# Patient Record
Sex: MALE | Race: WHITE | HISPANIC OR LATINO | Employment: FULL TIME | URBAN - METROPOLITAN AREA
[De-identification: names, ages, dates, MRNs, and addresses within clinical notes are randomized per-mention and may not be internally consistent; named-entity substitution may affect disease eponyms.]

---

## 2018-01-17 ENCOUNTER — HOSPITAL ENCOUNTER (EMERGENCY)
Facility: MEDICAL CENTER | Age: 58
End: 2018-01-17
Attending: EMERGENCY MEDICINE
Payer: COMMERCIAL

## 2018-01-17 ENCOUNTER — APPOINTMENT (OUTPATIENT)
Dept: RADIOLOGY | Facility: MEDICAL CENTER | Age: 58
End: 2018-01-17
Attending: EMERGENCY MEDICINE
Payer: COMMERCIAL

## 2018-01-17 VITALS
BODY MASS INDEX: 24.81 KG/M2 | OXYGEN SATURATION: 97 % | HEIGHT: 71 IN | SYSTOLIC BLOOD PRESSURE: 145 MMHG | TEMPERATURE: 98.1 F | WEIGHT: 177.25 LBS | RESPIRATION RATE: 15 BRPM | HEART RATE: 82 BPM | DIASTOLIC BLOOD PRESSURE: 79 MMHG

## 2018-01-17 DIAGNOSIS — F41.9 ANXIETY: ICD-10-CM

## 2018-01-17 DIAGNOSIS — R10.13 EPIGASTRIC PAIN: ICD-10-CM

## 2018-01-17 LAB
ALBUMIN SERPL BCP-MCNC: 4.7 G/DL (ref 3.2–4.9)
ALBUMIN/GLOB SERPL: 2 G/DL
ALP SERPL-CCNC: 56 U/L (ref 30–99)
ALT SERPL-CCNC: 24 U/L (ref 2–50)
ANION GAP SERPL CALC-SCNC: 11 MMOL/L (ref 0–11.9)
APTT PPP: 28.8 SEC (ref 24.7–36)
AST SERPL-CCNC: 21 U/L (ref 12–45)
BASOPHILS # BLD AUTO: 0.4 % (ref 0–1.8)
BASOPHILS # BLD: 0.03 K/UL (ref 0–0.12)
BILIRUB SERPL-MCNC: 0.8 MG/DL (ref 0.1–1.5)
BNP SERPL-MCNC: 7 PG/ML (ref 0–100)
BUN SERPL-MCNC: 8 MG/DL (ref 8–22)
CALCIUM SERPL-MCNC: 9.6 MG/DL (ref 8.5–10.5)
CHLORIDE SERPL-SCNC: 101 MMOL/L (ref 96–112)
CO2 SERPL-SCNC: 24 MMOL/L (ref 20–33)
CREAT SERPL-MCNC: 0.87 MG/DL (ref 0.5–1.4)
EKG IMPRESSION: NORMAL
EOSINOPHIL # BLD AUTO: 0.01 K/UL (ref 0–0.51)
EOSINOPHIL NFR BLD: 0.1 % (ref 0–6.9)
ERYTHROCYTE [DISTWIDTH] IN BLOOD BY AUTOMATED COUNT: 43.2 FL (ref 35.9–50)
GLOBULIN SER CALC-MCNC: 2.4 G/DL (ref 1.9–3.5)
GLUCOSE SERPL-MCNC: 108 MG/DL (ref 65–99)
HCT VFR BLD AUTO: 45 % (ref 42–52)
HGB BLD-MCNC: 15.5 G/DL (ref 14–18)
IMM GRANULOCYTES # BLD AUTO: 0.02 K/UL (ref 0–0.11)
IMM GRANULOCYTES NFR BLD AUTO: 0.3 % (ref 0–0.9)
INR PPP: 1.01 (ref 0.87–1.13)
LIPASE SERPL-CCNC: 21 U/L (ref 11–82)
LYMPHOCYTES # BLD AUTO: 1.53 K/UL (ref 1–4.8)
LYMPHOCYTES NFR BLD: 21.8 % (ref 22–41)
MCH RBC QN AUTO: 30.6 PG (ref 27–33)
MCHC RBC AUTO-ENTMCNC: 34.4 G/DL (ref 33.7–35.3)
MCV RBC AUTO: 88.9 FL (ref 81.4–97.8)
MONOCYTES # BLD AUTO: 0.67 K/UL (ref 0–0.85)
MONOCYTES NFR BLD AUTO: 9.5 % (ref 0–13.4)
NEUTROPHILS # BLD AUTO: 4.76 K/UL (ref 1.82–7.42)
NEUTROPHILS NFR BLD: 67.9 % (ref 44–72)
NRBC # BLD AUTO: 0 K/UL
NRBC BLD-RTO: 0 /100 WBC
PLATELET # BLD AUTO: 277 K/UL (ref 164–446)
PMV BLD AUTO: 9.8 FL (ref 9–12.9)
POTASSIUM SERPL-SCNC: 3.3 MMOL/L (ref 3.6–5.5)
PROT SERPL-MCNC: 7.1 G/DL (ref 6–8.2)
PROTHROMBIN TIME: 13 SEC (ref 12–14.6)
RBC # BLD AUTO: 5.06 M/UL (ref 4.7–6.1)
SODIUM SERPL-SCNC: 136 MMOL/L (ref 135–145)
TROPONIN I SERPL-MCNC: <0.01 NG/ML (ref 0–0.04)
WBC # BLD AUTO: 7 K/UL (ref 4.8–10.8)

## 2018-01-17 PROCEDURE — 83880 ASSAY OF NATRIURETIC PEPTIDE: CPT

## 2018-01-17 PROCEDURE — 84484 ASSAY OF TROPONIN QUANT: CPT

## 2018-01-17 PROCEDURE — 71045 X-RAY EXAM CHEST 1 VIEW: CPT | Performed by: EMERGENCY MEDICINE

## 2018-01-17 PROCEDURE — 71045 X-RAY EXAM CHEST 1 VIEW: CPT

## 2018-01-17 PROCEDURE — 85610 PROTHROMBIN TIME: CPT

## 2018-01-17 PROCEDURE — 83690 ASSAY OF LIPASE: CPT

## 2018-01-17 PROCEDURE — 93005 ELECTROCARDIOGRAM TRACING: CPT

## 2018-01-17 PROCEDURE — 90791 PSYCH DIAGNOSTIC EVALUATION: CPT

## 2018-01-17 PROCEDURE — 99285 EMERGENCY DEPT VISIT HI MDM: CPT

## 2018-01-17 PROCEDURE — 700102 HCHG RX REV CODE 250 W/ 637 OVERRIDE(OP): Performed by: EMERGENCY MEDICINE

## 2018-01-17 PROCEDURE — 93005 ELECTROCARDIOGRAM TRACING: CPT | Performed by: EMERGENCY MEDICINE

## 2018-01-17 PROCEDURE — A9270 NON-COVERED ITEM OR SERVICE: HCPCS | Performed by: EMERGENCY MEDICINE

## 2018-01-17 PROCEDURE — 85730 THROMBOPLASTIN TIME PARTIAL: CPT

## 2018-01-17 PROCEDURE — 80053 COMPREHEN METABOLIC PANEL: CPT

## 2018-01-17 PROCEDURE — 85025 COMPLETE CBC W/AUTO DIFF WBC: CPT

## 2018-01-17 RX ORDER — LORAZEPAM 0.5 MG/1
0.5 TABLET ORAL EVERY 8 HOURS PRN
Qty: 6 TAB | Refills: 0 | Status: SHIPPED | OUTPATIENT
Start: 2018-01-17 | End: 2018-01-19

## 2018-01-17 RX ORDER — HALOPERIDOL 0.5 MG/1
0.5 TABLET ORAL 2 TIMES DAILY
Qty: 4 TAB | Refills: 0 | Status: SHIPPED
Start: 2018-01-17 | End: 2018-07-19

## 2018-01-17 RX ORDER — HALOPERIDOL 5 MG/1
5 TABLET ORAL ONCE
Status: COMPLETED | OUTPATIENT
Start: 2018-01-17 | End: 2018-01-17

## 2018-01-17 RX ORDER — LORAZEPAM 1 MG/1
1 TABLET ORAL ONCE
Status: COMPLETED | OUTPATIENT
Start: 2018-01-17 | End: 2018-01-17

## 2018-01-17 RX ADMIN — HALOPERIDOL 5 MG: 5 TABLET ORAL at 19:00

## 2018-01-17 RX ADMIN — LORAZEPAM 1 MG: 1 TABLET ORAL at 15:45

## 2018-01-17 ASSESSMENT — PAIN SCALES - GENERAL: PAINLEVEL_OUTOF10: 0

## 2018-01-17 NOTE — ED NOTES
Pt extremely anxious denies pain at this time, denies SI and HI but is very scared something may happen. Patient is talking very quickly. 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 Pt has HX of anxiety.

## 2018-01-17 NOTE — ED NOTES
Visual acuity exam, R eye 20/20, L eye 20/20 both eyes 20/20.  Pt returned to room, ERP at bedside

## 2018-01-17 NOTE — ED NOTES
Pt ambulates to triage  Chief Complaint   Patient presents with   • Epigastric Pain   • Anxiety   • Fatigue   pt reports he works at [employer] and has been very busy  Denies drug or ETOH   C/o epigastric pain that started today, dull ache  Pt back for EKG

## 2018-01-17 NOTE — ED NOTES
"Pt is paranoid at this time. He asked many questions regarding the lorazepam, I aske dh if he was feeling paranoid and he stated A little bit yes\"  "
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
## 2018-01-18 NOTE — ED NOTES
Pt medicated per mar. Patient verbalizes understanding of discharge instructions. Patient ambulatory to discharge with steady gait, brother in tow. Patient will stay with brother for a few days
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
## 2018-01-18 NOTE — DISCHARGE INSTRUCTIONS
Abdominal Pain, Adult  Many things can cause abdominal pain. Usually, abdominal pain is not caused by a disease and will improve without treatment. It can often be observed and treated at home. Your health care provider will do a physical exam and possibly order blood tests and X-rays to help determine the seriousness of your pain. However, in many cases, more time must pass before a clear cause of the pain can be found. Before that point, your health care provider may not know if you need more testing or further treatment.  HOME CARE INSTRUCTIONS   Monitor your abdominal pain for any changes. The following actions may help to alleviate any discomfort you are experiencing:  · Only take over-the-counter or prescription medicines as directed by your health care provider.  · Do not take laxatives unless directed to do so by your health care provider.  · Try a clear liquid diet (broth, tea, or water) as directed by your health care provider. Slowly move to a bland diet as tolerated.  SEEK MEDICAL CARE IF:  · You have unexplained abdominal pain.  · You have abdominal pain associated with nausea or diarrhea.  · You have pain when you urinate or have a bowel movement.  · You experience abdominal pain that wakes you in the night.  · You have abdominal pain that is worsened or improved by eating food.  · You have abdominal pain that is worsened with eating fatty foods.  · You have a fever.  SEEK IMMEDIATE MEDICAL CARE IF:   · Your pain does not go away within 2 hours.  · You keep throwing up (vomiting).  · Your pain is felt only in portions of the abdomen, such as the right side or the left lower portion of the abdomen.  · You pass bloody or black tarry stools.  MAKE SURE YOU:  · Understand these instructions.    · Will watch your condition.    · Will get help right away if you are not doing well or get worse.       This information is not intended to replace advice given to you by your health care provider. Make sure you  discuss any questions you have with your health care provider.     Document Released: 09/27/2006 Document Revised: 01/08/2016 Document Reviewed: 08/27/2014  [employer] Interactive Patient Education ©2016 [employer]  Generalized Anxiety Disorder  Generalized anxiety disorder (CYNTHIA) is a mental disorder. It interferes with life functions, including relationships, work, and school.  CYNTHIA is different from normal anxiety, which everyone experiences at some point in their lives in response to specific life events and activities. Normal anxiety actually helps us prepare for and get through these life events and activities. Normal anxiety goes away after the event or activity is over.   CYNTHIA causes anxiety that is not necessarily related to specific events or activities. It also causes excess anxiety in proportion to specific events or activities. The anxiety associated with CYNTHIA is also difficult to control. CYNTHIA can vary from mild to severe. People with severe CYNTHIA can have intense waves of anxiety with physical symptoms (panic attacks).   SYMPTOMS  The anxiety and worry associated with CYNTHIA are difficult to control. This anxiety and worry are related to many life events and activities and also occur more days than not for 6 months or longer. People with CYNTHIA also have three or more of the following symptoms (one or more in children):  · Restlessness.    · Fatigue.  · Difficulty concentrating.    · Irritability.  · Muscle tension.  · Difficulty sleeping or unsatisfying sleep.  DIAGNOSIS  CYNTHIA is diagnosed through an assessment by your health care provider. Your health care provider will ask you questions about your mood, physical symptoms, and events in your life. Your health care provider may ask you about your medical history and use of alcohol or drugs, including prescription medicines. Your health care provider may also do a physical exam and blood tests. Certain medical conditions and the use of certain substances can cause  symptoms similar to those associated with CYNTHIA. Your health care provider may refer you to a mental health specialist for further evaluation.  TREATMENT  The following therapies are usually used to treat CYNTHIA:   · Medication. Antidepressant medication usually is prescribed for long-term daily control. Antianxiety medicines may be added in severe cases, especially when panic attacks occur.    · Talk therapy (psychotherapy). Certain types of talk therapy can be helpful in treating CYNTHIA by providing support, education, and guidance. A form of talk therapy called cognitive behavioral therapy can teach you healthy ways to think about and react to daily life events and activities.  · Stress management techniques. These include yoga, meditation, and exercise and can be very helpful when they are practiced regularly.  A mental health specialist can help determine which treatment is best for you. Some people see improvement with one therapy. However, other people require a combination of therapies.     This information is not intended to replace advice given to you by your health care provider. Make sure you discuss any questions you have with your health care provider.     Document Released: 04/14/2014 Document Revised: 01/08/2016 Document Reviewed: 04/14/2014  [employer] Interactive Patient Education ©2016 [employer]

## 2018-01-18 NOTE — DISCHARGE PLANNING
[facility]  Crisis/Safety Plan    Name:  Terrell Hensley  MRN:  [id number]  Date:  2018    Warning signs that a crisis may be developing for me or I may be at risk:  1) feel increasingly depressed and anxious  2) having increasing auditory hallucinations  3) having any thoughts of self harm    Coping strategies I can use on my own (relaxation, physical activity, etc):  1) try to exercise everyday   2) read for pleasure  3) listen to soft music    Ways I can make my environment safe:  1)absolutely no weapons in the house  2)seek help if problems get worst, right away  3)keep meds and sharps secure.    Things I want to tell myself when I feel a crisis developin) try to stay calm  2) remember there is help out there  3)get help before a crisis develops    People I can contact for support or distraction (and their phone numbers):  1) Jean Paul [phone]   2) Destiny [phone]  3)use numbers below    If I’m not able to reach my support people, or the above strategies don’t help, I can contact the following professionals, agencies, or hotlines:  1) Crisis Call Center ():  1-427-768-2055 -OR- (425) [phone]  2) Crisis Text Line ():  Text START to 876173  3)   4)     Omar Yeboah R.N.

## 2018-01-18 NOTE — ED PROVIDER NOTES
ED Provider Note    CHIEF COMPLAINT  Chief Complaint   Patient presents with   • Epigastric Pain   • Anxiety   • Fatigue       HPI  Terrell Hensley is a 57 y.o. male here for evaluation of epigastric pain and anxiety. The patient states that he's been having abdominal epigastric pain over the last 1-2 days, but that it does not radiate to his back, and is not associated with chest pain or shortness of breath. He has no vomiting, no fever, and no nausea. The patient does have a fair amount of anxiety, and states that he's been working a lot at his job, and is very stressful. He states that sometimes he feels as though he has trouble concentrating, but denies any suicidal or homicidal ideations at this time.    PAST MEDICAL HISTORY   see chart    SOCIAL HISTORY  Social History     Social History Main Topics   • Smoking status: Never Smoker   • Smokeless tobacco: Not on file   • Alcohol use Not on file   • Drug use: Unknown   • Sexual activity: Not on file       SURGICAL HISTORY  patient denies any surgical history    CURRENT MEDICATIONS  Home Medications    **Home medications have not yet been reviewed for this encounter**         ALLERGIES  Allergies   Allergen Reactions   • Sulfa Drugs        REVIEW OF SYSTEMS  See HPI for further details. Review of systems as above, otherwise all other systems are negative.     PHYSICAL EXAM  Constitutional: Well developed, well nourished. mild acute distress.  HEENT: Normocephalic, atraumatic. Posterior pharynx clear and moist.  Eyes:  EOMI. Normal sclera.  Neck: Supple, Full range of motion, nontender.  Chest/Pulmonary: clear to ausculation. Symmetrical expansion.   Cardio: Regular rate and rhythm with no murmur.   Abdomen: Soft, nontender. No peritoneal signs. No guarding. No palpable masses.  Musculoskeletal: No deformity, no edema, neurovascular intact.   Neuro: Clear speech, appropriate, cooperative, cranial nerves II-XII grossly intact.  Psych:  Tangential speech,  anxious    Results for orders placed or performed during the hospital encounter of 01/17/18   Troponin   Result Value Ref Range    Troponin I <0.01 0.00 - 0.04 ng/mL   Btype Natriuretic Peptide   Result Value Ref Range    B Natriuretic Peptide 7 0 - 100 pg/mL   CBC with Differential   Result Value Ref Range    WBC 7.0 4.8 - 10.8 K/uL    RBC 5.06 4.70 - 6.10 M/uL    Hemoglobin 15.5 14.0 - 18.0 g/dL    Hematocrit 45.0 42.0 - 52.0 %    MCV 88.9 81.4 - 97.8 fL    MCH 30.6 27.0 - 33.0 pg    MCHC 34.4 33.7 - 35.3 g/dL    RDW 43.2 35.9 - 50.0 fL    Platelet Count 277 164 - 446 K/uL    MPV 9.8 9.0 - 12.9 fL    Neutrophils-Polys 67.90 44.00 - 72.00 %    Lymphocytes 21.80 (L) 22.00 - 41.00 %    Monocytes 9.50 0.00 - 13.40 %    Eosinophils 0.10 0.00 - 6.90 %    Basophils 0.40 0.00 - 1.80 %    Immature Granulocytes 0.30 0.00 - 0.90 %    Nucleated RBC 0.00 /100 WBC    Neutrophils (Absolute) 4.76 1.82 - 7.42 K/uL    Lymphs (Absolute) 1.53 1.00 - 4.80 K/uL    Monos (Absolute) 0.67 0.00 - 0.85 K/uL    Eos (Absolute) 0.01 0.00 - 0.51 K/uL    Baso (Absolute) 0.03 0.00 - 0.12 K/uL    Immature Granulocytes (abs) 0.02 0.00 - 0.11 K/uL    NRBC (Absolute) 0.00 K/uL   Complete Metabolic Panel (CMP)   Result Value Ref Range    Sodium 136 135 - 145 mmol/L    Potassium 3.3 (L) 3.6 - 5.5 mmol/L    Chloride 101 96 - 112 mmol/L    Co2 24 20 - 33 mmol/L    Anion Gap 11.0 0.0 - 11.9    Glucose 108 (H) 65 - 99 mg/dL    Bun 8 8 - 22 mg/dL    Creatinine 0.87 0.50 - 1.40 mg/dL    Calcium 9.6 8.5 - 10.5 mg/dL    AST(SGOT) 21 12 - 45 U/L    ALT(SGPT) 24 2 - 50 U/L    Alkaline Phosphatase 56 30 - 99 U/L    Total Bilirubin 0.8 0.1 - 1.5 mg/dL    Albumin 4.7 3.2 - 4.9 g/dL    Total Protein 7.1 6.0 - 8.2 g/dL    Globulin 2.4 1.9 - 3.5 g/dL    A-G Ratio 2.0 g/dL   Prothrombin Time   Result Value Ref Range    PT 13.0 12.0 - 14.6 sec    INR 1.01 0.87 - 1.13   APTT   Result Value Ref Range    APTT 28.8 24.7 - 36.0 sec   Lipase   Result Value Ref Range    Lipase  21 11 - 82 U/L   ESTIMATED GFR   Result Value Ref Range    GFR If African American >60 >60 mL/min/1.73 m 2    GFR If Non African American >60 >60 mL/min/1.73 m 2   EKG (ER)   Result Value Ref Range    Report       [facility] Emergency Dept.    Test Date:  2018  Pt Name:    FAIZAN GARDNER             Department: ER  MRN:        [id number]                      Room:  Gender:     Male                         Technician: [id number]  :        1960                   Requested By:ER TRIAGE PROTOCOL  Order #:    [id number]                    Reading MD:    Measurements  Intervals                                Axis  Rate:       81                           P:          75  IN:         172                          QRS:        78  QRSD:       100                          T:          19  QT:         368  QTc:        428    Interpretive Statements  SINUS RHYTHM  No previous ECG available for comparison          PROCEDURES     MEDICAL RECORD  I have reviewed patient's medical record and pertinent results are listed above.    COURSE & MEDICAL DECISION MAKING  I have reviewed any medical record information, laboratory studies and radiographic results as noted above.    If you have had any blood pressure issues while here in the emergency department, please see your doctor for a further evaluation or work up.    5:50 PM  Omar GORDILLO, has seen the pt and states he is ok with sending the pt home.  The pt has no si/hi at this time, and we will send him with some medication for anxiety. The patient will be going home with his brother, who agrees to watch him.  He has no current pain, and is comfortable going home.     Differential diagnoses include but not limited to: mi, si/hi, gastritis,     This patient presents with anxiety and abdominal pain .  At this time, I have counseled the patient/family regarding their medications, pain control, and follow up.  They will continue their medications, if any, as  prescribed.  They will return immediately for any worsening symptoms and/or any other medical concerns.  They will see their doctor, or contact the doctor provided, in 1-2 days for follow up.       FINAL IMPRESSION  Anxiety  Abdominal pain        Electronically signed by: Paulo Reno, 1/17/2018 5:30 PM

## 2018-01-19 ENCOUNTER — PATIENT OUTREACH (OUTPATIENT)
Dept: HEALTH INFORMATION MANAGEMENT | Facility: OTHER | Age: 58
End: 2018-01-19

## 2018-01-19 NOTE — PROGRESS NOTES
01/19/2018 1042 - Discharge Outreach - received call from patient's sister, Mariana who patient is staying with temporarily. States patient only given small amt of Ativan and can't get in to see counselor until next week. Concerned that he will run out of Ativan. Advised to f/u with PCP. No other questions.

## 2018-03-14 ENCOUNTER — HOSPITAL ENCOUNTER (OUTPATIENT)
Dept: RADIOLOGY | Facility: MEDICAL CENTER | Age: 58
End: 2018-03-14
Attending: NURSE PRACTITIONER
Payer: COMMERCIAL

## 2018-03-14 DIAGNOSIS — R05.9 COUGH: ICD-10-CM

## 2018-03-14 PROCEDURE — 71046 X-RAY EXAM CHEST 2 VIEWS: CPT

## 2018-07-18 ENCOUNTER — HOSPITAL ENCOUNTER (EMERGENCY)
Facility: MEDICAL CENTER | Age: 58
End: 2018-07-19
Attending: EMERGENCY MEDICINE
Payer: COMMERCIAL

## 2018-07-18 DIAGNOSIS — T50.902A INTENTIONAL DRUG OVERDOSE, INITIAL ENCOUNTER (HCC): ICD-10-CM

## 2018-07-18 LAB
ALBUMIN SERPL BCP-MCNC: 4.2 G/DL (ref 3.2–4.9)
ALBUMIN/GLOB SERPL: 1.8 G/DL
ALP SERPL-CCNC: 61 U/L (ref 30–99)
ALT SERPL-CCNC: 20 U/L (ref 2–50)
AMPHET UR QL SCN: NEGATIVE
ANION GAP SERPL CALC-SCNC: 11 MMOL/L (ref 0–11.9)
APAP SERPL-MCNC: <10 UG/ML (ref 10–30)
AST SERPL-CCNC: 15 U/L (ref 12–45)
BARBITURATES UR QL SCN: NEGATIVE
BASOPHILS # BLD AUTO: 0.7 % (ref 0–1.8)
BASOPHILS # BLD: 0.04 K/UL (ref 0–0.12)
BENZODIAZ UR QL SCN: NEGATIVE
BILIRUB SERPL-MCNC: 0.4 MG/DL (ref 0.1–1.5)
BUN SERPL-MCNC: 18 MG/DL (ref 8–22)
BZE UR QL SCN: NEGATIVE
CALCIUM SERPL-MCNC: 9.6 MG/DL (ref 8.5–10.5)
CANNABINOIDS UR QL SCN: NEGATIVE
CHLORIDE SERPL-SCNC: 106 MMOL/L (ref 96–112)
CO2 SERPL-SCNC: 23 MMOL/L (ref 20–33)
CREAT SERPL-MCNC: 1.11 MG/DL (ref 0.5–1.4)
EOSINOPHIL # BLD AUTO: 0.05 K/UL (ref 0–0.51)
EOSINOPHIL NFR BLD: 0.9 % (ref 0–6.9)
ERYTHROCYTE [DISTWIDTH] IN BLOOD BY AUTOMATED COUNT: 40.5 FL (ref 35.9–50)
ETHANOL BLD-MCNC: 0 G/DL
GLOBULIN SER CALC-MCNC: 2.3 G/DL (ref 1.9–3.5)
GLUCOSE SERPL-MCNC: 80 MG/DL (ref 65–99)
HCT VFR BLD AUTO: 42 % (ref 42–52)
HGB BLD-MCNC: 14.4 G/DL (ref 14–18)
IMM GRANULOCYTES # BLD AUTO: 0.02 K/UL (ref 0–0.11)
IMM GRANULOCYTES NFR BLD AUTO: 0.4 % (ref 0–0.9)
LYMPHOCYTES # BLD AUTO: 1.47 K/UL (ref 1–4.8)
LYMPHOCYTES NFR BLD: 27.2 % (ref 22–41)
MCH RBC QN AUTO: 29.6 PG (ref 27–33)
MCHC RBC AUTO-ENTMCNC: 34.3 G/DL (ref 33.7–35.3)
MCV RBC AUTO: 86.2 FL (ref 81.4–97.8)
METHADONE UR QL SCN: NEGATIVE
MONOCYTES # BLD AUTO: 0.56 K/UL (ref 0–0.85)
MONOCYTES NFR BLD AUTO: 10.4 % (ref 0–13.4)
NEUTROPHILS # BLD AUTO: 3.27 K/UL (ref 1.82–7.42)
NEUTROPHILS NFR BLD: 60.4 % (ref 44–72)
NRBC # BLD AUTO: 0 K/UL
NRBC BLD-RTO: 0 /100 WBC
OPIATES UR QL SCN: NEGATIVE
OXYCODONE UR QL SCN: NEGATIVE
PCP UR QL SCN: NEGATIVE
PLATELET # BLD AUTO: 206 K/UL (ref 164–446)
PMV BLD AUTO: 9.6 FL (ref 9–12.9)
POTASSIUM SERPL-SCNC: 3.5 MMOL/L (ref 3.6–5.5)
PROPOXYPH UR QL SCN: NEGATIVE
PROT SERPL-MCNC: 6.5 G/DL (ref 6–8.2)
RBC # BLD AUTO: 4.87 M/UL (ref 4.7–6.1)
SALICYLATES SERPL-MCNC: 0 MG/DL (ref 15–25)
SODIUM SERPL-SCNC: 140 MMOL/L (ref 135–145)
WBC # BLD AUTO: 5.4 K/UL (ref 4.8–10.8)

## 2018-07-18 PROCEDURE — 36415 COLL VENOUS BLD VENIPUNCTURE: CPT

## 2018-07-18 PROCEDURE — 99285 EMERGENCY DEPT VISIT HI MDM: CPT

## 2018-07-18 PROCEDURE — 304561 HCHG STAT O2

## 2018-07-18 PROCEDURE — 85025 COMPLETE CBC W/AUTO DIFF WBC: CPT

## 2018-07-18 PROCEDURE — 80053 COMPREHEN METABOLIC PANEL: CPT

## 2018-07-18 PROCEDURE — 93005 ELECTROCARDIOGRAM TRACING: CPT | Performed by: EMERGENCY MEDICINE

## 2018-07-18 PROCEDURE — 80307 DRUG TEST PRSMV CHEM ANLYZR: CPT

## 2018-07-18 PROCEDURE — 700111 HCHG RX REV CODE 636 W/ 250 OVERRIDE (IP): Performed by: EMERGENCY MEDICINE

## 2018-07-19 VITALS
WEIGHT: 155 LBS | SYSTOLIC BLOOD PRESSURE: 129 MMHG | DIASTOLIC BLOOD PRESSURE: 84 MMHG | RESPIRATION RATE: 16 BRPM | BODY MASS INDEX: 21.7 KG/M2 | TEMPERATURE: 97.5 F | OXYGEN SATURATION: 97 % | HEART RATE: 89 BPM | HEIGHT: 71 IN

## 2018-07-19 PROCEDURE — 93005 ELECTROCARDIOGRAM TRACING: CPT | Performed by: EMERGENCY MEDICINE

## 2018-07-19 PROCEDURE — 700102 HCHG RX REV CODE 250 W/ 637 OVERRIDE(OP): Performed by: EMERGENCY MEDICINE

## 2018-07-19 PROCEDURE — A9270 NON-COVERED ITEM OR SERVICE: HCPCS | Performed by: EMERGENCY MEDICINE

## 2018-07-19 PROCEDURE — 90791 PSYCH DIAGNOSTIC EVALUATION: CPT

## 2018-07-19 RX ORDER — QUETIAPINE FUMARATE 25 MG/1
300 TABLET, FILM COATED ORAL
COMMUNITY

## 2018-07-19 RX ORDER — MIRTAZAPINE 30 MG/1
45 TABLET, FILM COATED ORAL NIGHTLY
Status: SHIPPED | COMMUNITY
End: 2023-09-11

## 2018-07-19 RX ORDER — ALPRAZOLAM 0.5 MG/1
0.5 TABLET ORAL 3 TIMES DAILY
Status: SHIPPED | COMMUNITY
End: 2022-01-21

## 2018-07-19 RX ORDER — LORAZEPAM 1 MG/1
1 TABLET ORAL ONCE
Status: COMPLETED | OUTPATIENT
Start: 2018-07-19 | End: 2018-07-19

## 2018-07-19 RX ADMIN — LORAZEPAM 1 MG: 1 TABLET ORAL at 05:30

## 2018-07-19 NOTE — ED NOTES
Med rec complete per pt and pt home pharmacy   Allergies reviewed  No oral ABX within last 30 days

## 2018-07-19 NOTE — ED NOTES
Patient is no longer lethargic and ambulatory    [place] 1 Fall Risk Assessment Tool     Present to ED because of fall  No  (Syncope, seizure, or ALOC)  Age>70   No  Altered Mental Status:  (Intoxicated with Alcohol or Substance Confusion,  Inability to follow instructions, disorientation) Yes  Impaired Mobility:  Ambulates or transfers with assistive devices or assist  Ambulates with unsteady gait and no assistance  Unable to ambulate or transfer   No  Nursing Judgement  (Bowel or bladder incontinence, diarrhea, urinary   frequency or urgency, leg weakness, orthostatic   hypotension, dizziness or vertigo, narcotic use).   Yes   Fall Risk Interventions     Move the patient closer to the nurse's station (sitter outside of door)  Familiarize the patient with environment.  Keep patient's personal possessions within patients safe reach  Place stretcher in low position and brakes locked  Place none slip socks and armband on patient  Place green triangle on patient's door  Give patient urinal if applicable  Keep floor surfaces clean and dry.  Keep patient care areas uncluttered.  Assess patient hourly for: Pain, Personal Needs, Position change, and call   light access    Patient educated on fall prevention.

## 2018-07-19 NOTE — ED PROVIDER NOTES
"ED Provider Note    Scribed for Thai Mayen M.D. by Nas Perez. 7/18/2018, 9:05 PM.    Primary care provider: Car Rivera M.D.  Means of arrival: Ambulance  History obtained from: Patient  History limited by: Patient's condition    CHIEF COMPLAINT  Chief Complaint   Patient presents with   • Drug Overdose     Patient took 15 pills. 75mg Doxepin at around 2000   • Suicide Attempt     Patient OD'd 2/2 \"wife  him\"       HPI  Terrell Hensley is a 57 y.o. male who presents to the Emergency Department for evaluation after a drug overdose 1 hour ago. Patient was very anxious when he was brought in. Patient states that he took the pills in a suicide attempt and did not take anything else.    The history is limited by the patient's condition    REVIEW OF SYSTEMS  Pertinent positives include suicidal ideations. Pertinent negatives include no fever.  E    The review of systems is limited by the patient's conditions.    PAST MEDICAL HISTORY  None noted.    SURGICAL HISTORY  patient denies any surgical history    SOCIAL HISTORY  Social History   Substance Use Topics   • Smoking status: Never Smoker       FAMILY HISTORY  None noted.    CURRENT MEDICATIONS    Current Facility-Administered Medications:   •  sodium bicarbonate 8.4 % injection 50 mEq, 50 mEq, Intravenous, Once, Thai Mayen M.D., Stopped at 07/18/18 2113  •  sodium bicarbonate 8.4 % 150 mEq in D5W 1,000 mL infusion, , Intravenous, Continuous, Thai Mayen M.D.    Current Outpatient Prescriptions:   •  haloperidol (HALDOL) 0.5 MG Tab, Take 1 Tab by mouth 2 Times a Day., Disp: 4 Tab, Rfl: 0      ALLERGIES  Allergies   Allergen Reactions   • Sulfa Drugs        PHYSICAL EXAM  VITAL SIGNS: /79   Pulse 91   Temp 36.3 °C (97.3 °F)   Resp 16   Ht 1.803 m (5' 11\")   Wt 70.3 kg (155 lb)   SpO2 97%   BMI 21.62 kg/m²     Constitutional: Well developed, Well nourished, No acute distress  HENT: Normocephalic, Atraumatic, no erythema, exudates, " swelling, or masses, nares patent  Eyes: nonicteric  Neck: Supple, no meningismus  Lymphatic: No lymphadenopathy noted.   Cardiovascular: Regular rate and rhythm, no gallops rubs or murmurs  Lungs: Clear bilaterally   Abdomen: Bowel sounds normal, Soft, No tenderness  Skin: Warm, Dry, no rash  Back: No tenderness, No CVA tenderness.   Genitalia: Deferred  Rectal: Deferred  Extremities: No edema  Neurologic: Alert, appropriate, follows commands, moving all extremities, normal speech, able to relay his name   Psychiatric: Affect normal, lethargic but arouses to verbal stimuli    DIAGNOSTIC STUDIES / PROCEDURES    LABS  Results for orders placed or performed during the hospital encounter of 18   CBC WITH DIFFERENTIAL   Result Value Ref Range    WBC 5.4 4.8 - 10.8 K/uL    RBC 4.87 4.70 - 6.10 M/uL    Hemoglobin 14.4 14.0 - 18.0 g/dL    Hematocrit 42.0 42.0 - 52.0 %    MCV 86.2 81.4 - 97.8 fL    MCH 29.6 27.0 - 33.0 pg    MCHC 34.3 33.7 - 35.3 g/dL    RDW 40.5 35.9 - 50.0 fL    Platelet Count 206 164 - 446 K/uL    MPV 9.6 9.0 - 12.9 fL    Neutrophils-Polys 60.40 44.00 - 72.00 %    Lymphocytes 27.20 22.00 - 41.00 %    Monocytes 10.40 0.00 - 13.40 %    Eosinophils 0.90 0.00 - 6.90 %    Basophils 0.70 0.00 - 1.80 %    Immature Granulocytes 0.40 0.00 - 0.90 %    Nucleated RBC 0.00 /100 WBC    Neutrophils (Absolute) 3.27 1.82 - 7.42 K/uL    Lymphs (Absolute) 1.47 1.00 - 4.80 K/uL    Monos (Absolute) 0.56 0.00 - 0.85 K/uL    Eos (Absolute) 0.05 0.00 - 0.51 K/uL    Baso (Absolute) 0.04 0.00 - 0.12 K/uL    Immature Granulocytes (abs) 0.02 0.00 - 0.11 K/uL    NRBC (Absolute) 0.00 K/uL   EKG (ER)   Result Value Ref Range    Report       [facility] Emergency Dept.    Test Date:  2018  Pt Name:    FAIZAN GARDNER             Department:   MRN:        [id number]                      Room:       [facility]  Gender:     Male                         Technician: [id number]  :        1960                    Requested By:CORNELIUS MORALEZ  Order #:    [id number]                    Reading MD:    Measurements  Intervals                                Axis  Rate:       87                           P:          63  MO:         168                          QRS:        80  QRSD:       100                          T:          39  QT:         368  QTc:        443    Interpretive Statements  SINUS RHYTHM  BASELINE WANDER IN LEAD(S) V2  Compared to ECG 01/17/2018 12:46:37  No significant changes         All labs reviewed by me.    EKG:   Obtained at 9:11 PM  Normal Sinus rhythm   Rate 87  Axis normal   Intervals normal   No ST segment elevation or depression.     EKG:   Obtained at 10:12 PM  Normal Sinus rhythm   Rate 82  Axis normal   Intervals normal   No ST segment elevation or depression.     EKG:   Obtained at 11:14 PM  Normal Sinus rhythm   Rate 82  Axis normal   Intervals normal   No ST segment elevation or depression.     EKG:   Obtained at 12:18 PM  Normal Sinus rhythm   Rate 75  Axis normal   Intervals normal   No ST segment elevation or depression.     COURSE & MEDICAL DECISION MAKING  Nursing notes, VS, PMSFHx reviewed in chart.     9:05 PM Patient seen and examined at bedside. The patient presents with drug overdose as a suicide attempt and the differential diagnosis includes but is not limited to . Patient was treated with Sodium bicarbonate 50 mEq for his symptoms.     10:12 PM Patient was rechecked and was still lethargic. A repeat EKG was ordered.    Decision Making:  This is a 57 y.o. year old male who presents with a suicide attempt. The patient took a tricyclic antidepressant. Poison control was contacted and they recommended serial EKGs-thus far they have all been unremarkable with a normal QRS duration. Patient's labs are reassuring and vitals are as well. He is somewhat somnolent but arouses to verbal stimuli. Patient's aspirin and Tylenol are negative and alcohol is negative. Pending medical clearance patient will  be evaluated by life skills and transferred for further psychiatric care.    12:30 AM-patient has had serial EKGs here which have been normal. He will be medically cleared. He is somewhat somnolent given the overdose. He will be seen by life skills.      FINAL IMPRESSION  1. Intentional drug overdose, initial encounter ([facility])         [unfilled]    [unfilled]    [unfilled]    The note accurately reflects work and decisions made by me.  Thai Mayen  7/18/2018  11:48 PM

## 2018-07-19 NOTE — ED PROVIDER NOTES
ED Provider Note    Patient signed out from Dr. Mayen pending life skills evaluation.  Please see his note for the initial evaluation and management.  Patient was evaluated by life skills and will be transferred to [facility] for further care.  Patient has been cooperative during his ED stay in no acute distress and nontoxic in appearance.  EKG and labs are unremarkable and he has been hemodynamically stable.  He is medically cleared for psychiatric care.

## 2018-07-19 NOTE — ED NOTES
One bag of Pt belongings returned to Pt. One bottle of Doxepin retrieved from central pharmacy and given to [facility].

## 2018-07-19 NOTE — ED TRIAGE NOTES
"Chief Complaint  Patient presents with  • Drug Overdose    Patient took 15 pills. 75mg Doxepin at around 2000  • Suicide Attempt    Patient OD'd 2/2 \"wife  him\"  Intially very anxious but became lethargic during interview. ERP at bedside.     RASS -1, oriented, resp shallow, +2 radial pulse    "

## 2018-07-19 NOTE — DISCHARGE PLANNING
Medical Social Work    Referral: Legal Hold    Intervention: Legal Hold Paperwork given to SW by [facility] RN Gregorio Jamil    Legal Hold Initiated: Date: 07- Time: 0010    Patient’s Insurance Listed on Face Sheet: [employer]    Referrals sent to: [facility], [facility]    This referral contains the following information:  1) Face sheet ___x_  2) Page 1 and Page 2 of Legal Hold ___x_  3) Alert Team Assessment/Psych Assessment __x__  4) Head to toe physical exam _x___  5) Urine Drug Screen __x__  6) Blood Alcohol __x__  7) Vital signs ____x  8) Pregnancy test when applicable _NA__  9) Medications list _x___    Plan: Patient will transfer to mental health facility once acceptance is obtained

## 2018-07-19 NOTE — ED NOTES
RAYMUNDO Sanchez is aware a note is needed stating that the patient IS medically clear. Patient will then have placement at [facility]   (E4) spontaneous

## 2018-07-19 NOTE — DISCHARGE PLANNING
Medical Social Work    Referral: Legal hold Transfer to Mental Health Facility    Intervention: KELLY received call from Will from [facility] stating that they have accepted the patient for admission.     Pt's accepting physcian is Dr. Anurag MENDOZA arranged for transportation to be set up through [facility]    The pt will be picked up at 1000.     KELLY notified the RN of the departure time as well as accepting facility.     KELLY created transfer packet and placed on chart.     Plan: Pt will transfer back to [facility] at 1000
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
## 2018-08-06 ENCOUNTER — TELEPHONE (OUTPATIENT)
Dept: PEDIATRICS | Facility: CLINIC | Age: 58
End: 2018-08-06

## 2018-08-06 NOTE — TELEPHONE ENCOUNTER
1. Caller Name: Terrell                                         Call Back Number: [phone] (home)         Patient approves a detailed voicemail message: N\A    Terrell called asking if Dr. Nieto can please make him a letter for work saying he is able to go back and work his full normal shift. Terrell would like the letter to be faxed to [phone].

## 2018-08-08 LAB
EKG IMPRESSION: NORMAL

## 2019-09-19 ENCOUNTER — OFFICE VISIT (OUTPATIENT)
Dept: URGENT CARE | Facility: CLINIC | Age: 59
End: 2019-09-19
Payer: COMMERCIAL

## 2019-09-19 ENCOUNTER — HOSPITAL ENCOUNTER (OUTPATIENT)
Dept: RADIOLOGY | Facility: MEDICAL CENTER | Age: 59
End: 2019-09-19
Attending: NURSE PRACTITIONER
Payer: COMMERCIAL

## 2019-09-19 ENCOUNTER — HOSPITAL ENCOUNTER (OUTPATIENT)
Dept: LAB | Facility: MEDICAL CENTER | Age: 59
End: 2019-09-19
Attending: NURSE PRACTITIONER
Payer: COMMERCIAL

## 2019-09-19 VITALS
SYSTOLIC BLOOD PRESSURE: 104 MMHG | OXYGEN SATURATION: 97 % | BODY MASS INDEX: 26.8 KG/M2 | HEART RATE: 70 BPM | DIASTOLIC BLOOD PRESSURE: 64 MMHG | WEIGHT: 191.4 LBS | HEIGHT: 71 IN | TEMPERATURE: 98.6 F | RESPIRATION RATE: 20 BRPM

## 2019-09-19 DIAGNOSIS — R10.32 LLQ PAIN: ICD-10-CM

## 2019-09-19 DIAGNOSIS — K52.9 COLITIS: ICD-10-CM

## 2019-09-19 DIAGNOSIS — K92.1 BLOOD IN STOOL: ICD-10-CM

## 2019-09-19 LAB
ALBUMIN SERPL BCP-MCNC: 4.3 G/DL (ref 3.2–4.9)
ALBUMIN/GLOB SERPL: 1.5 G/DL
ALP SERPL-CCNC: 86 U/L (ref 30–99)
ALT SERPL-CCNC: 26 U/L (ref 2–50)
ANION GAP SERPL CALC-SCNC: 15 MMOL/L (ref 0–11.9)
AST SERPL-CCNC: 22 U/L (ref 12–45)
BASOPHILS # BLD AUTO: 0.3 % (ref 0–1.8)
BASOPHILS # BLD: 0.04 K/UL (ref 0–0.12)
BILIRUB SERPL-MCNC: 0.5 MG/DL (ref 0.1–1.5)
BUN SERPL-MCNC: 16 MG/DL (ref 8–22)
CALCIUM SERPL-MCNC: 9.6 MG/DL (ref 8.4–10.2)
CHLORIDE SERPL-SCNC: 99 MMOL/L (ref 96–112)
CO2 SERPL-SCNC: 23 MMOL/L (ref 20–33)
CREAT SERPL-MCNC: 0.95 MG/DL (ref 0.5–1.4)
EOSINOPHIL # BLD AUTO: 0.01 K/UL (ref 0–0.51)
EOSINOPHIL NFR BLD: 0.1 % (ref 0–6.9)
ERYTHROCYTE [DISTWIDTH] IN BLOOD BY AUTOMATED COUNT: 40.3 FL (ref 35.9–50)
GLOBULIN SER CALC-MCNC: 2.9 G/DL (ref 1.9–3.5)
GLUCOSE SERPL-MCNC: 130 MG/DL (ref 65–99)
HCT VFR BLD AUTO: 49.1 % (ref 42–52)
HGB BLD-MCNC: 16.6 G/DL (ref 14–18)
IMM GRANULOCYTES # BLD AUTO: 0.04 K/UL (ref 0–0.11)
IMM GRANULOCYTES NFR BLD AUTO: 0.3 % (ref 0–0.9)
LYMPHOCYTES # BLD AUTO: 0.91 K/UL (ref 1–4.8)
LYMPHOCYTES NFR BLD: 7.7 % (ref 22–41)
MCH RBC QN AUTO: 30.1 PG (ref 27–33)
MCHC RBC AUTO-ENTMCNC: 33.8 G/DL (ref 33.7–35.3)
MCV RBC AUTO: 89.1 FL (ref 81.4–97.8)
MONOCYTES # BLD AUTO: 0.61 K/UL (ref 0–0.85)
MONOCYTES NFR BLD AUTO: 5.2 % (ref 0–13.4)
NEUTROPHILS # BLD AUTO: 10.15 K/UL (ref 1.82–7.42)
NEUTROPHILS NFR BLD: 86.4 % (ref 44–72)
NRBC # BLD AUTO: 0 K/UL
NRBC BLD-RTO: 0 /100 WBC
PLATELET # BLD AUTO: 208 K/UL (ref 164–446)
PMV BLD AUTO: 9.4 FL (ref 9–12.9)
POTASSIUM SERPL-SCNC: 4.3 MMOL/L (ref 3.6–5.5)
PROT SERPL-MCNC: 7.2 G/DL (ref 6–8.2)
RBC # BLD AUTO: 5.51 M/UL (ref 4.7–6.1)
SODIUM SERPL-SCNC: 137 MMOL/L (ref 135–145)
WBC # BLD AUTO: 11.8 K/UL (ref 4.8–10.8)

## 2019-09-19 PROCEDURE — 700117 HCHG RX CONTRAST REV CODE 255: Performed by: NURSE PRACTITIONER

## 2019-09-19 PROCEDURE — 36415 COLL VENOUS BLD VENIPUNCTURE: CPT

## 2019-09-19 PROCEDURE — 80053 COMPREHEN METABOLIC PANEL: CPT

## 2019-09-19 PROCEDURE — 85025 COMPLETE CBC W/AUTO DIFF WBC: CPT

## 2019-09-19 PROCEDURE — 99205 OFFICE O/P NEW HI 60 MIN: CPT | Performed by: NURSE PRACTITIONER

## 2019-09-19 PROCEDURE — 74177 CT ABD & PELVIS W/CONTRAST: CPT

## 2019-09-19 RX ORDER — HYDROXYZINE 50 MG/1
50 TABLET, FILM COATED ORAL 3 TIMES DAILY PRN
COMMUNITY
End: 2023-09-11

## 2019-09-19 RX ORDER — METRONIDAZOLE 500 MG/1
500 TABLET ORAL 3 TIMES DAILY
Qty: 21 TAB | Refills: 0 | Status: SHIPPED | OUTPATIENT
Start: 2019-09-19 | End: 2019-09-26

## 2019-09-19 RX ORDER — AMLODIPINE BESYLATE 10 MG/1
10 TABLET ORAL DAILY
COMMUNITY

## 2019-09-19 RX ORDER — BISMUTH SUBSALICYLATE 262 MG/1
524 TABLET, CHEWABLE ORAL
COMMUNITY
End: 2023-09-11

## 2019-09-19 RX ORDER — AMLODIPINE BESYLATE 5 MG/1
5 TABLET ORAL DAILY
COMMUNITY

## 2019-09-19 RX ORDER — CLOZAPINE 25 MG/1
22.5 TABLET ORAL DAILY
COMMUNITY
End: 2023-09-11

## 2019-09-19 RX ADMIN — IOHEXOL 25 ML: 240 INJECTION, SOLUTION INTRATHECAL; INTRAVASCULAR; INTRAVENOUS; ORAL at 17:53

## 2019-09-19 RX ADMIN — IOHEXOL 100 ML: 350 INJECTION, SOLUTION INTRAVENOUS at 17:52

## 2019-09-19 ASSESSMENT — ENCOUNTER SYMPTOMS
BLOOD IN STOOL: 1
ABDOMINAL PAIN: 1
FEVER: 0
CHILLS: 0

## 2019-09-19 ASSESSMENT — PATIENT HEALTH QUESTIONNAIRE - PHQ9: CLINICAL INTERPRETATION OF PHQ2 SCORE: 0

## 2019-09-19 NOTE — PROGRESS NOTES
Subjective:      Terrell Hensley is a 59 y.o. male who presents with LLQ Pain (diarrhea with blood x today)    History reviewed. No pertinent past medical history.     Social History     Socioeconomic History   • Marital status:      Spouse name: Not on file   • Number of children: Not on file   • Years of education: Not on file   • Highest education level: Not on file   Occupational History   • Not on file   Social Needs   • Financial resource strain: Not on file   • Food insecurity:     Worry: Not on file     Inability: Not on file   • Transportation needs:     Medical: Not on file     Non-medical: Not on file   Tobacco Use   • Smoking status: Never Smoker   • Smokeless tobacco: Never Used   Substance and Sexual Activity   • Alcohol use: Yes     Comment: occ   • Drug use: Never   • Sexual activity: Not on file   Lifestyle   • Physical activity:     Days per week: Not on file     Minutes per session: Not on file   • Stress: Not on file   Relationships   • Social connections:     Talks on phone: Not on file     Gets together: Not on file     Attends Sikh service: Not on file     Active member of club or organization: Not on file     Attends meetings of clubs or organizations: Not on file     Relationship status: Not on file   • Intimate partner violence:     Fear of current or ex partner: Not on file     Emotionally abused: Not on file     Physically abused: Not on file     Forced sexual activity: Not on file   Other Topics Concern   • Not on file   Social History Narrative   • Not on file     History reviewed. No pertinent family history.    Allergie: Sulfa drugs    Patient is a 59-year-old male who presents today with complaint of onset of left lower quadrant pain approximately 4:00 this morning.  States he felt bloated and constipated and attempted to have bowel movements several times.  He finally did begin having episodes of diarrhea which contained he estimates about 50% blood and 50% stool.   "States he states that time he has attempted to have a bowel movement he has noted bright red blood.  Positive prior history of hemorrhoids, states he has not noted any external hemorrhoids with this.  Continues to have left lower quadrant tenderness with nausea but no vomiting.  Appetite has been decreased.          LLQ Pain   This is a new problem. The current episode started in the past 7 days. The onset quality is undetermined. The problem occurs constantly. The pain is located in the LLQ. The pain is moderate. Pertinent negatives include no fever. Nothing aggravates the pain. The pain is relieved by nothing. The treatment provided no relief.       Review of Systems   Constitutional: Positive for malaise/fatigue. Negative for chills and fever.   Gastrointestinal: Positive for abdominal pain and blood in stool.   All other systems reviewed and are negative.         Objective:     /64 (BP Location: Left arm, Patient Position: Sitting, BP Cuff Size: Adult)   Pulse 70   Temp 37 °C (98.6 °F) (Temporal)   Resp 20   Ht 1.803 m (5' 11\")   Wt 86.8 kg (191 lb 6.4 oz)   SpO2 97%   BMI 26.69 kg/m²      Physical Exam   Constitutional: He is oriented to person, place, and time. He appears well-developed and well-nourished.   Neck: Normal range of motion. Neck supple.   Cardiovascular: Normal rate and regular rhythm.   Pulmonary/Chest: Effort normal and breath sounds normal.   Abdominal: Soft. Bowel sounds are normal. He exhibits no distension and no mass. There is tenderness. There is no rebound.   Genitourinary:   Genitourinary Comments: No external hemorrhoids noted. Old dried blood present to the external rectum.    Musculoskeletal: Normal range of motion.   Neurological: He is alert and oriented to person, place, and time.   Skin: Skin is warm and dry.   Psychiatric: He has a normal mood and affect. His behavior is normal. Judgment and thought content normal.   Vitals reviewed.              Assessment/Plan: "     1. Blood in stool  2. LLQ pain    CBC, CMP  CT abd-pelvis with contrast

## 2019-09-20 NOTE — PROGRESS NOTES
Patient notified at 7:40 PM of results of CT scan and labs.  On CT scan, he is noted to have colitis, no diverticulitis identified.  No perforation identified.  Patient states he is still having pain.  I did consult urgent care medical staff  Dr. Vences regarding this patient.  At this time I will cover the patient with Flagyl and he will follow up with his PCP next week. Strict ER precautions for increasing pain or continued blood in stool.  Patient verbalized understanding and agreement with plan of care.

## 2021-03-15 DIAGNOSIS — Z23 NEED FOR VACCINATION: ICD-10-CM

## 2022-01-21 ENCOUNTER — OFFICE VISIT (OUTPATIENT)
Dept: URGENT CARE | Facility: PHYSICIAN GROUP | Age: 62
End: 2022-01-21
Payer: COMMERCIAL

## 2022-01-21 VITALS
BODY MASS INDEX: 28 KG/M2 | TEMPERATURE: 98.7 F | HEART RATE: 77 BPM | DIASTOLIC BLOOD PRESSURE: 66 MMHG | OXYGEN SATURATION: 97 % | SYSTOLIC BLOOD PRESSURE: 124 MMHG | WEIGHT: 200 LBS | RESPIRATION RATE: 16 BRPM | HEIGHT: 71 IN

## 2022-01-21 DIAGNOSIS — R19.7 DIARRHEA, UNSPECIFIED TYPE: ICD-10-CM

## 2022-01-21 LAB
EXTERNAL QUALITY CONTROL: NORMAL
SARS-COV+SARS-COV-2 AG RESP QL IA.RAPID: NEGATIVE

## 2022-01-21 PROCEDURE — 87426 SARSCOV CORONAVIRUS AG IA: CPT | Performed by: PHYSICIAN ASSISTANT

## 2022-01-21 PROCEDURE — 99213 OFFICE O/P EST LOW 20 MIN: CPT | Performed by: PHYSICIAN ASSISTANT

## 2022-01-21 RX ORDER — OXYBUTYNIN CHLORIDE 15 MG/1
15 TABLET, EXTENDED RELEASE ORAL DAILY
Status: ON HOLD | COMMUNITY

## 2022-01-21 RX ORDER — TAMSULOSIN HYDROCHLORIDE 0.4 MG/1
0.4 CAPSULE ORAL DAILY
Status: ON HOLD | COMMUNITY

## 2022-01-21 ASSESSMENT — ENCOUNTER SYMPTOMS
NAUSEA: 0
SPUTUM PRODUCTION: 0
CONSTIPATION: 0
COUGH: 0
VOMITING: 0
ABDOMINAL PAIN: 1
BLOOD IN STOOL: 0
MYALGIAS: 0
FLANK PAIN: 0
SHORTNESS OF BREATH: 0
SORE THROAT: 0
DIZZINESS: 0
CHILLS: 0
FEVER: 0
HEADACHES: 0
DIARRHEA: 1
WHEEZING: 0

## 2022-01-22 NOTE — PROGRESS NOTES
"Subjective:   Terrell Hensley is a 61 y.o. male who presents for Diarrhea      HPI  61 y.o. male presents to urgent care with new problem to provider of diarrhea generalized abdominal pain onset about 3 days ago.  Patient denies associated symptoms including fevers, nausea, vomiting.  He denies symptoms of URI including cough.  No confirmed exposure to COVID-19.  Patient is vaccinated for COVID.  Patient reports history of colitis diagnosed about 2 years ago on CT.  He denies blood in stool.  Taking Pepto-Bismol with some symptomatic relief.  Patient reports he takes probiotics regularly.    Review of Systems   Constitutional: Negative for chills, fever and malaise/fatigue.   HENT: Negative for congestion and sore throat.    Respiratory: Negative for cough, sputum production, shortness of breath and wheezing.    Cardiovascular: Negative for chest pain.   Gastrointestinal: Positive for abdominal pain and diarrhea. Negative for blood in stool, constipation, melena, nausea and vomiting.   Genitourinary: Negative for dysuria and flank pain.   Musculoskeletal: Negative for myalgias.   Neurological: Negative for dizziness and headaches.       There is no problem list on file for this patient.    History reviewed. No pertinent surgical history.  Social History     Tobacco Use   • Smoking status: Never Smoker   • Smokeless tobacco: Never Used   Substance Use Topics   • Alcohol use: Yes     Comment: occ   • Drug use: Never      History reviewed. No pertinent family history.   (Allergies, Medications, & Tobacco/Substance Use were reconciled by the Medical Assistant and reviewed by myself. The family history is prepopulated)     Objective:     /66 (BP Location: Left arm)   Pulse 77   Temp 37.1 °C (98.7 °F)   Resp 16   Ht 1.803 m (5' 11\")   Wt 90.7 kg (200 lb)   SpO2 97%   BMI 27.89 kg/m²     Physical Exam  Vitals reviewed.   Constitutional:       Appearance: Normal appearance. He is well-developed.   HENT: "      Head: Normocephalic and atraumatic.   Eyes:      Conjunctiva/sclera: Conjunctivae normal.   Cardiovascular:      Rate and Rhythm: Normal rate.   Pulmonary:      Effort: Pulmonary effort is normal. No respiratory distress.   Abdominal:      General: Bowel sounds are increased. There is no distension.      Palpations: Abdomen is soft.      Tenderness: There is abdominal tenderness in the left upper quadrant and left lower quadrant. There is no guarding or rebound.   Musculoskeletal:      Cervical back: Normal range of motion and neck supple.   Skin:     General: Skin is warm and dry.      Findings: No erythema.   Neurological:      Mental Status: He is alert and oriented to person, place, and time.   Psychiatric:         Mood and Affect: Mood normal.         Behavior: Behavior normal.         Thought Content: Thought content normal.         Judgment: Judgment normal.         Assessment/Plan:     1. Diarrhea, unspecified type  POCT SARS-COV Antigen ARRON (Symptomatic Only)     Results for orders placed or performed in visit on 01/21/22   POCT SARS-COV Antigen ARRON (Symptomatic Only)   Result Value Ref Range    Internal  Valid     SARS-COV ANTIGEN ARRON Negative        Patient presents with 3 days of diarrhea and nonspecific mild abdominal pain.  He has no concerning symptoms of nausea, vomiting, fevers, or blood in stools.  He has no focal abdominal tenderness on exam.  There is no indication for diagnostic imaging or labs at this time.  Patient given strict return precautions.  COVID-19 testing is negative in clinic today, consider viral gastroenteritis.  Drink plenty of fluids and rest.  Recommend electrolyte replacement such as Pedialyte.    Differential diagnosis, natural history, supportive care, and indications for immediate follow-up discussed.    Advised the patient to follow-up with the primary care physician for recheck, reevaluation, and consideration of further management.  Patient verbalized  understanding of treatment plan and has no further questions regarding care.     I personally reviewed prior external notes and test results pertinent to today's visit.   Please note that this dictation was created using voice recognition software. I have made a reasonable attempt to correct obvious errors, but I expect that there are errors of grammar and possibly content that I did not discover before finalizing the note.    This note was electronically signed by Pita Jay PA-C

## 2022-07-17 ENCOUNTER — OFFICE VISIT (OUTPATIENT)
Dept: URGENT CARE | Facility: PHYSICIAN GROUP | Age: 62
End: 2022-07-17
Payer: COMMERCIAL

## 2022-07-17 VITALS
TEMPERATURE: 98.6 F | BODY MASS INDEX: 25.9 KG/M2 | HEART RATE: 92 BPM | SYSTOLIC BLOOD PRESSURE: 124 MMHG | HEIGHT: 71 IN | OXYGEN SATURATION: 96 % | RESPIRATION RATE: 16 BRPM | WEIGHT: 185 LBS | DIASTOLIC BLOOD PRESSURE: 70 MMHG

## 2022-07-17 DIAGNOSIS — B35.1 NAIL FUNGUS: ICD-10-CM

## 2022-07-17 PROCEDURE — 99213 OFFICE O/P EST LOW 20 MIN: CPT | Performed by: FAMILY MEDICINE

## 2022-07-17 RX ORDER — CICLOPIROX 80 MG/ML
SOLUTION TOPICAL
Qty: 6.6 ML | Refills: 0 | Status: SHIPPED | OUTPATIENT
Start: 2022-07-17 | End: 2023-09-11

## 2022-07-17 RX ORDER — CICLOPIROX 80 MG/ML
SOLUTION TOPICAL
Qty: 6.6 ML | Refills: 0 | Status: CANCELLED | OUTPATIENT
Start: 2022-07-17

## 2022-07-17 NOTE — PROGRESS NOTES
"  Subjective:      61 y.o. male presents to urgent care for pain to his left big toe.  States he has had thickening and yellowing of his left big toenail for several months now, but the last couple of days has developed associated pain.  No inciting trauma or event at that time.  Pain is constant, described as throbbing, currently rated 3/10.  He has use of Tylenol with some relief in symptoms.  He remains afebrile.    He denies any other questions or concerns at this time.    Current problem list, medication, and past medical/surgical history were reviewed in Epic.    ROS  See HPI     Objective:      /70 (BP Location: Right arm, Patient Position: Sitting, BP Cuff Size: Adult)   Pulse 92   Temp 37 °C (98.6 °F) (Temporal)   Resp 16   Ht 1.803 m (5' 11\")   Wt 83.9 kg (185 lb)   SpO2 96%   BMI 25.80 kg/m²     Physical Exam  Constitutional:       General: He is not in acute distress.     Appearance: He is not diaphoretic.   Cardiovascular:      Rate and Rhythm: Normal rate and regular rhythm.      Heart sounds: Normal heart sounds.   Pulmonary:      Effort: Pulmonary effort is normal. No respiratory distress.      Breath sounds: Normal breath sounds.   Musculoskeletal:      Comments: Thickening and yellowing of left, great toenail. Some associated ingrown nail due to thickening of nail on the medial side.  No surrounding erythema, edema, or open areas/discharge.   Neurological:      Mental Status: He is alert.   Psychiatric:         Mood and Affect: Affect normal.         Judgment: Judgment normal.       Assessment/Plan:     1. Nail fungus  Will treat nail fungus with Penlac.  Tylenol and ibuprofen as needed for symptomatic relief.  Also encouraged foot soaks and daily placement of cotton ball under toenail.  - ciclopirox (PENLAC) 8 % solution; Apply to affected toe once daily as needed for nail yellowing and thickening  Dispense: 6.6 mL; Refill: 0      Instructed to return to Urgent Care or nearest " Emergency Department if symptoms fail to improve, for any change in condition, further concerns, or new concerning symptoms. Patient states understanding of the plan of care and discharge instructions.    Amanda Posada M.D.

## 2023-02-11 ENCOUNTER — OFFICE VISIT (OUTPATIENT)
Dept: URGENT CARE | Facility: PHYSICIAN GROUP | Age: 63
End: 2023-02-11
Payer: COMMERCIAL

## 2023-02-11 VITALS
RESPIRATION RATE: 20 BRPM | WEIGHT: 202.4 LBS | TEMPERATURE: 97 F | BODY MASS INDEX: 28.34 KG/M2 | HEART RATE: 78 BPM | HEIGHT: 71 IN | OXYGEN SATURATION: 96 %

## 2023-02-11 DIAGNOSIS — B35.1 NAIL FUNGUS: ICD-10-CM

## 2023-02-11 PROCEDURE — 99213 OFFICE O/P EST LOW 20 MIN: CPT

## 2023-02-11 ASSESSMENT — PAIN SCALES - GENERAL: PAINLEVEL: NO PAIN

## 2023-02-11 NOTE — PROGRESS NOTES
Subjective:   Terrell Hensley is a 62 y.o. male who presents for Nail Problem (Left foot)      HPI: This is a 62-year-old male who presents today for evaluation of left great toenail.  Patient was seen and evaluated in July 2022 for nail problem.  He was diagnosed and treated for fungal infection of left great toe. He was treated with Ciclopirox which he has continued to use.  Patient reports thickening and blackening of left great toe.  He reports intermittent pain to the toe.  No numbness or tingling. No fevers.    ROS per HPI    Medications:    Current Outpatient Medications on File Prior to Visit   Medication Sig Dispense Refill    amLODIPine (NORVASC) 10 MG Tab Take 10 mg by mouth every day.      amLODIPine (NORVASC) 5 MG Tab Take 5 mg by mouth every day.      ciclopirox (PENLAC) 8 % solution Apply to affected toe once daily as needed for nail yellowing and thickening 6.6 mL 0    tamsulosin (FLOMAX) 0.4 MG capsule Take 0.4 mg by mouth 1/2 hour after breakfast.      oxybutynin (DITROPAN XL) 15 MG CR tablet Take 15 mg by mouth every day.      bismuth subsalicylate (PEPTO-BISMOL) 262 MG/15ML Suspension Take 30 mL by mouth every 6 hours as needed.      bismuth subsalicylate (PEPTO-BISMOL) 262 MG Chew Tab Take 524 mg by mouth 4 Times a Day,Before Meals and at Bedtime.      cloZAPine (CLOZARIL) 25 MG Tab Take 22.5 mg by mouth every day.      hydrOXYzine HCl (ATARAX) 50 MG Tab Take 50 mg by mouth 3 times a day as needed for Itching.      mirtazapine (REMERON) 30 MG Tab tablet Take 45 mg by mouth every evening.      QUEtiapine (SEROQUEL) 25 MG Tab Take 300 mg by mouth every bedtime.       No current facility-administered medications on file prior to visit.        Allergies:   Sulfa drugs    Problem List:   There is no problem list on file for this patient.       Surgical History:  No past surgical history on file.    Past Social Hx:   Social History     Tobacco Use    Smoking status: Never    Smokeless tobacco:  "Never   Substance Use Topics    Alcohol use: Yes     Comment: occ    Drug use: Never          Problem list, medications, and allergies reviewed by myself today in Epic.     Objective:     BP (P) 110/68 (BP Location: Left arm, Patient Position: Sitting, BP Cuff Size: Adult)   Pulse 78   Temp 36.1 °C (97 °F) (Temporal)   Resp 20   Ht 1.803 m (5' 11\")   Wt 91.8 kg (202 lb 6.4 oz)   SpO2 96%   BMI 28.23 kg/m²     Physical Exam  Vitals and nursing note reviewed.   Constitutional:       General: He is not in acute distress.     Appearance: Normal appearance. He is normal weight. He is not ill-appearing, toxic-appearing or diaphoretic.   HENT:      Head: Normocephalic and atraumatic.      Left Ear: Tympanic membrane normal.      Mouth/Throat:      Mouth: Mucous membranes are moist.      Pharynx: Oropharynx is clear. No oropharyngeal exudate or posterior oropharyngeal erythema.   Cardiovascular:      Rate and Rhythm: Normal rate and regular rhythm.      Pulses: Normal pulses.      Heart sounds: Normal heart sounds. No murmur heard.    No friction rub. No gallop.   Pulmonary:      Effort: Pulmonary effort is normal. No respiratory distress.      Breath sounds: Normal breath sounds. No stridor. No wheezing, rhonchi or rales.   Chest:      Chest wall: No tenderness.   Musculoskeletal:      Cervical back: Normal range of motion.        Feet:    Feet:      Left foot:      Toenail Condition: Left toenails are abnormally thick. Fungal disease present.  Skin:     General: Skin is warm and dry.      Capillary Refill: Capillary refill takes less than 2 seconds.   Neurological:      General: No focal deficit present.      Mental Status: He is alert and oriented to person, place, and time. Mental status is at baseline.      Gait: Gait normal.   Psychiatric:         Mood and Affect: Mood normal.         Behavior: Behavior normal.         Thought Content: Thought content normal.         Judgment: Judgment normal.      "   Assessment/Plan:     Diagnosis and associated orders:   1. Nail fungus  Referral to Podiatry             Comments/MDM:   Pt is clinically stable at today's acute urgent care visit.  No acute distress noted. Appropriate for outpatient management at this time.     Acute problem. Patient has been treating left great toe for nail fungus since 7/2022. He presents today with blackening and thickening to left great toe. Patient is to follow up with podiatry for further evaluation and management, referral placed today. He is agreeable with this plan and verbalizes good understanding.           Discussed DDx, management options (risks,benefits, and alternatives to planned treatment), natural progression and supportive care.  Expressed understanding and the treatment plan was agreed upon. Questions were encouraged and answered   Return to urgent care prn if new or worsening sx or if there is no improvement in condition prn.    Educated in Red flags and indications to immediately call 911 or present to the Emergency Department.   Advised the patient to follow-up with the primary care physician for recheck, reevaluation, and consideration of further management.    I personally reviewed prior external notes and test results pertinent to today's visit.  I have independently reviewed and interpreted all diagnostics ordered during this urgent care acute visit.       Please note that this dictation was created using voice recognition software. I have made a reasonable attempt to correct obvious errors, but I expect that there are errors of grammar and possibly content that I did not discover before finalizing the note.    This note was electronically signed by MELY Biswas

## 2023-05-13 ENCOUNTER — APPOINTMENT (OUTPATIENT)
Dept: RADIOLOGY | Facility: IMAGING CENTER | Age: 63
End: 2023-05-13
Attending: NURSE PRACTITIONER
Payer: COMMERCIAL

## 2023-05-13 ENCOUNTER — OFFICE VISIT (OUTPATIENT)
Dept: URGENT CARE | Facility: CLINIC | Age: 63
End: 2023-05-13
Payer: COMMERCIAL

## 2023-05-13 VITALS
HEIGHT: 71 IN | OXYGEN SATURATION: 98 % | TEMPERATURE: 96.6 F | DIASTOLIC BLOOD PRESSURE: 70 MMHG | SYSTOLIC BLOOD PRESSURE: 140 MMHG | HEART RATE: 81 BPM | WEIGHT: 195 LBS | BODY MASS INDEX: 27.3 KG/M2 | RESPIRATION RATE: 20 BRPM

## 2023-05-13 DIAGNOSIS — K59.00 CONSTIPATION, UNSPECIFIED CONSTIPATION TYPE: ICD-10-CM

## 2023-05-13 DIAGNOSIS — R10.32 LLQ ABDOMINAL PAIN: Primary | ICD-10-CM

## 2023-05-13 DIAGNOSIS — R10.9 ABDOMINAL PAIN, UNSPECIFIED ABDOMINAL LOCATION: ICD-10-CM

## 2023-05-13 DIAGNOSIS — Z87.19 HISTORY OF DIVERTICULITIS: ICD-10-CM

## 2023-05-13 DIAGNOSIS — K92.1 BLOOD IN STOOL: ICD-10-CM

## 2023-05-13 PROCEDURE — 74019 RADEX ABDOMEN 2 VIEWS: CPT | Mod: TC | Performed by: NURSE PRACTITIONER

## 2023-05-13 PROCEDURE — 99214 OFFICE O/P EST MOD 30 MIN: CPT | Performed by: NURSE PRACTITIONER

## 2023-05-13 ASSESSMENT — ENCOUNTER SYMPTOMS
CONSTIPATION: 1
BLOOD IN STOOL: 1

## 2023-05-14 ASSESSMENT — ENCOUNTER SYMPTOMS
VOMITING: 0
ABDOMINAL PAIN: 1
NAUSEA: 0
DIARRHEA: 1

## 2023-05-14 NOTE — PROGRESS NOTES
"Subjective:     Terrell Hensley is a 62 y.o. male who presents for Constipation (X 1 day, rectal bleeding bright red, diarrhea with bright red blood in stool, constipation)      Constipation  Associated symptoms include abdominal pain and diarrhea. Pertinent negatives include no melena, nausea or vomiting.     Pt presents for evaluation of a new problem.  Terrell is a pleasant 62-year-old male presents urgent care today with complaints of abdominal pain and constipation x4 days.  He has had a mixture of diarrhea, bright red blood in his stool as well as inability to pass stool.  He denies any pain with bowel movement.  He has tried using Ex-Lax for relief of his constipation with no relief.  Last bowel movement 2 days ago.  He does have a history of diverticulitis.  He denies any nausea, vomiting, fever, chills.  He does note bright red blood from rectum which she estimates to be 1 to 2 teaspoons with attempted bowel movements today.  He denies any discomfort associated with this.    Review of Systems   Gastrointestinal:  Positive for abdominal pain, blood in stool, constipation and diarrhea. Negative for melena, nausea and vomiting.       PMH: History reviewed. No pertinent past medical history.  ALLERGIES:   Allergies   Allergen Reactions    Sulfa Drugs Hives     SURGHX: History reviewed. No pertinent surgical history.  SOCHX:   Social History     Socioeconomic History    Marital status:    Tobacco Use    Smoking status: Never    Smokeless tobacco: Never   Vaping Use    Vaping Use: Never used   Substance and Sexual Activity    Alcohol use: Yes     Comment: occ    Drug use: Never     FH: History reviewed. No pertinent family history.      Objective:   BP (!) 140/70   Pulse 81   Temp 35.9 °C (96.6 °F) (Temporal)   Resp 20   Ht 1.803 m (5' 11\")   Wt 88.5 kg (195 lb)   SpO2 98%   BMI 27.20 kg/m²     Physical Exam  Vitals and nursing note reviewed.   Constitutional:       General: He is not in acute " distress.     Appearance: Normal appearance. He is normal weight. He is not ill-appearing or toxic-appearing.   HENT:      Head: Normocephalic.      Right Ear: External ear normal.      Left Ear: External ear normal.      Nose: Nose normal.      Mouth/Throat:      Mouth: Mucous membranes are moist.   Eyes:      General:         Right eye: No discharge.         Left eye: No discharge.      Extraocular Movements: Extraocular movements intact.      Conjunctiva/sclera: Conjunctivae normal.      Pupils: Pupils are equal, round, and reactive to light.   Cardiovascular:      Rate and Rhythm: Normal rate and regular rhythm.   Pulmonary:      Effort: Pulmonary effort is normal.      Breath sounds: Normal breath sounds.   Abdominal:      General: Abdomen is flat. There is distension.      Tenderness: There is abdominal tenderness in the right lower quadrant, left upper quadrant and left lower quadrant. There is guarding.   Musculoskeletal:         General: Normal range of motion.      Cervical back: Normal range of motion and neck supple. No rigidity.   Lymphadenopathy:      Cervical: No cervical adenopathy.   Skin:     General: Skin is warm and dry.   Neurological:      General: No focal deficit present.      Mental Status: He is alert and oriented to person, place, and time. Mental status is at baseline.   Psychiatric:         Mood and Affect: Mood normal.         Behavior: Behavior normal.         Judgment: Judgment normal.       [id number] VIEWS    Result Date: 5/13/2023 5/13/2023 8:13 PM HISTORY/REASON FOR EXAM:  Gastrointestinal Complaint; constipation with no relief from ex lax. TECHNIQUE/EXAM DESCRIPTION AND NUMBER OF VIEWS:  3 views of the abdomen. COMPARISON: None FINDINGS: No abnormally distended loops of air-filled bowel bowel are evident to suggest bowel obstruction. Free air not excluded absent upright radiograph.     No evidence of bowel obstruction. No abnormal stool burden appreciated.       Assessment/Plan:   Assessment    1. LLQ abdominal pain  CT-ABDOMEN-PELVIS WITH    CBC WITH DIFFERENTIAL    Comp Metabolic Panel      2. Abdominal pain, unspecified abdominal location  [id number] VIEWS    CBC WITH DIFFERENTIAL    Comp Metabolic Panel    LIPASE      3. Constipation, unspecified constipation type  [id number] VIEWS      4. Blood in stool        5. History of diverticulitis  CT-ABDOMEN-PELVIS WITH    CBC WITH DIFFERENTIAL    Comp Metabolic Panel        X-ray results discussed with patient.  Did personally review his x-ray and agree with the radiologist interpretation of the findings.  Due to significant history of diverticulitis, rectal bleeding and abdominal pain he was ordered a CT abdomen pelvis stat.  Unfortunately due to time of the night I am unable to order this outpatient.  Patient to schedule this first thing in the morning and I will notify him of results.  Strict ER precautions given for worsening pain, fever, chills, nausea/vomiting.  He is in agreement with this plan of care today.  AVS handout given and reviewed with patient. Pt educated on red flags and when to seek treatment back in ER or UC.

## 2023-05-15 LAB
ALBUMIN SERPL-MCNC: 4.4 G/DL (ref 3.8–4.8)
ALBUMIN/GLOB SERPL: 2 {RATIO} (ref 1.2–2.2)
ALP SERPL-CCNC: 87 IU/L (ref 44–121)
ALT SERPL-CCNC: 23 IU/L (ref 0–44)
AST SERPL-CCNC: 17 IU/L (ref 0–40)
BASOPHILS # BLD AUTO: 0 X10E3/UL (ref 0–0.2)
BASOPHILS NFR BLD AUTO: 0 %
BILIRUB SERPL-MCNC: 0.6 MG/DL (ref 0–1.2)
BUN SERPL-MCNC: 26 MG/DL (ref 8–27)
BUN/CREAT SERPL: 16 (ref 10–24)
CALCIUM SERPL-MCNC: 9.8 MG/DL (ref 8.6–10.2)
CHLORIDE SERPL-SCNC: 92 MMOL/L (ref 96–106)
CO2 SERPL-SCNC: 22 MMOL/L (ref 20–29)
CREAT SERPL-MCNC: 1.67 MG/DL (ref 0.76–1.27)
EGFRCR SERPLBLD CKD-EPI 2021: 46 ML/MIN/1.73
EOSINOPHIL # BLD AUTO: 0.1 X10E3/UL (ref 0–0.4)
EOSINOPHIL NFR BLD AUTO: 0 %
ERYTHROCYTE [DISTWIDTH] IN BLOOD BY AUTOMATED COUNT: 12.6 % (ref 11.6–15.4)
GLOBULIN SER CALC-MCNC: 2.2 G/DL (ref 1.5–4.5)
GLUCOSE SERPL-MCNC: 108 MG/DL (ref 70–99)
HCT VFR BLD AUTO: 42.7 % (ref 37.5–51)
HGB BLD-MCNC: 14.8 G/DL (ref 13–17.7)
IMM GRANULOCYTES # BLD AUTO: ABNORMAL 10*3/UL
IMM GRANULOCYTES NFR BLD AUTO: ABNORMAL %
IMMATURE CELLS  115398: ABNORMAL
LYMPHOCYTES # BLD AUTO: 2.1 X10E3/UL (ref 0.7–3.1)
LYMPHOCYTES NFR BLD AUTO: 18 %
MCH RBC QN AUTO: 31.6 PG (ref 26.6–33)
MCHC RBC AUTO-ENTMCNC: 34.7 G/DL (ref 31.5–35.7)
MCV RBC AUTO: 91 FL (ref 79–97)
MONOCYTES # BLD AUTO: 1.1 X10E3/UL (ref 0.1–0.9)
MONOCYTES NFR BLD AUTO: 9 %
MORPHOLOGY BLD-IMP: ABNORMAL
NEUTROPHILS # BLD AUTO: 8.5 X10E3/UL (ref 1.4–7)
NEUTROPHILS NFR BLD AUTO: 73 %
NRBC BLD AUTO-RTO: ABNORMAL %
PLATELET # BLD AUTO: 222 X10E3/UL (ref 150–450)
POTASSIUM SERPL-SCNC: 3.7 MMOL/L (ref 3.5–5.2)
PROT SERPL-MCNC: 6.6 G/DL (ref 6–8.5)
RBC # BLD AUTO: 4.68 X10E6/UL (ref 4.14–5.8)
SODIUM SERPL-SCNC: 129 MMOL/L (ref 134–144)
WBC # BLD AUTO: 11.7 X10E3/UL (ref 3.4–10.8)

## 2023-05-17 ENCOUNTER — OFFICE VISIT (OUTPATIENT)
Dept: URGENT CARE | Facility: PHYSICIAN GROUP | Age: 63
End: 2023-05-17
Payer: COMMERCIAL

## 2023-05-17 VITALS
DIASTOLIC BLOOD PRESSURE: 82 MMHG | SYSTOLIC BLOOD PRESSURE: 132 MMHG | BODY MASS INDEX: 27.3 KG/M2 | TEMPERATURE: 99.3 F | HEART RATE: 84 BPM | RESPIRATION RATE: 14 BRPM | OXYGEN SATURATION: 95 % | HEIGHT: 71 IN | WEIGHT: 195 LBS

## 2023-05-17 DIAGNOSIS — M79.674 PAIN OF RIGHT GREAT TOE: ICD-10-CM

## 2023-05-17 PROCEDURE — 3075F SYST BP GE 130 - 139MM HG: CPT | Performed by: FAMILY MEDICINE

## 2023-05-17 PROCEDURE — 3079F DIAST BP 80-89 MM HG: CPT | Performed by: FAMILY MEDICINE

## 2023-05-17 PROCEDURE — 99213 OFFICE O/P EST LOW 20 MIN: CPT | Performed by: FAMILY MEDICINE

## 2023-05-17 RX ORDER — CLONAZEPAM 1 MG/1
2 TABLET ORAL NIGHTLY
COMMUNITY
Start: 2023-05-10

## 2023-05-17 ASSESSMENT — FIBROSIS 4 INDEX: FIB4 SCORE: 1.01

## 2023-05-18 NOTE — PROGRESS NOTES
"  Subjective:      62 y.o. male presents to urgent care for concerns of an infection to his right great toe.  On Friday he was walking around barefoot at home when he accidentally stepped his toe along a door.  Since then he has had constant pain that is described as achy, currently rated 3/10.  He is using Neosporin, Tylenol, and Epsom salt with some relief in symptoms.  He remains afebrile.    He denies any other questions or concerns at this time.    Current problem list, medication, and past medical/surgical history were reviewed in Epic.    ROS  See HPI     Objective:      /82   Pulse 84   Temp 37.4 °C (99.3 °F)   Resp 14   Ht 1.803 m (5' 11\")   Wt 88.5 kg (195 lb)   SpO2 95%   BMI 27.20 kg/m²     Physical Exam  Constitutional:       General: He is not in acute distress.     Appearance: He is not diaphoretic.   Cardiovascular:      Rate and Rhythm: Normal rate and regular rhythm.      Heart sounds: Normal heart sounds.   Pulmonary:      Effort: Pulmonary effort is normal. No respiratory distress.      Breath sounds: Normal breath sounds.   Skin:     Comments: Dried blood to the distal end of his right great toenail.  The same area is tender to palpation.  No open areas or discharge.   Neurological:      Mental Status: He is alert.   Psychiatric:         Mood and Affect: Affect normal.         Judgment: Judgment normal.       Assessment/Plan:     1. Pain of right great toe  Patient encouraged to continue with triple antibiotic ointment to prevent infection from entering the area.  Tylenol and ibuprofen as needed for pain.      Instructed to return to Urgent Care or nearest Emergency Department if symptoms fail to improve, for any change in condition, further concerns, or new concerning symptoms. Patient states understanding of the plan of care and discharge instructions.    Amanda Posada M.D.   "

## 2023-09-11 ENCOUNTER — OFFICE VISIT (OUTPATIENT)
Dept: URGENT CARE | Facility: PHYSICIAN GROUP | Age: 63
End: 2023-09-11
Payer: COMMERCIAL

## 2023-09-11 VITALS
HEART RATE: 84 BPM | DIASTOLIC BLOOD PRESSURE: 60 MMHG | RESPIRATION RATE: 16 BRPM | TEMPERATURE: 99.8 F | OXYGEN SATURATION: 98 % | BODY MASS INDEX: 26.08 KG/M2 | WEIGHT: 187 LBS | SYSTOLIC BLOOD PRESSURE: 118 MMHG

## 2023-09-11 DIAGNOSIS — U07.1 COVID-19: ICD-10-CM

## 2023-09-11 LAB
FLUAV RNA SPEC QL NAA+PROBE: NEGATIVE
FLUBV RNA SPEC QL NAA+PROBE: NEGATIVE
RSV RNA SPEC QL NAA+PROBE: NEGATIVE
SARS-COV-2 RNA RESP QL NAA+PROBE: POSITIVE

## 2023-09-11 PROCEDURE — 3078F DIAST BP <80 MM HG: CPT | Performed by: STUDENT IN AN ORGANIZED HEALTH CARE EDUCATION/TRAINING PROGRAM

## 2023-09-11 PROCEDURE — 3074F SYST BP LT 130 MM HG: CPT | Performed by: STUDENT IN AN ORGANIZED HEALTH CARE EDUCATION/TRAINING PROGRAM

## 2023-09-11 PROCEDURE — 0241U POCT CEPHEID COV-2, FLU A/B, RSV - PCR: CPT | Performed by: STUDENT IN AN ORGANIZED HEALTH CARE EDUCATION/TRAINING PROGRAM

## 2023-09-11 PROCEDURE — 99213 OFFICE O/P EST LOW 20 MIN: CPT | Performed by: STUDENT IN AN ORGANIZED HEALTH CARE EDUCATION/TRAINING PROGRAM

## 2023-09-11 RX ORDER — CHLORTHALIDONE 25 MG/1
25 TABLET ORAL DAILY
Status: ON HOLD | COMMUNITY
Start: 2023-07-15 | End: 2024-03-27

## 2023-09-11 ASSESSMENT — FIBROSIS 4 INDEX: FIB4 SCORE: 1.02

## 2023-09-11 NOTE — PROGRESS NOTES
Subjective:   Terrell Hensley is a 63 y.o. male who presents for Coronavirus Screening (X 3 days with fever, sore throat, cough and body aches. Needs a COVID test for work)      HPI:  Pleasant 63-year-old male presents to urgent care for COVID-19 screening.  Reports he started having low-grade fever, sore throat, cough, body aches, and chills 2 days ago.  Tested positive for COVID-19 with an at home test.  He is here because he needs a formal test for work.  Using OTC cold medication.  No nausea, vomiting, chest pain, shortness of breath, sputum production, wheezing, or dizziness.    Medications:    amLODIPine Tabs  chlorthalidone Tabs  clonazePAM Tabs  oxybutynin  QUEtiapine Tabs  tamsulosin    Allergies: Sulfa drugs    Problem List: Terrell Hensley does not have a problem list on file.    Surgical History:  No past surgical history on file.    Past Social Hx: Terrell Hensley  reports that he has never smoked. He has never used smokeless tobacco. He reports current alcohol use. He reports that he does not use drugs.     Past Family Hx:  Terrell Hensley family history is not on file.     Problem list, medications, and allergies reviewed by myself today in Epic.     Objective:     /60 (BP Location: Left arm, Patient Position: Sitting, BP Cuff Size: Adult)   Pulse 84   Temp 37.7 °C (99.8 °F) (Temporal)   Resp 16   Wt 84.8 kg (187 lb)   SpO2 98%   BMI 26.08 kg/m²     Physical Exam  Vitals reviewed.   Constitutional:       General: He is not in acute distress.     Appearance: Normal appearance.   HENT:      Head: Normocephalic.      Right Ear: Tympanic membrane, ear canal and external ear normal.      Left Ear: Tympanic membrane, ear canal and external ear normal.      Nose: Congestion present. No rhinorrhea.   Eyes:      Conjunctiva/sclera: Conjunctivae normal.      Pupils: Pupils are equal, round, and reactive to light.   Cardiovascular:      Rate and Rhythm: Normal rate and regular  rhythm.      Pulses: Normal pulses.      Heart sounds: Normal heart sounds. No murmur heard.  Pulmonary:      Effort: Pulmonary effort is normal. No respiratory distress.      Breath sounds: Normal breath sounds. No stridor. No wheezing, rhonchi or rales.   Musculoskeletal:      Cervical back: Normal range of motion.   Lymphadenopathy:      Cervical: No cervical adenopathy.   Skin:     General: Skin is warm and dry.   Neurological:      Mental Status: He is alert.       Lab Results/POC Test Results   Results for orders placed or performed in visit on 09/11/23   POCT CoV-2, Flu A/B, RSV by PCR   Result Value Ref Range    SARS-CoV-2 by PCR Positive (A) Negative, Invalid    Influenza virus A RNA Negative Negative, Invalid    Influenza virus B, PCR Negative Negative, Invalid    RSV, PCR Negative Negative, Invalid             Assessment/Plan:     Diagnosis and associated orders:     1. COVID-19  POCT CoV-2, Flu A/B, RSV by PCR         Comments/MDM:     PCR COVID-19 positive.  Patient's presentation physical exam findings are consistent with this diagnosis.  Patient also had a home positive COVID test  Pulmonary exam shows no abnormal findings.  No wheezing, rales, rhonchi.  No signs of pneumonia at this time.  Vitals all within normal limits.  Temperature slightly elevated in clinic at 99.8 °F.  Ibuprofen/Tylenol as needed at home for any fever.  Continue OTC cold medication.  Discussed typical timeframe for resolution of symptoms.  Discussed CDC guidelines for mask use and isolation.  Work note provided.  ED/return precautions given.         Differential diagnosis, natural history, supportive care, and indications for immediate follow-up discussed.    Advised the patient to follow-up with the primary care physician for recheck, reevaluation, and consideration of further management.    Please note that this dictation was created using voice recognition software. I have made a reasonable attempt to correct obvious errors,  but I expect that there are errors of grammar and possibly content that I did not discover before finalizing the note.    Electronically signed by Bart Forman PA-C.

## 2023-09-11 NOTE — LETTER
September 11, 2023    To Whom It May Concern:         This is confirmation that Terrell Hensley attended his scheduled appointment with Bart Forman P.A.-C. on 9/11/23.  Patient is excuse from work on 9/11/2023 through 9/15/2023.  Once the patient hits day 10 from symptom start I would consider him not contagious.  Advised to wear a mask from day 5-10.  After day 10 there is no need for a test of cure as we consider him no longer contagious.         If you have any questions please do not hesitate to call me at the phone number listed below.    Sincerely,          Bart Forman P.A.-C.  [phone]

## 2024-03-26 ENCOUNTER — APPOINTMENT (OUTPATIENT)
Dept: RADIOLOGY | Facility: MEDICAL CENTER | Age: 64
End: 2024-03-26
Attending: EMERGENCY MEDICINE
Payer: COMMERCIAL

## 2024-03-26 ENCOUNTER — APPOINTMENT (OUTPATIENT)
Dept: CARDIOLOGY | Facility: MEDICAL CENTER | Age: 64
End: 2024-03-26
Attending: INTERNAL MEDICINE
Payer: COMMERCIAL

## 2024-03-26 ENCOUNTER — OFFICE VISIT (OUTPATIENT)
Dept: URGENT CARE | Facility: PHYSICIAN GROUP | Age: 64
End: 2024-03-26
Payer: COMMERCIAL

## 2024-03-26 ENCOUNTER — HOSPITAL ENCOUNTER (OUTPATIENT)
Facility: MEDICAL CENTER | Age: 64
End: 2024-03-27
Attending: EMERGENCY MEDICINE | Admitting: INTERNAL MEDICINE
Payer: COMMERCIAL

## 2024-03-26 VITALS
SYSTOLIC BLOOD PRESSURE: 104 MMHG | RESPIRATION RATE: 16 BRPM | HEART RATE: 68 BPM | WEIGHT: 192 LBS | OXYGEN SATURATION: 97 % | DIASTOLIC BLOOD PRESSURE: 58 MMHG | BODY MASS INDEX: 26.88 KG/M2 | TEMPERATURE: 97.5 F | HEIGHT: 71 IN

## 2024-03-26 DIAGNOSIS — E87.1 HYPONATREMIA: ICD-10-CM

## 2024-03-26 DIAGNOSIS — S01.81XA FACIAL LACERATION, INITIAL ENCOUNTER: ICD-10-CM

## 2024-03-26 DIAGNOSIS — R55 SYNCOPE, UNSPECIFIED SYNCOPE TYPE: ICD-10-CM

## 2024-03-26 DIAGNOSIS — N40.0 BENIGN PROSTATIC HYPERPLASIA, UNSPECIFIED WHETHER LOWER URINARY TRACT SYMPTOMS PRESENT: ICD-10-CM

## 2024-03-26 DIAGNOSIS — M25.552 LEFT HIP PAIN: ICD-10-CM

## 2024-03-26 DIAGNOSIS — R01.1 CARDIAC MURMUR: ICD-10-CM

## 2024-03-26 DIAGNOSIS — R55 SYNCOPE AND COLLAPSE: ICD-10-CM

## 2024-03-26 DIAGNOSIS — S50.02XA CONTUSION OF LEFT ELBOW, INITIAL ENCOUNTER: ICD-10-CM

## 2024-03-26 PROBLEM — R73.9 HYPERGLYCEMIA: Status: ACTIVE | Noted: 2024-03-26

## 2024-03-26 PROBLEM — I10 HYPERTENSION: Status: ACTIVE | Noted: 2024-03-26

## 2024-03-26 PROBLEM — G47.00 INSOMNIA: Status: ACTIVE | Noted: 2024-03-26

## 2024-03-26 LAB
ALBUMIN SERPL BCP-MCNC: 4.8 G/DL (ref 3.2–4.9)
ALBUMIN/GLOB SERPL: 1.8 G/DL
ALP SERPL-CCNC: 81 U/L (ref 30–99)
ALT SERPL-CCNC: 26 U/L (ref 2–50)
ANION GAP SERPL CALC-SCNC: 13 MMOL/L (ref 7–16)
APPEARANCE UR: NORMAL
AST SERPL-CCNC: 23 U/L (ref 12–45)
BASOPHILS # BLD AUTO: 0.3 % (ref 0–1.8)
BASOPHILS # BLD: 0.04 K/UL (ref 0–0.12)
BILIRUB SERPL-MCNC: 0.4 MG/DL (ref 0.1–1.5)
BILIRUB UR STRIP-MCNC: NEGATIVE MG/DL
BUN SERPL-MCNC: 19 MG/DL (ref 8–22)
CALCIUM ALBUM COR SERPL-MCNC: 9.3 MG/DL (ref 8.5–10.5)
CALCIUM SERPL-MCNC: 9.9 MG/DL (ref 8.5–10.5)
CHLORIDE SERPL-SCNC: 92 MMOL/L (ref 96–112)
CO2 SERPL-SCNC: 25 MMOL/L (ref 20–33)
COLOR UR AUTO: YELLOW
CREAT SERPL-MCNC: 1.06 MG/DL (ref 0.5–1.4)
D DIMER PPP IA.FEU-MCNC: 1.59 UG/ML (FEU) (ref 0–0.5)
EKG IMPRESSION: NORMAL
EOSINOPHIL # BLD AUTO: 0.01 K/UL (ref 0–0.51)
EOSINOPHIL NFR BLD: 0.1 % (ref 0–6.9)
ERYTHROCYTE [DISTWIDTH] IN BLOOD BY AUTOMATED COUNT: 40.9 FL (ref 35.9–50)
EST. AVERAGE GLUCOSE BLD GHB EST-MCNC: 105 MG/DL
GFR SERPLBLD CREATININE-BSD FMLA CKD-EPI: 78 ML/MIN/1.73 M 2
GLOBULIN SER CALC-MCNC: 2.7 G/DL (ref 1.9–3.5)
GLUCOSE BLD-MCNC: 124 MG/DL (ref 65–99)
GLUCOSE SERPL-MCNC: 184 MG/DL (ref 65–99)
GLUCOSE UR STRIP.AUTO-MCNC: NEGATIVE MG/DL
HBA1C MFR BLD: 5.3 % (ref 4–5.6)
HCT VFR BLD AUTO: 44.4 % (ref 42–52)
HGB BLD-MCNC: 15.6 G/DL (ref 14–18)
IMM GRANULOCYTES # BLD AUTO: 0.04 K/UL (ref 0–0.11)
IMM GRANULOCYTES NFR BLD AUTO: 0.3 % (ref 0–0.9)
KETONES UR STRIP.AUTO-MCNC: NEGATIVE MG/DL
LEUKOCYTE ESTERASE UR QL STRIP.AUTO: NEGATIVE
LV EJECT FRACT  99904: 61
LV EJECT FRACT MOD 2C 99903: 64.36
LV EJECT FRACT MOD 4C 99902: 56.08
LV EJECT FRACT MOD BP 99901: 61.07
LYMPHOCYTES # BLD AUTO: 0.67 K/UL (ref 1–4.8)
LYMPHOCYTES NFR BLD: 5.6 % (ref 22–41)
MCH RBC QN AUTO: 31.5 PG (ref 27–33)
MCHC RBC AUTO-ENTMCNC: 35.1 G/DL (ref 32.3–36.5)
MCV RBC AUTO: 89.5 FL (ref 81.4–97.8)
MONOCYTES # BLD AUTO: 0.69 K/UL (ref 0–0.85)
MONOCYTES NFR BLD AUTO: 5.7 % (ref 0–13.4)
NEUTROPHILS # BLD AUTO: 10.59 K/UL (ref 1.82–7.42)
NEUTROPHILS NFR BLD: 88 % (ref 44–72)
NITRITE UR QL STRIP.AUTO: NEGATIVE
NRBC # BLD AUTO: 0 K/UL
NRBC BLD-RTO: 0 /100 WBC (ref 0–0.2)
PH UR STRIP.AUTO: 6 [PH] (ref 5–8)
PLATELET # BLD AUTO: 232 K/UL (ref 164–446)
PMV BLD AUTO: 9.3 FL (ref 9–12.9)
POTASSIUM SERPL-SCNC: 3.9 MMOL/L (ref 3.6–5.5)
PROT SERPL-MCNC: 7.5 G/DL (ref 6–8.2)
PROT UR QL STRIP: NEGATIVE MG/DL
RBC # BLD AUTO: 4.96 M/UL (ref 4.7–6.1)
RBC UR QL AUTO: NORMAL
SODIUM SERPL-SCNC: 130 MMOL/L (ref 135–145)
SP GR UR STRIP.AUTO: 1.01
TROPONIN T SERPL-MCNC: 12 NG/L (ref 6–19)
TROPONIN T SERPL-MCNC: 12 NG/L (ref 6–19)
TROPONIN T SERPL-MCNC: 13 NG/L (ref 6–19)
TSH SERPL DL<=0.005 MIU/L-ACNC: 0.89 UIU/ML (ref 0.38–5.33)
UROBILINOGEN UR STRIP-MCNC: 0.2 MG/DL
WBC # BLD AUTO: 12 K/UL (ref 4.8–10.8)

## 2024-03-26 PROCEDURE — 93306 TTE W/DOPPLER COMPLETE: CPT | Mod: 26 | Performed by: INTERNAL MEDICINE

## 2024-03-26 PROCEDURE — 73501 X-RAY EXAM HIP UNI 1 VIEW: CPT | Mod: LT

## 2024-03-26 PROCEDURE — 70450 CT HEAD/BRAIN W/O DYE: CPT

## 2024-03-26 PROCEDURE — 700111 HCHG RX REV CODE 636 W/ 250 OVERRIDE (IP): Mod: JZ | Performed by: INTERNAL MEDICINE

## 2024-03-26 PROCEDURE — A9270 NON-COVERED ITEM OR SERVICE: HCPCS | Performed by: INTERNAL MEDICINE

## 2024-03-26 PROCEDURE — G0378 HOSPITAL OBSERVATION PER HR: HCPCS

## 2024-03-26 PROCEDURE — 93306 TTE W/DOPPLER COMPLETE: CPT

## 2024-03-26 PROCEDURE — 303353 HCHG DERMABOND SKIN ADHESIVE

## 2024-03-26 PROCEDURE — 304999 HCHG REPAIR-SIMPLE/INTERMED LEVEL 1

## 2024-03-26 PROCEDURE — 83036 HEMOGLOBIN GLYCOSYLATED A1C: CPT

## 2024-03-26 PROCEDURE — 80053 COMPREHEN METABOLIC PANEL: CPT

## 2024-03-26 PROCEDURE — 700102 HCHG RX REV CODE 250 W/ 637 OVERRIDE(OP): Performed by: INTERNAL MEDICINE

## 2024-03-26 PROCEDURE — 71045 X-RAY EXAM CHEST 1 VIEW: CPT

## 2024-03-26 PROCEDURE — 84443 ASSAY THYROID STIM HORMONE: CPT

## 2024-03-26 PROCEDURE — 700105 HCHG RX REV CODE 258: Performed by: EMERGENCY MEDICINE

## 2024-03-26 PROCEDURE — 93005 ELECTROCARDIOGRAM TRACING: CPT | Performed by: EMERGENCY MEDICINE

## 2024-03-26 PROCEDURE — 99285 EMERGENCY DEPT VISIT HI MDM: CPT

## 2024-03-26 PROCEDURE — 93005 ELECTROCARDIOGRAM TRACING: CPT

## 2024-03-26 PROCEDURE — 84484 ASSAY OF TROPONIN QUANT: CPT

## 2024-03-26 PROCEDURE — 85379 FIBRIN DEGRADATION QUANT: CPT

## 2024-03-26 PROCEDURE — 36415 COLL VENOUS BLD VENIPUNCTURE: CPT

## 2024-03-26 PROCEDURE — 85025 COMPLETE CBC W/AUTO DIFF WBC: CPT

## 2024-03-26 PROCEDURE — 96372 THER/PROPH/DIAG INJ SC/IM: CPT

## 2024-03-26 PROCEDURE — 99223 1ST HOSP IP/OBS HIGH 75: CPT | Performed by: INTERNAL MEDICINE

## 2024-03-26 PROCEDURE — 73080 X-RAY EXAM OF ELBOW: CPT | Mod: LT

## 2024-03-26 RX ORDER — TAMSULOSIN HYDROCHLORIDE 0.4 MG/1
1 CAPSULE ORAL
COMMUNITY
End: 2024-03-26

## 2024-03-26 RX ORDER — ACETAMINOPHEN 325 MG/1
650 TABLET ORAL EVERY 6 HOURS PRN
Status: DISCONTINUED | OUTPATIENT
Start: 2024-03-26 | End: 2024-03-27 | Stop reason: HOSPADM

## 2024-03-26 RX ORDER — INDOMETHACIN 50 MG/1
CAPSULE ORAL
COMMUNITY
End: 2024-03-26

## 2024-03-26 RX ORDER — CHOLECALCIFEROL (VITAMIN D3) 125 MCG
10 CAPSULE ORAL NIGHTLY
Status: DISCONTINUED | OUTPATIENT
Start: 2024-03-26 | End: 2024-03-27 | Stop reason: HOSPADM

## 2024-03-26 RX ORDER — QUETIAPINE FUMARATE 100 MG/1
150-200 TABLET, FILM COATED ORAL
COMMUNITY

## 2024-03-26 RX ORDER — CHLORTHALIDONE 25 MG/1
TABLET ORAL
COMMUNITY
End: 2024-03-26

## 2024-03-26 RX ORDER — SODIUM CHLORIDE 9 MG/ML
INJECTION, SOLUTION INTRAVENOUS CONTINUOUS
Status: DISCONTINUED | OUTPATIENT
Start: 2024-03-26 | End: 2024-03-27 | Stop reason: HOSPADM

## 2024-03-26 RX ORDER — AMLODIPINE BESYLATE AND BENAZEPRIL HYDROCHLORIDE 5; 10 MG/1; MG/1
1 CAPSULE ORAL
COMMUNITY
Start: 2024-02-29

## 2024-03-26 RX ORDER — DIPHENHYDRAMINE CITRATE AND IBUPROFEN 38; 200 MG/1; MG/1
2 TABLET, COATED ORAL
COMMUNITY

## 2024-03-26 RX ORDER — QUETIAPINE FUMARATE 50 MG/1
150 TABLET, FILM COATED ORAL
Status: DISCONTINUED | OUTPATIENT
Start: 2024-03-26 | End: 2024-03-27 | Stop reason: HOSPADM

## 2024-03-26 RX ORDER — OXYBUTYNIN CHLORIDE 15 MG/1
1 TABLET, EXTENDED RELEASE ORAL
COMMUNITY
End: 2024-03-26

## 2024-03-26 RX ORDER — CLONAZEPAM 1 MG/1
2 TABLET ORAL NIGHTLY
Status: DISCONTINUED | OUTPATIENT
Start: 2024-03-26 | End: 2024-03-27 | Stop reason: HOSPADM

## 2024-03-26 RX ORDER — ENOXAPARIN SODIUM 100 MG/ML
40 INJECTION SUBCUTANEOUS DAILY
Status: DISCONTINUED | OUTPATIENT
Start: 2024-03-26 | End: 2024-03-27 | Stop reason: HOSPADM

## 2024-03-26 RX ORDER — SODIUM CHLORIDE 9 MG/ML
INJECTION, SOLUTION INTRAVENOUS CONTINUOUS
Status: DISCONTINUED | OUTPATIENT
Start: 2024-03-26 | End: 2024-03-26

## 2024-03-26 RX ADMIN — Medication 10 MG: at 20:17

## 2024-03-26 RX ADMIN — QUETIAPINE FUMARATE 150 MG: 50 TABLET ORAL at 20:18

## 2024-03-26 RX ADMIN — CLONAZEPAM 2 MG: 1 TABLET ORAL at 20:17

## 2024-03-26 RX ADMIN — SODIUM CHLORIDE: 9 INJECTION, SOLUTION INTRAVENOUS at 13:59

## 2024-03-26 RX ADMIN — ENOXAPARIN SODIUM 40 MG: 100 INJECTION SUBCUTANEOUS at 18:51

## 2024-03-26 RX ADMIN — ACETAMINOPHEN 650 MG: 325 TABLET, FILM COATED ORAL at 16:48

## 2024-03-26 ASSESSMENT — ENCOUNTER SYMPTOMS: LOSS OF CONSCIOUSNESS: 1

## 2024-03-26 ASSESSMENT — PATIENT HEALTH QUESTIONNAIRE - PHQ9
SUM OF ALL RESPONSES TO PHQ9 QUESTIONS 1 AND 2: 0
1. LITTLE INTEREST OR PLEASURE IN DOING THINGS: NOT AT ALL
2. FEELING DOWN, DEPRESSED, IRRITABLE, OR HOPELESS: NOT AT ALL

## 2024-03-26 ASSESSMENT — LIFESTYLE VARIABLES
CONSUMPTION TOTAL: NEGATIVE
ON A TYPICAL DAY WHEN YOU DRINK ALCOHOL HOW MANY DRINKS DO YOU HAVE: 1
HAVE PEOPLE ANNOYED YOU BY CRITICIZING YOUR DRINKING: NO
HAVE YOU EVER FELT YOU SHOULD CUT DOWN ON YOUR DRINKING: NO
EVER HAD A DRINK FIRST THING IN THE MORNING TO STEADY YOUR NERVES TO GET RID OF A HANGOVER: NO
HOW MANY TIMES IN THE PAST YEAR HAVE YOU HAD 5 OR MORE DRINKS IN A DAY: 0
TOTAL SCORE: 0
AVERAGE NUMBER OF DAYS PER WEEK YOU HAVE A DRINK CONTAINING ALCOHOL: 7
ALCOHOL_USE: YES
EVER FELT BAD OR GUILTY ABOUT YOUR DRINKING: NO
TOTAL SCORE: 0
TOTAL SCORE: 0
DOES PATIENT WANT TO STOP DRINKING: NO

## 2024-03-26 ASSESSMENT — PAIN DESCRIPTION - PAIN TYPE
TYPE: ACUTE PAIN

## 2024-03-26 ASSESSMENT — FIBROSIS 4 INDEX
FIB4 SCORE: 1.24
FIB4 SCORE: 1.02
FIB4 SCORE: 1.02

## 2024-03-26 NOTE — H&P
Hospital Medicine History & Physical Note    Date of Service  3/26/2024    Primary Care Physician  Neelima Rivera M.D.    Consultants  none    Code Status  Full Code    Chief Complaint  Chief Complaint   Patient presents with    Dizziness     Pt states he was a work started to feel dizzy had a syncopal episode and hit his head     Syncope    Elbow Pain     Left elbow     Hip Pain     Left hip pain        History of Presenting Illness  Terrell Hensley is a 63 y.o. male who presented 3/26/2024 with a syncopal episode.  Patient states that he has been doing more physical activity at work and also has been noticing insomnia.  Today he was at work when he suddenly felt lightheaded and had a syncopal episode.  He was brought into the ER and was noted to have a laceration of his left orbital wall.  Patient denied any chest pain or palpitations preceding the event.  He denied any tongue biting or urinary incontinence.  He reports a left-sided headache.  He denies any fevers, chills neck stiffness, focal weakness or shortness of breath.  He does report he had some left lower extremity swelling a week ago which is now resolved.  He denies any previous history of stroke, MI or DVT.  Initial blood work significant for leukocytosis of 12 and hyponatremia with a sodium of 130.  His glucose was elevated at 184    I discussed the plan of care with patient, bedside RN, and ERP .    EKG interpreted by me reveals sinus rhythm with no ST elevation or ST depression  CT head without contrast interpreted by me was negative for acute intracranial process    Review of Systems  Review of Systems   Neurological:  Positive for loss of consciousness.       Past Medical History   has a past medical history of HTN (hypertension) and Suicide attempt (HCC).    Surgical History  No pertinent surgical history    Family History     Family history reviewed with patient. There is no family history that is pertinent to the chief complaint.      Social History   reports that he has never smoked. He has never used smokeless tobacco. He reports current alcohol use. He reports that he does not use drugs.    Allergies  Allergies   Allergen Reactions    Sulfa Drugs Hives and Rash       Medications  Prior to Admission Medications   Prescriptions Last Dose Informant Patient Reported? Taking?   GLUCOSAMINE-CHONDROITIN PO 3/26/2024 at AM Patient Yes Yes   Sig: Take 1 Tablet by mouth every day.   Ibuprofen-diphenhydrAMINE Cit (ADVIL PM) 200-38 MG Tab 3/25/2024 at PM Patient Yes Yes   Sig: Take 2 Tablets by mouth at bedtime.   MELATONIN PO 3/25/2024 at PM Patient Yes Yes   Sig: Take 18 mg by mouth at bedtime.   Multiple Vitamins-Minerals (MENS 50+ MULTI VITAMIN/MIN PO) 3/26/2024 at AM Patient Yes Yes   Sig: Take 1 Tablet by mouth every day.   QUEtiapine (SEROQUEL) 100 MG Tab 3/25/2024 at PM Patient Yes Yes   Sig: Take 150-200 mg by mouth at bedtime.   amlodipine-benazepril (LOTREL) 5-10 MG per capsule 3/26/2024 at AM Patient Yes Yes   Sig: Take 1 Capsule by mouth every day.   chlorthalidone (HYGROTON) 25 MG Tab 3/26/2024 at AM Patient Yes Yes   Sig: Take 25 mg by mouth every day.   clonazePAM (KLONOPIN) 1 MG Tab 3/25/2024 at PM Patient Yes Yes   Sig: Take 2 mg by mouth every evening.   oxybutynin (DITROPAN XL) 15 MG CR tablet 3/26/2024 at AM Patient Yes Yes   Sig: Take 15 mg by mouth every day.   tamsulosin (FLOMAX) 0.4 MG capsule 3/25/2024 at PM Patient Yes Yes   Sig: Take 0.4 mg by mouth every day.      Facility-Administered Medications: None       Physical Exam  Temp:  [36.1 °C (97 °F)-36.6 °C (97.9 °F)] 36.6 °C (97.9 °F)  Pulse:  [68-86] 86  Resp:  [16-22] 18  BP: (104-146)/(58-81) 132/81  SpO2:  [92 %-99 %] 94 %  Blood Pressure: 132/81   Temperature: 36.6 °C (97.9 °F)   Pulse: 86   Respiration: 18   Pulse Oximetry: 94 %       Physical Exam  Vitals and nursing note reviewed.   Constitutional:       General: He is not in acute distress.  HENT:      Head:  "Normocephalic.      Mouth/Throat:      Mouth: Mucous membranes are moist.   Eyes:      Pupils: Pupils are equal, round, and reactive to light.   Cardiovascular:      Rate and Rhythm: Normal rate and regular rhythm.      Pulses: Normal pulses.      Heart sounds: Normal heart sounds.   Pulmonary:      Effort: Pulmonary effort is normal.      Breath sounds: Normal breath sounds.   Abdominal:      Palpations: Abdomen is soft.      Tenderness: There is no abdominal tenderness.   Musculoskeletal:         General: No swelling.      Cervical back: Neck supple.   Skin:     General: Skin is warm.      Coloration: Skin is not jaundiced.   Neurological:      General: No focal deficit present.      Mental Status: He is alert and oriented to person, place, and time.   Psychiatric:         Mood and Affect: Mood normal.         Behavior: Behavior normal.         Laboratory:  Recent Labs     03/26/24  1048   WBC 12.0*   RBC 4.96   HEMOGLOBIN 15.6   HEMATOCRIT 44.4   MCV 89.5   MCH 31.5   MCHC 35.1   RDW 40.9   PLATELETCT 232   MPV 9.3     Recent Labs     03/26/24  1048   SODIUM 130*   POTASSIUM 3.9   CHLORIDE 92*   CO2 25   GLUCOSE 184*   BUN 19   CREATININE 1.06   CALCIUM 9.9     Recent Labs     03/26/24  1048   ALTSGPT 26   ASTSGOT 23   ALKPHOSPHAT 81   TBILIRUBIN 0.4   GLUCOSE 184*         No results for input(s): \"NTPROBNP\" in the last 72 hours.      Recent Labs     03/26/24  1048   TROPONINT 13       Imaging:  CT-HEAD W/O   Final Result      No acute intracranial abnormality.         DX-ELBOW-COMPLETE 3+ LEFT   Final Result      No fracture or dislocation of LEFT elbow.      DX-HIP-UNILATERAL-WITH PELVIS-1 VIEW LEFT   Final Result      No radiographic evidence of acute traumatic injury.      DX-CHEST-PORTABLE (1 VIEW)   Final Result      Hypoinflation without other evidence for acute cardiopulmonary disease.      EC-ECHOCARDIOGRAM COMPLETE W/O CONT    (Results Pending)         Assessment/Plan:  Justification for Admission " Status  I anticipate this patient is appropriate for observation status at this time because of syncope    Patient will need a Telemetry bed on MEDICAL service .  The need is secondary to syncope.    * Syncope and collapse- (present on admission)  Assessment & Plan  Rule out cardiogenic causes  Continuous cardiac monitoring on telemetry  Check 2D echo  Check orthostatics      Hyperglycemia- (present on admission)  Assessment & Plan  Check HbA1c      Insomnia- (present on admission)  Assessment & Plan  Continue Klonopin    Hypertension- (present on admission)  Assessment & Plan  Hold antihypertensives for now    Hyponatremia- (present on admission)  Assessment & Plan  IV fluid hydration with normal saline  Hold chlorthalidone  Check TSH  Monitor BMP and assess response          VTE prophylaxis: enoxaparin ppx    I independently reviewed pertinent clinical lab tests since admission and ordered additional follow up clinical lab tests.  I independently reviewed pertinent radiographic images and the radiologist's reports since admission and ordered additional follow up radiographic studies.  The details of the available patient records in [facility] (including laboratory tests, culture data, medications, imaging, and other pertinent diagnostic tests) and that information was utilized as warranted in today's medical decision making for this patient.  I personally evaluated the patient condition at bedside.     Care interventions include:   Review of interval medical and surgical history, current medications and outpatient medication reconciliation, interval imaging studies and radiologist interpretation, and interval laboratory values.     Aggregated care time spent evaluating, reassessing, reviewing documentation, providing care, counseling, coordinating care and managing this patient exclusive of procedures: 76 minutes

## 2024-03-26 NOTE — CARE PLAN
The patient is Stable - Low risk of patient condition declining or worsening    Shift Goals  Clinical Goals: Cardiac obs  Patient Goals: Dx, results  Family Goals: NA    Progress made toward(s) clinical / shift goals:    Problem: Knowledge Deficit - Standard  Goal: Patient and family/care givers will demonstrate understanding of plan of care, disease process/condition, diagnostic tests and medications  Description: Target End Date:  1-3 days or as soon as patient condition allows    Document in Patient Education    1.  Patient and family/caregiver oriented to unit, equipment, visitation policy and means for communicating concern  2.  Complete/review Learning Assessment  3.  Assess knowledge level of disease process/condition, treatment plan, diagnostic tests and medications  4.  Explain disease process/condition, treatment plan, diagnostic tests and medications  Outcome: Progressing       Patient is not progressing towards the following goals:

## 2024-03-26 NOTE — ED TRIAGE NOTES
Pt to triage .  Chief Complaint   Patient presents with    Dizziness     Pt states he was a work started to feel dizzy had a syncopal episode and hit his head     Syncope    Elbow Pain     Left elbow     Hip Pain     Left hip pain

## 2024-03-26 NOTE — ED NOTES
Pt to floor with transport tech with chart and all belongings. No acute changes in pt condition.

## 2024-03-26 NOTE — PROGRESS NOTES
2 RN Skin Assessment Completed by Latisha RN and Anu RN.     Head/Face: Eyebrow laceration, derma bonded ADINA  Ears: WDL  Nose: WDL  Mouth: WDL  Neck: WDL  Breasts/Chest: WDL  Shoulder Blades: WDL  Spine: WDL  (R) Arm/Elbow/hand: WDL  (L) Arm/Elbow/hand: WDL  Abdomen:WDL  Groin: WDL  Sacrum/Coccyx/Buttocks: blanching  (R) Leg: WDL  (L) Leg: WDL  (R) Heel/Foot/Toe: blanching  (L) Heel/Foot/Toe: blanching              Devices in place: BP Cuff and Pulse Ox     Interventions in place: Gray ear foams and Pillows     Possible skin injury found: No     Pictures uploaded into Epic: N/A  Wound Consult Placed: N/A

## 2024-03-26 NOTE — PROGRESS NOTES
"Subjective:   Terrell Hensley is a 63 y.o. male who presents for Dizziness (Seeing dark spots. Disoriented, pain in gut. Pt fainted this morning, hit head and elbow. Onset this morning ) and Other       HPI  Patient is a pleasant 63 y.o. who presents for evaluation of syncopal event that happened earlier this morning.  Patient states that he was at work when he was performing his normal work duties, he reportedly saw dark spots and the \"room closing in\" on him.  Patient reports later waking up laying on the concrete ground, noticing a drop of blood, which he noticed was from the left side of his temple of which he suffered a laceration.  Also endorses left elbow and hip pain.  He reports several episodes of diarrhea as well this morning.  Denies nausea, vomiting, chest pain, chest pressure, palpitations, or lower extremity swelling.  Patient ate his normal breakfast of cereal.  Has known hypertension, takes medications as prescribed, no prior additional cardiac or neurologic issues.    ROS  All other systems are negative except as documented above within HPI.    MEDS:   Current Outpatient Medications:     amlodipine-benazepril (LOTREL) 5-10 MG per capsule, Take 1 Capsule by mouth every day., Disp: , Rfl:     indomethacin (INDOCIN) 50 MG Cap, TAKE 1 CAPSULE WITH FOOD OR MILK ORALLY TWICE A DAY FOR 5 DAYS FOR ACUTE GOUT ATTACKS ONLY 30 DAYS, Disp: , Rfl:     QUEtiapine (SEROQUEL) 100 MG Tab, Take 100 mg by mouth 3 times a day., Disp: , Rfl:     chlorthalidone (HYGROTON) 25 MG Tab, TAKE 1 TABLET BY MOUTH EVERY DAY IN THE MORNING WITH FOOD FOR 90 DAYS, Disp: , Rfl:     clonazePAM (KLONOPIN) 1 MG Tab, TAKE 2 TABLETS (2 MG) BY MOUTH DAILY AT BEDTIME F41.1, Disp: , Rfl:     tamsulosin (FLOMAX) 0.4 MG capsule, Take 0.4 mg by mouth 1/2 hour after breakfast., Disp: , Rfl:     oxybutynin (DITROPAN XL) 15 MG CR tablet, Take 15 mg by mouth every day., Disp: , Rfl:     chlorthalidone (HYGROTON) 25 MG Tab, TAKE 1 TABLET BY " "MOUTH EVERY DAY IN THE MORNING WITH FOOD FOR 90 DAYS (Patient not taking: Reported on 3/26/2024), Disp: , Rfl:     oxybutynin (DITROPAN-XL) 15 MG CR tablet, Take 1 Tablet by mouth every day. (Patient not taking: Reported on 3/26/2024), Disp: , Rfl:     tamsulosin (FLOMAX) 0.4 MG capsule, Take 1 Capsule by mouth at bedtime. (Patient not taking: Reported on 3/26/2024), Disp: , Rfl:     amLODIPine (NORVASC) 10 MG Tab, Take 10 mg by mouth every day. (Patient not taking: Reported on 3/26/2024), Disp: , Rfl:     amLODIPine (NORVASC) 5 MG Tab, Take 5 mg by mouth every day. (Patient not taking: Reported on 3/26/2024), Disp: , Rfl:     QUEtiapine (SEROQUEL) 25 MG Tab, Take 300 mg by mouth every bedtime. (Patient not taking: Reported on 3/26/2024), Disp: , Rfl:   ALLERGIES:   Allergies   Allergen Reactions    Sulfa Drugs Hives and Rash       Patient's PMH, SocHx, SurgHx, FamHx, Drug allergies and medications were reviewed.     Objective:   /58 (BP Location: Right arm, Patient Position: Sitting, BP Cuff Size: Adult)   Pulse 68   Temp 36.4 °C (97.5 °F) (Temporal)   Resp 16   Ht 1.803 m (5' 11\")   Wt 87.1 kg (192 lb)   SpO2 97%   BMI 26.78 kg/m²     Physical Exam  Vitals and nursing note reviewed.   Constitutional:       General: He is awake.      Appearance: Normal appearance. He is well-developed and normal weight.   HENT:      Head: Normocephalic and atraumatic.        Comments: Laceration as diagrammed     Right Ear: Tympanic membrane, ear canal and external ear normal.      Left Ear: Tympanic membrane, ear canal and external ear normal.      Nose: Nose normal.      Mouth/Throat:      Lips: Pink.      Mouth: Mucous membranes are moist.      Pharynx: Oropharynx is clear. Uvula midline.   Eyes:      Extraocular Movements: Extraocular movements intact.      Conjunctiva/sclera: Conjunctivae normal.      Pupils: Pupils are equal, round, and reactive to light.   Neck:      Thyroid: No thyromegaly.      Trachea: " Trachea normal.   Cardiovascular:      Rate and Rhythm: Normal rate and regular rhythm.      Pulses: Normal pulses.      Heart sounds: Normal heart sounds, S1 normal and S2 normal.   Pulmonary:      Effort: Pulmonary effort is normal. No respiratory distress.      Breath sounds: Normal breath sounds. No wheezing, rhonchi or rales.   Abdominal:      General: Bowel sounds are normal.      Palpations: Abdomen is soft.   Musculoskeletal:         General: Normal range of motion.      Left elbow: Swelling present.        Arms:       Cervical back: Full passive range of motion without pain, normal range of motion and neck supple.      Comments: Normal ROM   Lymphadenopathy:      Cervical: No cervical adenopathy.   Skin:     General: Skin is warm and dry.      Capillary Refill: Capillary refill takes less than 2 seconds.   Neurological:      General: No focal deficit present.      Mental Status: He is alert and oriented to person, place, and time.      Gait: Gait is intact.   Psychiatric:         Attention and Perception: Attention and perception normal.         Mood and Affect: Mood normal.         Speech: Speech normal.         Behavior: Behavior normal. Behavior is cooperative.         Thought Content: Thought content normal.         Judgment: Judgment normal.     EKG- NSR w/o acute ST changes  POCT glucose- 124  POCT UA- cloudy, trace blood, intact    Assessment/Plan:   Assessment    1. Syncope and collapse  - EKG - Clinic Performed  - POCT Glucose  - POCT Urinalysis    2. Contusion of left elbow, initial encounter    3. Left hip pain      Vital signs stable at today's acute urgent care visit.  EKG completed in clinic, normal sinus rhythm without acute changes.  POCT glucose and urinalysis essentially unremarkable.  However, due to patient having a syncopal event as well as falling and hitting his head, he was referred to ED for further evaluation and higher level of care.  Patient be transported via POV with his  brother.  [facility] called and report provided.  All questions were encouraged and answered to the patient's satisfaction and understanding, and they agree to the plan of care.     This is an acute problem with uncertain prognosis, medication management and instructions as well as management options were provided.  I personally reviewed prior external notes and test results pertinent to today and independently reviewed and interpreted all diagnostics, to include POCT testing. Time spent evaluating this patient includes preparing for visit, counseling/education, exam, evaluation, obtaining history, and ordering lab/test/procedures.      Please note that this dictation was created using voice recognition software. I have made a reasonable attempt to correct obvious errors, but I expect that there are errors of grammar and possibly content that I did not discover before finalizing the note.

## 2024-03-26 NOTE — ED NOTES
Med Rec completed per patient   Allergies reviewed  No ORAL antibiotics in last 30 days    Patient is not taking anticoagulants

## 2024-03-27 ENCOUNTER — APPOINTMENT (OUTPATIENT)
Dept: RADIOLOGY | Facility: MEDICAL CENTER | Age: 64
End: 2024-03-27
Attending: INTERNAL MEDICINE
Payer: COMMERCIAL

## 2024-03-27 ENCOUNTER — HOSPITAL ENCOUNTER (OUTPATIENT)
Dept: RADIOLOGY | Facility: MEDICAL CENTER | Age: 64
End: 2024-03-27
Attending: NURSE PRACTITIONER

## 2024-03-27 VITALS
TEMPERATURE: 99 F | OXYGEN SATURATION: 91 % | HEART RATE: 77 BPM | WEIGHT: 195.99 LBS | SYSTOLIC BLOOD PRESSURE: 137 MMHG | BODY MASS INDEX: 27.44 KG/M2 | DIASTOLIC BLOOD PRESSURE: 76 MMHG | RESPIRATION RATE: 16 BRPM | HEIGHT: 71 IN

## 2024-03-27 PROBLEM — E87.1 HYPONATREMIA: Status: RESOLVED | Noted: 2024-03-26 | Resolved: 2024-03-27

## 2024-03-27 PROBLEM — R55 SYNCOPE AND COLLAPSE: Status: RESOLVED | Noted: 2024-03-26 | Resolved: 2024-03-27

## 2024-03-27 PROBLEM — R73.9 HYPERGLYCEMIA: Status: RESOLVED | Noted: 2024-03-26 | Resolved: 2024-03-27

## 2024-03-27 LAB
ANION GAP SERPL CALC-SCNC: 13 MMOL/L (ref 7–16)
BUN SERPL-MCNC: 15 MG/DL (ref 8–22)
CALCIUM SERPL-MCNC: 8.9 MG/DL (ref 8.5–10.5)
CHLORIDE SERPL-SCNC: 99 MMOL/L (ref 96–112)
CO2 SERPL-SCNC: 23 MMOL/L (ref 20–33)
CREAT SERPL-MCNC: 0.86 MG/DL (ref 0.5–1.4)
ERYTHROCYTE [DISTWIDTH] IN BLOOD BY AUTOMATED COUNT: 40.4 FL (ref 35.9–50)
GFR SERPLBLD CREATININE-BSD FMLA CKD-EPI: 96 ML/MIN/1.73 M 2
GLUCOSE SERPL-MCNC: 96 MG/DL (ref 65–99)
HCT VFR BLD AUTO: 40.3 % (ref 42–52)
HGB BLD-MCNC: 14.1 G/DL (ref 14–18)
MCH RBC QN AUTO: 31.2 PG (ref 27–33)
MCHC RBC AUTO-ENTMCNC: 35 G/DL (ref 32.3–36.5)
MCV RBC AUTO: 89.2 FL (ref 81.4–97.8)
PLATELET # BLD AUTO: 221 K/UL (ref 164–446)
PMV BLD AUTO: 9.6 FL (ref 9–12.9)
POTASSIUM SERPL-SCNC: 3.3 MMOL/L (ref 3.6–5.5)
RBC # BLD AUTO: 4.52 M/UL (ref 4.7–6.1)
SODIUM SERPL-SCNC: 135 MMOL/L (ref 135–145)
WBC # BLD AUTO: 8.2 K/UL (ref 4.8–10.8)

## 2024-03-27 PROCEDURE — 80048 BASIC METABOLIC PNL TOTAL CA: CPT

## 2024-03-27 PROCEDURE — 700117 HCHG RX CONTRAST REV CODE 255: Performed by: NURSE PRACTITIONER

## 2024-03-27 PROCEDURE — 93970 EXTREMITY STUDY: CPT

## 2024-03-27 PROCEDURE — 71275 CT ANGIOGRAPHY CHEST: CPT

## 2024-03-27 PROCEDURE — 700102 HCHG RX REV CODE 250 W/ 637 OVERRIDE(OP): Performed by: INTERNAL MEDICINE

## 2024-03-27 PROCEDURE — 99239 HOSP IP/OBS DSCHRG MGMT >30: CPT | Performed by: INTERNAL MEDICINE

## 2024-03-27 PROCEDURE — G0378 HOSPITAL OBSERVATION PER HR: HCPCS

## 2024-03-27 PROCEDURE — 85027 COMPLETE CBC AUTOMATED: CPT

## 2024-03-27 PROCEDURE — A9270 NON-COVERED ITEM OR SERVICE: HCPCS | Performed by: INTERNAL MEDICINE

## 2024-03-27 RX ORDER — FINASTERIDE 5 MG/1
5 TABLET, FILM COATED ORAL DAILY
Qty: 30 TABLET | Refills: 0 | Status: SHIPPED | OUTPATIENT
Start: 2024-03-27

## 2024-03-27 RX ORDER — POTASSIUM CHLORIDE 20 MEQ/1
40 TABLET, EXTENDED RELEASE ORAL ONCE
Status: COMPLETED | OUTPATIENT
Start: 2024-03-27 | End: 2024-03-27

## 2024-03-27 RX ADMIN — POTASSIUM CHLORIDE 40 MEQ: 1500 TABLET, EXTENDED RELEASE ORAL at 11:00

## 2024-03-27 RX ADMIN — IOHEXOL 65 ML: 350 INJECTION, SOLUTION INTRAVENOUS at 01:06

## 2024-03-27 ASSESSMENT — PAIN DESCRIPTION - PAIN TYPE: TYPE: ACUTE PAIN

## 2024-03-27 NOTE — PROGRESS NOTES
PIV removed with tip intact and site covered with gauze and coban. Armband removed. Discussed dc instructions including monitoring BP, medications, and follow-up appointments.

## 2024-03-27 NOTE — CARE PLAN
The patient is Watcher - Medium risk of patient condition declining or worsening    Shift Goals  Clinical Goals: Cardiac/syncope obs  Patient Goals: Dx, rest , comfort.,  Family Goals: nonoe    Progress made toward(s) clinical / shift goals:    Problem: Knowledge Deficit - Standard  Goal: Patient and family/care givers will demonstrate understanding of plan of care, disease process/condition, diagnostic tests and medications  Description: Target End Date:  1-3 days or as soon as patient condition allows    Document in Patient Education    1.  Patient and family/caregiver oriented to unit, equipment, visitation policy and means for communicating concern  2.  Complete/review Learning Assessment  3.  Assess knowledge level of disease process/condition, treatment plan, diagnostic tests and medications  4.  Explain disease process/condition, treatment plan, diagnostic tests and medications  Outcome: Progressing       Patient is not progressing towards the following goals:

## 2024-03-27 NOTE — PROGRESS NOTES
Monitor summary per monitor tech report:    Sinus Rhythm, rate 69-74  Ectopy: rPVCs  .19/.07/.37

## 2024-03-27 NOTE — DISCHARGE SUMMARY
Discharge Summary    CHIEF COMPLAINT ON ADMISSION  Chief Complaint   Patient presents with    Dizziness     Pt states he was a work started to feel dizzy had a syncopal episode and hit his head     Syncope    Elbow Pain     Left elbow     Hip Pain     Left hip pain        Reason for Admission  Syncope/Sent by      Admission Date  3/26/2024    CODE STATUS  Full Code    HPI & HOSPITAL COURSE  63 y.o. male who presented 3/26/2024 with a syncopal episode.  He was at work when he suddenly felt lightheaded and had a syncopal episode.  He was brought into the ER and was noted to have a laceration of his left orbital wall which was reparied.  Patient denied any chest pain or palpitations preceding the event.  He denied any tongue biting or urinary incontinence.  He reports a left-sided headache.  He denies any fevers, chills neck stiffness, focal weakness or shortness of breath.  He does report he had some left lower extremity swelling a week ago which is now resolved.  He denies any previous history of stroke, MI or DVT.  Initial blood work significant for leukocytosis of 12 and hyponatremia with a sodium of 130.  His glucose was elevated at 184     EKG reveals sinus rhythm with no ST elevation or ST depression  CT head without contrast was negative for acute intracranial process    He was monitored in the clinical decision unit.  He was treated with IV fluid hydration with normal saline.  Cardiac monitoring revealed sinus rhythm without any evidence of arrhythmias.  2D echo revealed LVEF 61% and no evidence of valvular abnormalities.  D-dimer was elevated.  Lower extremity ultrasound was negative for DVT.  CTA chest with IV contrast was negative for PE.  His sodium improved with IV fluid hydration.  His chlorthalidone was discontinued.  His Flomax was also discontinued which may have been contributing to his symptoms of dizziness.  He has been instructed to follow-up with his PCP and to monitor his blood pressure  closely    Therefore, he is discharged in good and stable condition to home with close outpatient follow-up.    The patient recovered much more quickly than anticipated on admission.    Discharge Date  3/27/24    FOLLOW UP ITEMS POST DISCHARGE  PCP    DISCHARGE DIAGNOSES  Principal Problem (Resolved):    Syncope and collapse (POA: Yes)  Active Problems:    Hypertension (POA: Yes)    Insomnia (POA: Yes)  Resolved Problems:    Hyponatremia (POA: Yes)    Hyperglycemia (POA: Yes)      FOLLOW UP  No future appointments.  No follow-up provider specified.    MEDICATIONS ON DISCHARGE     Medication List        CONTINUE taking these medications        Instructions   Advil -38 MG Tabs  Generic drug: Ibuprofen-diphenhydrAMINE Cit   Take 2 Tablets by mouth at bedtime.  Dose: 2 Tablet     amlodipine-benazepril 5-10 MG per capsule  Commonly known as: Lotrel   Take 1 Capsule by mouth every day.  Dose: 1 Capsule     clonazePAM 1 MG Tabs  Commonly known as: KlonoPIN   Take 2 mg by mouth every evening.  Dose: 2 mg     GLUCOSAMINE-CHONDROITIN PO   Take 1 Tablet by mouth every day.  Dose: 1 Tablet     MELATONIN PO   Take 18 mg by mouth at bedtime.  Dose: 18 mg     MENS 50+ MULTI VITAMIN/MIN PO   Take 1 Tablet by mouth every day.  Dose: 1 Tablet     oxybutynin 15 MG CR tablet  Commonly known as: Ditropan-XL   Take 15 mg by mouth every day.  Dose: 15 mg     QUEtiapine 100 MG Tabs  Commonly known as: SEROquel   Take 150-200 mg by mouth at bedtime.  Dose: 150-200 mg            STOP taking these medications      chlorthalidone 25 MG Tabs  Commonly known as: Hygroton     tamsulosin 0.4 MG capsule  Commonly known as: Flomax              Allergies  Allergies   Allergen Reactions    Sulfa Drugs Hives and Rash       DIET  Orders Placed This Encounter   Procedures    Diet Order Diet: Regular     Standing Status:   Standing     Number of Occurrences:   1     Order Specific Question:   Diet:     Answer:   Regular [1]       ACTIVITY  As  tolerated.  Weight bearing as tolerated    CONSULTATIONS  none    PROCEDURES  none    LABORATORY  Lab Results   Component Value Date    SODIUM 135 03/27/2024    POTASSIUM 3.3 (L) 03/27/2024    CHLORIDE 99 03/27/2024    CO2 23 03/27/2024    GLUCOSE 96 03/27/2024    BUN 15 03/27/2024    CREATININE 0.86 03/27/2024        Lab Results   Component Value Date    WBC 8.2 03/27/2024    HEMOGLOBIN 14.1 03/27/2024    HEMATOCRIT 40.3 (L) 03/27/2024    PLATELETCT 221 03/27/2024        Total time of the discharge process exceeds 35 minutes.   No

## 2024-03-27 NOTE — PROGRESS NOTES
Assessment completed. Pt A&Ox4. Respirations are even and unlabored on RA. Pt denies pain at this time, reports soreness to L elbow and L hip area-medication per MAR. Monitors applied, VS stable, call light and belongings within reach. POC updated (monitor overnight, CT chest). Pt educated on room and call light, pt verbalized understanding. Communication board updated. Needs met.

## 2024-04-30 ENCOUNTER — APPOINTMENT (OUTPATIENT)
Dept: RADIOLOGY | Facility: IMAGING CENTER | Age: 64
End: 2024-04-30
Payer: COMMERCIAL

## 2024-04-30 ENCOUNTER — OFFICE VISIT (OUTPATIENT)
Dept: URGENT CARE | Facility: PHYSICIAN GROUP | Age: 64
End: 2024-04-30
Payer: COMMERCIAL

## 2024-04-30 VITALS
DIASTOLIC BLOOD PRESSURE: 60 MMHG | HEIGHT: 71 IN | BODY MASS INDEX: 25.62 KG/M2 | OXYGEN SATURATION: 95 % | TEMPERATURE: 99.2 F | WEIGHT: 183 LBS | RESPIRATION RATE: 16 BRPM | HEART RATE: 80 BPM | SYSTOLIC BLOOD PRESSURE: 110 MMHG

## 2024-04-30 DIAGNOSIS — M25.562 ACUTE PAIN OF LEFT KNEE: ICD-10-CM

## 2024-04-30 DIAGNOSIS — M25.462 EFFUSION OF LEFT KNEE: ICD-10-CM

## 2024-04-30 PROCEDURE — 73564 X-RAY EXAM KNEE 4 OR MORE: CPT | Mod: TC,LT

## 2024-04-30 RX ORDER — TAMSULOSIN HYDROCHLORIDE 0.4 MG/1
1 CAPSULE ORAL
COMMUNITY
Start: 2024-04-10

## 2024-04-30 RX ORDER — CHLORTHALIDONE 25 MG/1
TABLET ORAL
COMMUNITY
Start: 2024-04-07

## 2024-04-30 RX ORDER — OXYBUTYNIN CHLORIDE 15 MG/1
1 TABLET, EXTENDED RELEASE ORAL
COMMUNITY
Start: 2024-04-10

## 2024-04-30 RX ORDER — METHYLPREDNISOLONE 4 MG/1
TABLET ORAL
Qty: 21 TABLET | Refills: 0 | Status: SHIPPED | OUTPATIENT
Start: 2024-04-30

## 2024-04-30 ASSESSMENT — FIBROSIS 4 INDEX: FIB4 SCORE: 1.31

## 2024-04-30 ASSESSMENT — ENCOUNTER SYMPTOMS
CHILLS: 0
TINGLING: 0
SENSORY CHANGE: 0
VOMITING: 0
NECK PAIN: 0
STRIDOR: 0
WEAKNESS: 0
DIZZINESS: 0
SHORTNESS OF BREATH: 0
MYALGIAS: 0
NAUSEA: 0
FEVER: 0
BACK PAIN: 0
FALLS: 0

## 2024-04-30 ASSESSMENT — VISUAL ACUITY: OU: 1

## 2024-04-30 NOTE — LETTER
[facility] [place]  [id number]  [place] [address]     April 30, 2024    Patient: Terrell Hensley   YOB: 1960   Date of Visit: 4/30/2024       To Whom It May Concern:    Terrell Hensley was seen and treated in our department on 4/30/2024.     Please allow Terrell to wear a knee brace at work.         Sincerely,     HEBERT Crenshaw.

## 2024-05-01 NOTE — PROGRESS NOTES
Subjective     Terrell Hensley is a 63 y.o. male who presents with left knee pain and swelling x4 days.     HPI:   Terrell is a 62yo male presenting for left knee pain and swelling x4 days. Denies recent or previous left knee injury. Positive history of gout in toes. Pain is located in the LCL region. Denies any catching of the knee. Denies instability of the right knee. No numbness, tingling, burning, radiation pain up or down the leg, falls, or laceration. No left calf pain or swelling. No ankle or hip involvement.       Review of Systems   Constitutional:  Negative for chills, fever and malaise/fatigue.   Respiratory:  Negative for shortness of breath and stridor.    Gastrointestinal:  Negative for nausea and vomiting.   Musculoskeletal:  Positive for joint pain. Negative for back pain, falls, myalgias and neck pain.   Skin:  Negative for rash.   Neurological:  Negative for dizziness, tingling, sensory change and weakness.     Past Medical History:   Diagnosis Date    HTN (hypertension)     Suicide attempt (HCC)       History reviewed. No pertinent surgical history.     Allergies: Sulfa drugs     Current Outpatient Medications:     chlorthalidone (HYGROTON) 25 MG Tab, TAKE 1 TABLET BY MOUTH EVERY DAY IN THE MORNING WITH FOOD, Disp: , Rfl:     tamsulosin (FLOMAX) 0.4 MG capsule, Take 1 Capsule by mouth at bedtime., Disp: , Rfl:     oxybutynin (DITROPAN-XL) 15 MG CR tablet, Take 1 Tablet by mouth every day., Disp: , Rfl:     amlodipine-benazepril (LOTREL) 5-10 MG per capsule, Take 1 Capsule by mouth every day., Disp: , Rfl:     QUEtiapine (SEROQUEL) 100 MG Tab, Take 150-200 mg by mouth at bedtime., Disp: , Rfl:     Multiple Vitamins-Minerals (MENS 50+ MULTI VITAMIN/MIN PO), Take 1 Tablet by mouth every day., Disp: , Rfl:     GLUCOSAMINE-CHONDROITIN PO, Take 1 Tablet by mouth every day., Disp: , Rfl:     Ibuprofen-diphenhydrAMINE Cit (ADVIL PM) 200-38 MG Tab, Take 2 Tablets by mouth at bedtime., Disp: , Rfl:     " MELATONIN PO, Take 18 mg by mouth at bedtime., Disp: , Rfl:     clonazePAM (KLONOPIN) 1 MG Tab, Take 2 mg by mouth every evening., Disp: , Rfl:     oxybutynin (DITROPAN XL) 15 MG CR tablet, Take 15 mg by mouth every day., Disp: , Rfl:     finasteride (PROSCAR) 5 MG Tab, Take 1 Tablet by mouth every day. (Patient not taking: Reported on 4/30/2024), Disp: 30 Tablet, Rfl: 0     Social History     Tobacco Use    Smoking status: Never    Smokeless tobacco: Never   Vaping Use    Vaping Use: Never used   Substance Use Topics    Alcohol use: Yes     Comment: occ    Drug use: Never      History reviewed. No pertinent family history.     Medications, Allergies, and current problem list reviewed today in Epic.      Objective     /60 (BP Location: Right arm, Patient Position: Sitting, BP Cuff Size: Adult)   Pulse 80   Temp 37.3 °C (99.2 °F) (Temporal)   Resp 16   Ht 1.803 m (5' 11\")   Wt 83 kg (183 lb)   SpO2 95%   BMI 25.52 kg/m²      Physical Exam  Vitals reviewed.   Constitutional:       General: He is not in acute distress.  HENT:      Nose: Nose normal.   Eyes:      General: Vision grossly intact. Gaze aligned appropriately. No visual field deficit.     Extraocular Movements: Extraocular movements intact.      Conjunctiva/sclera: Conjunctivae normal.      Pupils: Pupils are equal, round, and reactive to light.   Cardiovascular:      Rate and Rhythm: Normal rate and regular rhythm.      Pulses: Normal pulses.           Popliteal pulses are 2+ on the right side and 2+ on the left side.        Dorsalis pedis pulses are 2+ on the right side and 2+ on the left side.        Posterior tibial pulses are 2+ on the right side and 2+ on the left side.      Heart sounds: Normal heart sounds.   Pulmonary:      Effort: Pulmonary effort is normal. No tachypnea, accessory muscle usage, prolonged expiration, respiratory distress or retractions.      Breath sounds: Normal breath sounds.   Musculoskeletal:      Cervical back: " Full passive range of motion without pain, normal range of motion and neck supple. Normal range of motion.      Right upper leg: Normal.      Left upper leg: No swelling, edema, deformity, lacerations, tenderness or bony tenderness.      Right knee: Normal.      Left knee: Swelling and effusion present. No deformity, erythema, ecchymosis, lacerations, bony tenderness or crepitus. Decreased range of motion. Tenderness present over the LCL. No LCL laxity, MCL laxity, ACL laxity or PCL laxity.Normal alignment, normal meniscus and normal patellar mobility. Normal pulse.      Instability Tests: Anterior drawer test negative.      Right lower leg: Normal.      Left lower leg: No swelling, deformity, lacerations, tenderness or bony tenderness. No edema.      Right ankle: Normal.      Right Achilles Tendon: Normal.      Left ankle: No swelling, deformity, ecchymosis or lacerations. No tenderness. Normal range of motion. Anterior drawer test negative. Normal pulse.      Left Achilles Tendon: No tenderness or defects. Meier's test negative.      Comments: Left knee: Mild swelling overlying patellar region. No calf swelling. Pain intermittent with ambulation. There is no valgus or varus laxity with applied pressure, negative anterior drawer test. No bruising or discoloration. No tenderness to palpation.    Skin:     General: Skin is warm and dry.      Capillary Refill: Capillary refill takes less than 2 seconds.   Neurological:      Mental Status: He is alert. Mental status is at baseline.   Psychiatric:         Mood and Affect: Mood normal.         Behavior: Behavior normal.         Thought Content: Thought content normal.       DX-KNEE COMPLETE 4+ LEFT    Result Date: 4/30/2024 4/30/2024 3:16 PM HISTORY/REASON FOR EXAM:  Atraumatic Pain/Swelling/Deformity. TECHNIQUE/EXAM DESCRIPTION AND NUMBER OF VIEWS:  4 views of the  LEFT knee. COMPARISON: None FINDINGS: MINERALIZATION: Mineralization is unremarkable for age. INJURY:  No acute fracture or gross malalignment is seen. MEDIAL JOINT COMPARTMENT: There is mild loss of joint space. LATERAL JOINT COMPARTMENT: Unremarkable PATELLOFEMORAL JOINT COMPARTMENT: There is mild loss of joint space. There is a knee joint effusion.     1.  Mild osteoarthritis 2.  Knee joint effusion      Assessment & Plan     1. Acute pain of left knee   - DX-KNEE COMPLETE 4+ LEFT; Future  - methylPREDNISolone (MEDROL DOSEPAK) 4 MG Tablet Therapy Pack; Follow schedule on package instructions.  Dispense: 21 Tablet; Refill: 0    2. Effusion of left knee   - Referral to Orthopedics  - methylPREDNISolone (MEDROL DOSEPAK) 4 MG Tablet Therapy Pack; Follow schedule on package instructions.  Dispense: 21 Tablet; Refill: 0       MDM/Comments:   Discussed with patient signs and symptoms consistent with a knee effusion. Patient advised on how to correctly take the medrol dosepak. Do not take additional NSAIDs with this medication.    Referral to Orthopedics placed for further evaluation.   Patient has knee brace.    Treatment of as above and initial rest with no heavy lifting, stooping, or strenuous activity. Massage, ice and/or heat which ever feels better, and Tylenol per manufacture's instructions. Encouraged walking, stretching, and range of motion exercises as tolerated. Avoid sitting or laying down for long periods of time except for at night during sleep.   Patient is overall very well-appearing, no acute distress, and normal vital signs. Suspicions for acute emergent pathology are low.   Follow up with your PCP or return to urgent care if symptoms not improving in 1-2 weeks.      Illness progression and alarm symptoms discussed with patient, emphasizing low threshold for returning to clinic/emergency department for worsening symptoms. Patient is agreeable to the plan and verbalizes understanding, and will follow up if warranted.       Differential diagnosis, natural history, supportive care, and indications for  immediate follow-up discussed.      Follow-up as needed if symptoms worsen or fail to improve to PCP, Urgent care or Emergency Room.                 Electronically signed by MELY Whitt

## 2024-05-23 ENCOUNTER — HOSPITAL ENCOUNTER (EMERGENCY)
Facility: MEDICAL CENTER | Age: 64
End: 2024-05-23
Attending: STUDENT IN AN ORGANIZED HEALTH CARE EDUCATION/TRAINING PROGRAM
Payer: COMMERCIAL

## 2024-05-23 ENCOUNTER — APPOINTMENT (OUTPATIENT)
Dept: RADIOLOGY | Facility: MEDICAL CENTER | Age: 64
End: 2024-05-23
Attending: STUDENT IN AN ORGANIZED HEALTH CARE EDUCATION/TRAINING PROGRAM
Payer: COMMERCIAL

## 2024-05-23 VITALS
HEIGHT: 71 IN | DIASTOLIC BLOOD PRESSURE: 86 MMHG | TEMPERATURE: 98.9 F | SYSTOLIC BLOOD PRESSURE: 134 MMHG | RESPIRATION RATE: 17 BRPM | BODY MASS INDEX: 26.51 KG/M2 | HEART RATE: 69 BPM | OXYGEN SATURATION: 98 % | WEIGHT: 189.38 LBS

## 2024-05-23 DIAGNOSIS — R05.1 ACUTE COUGH: ICD-10-CM

## 2024-05-23 DIAGNOSIS — R06.00 DYSPNEA, UNSPECIFIED TYPE: ICD-10-CM

## 2024-05-23 DIAGNOSIS — F41.9 ANXIETY: ICD-10-CM

## 2024-05-23 LAB
ALBUMIN SERPL BCP-MCNC: 4 G/DL (ref 3.2–4.9)
ALBUMIN/GLOB SERPL: 1.5 G/DL
ALP SERPL-CCNC: 74 U/L (ref 30–99)
ALT SERPL-CCNC: 21 U/L (ref 2–50)
ANION GAP SERPL CALC-SCNC: 16 MMOL/L (ref 7–16)
AST SERPL-CCNC: 16 U/L (ref 12–45)
BASOPHILS # BLD AUTO: 0.3 % (ref 0–1.8)
BASOPHILS # BLD: 0.02 K/UL (ref 0–0.12)
BILIRUB SERPL-MCNC: 0.4 MG/DL (ref 0.1–1.5)
BUN SERPL-MCNC: 21 MG/DL (ref 8–22)
CALCIUM ALBUM COR SERPL-MCNC: 8.9 MG/DL (ref 8.5–10.5)
CALCIUM SERPL-MCNC: 8.9 MG/DL (ref 8.5–10.5)
CHLORIDE SERPL-SCNC: 92 MMOL/L (ref 96–112)
CO2 SERPL-SCNC: 19 MMOL/L (ref 20–33)
CREAT SERPL-MCNC: 1.11 MG/DL (ref 0.5–1.4)
D DIMER PPP IA.FEU-MCNC: 0.41 UG/ML (FEU) (ref 0–0.5)
EKG IMPRESSION: NORMAL
EOSINOPHIL # BLD AUTO: 0.06 K/UL (ref 0–0.51)
EOSINOPHIL NFR BLD: 0.9 % (ref 0–6.9)
ERYTHROCYTE [DISTWIDTH] IN BLOOD BY AUTOMATED COUNT: 38 FL (ref 35.9–50)
FLUAV RNA SPEC QL NAA+PROBE: NEGATIVE
FLUBV RNA SPEC QL NAA+PROBE: NEGATIVE
GFR SERPLBLD CREATININE-BSD FMLA CKD-EPI: 74 ML/MIN/1.73 M 2
GLOBULIN SER CALC-MCNC: 2.7 G/DL (ref 1.9–3.5)
GLUCOSE SERPL-MCNC: 146 MG/DL (ref 65–99)
HCT VFR BLD AUTO: 38.3 % (ref 42–52)
HGB BLD-MCNC: 14 G/DL (ref 14–18)
IMM GRANULOCYTES # BLD AUTO: 0.02 K/UL (ref 0–0.11)
IMM GRANULOCYTES NFR BLD AUTO: 0.3 % (ref 0–0.9)
LYMPHOCYTES # BLD AUTO: 1.53 K/UL (ref 1–4.8)
LYMPHOCYTES NFR BLD: 22 % (ref 22–41)
MCH RBC QN AUTO: 31.3 PG (ref 27–33)
MCHC RBC AUTO-ENTMCNC: 36.6 G/DL (ref 32.3–36.5)
MCV RBC AUTO: 85.5 FL (ref 81.4–97.8)
MONOCYTES # BLD AUTO: 0.84 K/UL (ref 0–0.85)
MONOCYTES NFR BLD AUTO: 12.1 % (ref 0–13.4)
NEUTROPHILS # BLD AUTO: 4.5 K/UL (ref 1.82–7.42)
NEUTROPHILS NFR BLD: 64.4 % (ref 44–72)
NRBC # BLD AUTO: 0 K/UL
NRBC BLD-RTO: 0 /100 WBC (ref 0–0.2)
PLATELET # BLD AUTO: 214 K/UL (ref 164–446)
PMV BLD AUTO: 9.2 FL (ref 9–12.9)
POTASSIUM SERPL-SCNC: 3 MMOL/L (ref 3.6–5.5)
PROT SERPL-MCNC: 6.7 G/DL (ref 6–8.2)
RBC # BLD AUTO: 4.48 M/UL (ref 4.7–6.1)
RSV RNA SPEC QL NAA+PROBE: NEGATIVE
SARS-COV-2 RNA RESP QL NAA+PROBE: NOTDETECTED
SODIUM SERPL-SCNC: 127 MMOL/L (ref 135–145)
WBC # BLD AUTO: 7 K/UL (ref 4.8–10.8)

## 2024-05-23 RX ORDER — POTASSIUM CHLORIDE 20 MEQ/1
20 TABLET, EXTENDED RELEASE ORAL ONCE
Status: COMPLETED | OUTPATIENT
Start: 2024-05-23 | End: 2024-05-23

## 2024-05-23 RX ORDER — CLONAZEPAM 0.5 MG/1
2 TABLET ORAL ONCE
Status: COMPLETED | OUTPATIENT
Start: 2024-05-23 | End: 2024-05-23

## 2024-05-23 RX ADMIN — CLONAZEPAM 2 MG: 0.5 TABLET ORAL at 19:09

## 2024-05-23 RX ADMIN — POTASSIUM CHLORIDE 20 MEQ: 1500 TABLET, EXTENDED RELEASE ORAL at 19:09

## 2024-05-23 ASSESSMENT — FIBROSIS 4 INDEX: FIB4 SCORE: 1.31

## 2024-05-24 NOTE — ED PROVIDER NOTES
CHIEF COMPLAINT  Chief Complaint   Patient presents with    Shortness of Breath     X 3 hours    Cough     Dry cough overnight       LIMITATION TO HISTORY   Select:     HPI    Terrell Hensley is a 63 y.o. male who presents to the Emergency Department for evaluation of shortness of breath for the last 3 hours patient reports feeling somewhat anxious reports he was hyperventilating prior to arrival also reports a dry cough starting overnight denies any chest pain he reports he takes Klonopin pen every day but he did not take his Klonopin this evening anxious    OUTSIDE HISTORIAN(S):  Select:    EXTERNAL RECORDS REVIEWED  Select:     Dizziness        Pt states he was a work started to feel dizzy had a syncopal episode and hit his head     Syncope    Elbow Pain       Left elbow     Hip Pain       Left hip pain          Reason for Admission  Syncope/Sent by       Admission Date  3/26/2024     CODE STATUS  Full Code     HPI & HOSPITAL COURSE  63 y.o. male who presented 3/26/2024 with a syncopal episode.  He was at work when he suddenly felt lightheaded and had a syncopal episode.  He was brought into the ER and was noted to have a laceration of his left orbital wall which was reparied.  Patient denied any chest pain or palpitations preceding the event.  He denied any tongue biting or urinary incontinence.  He reports a left-sided headache.  He denies any fevers, chills neck stiffness, focal weakness or shortness of breath.  He does report he had some left lower extremity swelling a week ago which is now resolved.  He denies any previous history of stroke, MI or DVT.  Initial blood work significant for leukocytosis of 12 and hyponatremia with a sodium of 130.  His glucose was elevated at 184     EKG reveals sinus rhythm with no ST elevation or ST depression  CT head without contrast was negative for acute intracranial process     He was monitored in the clinical decision unit.  He was treated with IV fluid  hydration with normal saline.  Cardiac monitoring revealed sinus rhythm without any evidence of arrhythmias.  2D echo revealed LVEF 61% and no evidence of valvular abnormalities.  D-dimer was elevated.  Lower extremity ultrasound was negative for DVT.  CTA chest with IV contrast was negative for PE.  His sodium improved with IV fluid hydration.  His chlorthalidone was discontinued.  His Flomax was also discontinued which may have been contributing to his symptoms of dizziness.  He has been instructed to follow-up with his PCP and to monitor his blood pressure closely     Therefore, he is discharged in good and stable condition to home with close outpatient follow-up.     The patient recovered much more quickly than anticipated on admission.     Discharge Date  3/27/24      PAST MEDICAL HISTORY  Past Medical History:   Diagnosis Date    HTN (hypertension)     Suicide attempt (HCC)      .    SURGICAL HISTORY  History reviewed. No pertinent surgical history.      FAMILY HISTORY  History reviewed. No pertinent family history.       SOCIAL HISTORY  Social History     Socioeconomic History    Marital status:      Spouse name: Not on file    Number of children: Not on file    Years of education: Not on file    Highest education level: Not on file   Occupational History    Not on file   Tobacco Use    Smoking status: Never    Smokeless tobacco: Never   Vaping Use    Vaping status: Never Used   Substance and Sexual Activity    Alcohol use: Yes     Comment: occ    Drug use: Never    Sexual activity: Not on file   Other Topics Concern    Not on file   Social History Narrative    Not on file     Social Determinants of Health     Financial Resource Strain: Not on file   Food Insecurity: Not on file   Transportation Needs: Not on file   Physical Activity: Not on file   Stress: Not on file   Social Connections: Not on file   Intimate Partner Violence: Not on file   Housing Stability: Not on file         CURRENT  "MEDICATIONS  No current facility-administered medications on file prior to encounter.     Current Outpatient Medications on File Prior to Encounter   Medication Sig Dispense Refill    chlorthalidone (HYGROTON) 25 MG Tab TAKE 1 TABLET BY MOUTH EVERY DAY IN THE MORNING WITH FOOD      tamsulosin (FLOMAX) 0.4 MG capsule Take 1 Capsule by mouth at bedtime.      oxybutynin (DITROPAN-XL) 15 MG CR tablet Take 1 Tablet by mouth every day.      methylPREDNISolone (MEDROL DOSEPAK) 4 MG Tablet Therapy Pack Follow schedule on package instructions. 21 Tablet 0    finasteride (PROSCAR) 5 MG Tab Take 1 Tablet by mouth every day. (Patient not taking: Reported on 4/30/2024) 30 Tablet 0    amlodipine-benazepril (LOTREL) 5-10 MG per capsule Take 1 Capsule by mouth every day.      QUEtiapine (SEROQUEL) 100 MG Tab Take 150-200 mg by mouth at bedtime.      Multiple Vitamins-Minerals (MENS 50+ MULTI VITAMIN/MIN PO) Take 1 Tablet by mouth every day.      GLUCOSAMINE-CHONDROITIN PO Take 1 Tablet by mouth every day.      Ibuprofen-diphenhydrAMINE Cit (ADVIL PM) 200-38 MG Tab Take 2 Tablets by mouth at bedtime.      MELATONIN PO Take 18 mg by mouth at bedtime.      clonazePAM (KLONOPIN) 1 MG Tab Take 2 mg by mouth every evening.      oxybutynin (DITROPAN XL) 15 MG CR tablet Take 15 mg by mouth every day.             ALLERGIES  Allergies   Allergen Reactions    Sulfa Drugs Hives and Rash       PHYSICAL EXAM  VITAL SIGNS:/86   Pulse 69   Temp 37.2 °C (98.9 °F) (Temporal)   Resp 17   Ht 1.803 m (5' 11\")   Wt 85.9 kg (189 lb 6 oz)   SpO2 98%   BMI 26.41 kg/m²       GENERAL: Awake and alert  HEAD: Normocephalic and atraumatic  NECK: Normal range of motion, without meningismus  EYES: Pupils Equal, Round, Reactive to Light, extraocular movements intact, conjunctiva white  ENT: Mucous membranes moist, oropharynx clear  PULMONARY: Normal effort, clear to auscultation  CARDIOVASCULAR: No murmurs, clicks or rubs, peripheral pulses " 2+  ABDOMINAL: Soft, non-tender, no guarding or rigidity present, no pulsatile masses  BACK: no midline tenderness, no costovertebral tenderness  NEUROLOGICAL: Grossly non-focal neurological examination, speech normal, gait normal  EXTREMITIES: No edema, normal to inspection  SKIN: Warm and dry.  PSYCHIATRIC: Affect is anxious    DIAGNOSTIC STUDIES / PROCEDURES  EKG  EKG Interpretation: I independently reviewed the below EKG and did not see signs of a STEMI  Results for orders placed or performed during the hospital encounter of 24   EKG (NOW)   Result Value Ref Range    Report       [facility] Emergency Dept.    Test Date:  2024  Pt Name:    FAIZAN GARDNER             Department: ER  MRN:        [id number]                      Room:  Gender:     Male                         Technician: [id number]  :        1960                   Requested By:ER TRIAGE PROTOCOL  Order #:    [id number]                    Reading MD:    Measurements  Intervals                                Axis  Rate:       83                           P:          38  SD:         149                          QRS:        50  QRSD:       103                          T:          36  QT:         369  QTc:        434    Interpretive Statements  Sinus rhythm  Compared to ECG 2024 10:32:47  No significant changes           Cardiac Monitor Interpretation:    Time: 9:30 PM  The cardiac monitor revealed normal sinus rhythm as interpreted by me.  The cardiac monitor was ordered secondary to the patient´s history of shortness of breath to monitor the patient for dysrhythmia    LABS  Labs Reviewed   CBC WITH DIFFERENTIAL - Abnormal; Notable for the following components:       Result Value    RBC 4.48 (*)     Hematocrit 38.3 (*)     MCHC 36.6 (*)     All other components within normal limits   COMP METABOLIC PANEL - Abnormal; Notable for the following components:    Sodium 127 (*)     Potassium 3.0 (*)     Chloride 92 (*)      Co2 19 (*)     Glucose 146 (*)     All other components within normal limits   ESTIMATED GFR   COV-2, FLU A/B, AND RSV BY PCR (CEPHEID)   D-DIMER   POC COV-2, FLU A/B, RSV BY PCR         RADIOLOGY  I have independently interpreted the diagnostic imaging associated with this visit and am waiting the final reading from the radiologist.   My preliminary interpretation is as follows: No pneumothorax    COURSE & MEDICAL DECISION MAKING    ED COURSE:        INTERVENTIONS BY ME:  Medications   potassium chloride SA (Kdur) tablet 20 mEq (20 mEq Oral Given 5/23/24 1909)   clonazePAM (KlonoPIN) tablet 2 mg (2 mg Oral Given 5/23/24 1909)       Response on recheck:  Discussed workup and test results so far discussed electrolyte abnormalities recommend patient drink electrolyte containing solution patient reports feeling greatly improved after receiving anxiolysis discussed test results and workup so far discussed return precautions should his symptoms worsen otherwise recommended he take electrolytes and follow-up with primary care physician    INITIAL ASSESSMENT, COURSE AND PLAN  Care Narrative:   Patient presenting with shortness of breath self-reported anxiety normal pulmonary examination normal vital signs D-dimer is normal feel pulmonary embolism is excluded patient had no chest pain no EKG changes do not suspect an acute coronary syndrome CBC unremarkable CMP notable for some electrolyte disturbances without EKG changes feel suitable for oral rehydration therapy patient without any nausea or vomiting feel patient safe to be discharged home given patient's shortness of breath and age to consider admission for further evaluation of his shortness of breath however given his normal vital signs and improvement of his symptoms with Klonopin do not feel that hospitalization would be of additional utility         ADDITIONAL PROBLEM LIST    DISPOSITION AND DISCUSSIONS  Discussion of management with other [facility] or appropriate  source(s): None       Escalation of care considered, and ultimately not performed:acute inpatient care management, however at this time, the patient is most appropriate for outpatient management      Decision tools and prescription drugs considered including, but not limited to: Considered potassium well encouraged p.o. intake of potassium containing fluids    FINAL DIAGNOSIS  1. Acute cough    2. Dyspnea, unspecified type    3. Anxiety             Electronically signed by: Christiano Bennett DO ,1:25 AM 05/23/24

## 2024-05-24 NOTE — ED NOTES
Pt discharged to home. Discharge paperwork provided. Education provided by ERP. Reinforced discharge instructions.  Pt was given follow up instructions   Pt verbalized understanding of all instructions for discharge.   Patient went out of the ER ambulatory with steady gait., alert and oriented x 4, with all belongings.

## 2024-07-23 ENCOUNTER — APPOINTMENT (OUTPATIENT)
Dept: RADIOLOGY | Facility: MEDICAL CENTER | Age: 64
End: 2024-07-23
Attending: EMERGENCY MEDICINE
Payer: COMMERCIAL

## 2024-07-23 ENCOUNTER — HOSPITAL ENCOUNTER (INPATIENT)
Facility: MEDICAL CENTER | Age: 64
End: 2024-07-23
Attending: EMERGENCY MEDICINE | Admitting: STUDENT IN AN ORGANIZED HEALTH CARE EDUCATION/TRAINING PROGRAM
Payer: COMMERCIAL

## 2024-07-23 DIAGNOSIS — E87.1 HYPONATREMIA: ICD-10-CM

## 2024-07-23 DIAGNOSIS — R29.898 LEFT LEG WEAKNESS: ICD-10-CM

## 2024-07-23 DIAGNOSIS — E55.9 VITAMIN D DEFICIENCY: ICD-10-CM

## 2024-07-23 DIAGNOSIS — I10 PRIMARY HYPERTENSION: ICD-10-CM

## 2024-07-23 DIAGNOSIS — N17.9 ACUTE RENAL FAILURE, UNSPECIFIED ACUTE RENAL FAILURE TYPE (HCC): ICD-10-CM

## 2024-07-23 DIAGNOSIS — R41.82 ALTERED MENTAL STATUS, UNSPECIFIED ALTERED MENTAL STATUS TYPE: ICD-10-CM

## 2024-07-23 DIAGNOSIS — T50.904A DRUG OVERDOSE OF UNDETERMINED INTENT, INITIAL ENCOUNTER: ICD-10-CM

## 2024-07-23 DIAGNOSIS — B35.6 TINEA CRURIS: ICD-10-CM

## 2024-07-23 DIAGNOSIS — F32.3: ICD-10-CM

## 2024-07-23 DIAGNOSIS — N40.1 BENIGN PROSTATIC HYPERPLASIA WITH LOWER URINARY TRACT SYMPTOMS, SYMPTOM DETAILS UNSPECIFIED: ICD-10-CM

## 2024-07-23 LAB
ALBUMIN SERPL BCP-MCNC: 3.5 G/DL (ref 3.2–4.9)
ALBUMIN/GLOB SERPL: 1.6 G/DL
ALP SERPL-CCNC: 75 U/L (ref 30–99)
ALT SERPL-CCNC: 88 U/L (ref 2–50)
AMMONIA PLAS-SCNC: 11 UMOL/L (ref 11–45)
AMPHET UR QL SCN: NEGATIVE
ANION GAP SERPL CALC-SCNC: 19 MMOL/L (ref 7–16)
APAP SERPL-MCNC: <5 UG/ML (ref 10–30)
AST SERPL-CCNC: 194 U/L (ref 12–45)
BARBITURATES UR QL SCN: NEGATIVE
BASOPHILS # BLD AUTO: 0.1 % (ref 0–1.8)
BASOPHILS # BLD: 0.02 K/UL (ref 0–0.12)
BENZODIAZ UR QL SCN: POSITIVE
BILIRUB SERPL-MCNC: 0.5 MG/DL (ref 0.1–1.5)
BUN SERPL-MCNC: 52 MG/DL (ref 8–22)
BZE UR QL SCN: NEGATIVE
CALCIUM ALBUM COR SERPL-MCNC: 9.2 MG/DL (ref 8.5–10.5)
CALCIUM SERPL-MCNC: 8.8 MG/DL (ref 8.5–10.5)
CANNABINOIDS UR QL SCN: NEGATIVE
CHLORIDE SERPL-SCNC: 91 MMOL/L (ref 96–112)
CK SERPL-CCNC: ABNORMAL U/L (ref 0–154)
CO2 SERPL-SCNC: 19 MMOL/L (ref 20–33)
CREAT SERPL-MCNC: 3.77 MG/DL (ref 0.5–1.4)
EOSINOPHIL # BLD AUTO: 0.02 K/UL (ref 0–0.51)
EOSINOPHIL NFR BLD: 0.1 % (ref 0–6.9)
ERYTHROCYTE [DISTWIDTH] IN BLOOD BY AUTOMATED COUNT: 41.1 FL (ref 35.9–50)
ETHANOL BLD-MCNC: <10.1 MG/DL
FENTANYL UR QL: NEGATIVE
GFR SERPLBLD CREATININE-BSD FMLA CKD-EPI: 17 ML/MIN/1.73 M 2
GLOBULIN SER CALC-MCNC: 2.2 G/DL (ref 1.9–3.5)
GLUCOSE SERPL-MCNC: 142 MG/DL (ref 65–99)
HCT VFR BLD AUTO: 35.3 % (ref 42–52)
HGB BLD-MCNC: 12.8 G/DL (ref 14–18)
IMM GRANULOCYTES # BLD AUTO: 0.06 K/UL (ref 0–0.11)
IMM GRANULOCYTES NFR BLD AUTO: 0.3 % (ref 0–0.9)
LYMPHOCYTES # BLD AUTO: 0.69 K/UL (ref 1–4.8)
LYMPHOCYTES NFR BLD: 3.7 % (ref 22–41)
MAGNESIUM SERPL-MCNC: 2.2 MG/DL (ref 1.5–2.5)
MCH RBC QN AUTO: 31.1 PG (ref 27–33)
MCHC RBC AUTO-ENTMCNC: 36.3 G/DL (ref 32.3–36.5)
MCV RBC AUTO: 85.7 FL (ref 81.4–97.8)
METHADONE UR QL SCN: NEGATIVE
MONOCYTES # BLD AUTO: 1.07 K/UL (ref 0–0.85)
MONOCYTES NFR BLD AUTO: 5.8 % (ref 0–13.4)
NEUTROPHILS # BLD AUTO: 16.69 K/UL (ref 1.82–7.42)
NEUTROPHILS NFR BLD: 90 % (ref 44–72)
NRBC # BLD AUTO: 0 K/UL
NRBC BLD-RTO: 0 /100 WBC (ref 0–0.2)
OPIATES UR QL SCN: NEGATIVE
OXYCODONE UR QL SCN: NEGATIVE
PCP UR QL SCN: NEGATIVE
PLATELET # BLD AUTO: 243 K/UL (ref 164–446)
PMV BLD AUTO: 9.4 FL (ref 9–12.9)
POTASSIUM SERPL-SCNC: 3.4 MMOL/L (ref 3.6–5.5)
PROPOXYPH UR QL SCN: NEGATIVE
PROT SERPL-MCNC: 5.7 G/DL (ref 6–8.2)
RBC # BLD AUTO: 4.12 M/UL (ref 4.7–6.1)
SALICYLATES SERPL-MCNC: <1 MG/DL (ref 15–25)
SODIUM SERPL-SCNC: 129 MMOL/L (ref 135–145)
WBC # BLD AUTO: 18.6 K/UL (ref 4.8–10.8)

## 2024-07-23 PROCEDURE — 94760 N-INVAS EAR/PLS OXIMETRY 1: CPT

## 2024-07-23 PROCEDURE — 82140 ASSAY OF AMMONIA: CPT

## 2024-07-23 PROCEDURE — 51702 INSERT TEMP BLADDER CATH: CPT

## 2024-07-23 PROCEDURE — 93005 ELECTROCARDIOGRAM TRACING: CPT | Performed by: EMERGENCY MEDICINE

## 2024-07-23 PROCEDURE — 36415 COLL VENOUS BLD VENIPUNCTURE: CPT

## 2024-07-23 PROCEDURE — 700105 HCHG RX REV CODE 258: Performed by: EMERGENCY MEDICINE

## 2024-07-23 PROCEDURE — 303105 HCHG CATHETER EXTRA

## 2024-07-23 PROCEDURE — 82550 ASSAY OF CK (CPK): CPT

## 2024-07-23 PROCEDURE — 99285 EMERGENCY DEPT VISIT HI MDM: CPT

## 2024-07-23 PROCEDURE — 82077 ASSAY SPEC XCP UR&BREATH IA: CPT

## 2024-07-23 PROCEDURE — 85025 COMPLETE CBC W/AUTO DIFF WBC: CPT

## 2024-07-23 PROCEDURE — 80179 DRUG ASSAY SALICYLATE: CPT

## 2024-07-23 PROCEDURE — 80143 DRUG ASSAY ACETAMINOPHEN: CPT

## 2024-07-23 PROCEDURE — 83735 ASSAY OF MAGNESIUM: CPT

## 2024-07-23 PROCEDURE — 80053 COMPREHEN METABOLIC PANEL: CPT

## 2024-07-23 PROCEDURE — 80307 DRUG TEST PRSMV CHEM ANLYZR: CPT

## 2024-07-23 PROCEDURE — 70450 CT HEAD/BRAIN W/O DYE: CPT

## 2024-07-23 PROCEDURE — 93005 ELECTROCARDIOGRAM TRACING: CPT

## 2024-07-23 RX ORDER — INDOMETHACIN 50 MG/1
50 CAPSULE ORAL
Status: ON HOLD | COMMUNITY
End: 2024-08-22

## 2024-07-23 RX ORDER — AMLODIPINE AND BENAZEPRIL HYDROCHLORIDE 5; 10 MG/1; MG/1
1 CAPSULE ORAL DAILY
Status: ON HOLD | COMMUNITY
End: 2024-08-22

## 2024-07-23 RX ORDER — SODIUM CHLORIDE 9 MG/ML
500 INJECTION, SOLUTION INTRAVENOUS ONCE
Status: COMPLETED | OUTPATIENT
Start: 2024-07-23 | End: 2024-07-23

## 2024-07-23 RX ORDER — CLONAZEPAM 1 MG/1
1-2 TABLET ORAL
Status: ON HOLD | COMMUNITY
End: 2024-08-22

## 2024-07-23 RX ORDER — SODIUM CHLORIDE 9 MG/ML
1000 INJECTION, SOLUTION INTRAVENOUS ONCE
Status: COMPLETED | OUTPATIENT
Start: 2024-07-23 | End: 2024-07-23

## 2024-07-23 RX ORDER — CHLORTHALIDONE 25 MG/1
25 TABLET ORAL DAILY
Status: ON HOLD | COMMUNITY
End: 2024-08-22

## 2024-07-23 RX ORDER — QUETIAPINE FUMARATE 100 MG/1
300 TABLET, FILM COATED ORAL
Status: ON HOLD | COMMUNITY
End: 2024-08-22

## 2024-07-23 RX ADMIN — SODIUM CHLORIDE 1000 ML: 9 INJECTION, SOLUTION INTRAVENOUS at 20:21

## 2024-07-23 RX ADMIN — SODIUM CHLORIDE 500 ML: 9 INJECTION, SOLUTION INTRAVENOUS at 22:07

## 2024-07-23 RX ADMIN — SODIUM CHLORIDE 1000 ML: 9 INJECTION, SOLUTION INTRAVENOUS at 22:21

## 2024-07-23 ASSESSMENT — FIBROSIS 4 INDEX: FIB4 SCORE: 1.04

## 2024-07-23 ASSESSMENT — PAIN DESCRIPTION - PAIN TYPE: TYPE: CHRONIC PAIN

## 2024-07-24 PROBLEM — E87.29 HIGH ANION GAP METABOLIC ACIDOSIS: Status: ACTIVE | Noted: 2024-07-24

## 2024-07-24 PROBLEM — M62.82 RHABDOMYOLYSIS: Status: ACTIVE | Noted: 2024-07-24

## 2024-07-24 PROBLEM — N17.9 ACUTE RENAL FAILURE (ARF) (HCC): Status: ACTIVE | Noted: 2024-07-24

## 2024-07-24 PROBLEM — T14.91XA SUICIDAL BEHAVIOR WITH ATTEMPTED SELF-INJURY (HCC): Status: ACTIVE | Noted: 2024-07-24

## 2024-07-24 PROBLEM — I34.0 MITRAL REGURGITATION: Status: ACTIVE | Noted: 2024-07-24

## 2024-07-24 PROBLEM — T50.904A DRUG OVERDOSE OF UNDETERMINED INTENT, INITIAL ENCOUNTER: Status: ACTIVE | Noted: 2024-07-24

## 2024-07-24 PROBLEM — G93.40 ACUTE ENCEPHALOPATHY: Status: ACTIVE | Noted: 2024-07-24

## 2024-07-24 PROBLEM — D72.829 LEUKOCYTOSIS: Status: ACTIVE | Noted: 2024-07-24

## 2024-07-24 PROBLEM — D64.9 ANEMIA: Status: ACTIVE | Noted: 2024-07-24

## 2024-07-24 PROBLEM — E87.6 HYPOKALEMIA: Status: ACTIVE | Noted: 2024-07-24

## 2024-07-24 PROBLEM — R74.8 ELEVATED LIVER ENZYMES: Status: ACTIVE | Noted: 2024-07-24

## 2024-07-24 PROBLEM — R32 URINARY INCONTINENCE: Status: ACTIVE | Noted: 2024-07-24

## 2024-07-24 LAB
ALBUMIN SERPL BCP-MCNC: 3.2 G/DL (ref 3.2–4.9)
ALBUMIN/GLOB SERPL: 1.6 G/DL
ALP SERPL-CCNC: 72 U/L (ref 30–99)
ALT SERPL-CCNC: 105 U/L (ref 2–50)
ANION GAP SERPL CALC-SCNC: 15 MMOL/L (ref 7–16)
AST SERPL-CCNC: 226 U/L (ref 12–45)
BASOPHILS # BLD AUTO: 0.1 % (ref 0–1.8)
BASOPHILS # BLD: 0.02 K/UL (ref 0–0.12)
BILIRUB SERPL-MCNC: 0.4 MG/DL (ref 0.1–1.5)
BUN SERPL-MCNC: 46 MG/DL (ref 8–22)
CALCIUM ALBUM COR SERPL-MCNC: 8.9 MG/DL (ref 8.5–10.5)
CALCIUM SERPL-MCNC: 8.3 MG/DL (ref 8.5–10.5)
CHLORIDE SERPL-SCNC: 103 MMOL/L (ref 96–112)
CK SERPL-CCNC: ABNORMAL U/L (ref 0–154)
CO2 SERPL-SCNC: 19 MMOL/L (ref 20–33)
CREAT SERPL-MCNC: 2.83 MG/DL (ref 0.5–1.4)
EKG IMPRESSION: NORMAL
EOSINOPHIL # BLD AUTO: 0.06 K/UL (ref 0–0.51)
EOSINOPHIL NFR BLD: 0.4 % (ref 0–6.9)
ERYTHROCYTE [DISTWIDTH] IN BLOOD BY AUTOMATED COUNT: 42.7 FL (ref 35.9–50)
GFR SERPLBLD CREATININE-BSD FMLA CKD-EPI: 24 ML/MIN/1.73 M 2
GLOBULIN SER CALC-MCNC: 2 G/DL (ref 1.9–3.5)
GLUCOSE SERPL-MCNC: 126 MG/DL (ref 65–99)
HCT VFR BLD AUTO: 34.7 % (ref 42–52)
HGB BLD-MCNC: 12 G/DL (ref 14–18)
IMM GRANULOCYTES # BLD AUTO: 0.04 K/UL (ref 0–0.11)
IMM GRANULOCYTES NFR BLD AUTO: 0.3 % (ref 0–0.9)
LACTATE SERPL-SCNC: 1 MMOL/L (ref 0.5–2)
LYMPHOCYTES # BLD AUTO: 1.08 K/UL (ref 1–4.8)
LYMPHOCYTES NFR BLD: 7.3 % (ref 22–41)
MCH RBC QN AUTO: 30.3 PG (ref 27–33)
MCHC RBC AUTO-ENTMCNC: 34.6 G/DL (ref 32.3–36.5)
MCV RBC AUTO: 87.6 FL (ref 81.4–97.8)
MONOCYTES # BLD AUTO: 0.8 K/UL (ref 0–0.85)
MONOCYTES NFR BLD AUTO: 5.4 % (ref 0–13.4)
NEUTROPHILS # BLD AUTO: 12.71 K/UL (ref 1.82–7.42)
NEUTROPHILS NFR BLD: 86.5 % (ref 44–72)
NRBC # BLD AUTO: 0 K/UL
NRBC BLD-RTO: 0 /100 WBC (ref 0–0.2)
PLATELET # BLD AUTO: 209 K/UL (ref 164–446)
PMV BLD AUTO: 9.3 FL (ref 9–12.9)
POTASSIUM SERPL-SCNC: 3.2 MMOL/L (ref 3.6–5.5)
PROT SERPL-MCNC: 5.2 G/DL (ref 6–8.2)
RBC # BLD AUTO: 3.96 M/UL (ref 4.7–6.1)
SODIUM SERPL-SCNC: 137 MMOL/L (ref 135–145)
WBC # BLD AUTO: 14.7 K/UL (ref 4.8–10.8)

## 2024-07-24 PROCEDURE — 770001 HCHG ROOM/CARE - MED/SURG/GYN PRIV*

## 2024-07-24 PROCEDURE — 83605 ASSAY OF LACTIC ACID: CPT

## 2024-07-24 PROCEDURE — A9270 NON-COVERED ITEM OR SERVICE: HCPCS | Performed by: HOSPITALIST

## 2024-07-24 PROCEDURE — 99223 1ST HOSP IP/OBS HIGH 75: CPT | Mod: GC | Performed by: STUDENT IN AN ORGANIZED HEALTH CARE EDUCATION/TRAINING PROGRAM

## 2024-07-24 PROCEDURE — 700102 HCHG RX REV CODE 250 W/ 637 OVERRIDE(OP): Performed by: HOSPITALIST

## 2024-07-24 PROCEDURE — 36415 COLL VENOUS BLD VENIPUNCTURE: CPT

## 2024-07-24 PROCEDURE — 700111 HCHG RX REV CODE 636 W/ 250 OVERRIDE (IP): Mod: JZ | Performed by: STUDENT IN AN ORGANIZED HEALTH CARE EDUCATION/TRAINING PROGRAM

## 2024-07-24 PROCEDURE — 700105 HCHG RX REV CODE 258: Performed by: HOSPITALIST

## 2024-07-24 PROCEDURE — 97602 WOUND(S) CARE NON-SELECTIVE: CPT

## 2024-07-24 PROCEDURE — 82550 ASSAY OF CK (CPK): CPT

## 2024-07-24 PROCEDURE — 99233 SBSQ HOSP IP/OBS HIGH 50: CPT | Performed by: HOSPITALIST

## 2024-07-24 PROCEDURE — 80053 COMPREHEN METABOLIC PANEL: CPT

## 2024-07-24 PROCEDURE — 700105 HCHG RX REV CODE 258: Performed by: STUDENT IN AN ORGANIZED HEALTH CARE EDUCATION/TRAINING PROGRAM

## 2024-07-24 PROCEDURE — 700105 HCHG RX REV CODE 258: Performed by: EMERGENCY MEDICINE

## 2024-07-24 PROCEDURE — 85025 COMPLETE CBC W/AUTO DIFF WBC: CPT

## 2024-07-24 PROCEDURE — 99223 1ST HOSP IP/OBS HIGH 75: CPT | Mod: GC | Performed by: PSYCHIATRY & NEUROLOGY

## 2024-07-24 RX ORDER — POTASSIUM CHLORIDE 7.45 MG/ML
10 INJECTION INTRAVENOUS
Status: COMPLETED | OUTPATIENT
Start: 2024-07-24 | End: 2024-07-24

## 2024-07-24 RX ORDER — OXYCODONE HYDROCHLORIDE 5 MG/1
5 TABLET ORAL EVERY 4 HOURS PRN
Status: DISCONTINUED | OUTPATIENT
Start: 2024-07-24 | End: 2024-07-29

## 2024-07-24 RX ORDER — ACETAMINOPHEN 325 MG/1
650 TABLET ORAL EVERY 4 HOURS PRN
Status: DISCONTINUED | OUTPATIENT
Start: 2024-07-24 | End: 2024-08-22 | Stop reason: HOSPADM

## 2024-07-24 RX ORDER — SODIUM CHLORIDE 9 MG/ML
INJECTION, SOLUTION INTRAVENOUS CONTINUOUS
Status: DISCONTINUED | OUTPATIENT
Start: 2024-07-24 | End: 2024-07-24

## 2024-07-24 RX ORDER — ONDANSETRON 2 MG/ML
4 INJECTION INTRAMUSCULAR; INTRAVENOUS EVERY 4 HOURS PRN
Status: DISCONTINUED | OUTPATIENT
Start: 2024-07-24 | End: 2024-08-22 | Stop reason: HOSPADM

## 2024-07-24 RX ORDER — SODIUM CHLORIDE, SODIUM LACTATE, POTASSIUM CHLORIDE, CALCIUM CHLORIDE 600; 310; 30; 20 MG/100ML; MG/100ML; MG/100ML; MG/100ML
INJECTION, SOLUTION INTRAVENOUS CONTINUOUS
Status: DISCONTINUED | OUTPATIENT
Start: 2024-07-24 | End: 2024-07-28

## 2024-07-24 RX ADMIN — POTASSIUM CHLORIDE 10 MEQ: 10 INJECTION, SOLUTION INTRAVENOUS at 08:15

## 2024-07-24 RX ADMIN — POTASSIUM CHLORIDE 10 MEQ: 10 INJECTION, SOLUTION INTRAVENOUS at 04:02

## 2024-07-24 RX ADMIN — SODIUM CHLORIDE: 9 INJECTION, SOLUTION INTRAVENOUS at 00:08

## 2024-07-24 RX ADMIN — OXYCODONE HYDROCHLORIDE 5 MG: 5 TABLET ORAL at 22:23

## 2024-07-24 RX ADMIN — SODIUM CHLORIDE: 9 INJECTION, SOLUTION INTRAVENOUS at 05:38

## 2024-07-24 RX ADMIN — SODIUM CHLORIDE, POTASSIUM CHLORIDE, SODIUM LACTATE AND CALCIUM CHLORIDE: 600; 310; 30; 20 INJECTION, SOLUTION INTRAVENOUS at 08:47

## 2024-07-24 RX ADMIN — POTASSIUM CHLORIDE 10 MEQ: 10 INJECTION, SOLUTION INTRAVENOUS at 06:45

## 2024-07-24 RX ADMIN — SODIUM CHLORIDE, POTASSIUM CHLORIDE, SODIUM LACTATE AND CALCIUM CHLORIDE: 600; 310; 30; 20 INJECTION, SOLUTION INTRAVENOUS at 21:13

## 2024-07-24 RX ADMIN — POTASSIUM CHLORIDE 10 MEQ: 10 INJECTION, SOLUTION INTRAVENOUS at 05:42

## 2024-07-24 ASSESSMENT — PAIN DESCRIPTION - PAIN TYPE
TYPE: ACUTE PAIN

## 2024-07-24 ASSESSMENT — ENCOUNTER SYMPTOMS
FOCAL WEAKNESS: 0
DIZZINESS: 0
SENSORY CHANGE: 0
HEADACHES: 0

## 2024-07-24 ASSESSMENT — FIBROSIS 4 INDEX: FIB4 SCORE: 6.75

## 2024-07-24 ASSESSMENT — PATIENT HEALTH QUESTIONNAIRE - PHQ9
2. FEELING DOWN, DEPRESSED, IRRITABLE, OR HOPELESS: NOT AT ALL
SUM OF ALL RESPONSES TO PHQ9 QUESTIONS 1 AND 2: 0
1. LITTLE INTEREST OR PLEASURE IN DOING THINGS: NOT AT ALL

## 2024-07-24 NOTE — ED NOTES
Spoke to Lino RN from Poison Control:    Recommendations:  Supportive care  Maintain airway   Continuous Tele monitoring    Case Number: [id number]    Spoke to MD at Poison Control report it is difficult to say which medication pt took and concerning for the affects that may take place, MD report they will speak to their attending but agreed ICU.

## 2024-07-24 NOTE — ED TRIAGE NOTES
"Chief Complaint   Patient presents with    Drug Overdose    Suicidal Ideation    Suicide Attempt   BIB EMS to [facility], pt on monitor and in gown, labs drawn and sent. Pt coming from [employer] of someone's house. Pt Overdose on his amlodipine with the intent to kill himself. EMS reports that he did admit to possibly taking more his meds, but he declines to states. Pt reports he does not know the amount or time he had taken them. Per EMS on scene, pt was having slurred speech, Stroke scale negative. Pt was A/O x 1 on scene with initial BP 60/40. EMS administered 500 cc of LR IV. With improvement of his blood pressure into SBPs 80-100s. EMS placed pt on 4L of oxygen NC with transportation. FSBS 130. GCS 14.     On arrival when RN is speaking to pt about his SI/SA, pt closes his eyes and stops talking, RN educated pt this is a same place, and pt is able to answer most questions, however declines to answer questions about what he took or what time. Pt does report he was trying to hurt himself and this is not his first time. Pt placed on legal hold by RN at 1948. Pt is A/O x 3.     Medications given en route: 500cc of LR    1:1 sitter in direct view of patient.     /58   Pulse 85   Temp 36.2 °C (97.1 °F) (Temporal)   Resp 20   Ht 1.803 m (5' 11\")   Wt 83.9 kg (185 lb)   SpO2 97%   BMI 25.80 kg/m²    "

## 2024-07-24 NOTE — ASSESSMENT & PLAN NOTE
Suspected murmur, though difficult to auscultate. Unclear if preexisting or related to overdose.   -consider TTE once more clinically stable.

## 2024-07-24 NOTE — ED NOTES
Temp gonzalez 16Fr placed with urine return via Gravity of 200cc of urine, with second RN Mary Kay at bedside. 1:1 sitter in direct view of patient.

## 2024-07-24 NOTE — CONSULTS
Patient hospitalized following intentional overdose; defer to IP psychiatry  Legal Hold; 1:1 observation level; C-SSRS high risk  No phone, visitors or belongings until further evaluated by IP Psychiatry

## 2024-07-24 NOTE — ED NOTES
Lab called with critical result of CPK 16,481 at 2335. Critical lab result read back to Lab.   Dr. Tenorio notified of critical lab result at 2334.  Critical lab result read back by Dr. Tenorio.

## 2024-07-24 NOTE — ASSESSMENT & PLAN NOTE
CK elevated to 16k, suspected 2/2 being found down and drug overdose.  Cont agressive IVFs but change to LR from NS  Follow daily AST, ALT, Bun, Creat and K    7/25  CPK now 7k  AST/ALT coming down  Renal function improving  K WNL  Cont to monitor CPK, CMP daily until normalized  Cont IVFs until CPK<3k    7/30/2024  Resolved

## 2024-07-24 NOTE — ASSESSMENT & PLAN NOTE
False in light of dehydration, likely aspiration pneumonitis with some neutrophil shift. May consider further intervention once patient stabilizes and is more alert, since unable to assess and suspect it is best to minimize medications at this time owing to unclear clinical picture from overdose.  -CTM

## 2024-07-24 NOTE — ED NOTES
Med rec updated and complete. Allergies reviewed.   Per family/and prescription bottles .    No known anticoagulants.    No known antibiotic use in last 30 days.    Home pharmacy  [facility] = [phone]

## 2024-07-24 NOTE — ASSESSMENT & PLAN NOTE
Apparent suicide attempt  Polypharmacy: some amlodipine but other ingestions unclear  Cont supportive care as clinically improving  On Psych hold  Psychiatry team following    7/31/2024  Continues on a legal hold

## 2024-07-24 NOTE — ED NOTES
"Spoke to patient's brother Jean Paul (P: [phone]) who provided a bag of pt's medications listed below:    Chlorthalidone 25 mg QD with food (2 Bottles of this medication with one filled 7/4/24 and the other filled 4/7/24.  Clonazepam 1 mg tablet take 1-2 tabs at bedtime   Sertraline HCL 50 mg tab 1.5 tab by mouth QD  Indomethacin 50 mg cap 1 cap with food as needed for gout attacks (2 bottles of these one of them filled 7/8/2024)  Amlodipine-benazepril 5-10 mg 1 cap by mouth QD filled at 05/30/24  Oxybutynin ER 15 mg tab 1 tab by mouth QD.  Quetiapine Fumarate 100 mg tab 3 tabs by mouth  \"Sleep 3\" which is 10 mg melatonin, L theanine, nighttime Herbal Blend.     "

## 2024-07-24 NOTE — PROGRESS NOTES
4 Eyes Skin Assessment Completed by sirisha RN and raissa RN.    Head WDL  Ears WDL  Nose WDL  Mouth WDL  Neck WDL  Breast/Chest WDL  Shoulder Blades WDL  Spine WDL  (R) Arm/Elbow/Hand Redness and Blanching  (L) Arm/Elbow/Hand Redness and Blanching  Abdomen WDL  Groin WDL  Scrotum/Coccyx/Buttocks Redness and Non-Blanching  (R) Leg WDL  (L) Leg WDL  (R) Heel/Foot/Toe Redness and Blanching  (L) Heel/Foot/Toe Redness and Blanching          Devices In Places Tele Box, Blood Pressure Cuff, Pulse Ox, Blake, and Nasal Cannula      Interventions In Place NC W/Ear Foams, Sacral Mepilex, TAP System, Pillows, Q2 Turns, Low Air Loss Mattress, Heels Loaded W/Pillows, and Pressure Redistribution Mattress    Possible Skin Injury Yes    Pictures Uploaded Into Epic Yes  Wound Consult Placed Yes  RN Wound Prevention Protocol Ordered Yes

## 2024-07-24 NOTE — ASSESSMENT & PLAN NOTE
Acute metabolic/toxic encephalopathy  CT head neg  Mental status improving daily though still not completely back to normal  Cont to monitor clinically  Repeat lab in am  Minimize sedating medications  7/27:  increased anxiety/confusion, query anoxic brain injury, ordered MRI brain w/o contrast.    7/31/2024  Resolved. MRI of the brain without acute changes.

## 2024-07-24 NOTE — ASSESSMENT & PLAN NOTE
Transaminase elevation likely from Rhabdo and not acute liver injury though certainly could have a toxic component from OD  Coming down as expected with resolution of Rhabdo  Cont to monitor daily CMP  Consider further evaluation if it does not resolve as CPK drops and Rhabdo resolves

## 2024-07-24 NOTE — H&P
[facility] Internal Medicine History & Physical Note    Date of Service  7/24/2024    Subjective  Terrell Hensley is a 63 year-old male who presented 7/23/2024 after suicidal attempt drug overdose.    Briefly, patient was BIBA. Reportedly was coming from [place] after reportedly overdosing on amlodipine in order to kill himself. EMS reported he admitted to taking more medications but not sure of time. Has slurred speech by EMS and BP initially 60/40, administered 500cc LR wtiht improvement of SBP to 80s-100s, placed on 4L NC, FSBG 130, GCS 14. In the ED, patient responsive to RN conversation and reports suidical attempt; placed on legal hold by RN 1948 on 7/23/24. Patient with 1:1 sitter in ED.    On evaluation, patient able to utter sentences but extremely challenging to understand due to slurred speech and unclear at times if patient fully understands questions or is just having difficulty interpreting the question or creating intelligible utterances. Patient nods that he did take multiple medications in an attempt to kill himself. He mentions something about his daughter and crayons, and later he mentions something about Trump. Unable to understand further information from patient.    A review of the patient's medications include his taking the following:  -Amlodipine-benazepril  -Chlorthalidone  -Clonazepam  -Sertraline  -Indomethacin  -Quetiapine  -Oxybutynin    Attempted to contact poison control but unable to reach them x2. Able to eventually talk with poison control, who mentioned concern that patient is relatively bradycardic compared to his volume status and hypotension.    Of note, this is not the patient's first visit to an ER for drug overdose. He was previously seen 7/2018 for drug overdose 2/2 TCA. He has also had multiple urgent care visits for abdominal pain and syncopal episodes. Most recently, he was at the ED 5/2024 for SOB with anxiety after taking clonazepam.    Unable to perform a review of  systems as patient's speech is intermittently unintelligible. Past medical history, surgical history, family history, social history, allergies, and medications were reviewed based on the chart but unable to obtain further information on these from the patient due to slurred speech. Reportedly allergic to sulfa drugs.    Objective  Vitals, labs, and imaging reviewed. Hypotensive, recovered with 2.5L fluid in ED + 500mL by EMS. GCS 14.    Gen: ill-appearing man in NAD  HEENT: normocephalic, atraumatic, pupils reactive to light  Neck: no tracheal deviation  CV: RRR, no 3/6 systolic murmur at LLSB  Lungs: CTAB anteriorly  Abd: soft, nontender  Neuro: Alert and oriented x3  Psych: depressed affect  Skin: warm and dry    EKG QT borderline normal    Assessment/Plan:  63M who presented 7/23/2024 with acute toxic-metabolic encephalopathy likely due to drug overdose, which has been complicated by acute renal failure likely 2/2 uremia, rhabdomyolysis, and electrolyte disturbances. Clinically the patient is stable but extremely tenuous owing to limited history on overdose agents.    * Drug overdose of undetermined intent, initial encounter- (present on admission)  Assessment & Plan  Unclear drug agent based on patient's presentation. Based on evaluation of the patient's medication list, suspect the most likely culprit agent would be the amlodipine-benazepril, since this agent would explain the patient's fluid-responsive hypotension without much change in other hemodynamic parameters. Chlorthalidone or amlodipine-benazepril seem to be the most likely agents given patient's hypotension but limited other hemodynamic instability, no pupillary change.  -consider Ca IV if not improving with fluids  -consider methylene blue if not responding  -tele  -psych consult    Acute renal failure (ARF) ([facility])  Assessment & Plan  Likely 2/2 prerenal damage from hypotension 2/2 drug overdose and uremia related to rhabdomyolysis.  -fluid  resuscitation with NS 200cc/hr  -IV potassium 10meq x4  -lactate pending  -consider poison control discussion if not improving    Acute encephalopathy  Assessment & Plan  Suspect multifactorial due to drug overdose from unclear agent and exacerbated by uremic encephalopathy 2/2 acute renal failure. Ammonia wnl, UDS shows benzodiazepines, though this appears on the patient's medication list so not surprising. Considered other etiologies of altered mental status, including metabolic, hypoxic, vascular (CTH wnl), seizures (no hx), trauma (no good hx), psychiatric (unable to evaluate), infectious (low suspicion given labs) and clinical picture.  -NPO  -aspiration precuations  -Fall precautions  -seizure precautions  -sitter  -neuro checks q4hr    Anemia  Assessment & Plan  Suspect false in light of dehydration.  -CTM    Leukocytosis  Assessment & Plan  False in light of dehydration, likely aspiration pneumonitis with some neutrophil shift. May consider further intervention once patient stabilizes and is more alert, since unable to assess and suspect it is best to minimize medications at this time owing to unclear clinical picture from overdose.  -CTM    Urinary incontinence  Assessment & Plan  -oxybutynin held due to drug overdose    Rhabdomyolysis  Assessment & Plan  CK elevated to 16k, suspected 2/2 being found down and drug overdose.  -repeat CK x1, will defer to new team if downtrending    Elevated liver enzymes  Assessment & Plan  Likely 2/2 drug overdose and shock liver.   -CMP repeat    Hypokalemia  Assessment & Plan  Likely related to renal injury  -CTM  -IV potassium 10meq x4    High anion gap metabolic acidosis  Assessment & Plan  Likely 2/2 prerenal damage from hypotension 2/2 drug overdose and uremia related to rhabdomyolysis.  -fluid resuscitation with NS 200cc/hr  -IV potassium 10meq x4  -lactate pending    Suicidal behavior with attempted self-injury (HCC)  Assessment & Plan  Repeat suicidal attempt by  patient  -psych consult  -sitter    Mitral regurgitation  Assessment & Plan  Suspected murmur, though difficult to auscultate. Unclear if preexisting or related to overdose.   -consider TTE once more clinically stable.    Hypertension- (present on admission)  Assessment & Plan  Hx of HTN, holding home meds in light of hypotension  --holding amlodipine-benazepril  -holding chlorthalidone    Hyponatremia- (present on admission)  Assessment & Plan  Likely related to renal failure.  -CTM      The patient's care was discussed with the patient, any present family, and the attending Dr. Earl Lechuga. The patient will require at least two midnights for appropriate medical management, necessitating inpatient admission because of illness severity. The patient will require a telemetry bed on medical service due to drug overdose/suicidal attempt.    VTE prophylaxis: SCDs  Diet: NPO  Code Status: Full assumed  Disposition: pending further evaluation    Irma Bruce MD  Internal Medicine Resident

## 2024-07-24 NOTE — ED NOTES
"Notified ERP of pt's blood pressure below  BP (!) 89/52   Pulse 77   Temp 36.2 °C (97.1 °F) (Temporal)   Resp 16   Ht 1.803 m (5' 11\")   Wt 83.9 kg (185 lb)   SpO2 95%    "

## 2024-07-24 NOTE — PROGRESS NOTES
IMCU Acceptance Note    Called by Dr. Nayeli Tenorio for admission to IMCU for acute encephalopathy after suicide attempt with home medications with associated WILMAR and rhabdomyolysis. Will admit to IMCU under the care of the hospitalist.      Emily Garg MD  Pulmonary and Critical Care Medicine

## 2024-07-24 NOTE — ED PROVIDER NOTES
"ED Provider Note    CHIEF COMPLAINT  Chief Complaint   Patient presents with    Drug Overdose    Suicidal Ideation    Suicide Attempt       EXTERNAL RECORDS REVIEWED  Outpatient Notes distant ER evaluation 2018 for drug overdose, 15 x 75 mg doxepin.  Suicide attempt following divorce.  Labs within normal limits.  EKG remained normal.  Treated with sodium bicarbonate, 1 amp for tricyclic overdose.  Lethargic.  Evaluated by [facility] and transferred to [facility] [facility] for further psychiatric evaluation upon medical clearance.    HPI/ROS  LIMITATION TO HISTORY   Select: Altered mental status / Confusion  OUTSIDE HISTORIAN(S):  EMS      Terrell Hensley is a 63 y.o. male who presents to the emergency department by ambulance from home for altered mental status.  Per EMS, patient with reported drug overdose, possible amlodipine.  Hypotensive on arrival but improved with 500 cc fluid bolus.    During my discussion with patient although he is somnolent, with some slurred speech, he admits to \"suicide attempt,\" and states he wanted to kill himself \"at that time.\"  Admits to taking medications but cannot detail what or how many.  Denies vomiting.  Denies alcohol.    Patient's brother arrived shortly after bedside.  States he called EMS when he was found to be quite somnolent this afternoon, difficult to arouse after not hearing from him for 24 hours.    PAST MEDICAL HISTORY   has a past medical history of HTN (hypertension) and Suicide attempt (HCC).    SURGICAL HISTORY  patient denies any surgical history    FAMILY HISTORY  History reviewed. No pertinent family history.    SOCIAL HISTORY  Social History     Tobacco Use    Smoking status: Every Day     Types: Cigarettes    Smokeless tobacco: Never   Vaping Use    Vaping status: Every Day   Substance and Sexual Activity    Alcohol use: Yes     Comment: occ    Drug use: Never    Sexual activity: Not on file       CURRENT MEDICATIONS  Home Medications       Reviewed " "by Christie Barraza PhT (Pharmacy Tech) on 07/23/24 at 2255  Med List Status: Complete     Medication Last Dose Status   amlodipine-benazepril (LOTREL) 5-10 MG per capsule unknown Active   chlorthalidone (HYGROTON) 25 MG Tab unknown Active   clonazePAM (KLONOPIN) 1 MG Tab unknown Active   indomethacin (INDOCIN) 50 MG Cap unknown Active   NON SPECIFIED unknown Active   oxybutynin (DITROPAN XL) 15 MG CR tablet unknown Active   QUEtiapine (SEROQUEL) 100 MG Tab unknown Active   sertraline (ZOLOFT) 50 MG Tab unknown Active                  Audit from Redirected Encounters    **Home medications have not yet been reviewed for this encounter**         ALLERGIES  Allergies   Allergen Reactions    Sulfa Drugs Hives and Rash       PHYSICAL EXAM  VITAL SIGNS: BP 90/55   Pulse 79   Temp 36.2 °C (97.1 °F) (Temporal)   Resp 18   Ht 1.803 m (5' 11\")   Wt 83.9 kg (185 lb)   SpO2 94%   BMI 25.80 kg/m²    Pulse ox interpretation: I interpret this pulse ox as normal.  Constitutional: Somnolent but arousable.  Disoriented  HENT: Normocephalic, atraumatic. Bilateral external ears normal, Nose normal.  Dry lips and mucous membranes.    Eyes: Pupils are equal and reactive, Conjunctiva normal.   Neck: Normal range of motion, Supple  Cardiovascular: Regular rate and rhythm, no murmurs. Distal pulses intact.  No peripheral edema.  Thorax & Lungs: Normal breath sounds.  No wheezing/rales/ronchi. No increased work of breathing  Abdomen: Soft, non-distended.  No grimace or draw to palpation.    Skin: Warm, Dry, No erythema, No rash.   Musculoskeletal: Good range of motion in all major joints. No major deformities noted.   Neurologic: Somnolent.  Alert and oriented to self, place.  Slurred speech although patient does have dry lips and tongue.  Moves 4 extremities spontaneously.  Psychiatric: Reported suicide attempt      EKG/LABS  Results for orders placed or performed during the hospital encounter of 07/23/24   Urine Drug Screen   Result " Value Ref Range    Amphetamines Urine Negative Negative    Barbiturates Negative Negative    Benzodiazepines Positive (A) Negative    Cocaine Metabolite Negative Negative    Fentanyl, Urine Negative Negative    Methadone Negative Negative    Opiates Negative Negative    Oxycodone Negative Negative    Phencyclidine -Pcp Negative Negative    Propoxyphene Negative Negative    Cannabinoid Metab Negative Negative   DIAGNOSTIC ALCOHOL (BA)   Result Value Ref Range    Diagnostic Alcohol <10.1 <10.1 mg/dL   CBC WITH DIFFERENTIAL   Result Value Ref Range    WBC 18.6 (H) 4.8 - 10.8 K/uL    RBC 4.12 (L) 4.70 - 6.10 M/uL    Hemoglobin 12.8 (L) 14.0 - 18.0 g/dL    Hematocrit 35.3 (L) 42.0 - 52.0 %    MCV 85.7 81.4 - 97.8 fL    MCH 31.1 27.0 - 33.0 pg    MCHC 36.3 32.3 - 36.5 g/dL    RDW 41.1 35.9 - 50.0 fL    Platelet Count 243 164 - 446 K/uL    MPV 9.4 9.0 - 12.9 fL    Neutrophils-Polys 90.00 (H) 44.00 - 72.00 %    Lymphocytes 3.70 (L) 22.00 - 41.00 %    Monocytes 5.80 0.00 - 13.40 %    Eosinophils 0.10 0.00 - 6.90 %    Basophils 0.10 0.00 - 1.80 %    Immature Granulocytes 0.30 0.00 - 0.90 %    Nucleated RBC 0.00 0.00 - 0.20 /100 WBC    Neutrophils (Absolute) 16.69 (H) 1.82 - 7.42 K/uL    Lymphs (Absolute) 0.69 (L) 1.00 - 4.80 K/uL    Monos (Absolute) 1.07 (H) 0.00 - 0.85 K/uL    Eos (Absolute) 0.02 0.00 - 0.51 K/uL    Baso (Absolute) 0.02 0.00 - 0.12 K/uL    Immature Granulocytes (abs) 0.06 0.00 - 0.11 K/uL    NRBC (Absolute) 0.00 K/uL   Acetaminophen Level   Result Value Ref Range    Acetaminophen -Tylenol <5.0 (L) 10.0 - 30.0 ug/mL   SALICYLATE LEVEL   Result Value Ref Range    Salicylates, Quant. <1.0 (L) 15.0 - 25.0 mg/dL   Comp Metabolic Panel   Result Value Ref Range    Sodium 129 (L) 135 - 145 mmol/L    Potassium 3.4 (L) 3.6 - 5.5 mmol/L    Chloride 91 (L) 96 - 112 mmol/L    Co2 19 (L) 20 - 33 mmol/L    Anion Gap 19.0 (H) 7.0 - 16.0    Glucose 142 (H) 65 - 99 mg/dL    Bun 52 (H) 8 - 22 mg/dL    Creatinine 3.77 (H) 0.50  - 1.40 mg/dL    Calcium 8.8 8.5 - 10.5 mg/dL    Correct Calcium 9.2 8.5 - 10.5 mg/dL    AST(SGOT) 194 (H) 12 - 45 U/L    ALT(SGPT) 88 (H) 2 - 50 U/L    Alkaline Phosphatase 75 30 - 99 U/L    Total Bilirubin 0.5 0.1 - 1.5 mg/dL    Albumin 3.5 3.2 - 4.9 g/dL    Total Protein 5.7 (L) 6.0 - 8.2 g/dL    Globulin 2.2 1.9 - 3.5 g/dL    A-G Ratio 1.6 g/dL   MAGNESIUM   Result Value Ref Range    Magnesium 2.2 1.5 - 2.5 mg/dL   ESTIMATED GFR   Result Value Ref Range    GFR (CKD-EPI) 17 (A) >60 mL/min/1.73 m 2   CREATINE KINASE   Result Value Ref Range    CPK Total 18725 (HH) 0 - 154 U/L   AMMONIA   Result Value Ref Range    Ammonia 11 11 - 45 umol/L   EKG   Result Value Ref Range    Report       [facility]    Test Date:  2024  Pt Name:    FAIZAN GARDNER             Department: ER  MRN:        [id number]                      Room:        12  Gender:     Male                         Technician: [id number]  :        1960                   Requested By:ER TRIAGE PROTOCOL  Order #:    [id number]                    Reading MD: ILDEFONSO ARNOLD DO    Measurements  Intervals                                Axis  Rate:       85                           P:          61  MA:         153                          QRS:        63  QRSD:       106                          T:          25  QT:         413  QTc:        492    Interpretive Statements  Sinus rhythm  Probable left atrial enlargement  Borderline prolonged QT interval  Compared to ECG 2024 17:09:50  No significant changes  Electronically Signed On 2024 00:56:04 PDT by ILDEFONSO ARNOLD DO       I have independently interpreted this EKG    RADIOLOGY/PROCEDURES     Radiologist interpretation:  CT-HEAD W/O   Final Result         1.  No acute intracranial abnormality.   2.  Atherosclerosis.                   COURSE & MEDICAL DECISION MAKING    ASSESSMENT, COURSE AND PLAN  Care Narrative:   Seen evaluated at bedside.  Somnolent  "but arousable.  Answers to questions appropriately.  Admits to \"suicide attempt\" but cannot provide details on what he took or how much.  Add labs per tox protocol.  History of hypotension although improved with fluid bolus prior to arrival.  Clinically dehydrated with dry lips, tongue and mucous membranes.  Continue IV fluid.    Medications added to chart provided by brother from home.  Medications do include Seroquel, melatonin, amlodipine, indomethacin, sertraline, clonazepam, chlorthalidone.  Brother states that patient has had overdose once previously but was treated at [facility].    Soft blood pressure, continue IV fluid bolus.    Acute renal failure, dehydration.  Continue fluid bolus, add Blake catheter.  Add CPK.    Nonspecific leukocytosis, WBC 18.6 with leftward shift, no bandemia.  Sodium 129, potassium 3.4, chloride 91, CO2 19, creatinine is 3.77 with BUN of 52.  Normal LFTs.  Tylenol, aspirin, alcohol negative.    CPK 48770.  Ammonia normal.  Drug screen positive for benzos only.    Persistent somnolence, per nursing staff with some hallucination as well.  Now urinating, more than 900 cc.  IV fluid infusion continues.  He will be hospitalized for further evaluation and treatment.  Unknown ingestion with blood pressure responsive to IV fluid.  Comorbidities with acute renal failure, dehydration, rhabdomyolysis as well.    Hydration: Based on the patient's presentation of Dehydration and Eldery ALOC the patient was given IV fluids. IV Hydration was used because oral hydration was not adequate alone. Upon recheck following hydration, the patient was infusing continues.      ADDITIONAL PROBLEMS MANAGED  Prior drug overdose, suicide attempt    DISPOSITION AND DISCUSSIONS  I have discussed management of the patient with the following physicians and RAHCEL's:    1230 -intensivist, Dr. Garg agrees with IMCU placement.  12:46 AM Hospitalist is aware of the patient and agreeable to " consultation    Discussion of management with other [facility] or appropriate source(s):  Poison Control      CRITICAL CARE  The very real possibilty of a deterioration of this patient's condition required the highest level of my preparedness for sudden, emergent intervention.  I provided critical care services, which included medication orders, frequent reevaluations of the patient's condition and response to treatment, ordering and reviewing test results, and discussing the case with various consultants.  The critical care time associated with the care of the patient was 35 minutes. Review chart for interventions. This time is exclusive of any other billable procedures.         FINAL DIAGNOSIS  1. Altered mental status, unspecified altered mental status type    2. Acute renal failure, unspecified acute renal failure type (HCC)    3. Drug overdose of undetermined intent, initial encounter           Electronically signed by: Fina Tenorio D.O., 7/23/2024 8:22 PM

## 2024-07-24 NOTE — ED NOTES
NS bolus administered per MAR. Pt's brother at bedside with medications pt may have taken. ERP speaking with pt's family member.

## 2024-07-24 NOTE — ED NOTES
Patient appears to be reaching for things, grabbing for the air, when asked, pt's sentences are difficult to understand. 1:1 sitter in direct view of patient.

## 2024-07-24 NOTE — ASSESSMENT & PLAN NOTE
Likely 2/2 prerenal damage from hypotension 2/2 drug overdose and uremia related to rhabdomyolysis.  Creat imroving daily: 1.4 today  Good UOP  Cont LR until CPK<3k  Daily BMP  Renally dose medications as appropriate    7/30/2024  Resolved

## 2024-07-24 NOTE — ED NOTES
ERP at bedside. Notified ERP pt is more drowsy and slurred speech, pt following commands, however very drowsy and falls asleep during conversation. Pt failed swallow eval at this time. Pt still hypotensive, new orders placed by ERP

## 2024-07-24 NOTE — ASSESSMENT & PLAN NOTE
Repeat suicidal attempt by patient  -psych consult  -sitter    7/30/2024  Continue one-on-one sitter  Continues to be on legal hold.  Psychiatry recommending psychiatric hospitalization  Changing sertraline to Wellbutrin

## 2024-07-24 NOTE — ED NOTES
NS running at 200ml/hr per MAR, pt is more altered and speech is more slurred then arrival. ERP notified.

## 2024-07-24 NOTE — ED NOTES
Pt to [facility] via Raghavendra GORDILLO. Pt with all belongings in possession. Pt is on cardiac monitoring and on 2L NC. Report has been given to Raghavendra GORDILLO.

## 2024-07-24 NOTE — ED NOTES
Report given to Brook RN, pt going to [facility], awaiting RN for transport.

## 2024-07-24 NOTE — PROGRESS NOTES
"Hospital Medicine Daily Progress Note    Date of Service  7/24/2024    Chief Complaint  Terrell Hensley is a 63 y.o. male admitted 7/23/2024 with altered mental status    Hospital Course  62yo PMHx HTN, gout.  Presented altered after an amlodipine OD.  CPK16k, WILMAR, elevated LFTs.    Interval Problem Update  Pt states he feels \"tired and sore\" but otherwise has no complaints    Sinus 70s  SBP   On 2 LNC  AFebrile    I have discussed this patient's plan of care and discharge plan at IDT rounds today with Case Management, Nursing, Nursing leadership, and other members of the IDT team.    Consultants/Specialty  psychiatry    Code Status  Full Code    Disposition  The patient is not medically cleared for discharge to home or a post-acute facility.      I have placed the appropriate orders for post-discharge needs.    Review of Systems  ROS     Physical Exam  Temp:  [36.2 °C (97.1 °F)] 36.2 °C (97.1 °F)  Pulse:  [76-90] 85  Resp:  [14-24] 20  BP: ()/(42-63) 108/60  SpO2:  [91 %-99 %] 96 %    Physical Exam  Constitutional:       General: He is not in acute distress.     Appearance: He is well-developed. He is not diaphoretic.   HENT:      Head: Normocephalic and atraumatic.   Eyes:      Conjunctiva/sclera: Conjunctivae normal.   Neck:      Vascular: No JVD.   Cardiovascular:      Rate and Rhythm: Normal rate.      Heart sounds: No murmur heard.     No gallop.   Pulmonary:      Effort: Pulmonary effort is normal. No respiratory distress.      Breath sounds: No stridor. No wheezing or rales.   Abdominal:      Palpations: Abdomen is soft.      Tenderness: There is no abdominal tenderness. There is no guarding or rebound.   Skin:     General: Skin is warm and dry.      Capillary Refill: Capillary refill takes less than 2 seconds.      Findings: No rash.   Neurological:      Mental Status: He is oriented to person, place, and time.   Psychiatric:         Mood and Affect: Mood normal.         Behavior: Behavior " normal.         Thought Content: Thought content normal.      Comments: Denies suicidal ideation but does admit to trying to kill himself         Fluids    Intake/Output Summary (Last 24 hours) at 7/24/2024 0636  Last data filed at 7/24/2024 0203  Gross per 24 hour   Intake 3000 ml   Output 2300 ml   Net 700 ml       Laboratory  Recent Labs     07/23/24 2000 07/24/24  0350   WBC 18.6* 14.7*   RBC 4.12* 3.96*   HEMOGLOBIN 12.8* 12.0*   HEMATOCRIT 35.3* 34.7*   MCV 85.7 87.6   MCH 31.1 30.3   MCHC 36.3 34.6   RDW 41.1 42.7   PLATELETCT 243 209   MPV 9.4 9.3     Recent Labs     07/23/24 2000 07/24/24  0350   SODIUM 129* 137   POTASSIUM 3.4* 3.2*   CHLORIDE 91* 103   CO2 19* 19*   GLUCOSE 142* 126*   BUN 52* 46*   CREATININE 3.77* 2.83*   CALCIUM 8.8 8.3*                   Imaging  CT-HEAD W/O   Final Result         1.  No acute intracranial abnormality.   2.  Atherosclerosis.                    Assessment/Plan  * Drug overdose of undetermined intent, initial encounter- (present on admission)  Assessment & Plan  Apparent suicide attempt  Polypharmacy: some amlodipine but other ingestions unclear  Cont supportive care as clinically improving  On Psych hold  Psychiatry team following    Anemia  Assessment & Plan  Mild   Cont to monitor    Leukocytosis  Assessment & Plan  False in light of dehydration, likely aspiration pneumonitis with some neutrophil shift. May consider further intervention once patient stabilizes and is more alert, since unable to assess and suspect it is best to minimize medications at this time owing to unclear clinical picture from overdose.  -CTM    Urinary incontinence  Assessment & Plan  -oxybutynin held due to drug overdose    Rhabdomyolysis  Assessment & Plan  CK elevated to 16k, suspected 2/2 being found down and drug overdose.  Cont agressive IVFs but change to LR from NS  Follow daily AST, ALT, Bun, Creat and K    Elevated liver enzymes  Assessment & Plan  Transaminase elevation likely from  Rhabdo and not acute liver injury though certainly could have a toxic component from OD  Cont to monitor daily CMP  Consider further evaluation if it does not resolve as CPK drops and Rhabdo resolves    Hypokalemia  Assessment & Plan  Follow and replace    Acute renal failure (ARF) (HCC)  Assessment & Plan  Likely 2/2 prerenal damage from hypotension 2/2 drug overdose and uremia related to rhabdomyolysis.  Creat improved today  Good UOP  DC NS and change to LR at same rate  Daily BMP  Renally dose medications as appropriate    High anion gap metabolic acidosis  Assessment & Plan  Likely 2/2 prerenal damage from hypotension 2/2 drug overdose and uremia related to rhabdomyolysis.  -fluid resuscitation with NS 200cc/hr  -IV potassium 10meq x4      Suicidal behavior with attempted self-injury (HCC)  Assessment & Plan  Repeat suicidal attempt by patient  -psych consult  -sitter    Mitral regurgitation  Assessment & Plan  Suspected murmur, though difficult to auscultate. Unclear if preexisting or related to overdose.   -consider TTE once more clinically stable.    Acute encephalopathy  Assessment & Plan  Acute metabolic/toxic encephalopathy  CT head neg  Mental status is markedly improved today though  pt is still mildly confused  Cont to monitor clinically  Repeat lab in am  Minimize sedating medications    Hypertension- (present on admission)  Assessment & Plan  Hx of HTN, holding home meds in light of hypotension  Holding BP meds for obvious reasons    Hyponatremia- (present on admission)  Assessment & Plan  Likely hypovolemic hypoNa  Has resolved with IVF resuscitation  Cont to monitor         VTE prophylaxis: VTE Selection    I have performed a physical exam and reviewed and updated ROS and Plan today (7/24/2024). In review of yesterday's note (7/23/2024), there are no changes except as documented above.

## 2024-07-24 NOTE — ASSESSMENT & PLAN NOTE
Likely 2/2 prerenal damage from hypotension 2/2 drug overdose and uremia related to rhabdomyolysis.  -fluid resuscitation with NS 200cc/hr  -IV potassium 10meq x4      7/30/2024  Resolved

## 2024-07-25 LAB
ALBUMIN SERPL BCP-MCNC: 3 G/DL (ref 3.2–4.9)
ALBUMIN/GLOB SERPL: 1.2 G/DL
ALP SERPL-CCNC: 72 U/L (ref 30–99)
ALT SERPL-CCNC: 107 U/L (ref 2–50)
ANION GAP SERPL CALC-SCNC: 8 MMOL/L (ref 7–16)
AST SERPL-CCNC: 143 U/L (ref 12–45)
BILIRUB SERPL-MCNC: 0.3 MG/DL (ref 0.1–1.5)
BUN SERPL-MCNC: 30 MG/DL (ref 8–22)
CALCIUM ALBUM COR SERPL-MCNC: 9.5 MG/DL (ref 8.5–10.5)
CALCIUM SERPL-MCNC: 8.7 MG/DL (ref 8.5–10.5)
CHLORIDE SERPL-SCNC: 107 MMOL/L (ref 96–112)
CK SERPL-CCNC: 7241 U/L (ref 0–154)
CO2 SERPL-SCNC: 22 MMOL/L (ref 20–33)
CREAT SERPL-MCNC: 1.44 MG/DL (ref 0.5–1.4)
GFR SERPLBLD CREATININE-BSD FMLA CKD-EPI: 54 ML/MIN/1.73 M 2
GLOBULIN SER CALC-MCNC: 2.5 G/DL (ref 1.9–3.5)
GLUCOSE SERPL-MCNC: 105 MG/DL (ref 65–99)
POTASSIUM SERPL-SCNC: 4 MMOL/L (ref 3.6–5.5)
PROT SERPL-MCNC: 5.5 G/DL (ref 6–8.2)
SODIUM SERPL-SCNC: 137 MMOL/L (ref 135–145)

## 2024-07-25 PROCEDURE — A9270 NON-COVERED ITEM OR SERVICE: HCPCS | Performed by: HOSPITALIST

## 2024-07-25 PROCEDURE — 51798 US URINE CAPACITY MEASURE: CPT

## 2024-07-25 PROCEDURE — 99233 SBSQ HOSP IP/OBS HIGH 50: CPT | Performed by: HOSPITALIST

## 2024-07-25 PROCEDURE — A9270 NON-COVERED ITEM OR SERVICE: HCPCS

## 2024-07-25 PROCEDURE — 770001 HCHG ROOM/CARE - MED/SURG/GYN PRIV*

## 2024-07-25 PROCEDURE — 700102 HCHG RX REV CODE 250 W/ 637 OVERRIDE(OP)

## 2024-07-25 PROCEDURE — 82550 ASSAY OF CK (CPK): CPT

## 2024-07-25 PROCEDURE — 80053 COMPREHEN METABOLIC PANEL: CPT

## 2024-07-25 PROCEDURE — 700102 HCHG RX REV CODE 250 W/ 637 OVERRIDE(OP): Performed by: HOSPITALIST

## 2024-07-25 RX ORDER — AMOXICILLIN 250 MG
2 CAPSULE ORAL EVERY EVENING
Status: DISCONTINUED | OUTPATIENT
Start: 2024-07-25 | End: 2024-07-29

## 2024-07-25 RX ORDER — POLYETHYLENE GLYCOL 3350 17 G/17G
1 POWDER, FOR SOLUTION ORAL
Status: DISCONTINUED | OUTPATIENT
Start: 2024-07-25 | End: 2024-07-29

## 2024-07-25 RX ADMIN — POLYETHYLENE GLYCOL 3350 1 PACKET: 17 POWDER, FOR SOLUTION ORAL at 18:05

## 2024-07-25 RX ADMIN — Medication 5 MG: at 22:02

## 2024-07-25 RX ADMIN — SENNOSIDES AND DOCUSATE SODIUM 2 TABLET: 50; 8.6 TABLET ORAL at 18:06

## 2024-07-25 ASSESSMENT — COGNITIVE AND FUNCTIONAL STATUS - GENERAL
DRESSING REGULAR LOWER BODY CLOTHING: A LOT
MOBILITY SCORE: 13
PERSONAL GROOMING: A LITTLE
MOVING FROM LYING ON BACK TO SITTING ON SIDE OF FLAT BED: A LOT
TURNING FROM BACK TO SIDE WHILE IN FLAT BAD: A LITTLE
HELP NEEDED FOR BATHING: A LOT
TOILETING: A LOT
DAILY ACTIVITIY SCORE: 15
DRESSING REGULAR UPPER BODY CLOTHING: A LITTLE
MOVING TO AND FROM BED TO CHAIR: A LOT
WALKING IN HOSPITAL ROOM: A LOT
SUGGESTED CMS G CODE MODIFIER DAILY ACTIVITY: CK
EATING MEALS: A LITTLE
SUGGESTED CMS G CODE MODIFIER MOBILITY: CL
CLIMB 3 TO 5 STEPS WITH RAILING: A LOT
STANDING UP FROM CHAIR USING ARMS: A LOT

## 2024-07-25 ASSESSMENT — PAIN DESCRIPTION - PAIN TYPE
TYPE: ACUTE PAIN

## 2024-07-25 ASSESSMENT — PATIENT HEALTH QUESTIONNAIRE - PHQ9
2. FEELING DOWN, DEPRESSED, IRRITABLE, OR HOPELESS: NOT AT ALL
1. LITTLE INTEREST OR PLEASURE IN DOING THINGS: NOT AT ALL
SUM OF ALL RESPONSES TO PHQ9 QUESTIONS 1 AND 2: 0

## 2024-07-25 ASSESSMENT — ENCOUNTER SYMPTOMS
BACK PAIN: 0
CHILLS: 0
FEVER: 0
PALPITATIONS: 0
NAUSEA: 0
NERVOUS/ANXIOUS: 1
VOMITING: 0
DIARRHEA: 0
HEADACHES: 0
LOSS OF CONSCIOUSNESS: 0
COUGH: 0
ABDOMINAL PAIN: 0
DIZZINESS: 0
SHORTNESS OF BREATH: 0

## 2024-07-25 ASSESSMENT — FIBROSIS 4 INDEX: FIB4 SCORE: 6.75

## 2024-07-25 NOTE — CARE PLAN
Problem: Pain - Standard  Goal: Alleviation of pain or a reduction in pain to the patient’s comfort goal  Outcome: Progressing     Problem: Knowledge Deficit - Standard  Goal: Patient and family/care givers will demonstrate understanding of plan of care, disease process/condition, diagnostic tests and medications  Outcome: Progressing     Problem: Provide Safe Environment  Goal: Suicide environmental safety, protocols, policies, and practices will be implemented  Outcome: Progressing     Problem: Psychosocial  Goal: Patient's ability to identify and develop effective coping behaviors will improve  Outcome: Progressing  Goal: Patient's ability to identify and utilize available support systems will improve  Outcome: Progressing     Problem: Fall Risk  Goal: Patient will remain free from falls  Outcome: Progressing   The patient is Stable - Low risk of patient condition declining or worsening    Shift Goals  Clinical Goals: hemodynamic stability/neuro cks  Patient Goals: ansley  Family Goals: ansley    Progress made toward(s) clinical / shift goals:  yes

## 2024-07-25 NOTE — PROGRESS NOTES
Lab called with critical result of CPK 7241 at 0532. Critical lab result read back to Lab.   Dr. Valdes notified of critical lab result at 0535.  Critical lab result read back by Dr. Valdes.

## 2024-07-25 NOTE — PROGRESS NOTES
"Hospital Medicine Daily Progress Note    Date of Service  7/25/2024    Chief Complaint  Terrell Hensley is a 63 y.o. male admitted 7/23/2024 with altered mental status    Hospital Course  62yo PMHx HTN, gout.  Presented altered after an amlodipine OD.  CPK16k, WILMAR, elevated LFTs.    Interval Problem Update  Pt states he feels \"tired and sore\" but otherwise has no complaints    Sinus 80s  SBP 130s  On RA  AFebrile    I have discussed this patient's plan of care and discharge plan at IDT rounds today with Case Management, Nursing, Nursing leadership, and other members of the IDT team.    Consultants/Specialty  psychiatry    Code Status  Full Code    Disposition  The patient is not medically cleared for discharge to home or a post-acute facility.      I have placed the appropriate orders for post-discharge needs.    Review of Systems  Review of Systems   Constitutional:  Negative for chills and fever.   Respiratory:  Negative for cough and shortness of breath.    Cardiovascular:  Negative for chest pain, palpitations and leg swelling.   Gastrointestinal:  Negative for abdominal pain, diarrhea, nausea and vomiting.   Genitourinary:  Negative for dysuria and urgency.   Musculoskeletal:  Negative for back pain.   Skin:  Negative for rash.   Neurological:  Negative for dizziness, loss of consciousness and headaches.   Psychiatric/Behavioral:  The patient is nervous/anxious.         Physical Exam  Temp:  [36.4 °C (97.5 °F)-36.6 °C (97.9 °F)] 36.5 °C (97.7 °F)  Pulse:  [74-97] 85  Resp:  [14-36] 21  BP: ()/(54-77) 145/76  SpO2:  [92 %-98 %] 95 %    Physical Exam  Constitutional:       General: He is not in acute distress.     Appearance: He is well-developed. He is not diaphoretic.   HENT:      Head: Normocephalic and atraumatic.   Eyes:      Conjunctiva/sclera: Conjunctivae normal.   Neck:      Vascular: No JVD.   Cardiovascular:      Rate and Rhythm: Normal rate.      Heart sounds: No murmur heard.     No " gallop.   Pulmonary:      Effort: Pulmonary effort is normal. No respiratory distress.      Breath sounds: No stridor. No wheezing or rales.   Abdominal:      Palpations: Abdomen is soft.      Tenderness: There is no abdominal tenderness. There is no guarding or rebound.   Musculoskeletal:      Right lower leg: No edema.      Left lower leg: No edema.   Skin:     General: Skin is warm and dry.      Capillary Refill: Capillary refill takes less than 2 seconds.      Findings: No rash.   Neurological:      Mental Status: He is oriented to person, place, and time.   Psychiatric:         Mood and Affect: Mood normal.         Behavior: Behavior normal.         Thought Content: Thought content normal.      Comments: Denies suicidal ideation again today         Fluids    Intake/Output Summary (Last 24 hours) at 7/25/2024 0637  Last data filed at 7/25/2024 0500  Gross per 24 hour   Intake 840 ml   Output 84368 ml   Net -27413 ml       Laboratory  Recent Labs     07/23/24 2000 07/24/24  0350   WBC 18.6* 14.7*   RBC 4.12* 3.96*   HEMOGLOBIN 12.8* 12.0*   HEMATOCRIT 35.3* 34.7*   MCV 85.7 87.6   MCH 31.1 30.3   MCHC 36.3 34.6   RDW 41.1 42.7   PLATELETCT 243 209   MPV 9.4 9.3     Recent Labs     07/23/24 2000 07/24/24  0350 07/25/24  0420   SODIUM 129* 137 137   POTASSIUM 3.4* 3.2* 4.0   CHLORIDE 91* 103 107   CO2 19* 19* 22   GLUCOSE 142* 126* 105*   BUN 52* 46* 30*   CREATININE 3.77* 2.83* 1.44*   CALCIUM 8.8 8.3* 8.7                   Imaging  CT-HEAD W/O   Final Result         1.  No acute intracranial abnormality.   2.  Atherosclerosis.                    Assessment/Plan  * Drug overdose of undetermined intent, initial encounter- (present on admission)  Assessment & Plan  Apparent suicide attempt  Polypharmacy: some amlodipine but other ingestions unclear  Cont supportive care as clinically improving  On Psych hold  Psychiatry team following    Anemia  Assessment & Plan  Mild   Cont to monitor    Leukocytosis  Assessment  & Plan  False in light of dehydration, likely aspiration pneumonitis with some neutrophil shift. May consider further intervention once patient stabilizes and is more alert, since unable to assess and suspect it is best to minimize medications at this time owing to unclear clinical picture from overdose.  -CTM    Urinary incontinence  Assessment & Plan  -oxybutynin held due to drug overdose    Rhabdomyolysis  Assessment & Plan  CK elevated to 16k, suspected 2/2 being found down and drug overdose.  Cont agressive IVFs but change to LR from NS  Follow daily AST, ALT, Bun, Creat and K    7/25  CPK now 7k  AST/ALT coming down  Renal function improving  K WNL  Cont to monitor CPK, CMP daily until normalized  Cont IVFs until CPK<3k    Elevated liver enzymes  Assessment & Plan  Transaminase elevation likely from Rhabdo and not acute liver injury though certainly could have a toxic component from OD  Coming down as expected with resolution of Rhabdo  Cont to monitor daily CMP  Consider further evaluation if it does not resolve as CPK drops and Rhabdo resolves    Hypokalemia  Assessment & Plan  Follow and replace    Acute renal failure (ARF) (HCC)  Assessment & Plan  Likely 2/2 prerenal damage from hypotension 2/2 drug overdose and uremia related to rhabdomyolysis.  Creat imroving daily: 1.4 today  Good UOP  Cont LR until CPK<3k  Daily BMP  Renally dose medications as appropriate    High anion gap metabolic acidosis  Assessment & Plan  Likely 2/2 prerenal damage from hypotension 2/2 drug overdose and uremia related to rhabdomyolysis.  -fluid resuscitation with NS 200cc/hr  -IV potassium 10meq x4      Suicidal behavior with attempted self-injury (HCC)  Assessment & Plan  Repeat suicidal attempt by patient  -psych consult  -sitter    Mitral regurgitation  Assessment & Plan  Suspected murmur, though difficult to auscultate. Unclear if preexisting or related to overdose.   -consider TTE once more clinically stable.    Acute  encephalopathy  Assessment & Plan  Acute metabolic/toxic encephalopathy  CT head neg  Mental status improving daily though still not completely back to normal  Cont to monitor clinically  Repeat lab in am  Minimize sedating medications  mobilize    Hypertension- (present on admission)  Assessment & Plan  Hx of HTN, holding home meds in light of hypotension  Holding BP meds for obvious reasons    Hyponatremia- (present on admission)  Assessment & Plan  Likely hypovolemic hypoNa  Has resolved with IVF resuscitation  Cont to monitor         VTE prophylaxis: VTE Selection    I have performed a physical exam and reviewed and updated ROS and Plan today (7/25/2024). In review of yesterday's note (7/24/2024), there are no changes except as documented above.

## 2024-07-25 NOTE — CARE PLAN
The patient is Stable - Low risk of patient condition declining or worsening    Shift Goals  Clinical Goals: hemodynamic stability/neuro cks  Patient Goals: rest  Family Goals: updates    Progress made toward(s) clinical / shift goals:     Problem: Pain - Standard  Goal: Alleviation of pain or a reduction in pain to the patient’s comfort goal  Outcome: Progressing     Problem: Provide Safe Environment  Goal: Suicide environmental safety, protocols, policies, and practices will be implemented  Outcome: Progressing     Problem: Psychosocial  Goal: Patient's ability to identify and develop effective coping behaviors will improve  Outcome: Progressing     Problem: Fall Risk  Goal: Patient will remain free from falls  Outcome: Progressing

## 2024-07-25 NOTE — PROGRESS NOTES
4 Eyes Skin Assessment Completed by DUY Mckinley and DUY Gomes.    Head WDL  Ears WDL  Nose WDL  Mouth WDL  Neck WDL  Breast/Chest WDL  Shoulder Blades WDL  Spine WDL  (R) Arm/Elbow/Hand WDL  (L) Arm/Elbow/Hand WDL  Abdomen WDL  Groin WDL  Scrotum/Coccyx/Buttocks Redness, nonblanching  (R) Leg WDL  (L) Leg WDL  (R) Heel/Foot/Toe dry, calloused  (L) Heel/Foot/Toe dry, calloused          Devices In Places Blood Pressure Cuff and SCD's      Interventions In Place Heel Mepilex, Sacral Mepilex, and Pillows    Possible Skin Injury Yes    Pictures Uploaded Into Epic Yes  Wound Consult Placed N/A - already placed  RN Wound Prevention Protocol Ordered Yes

## 2024-07-25 NOTE — PROGRESS NOTES
Pt only voiding 100cc every couple of hours. Pt c/o fullness post void. Bladder scan showed 1200cc. MD Valdes notified. Orders for gonzalez given.    Pt tolerated gonzalez insertion well. 1450cc clear yellow urine out.

## 2024-07-25 NOTE — WOUND TEAM
Progress Note    Evi Aviles                           Baby's First Name =  MEHKI  YOB: 2022      Gender: male BW: 7 lb 1.7 oz (3223 g)   Age: 42 hours Obstetrician: MARKIE FRANCO    Gestational Age: 39w4d            MATERNAL INFORMATION     Mother's Name: Adriane Aviles    Age: 23 y.o.              PREGNANCY INFORMATION           Maternal /Para:      Information for the patient's mother:  Adriane Aviles [[id number]]     Patient Active Problem List   Diagnosis   • Currently pregnant        Prenatal records, US and labs reviewed.    PRENATAL RECORDS:    Prenatal Course: benign      MATERNAL PRENATAL LABS:      MBT: O+  RUBELLA: immune  HBsAg:Negative   RPR:  Non Reactive  HIV: Negative  HEP C Ab: Negative  UDS: Positive for THC  GBS Culture: Negative  Genetic Testing: Low Risk  COVID 19 Screen: Presumptive Negative    PRENATAL ULTRASOUND :    Normal             MATERNAL MEDICAL, SOCIAL, GENETIC AND FAMILY HISTORY      Past Medical History:   Diagnosis Date   • Anemia    • Obesity           Family, Maternal or History of DDH, CHD, Renal, HSV, MRSA and Genetic:     Non-significant    Maternal Medications:     Information for the patient's mother:  Adriane Aviles [[id number]]   acetaminophen, 650 mg, Oral, Q6H  ePHEDrine Sulfate, , ,   erythromycin, , ,   ibuprofen, 600 mg, Oral, Q6H  prenatal vitamin, 1 tablet, Oral, Daily                LABOR AND DELIVERY SUMMARY        Rupture date:  2021   Rupture time:  8:11 AM  ROM prior to Delivery: 9h 51m     Antibiotics during Labor: No   EOS Calculator Screen: With well appearing baby supports Routine Vitals and Care    YOB: 2022   Time of birth:  6:02 PM  Delivery type:  , Low Transverse   Presentation/Position: Vertex;               APGAR SCORES:    Totals: 8   9                        INFORMATION     Vital Signs Temp:  [97.9 °F (36.6 °C)-98.1 °F (36.7 °C)] 98.1 °F (36.7 °C)  Pulse:  [126-140]  [facility]  Inpatient Services  Initial Wound and Skin Care Evaluation    Admission Date: 7/23/2024     Last order of IP CONSULT TO WOUND CARE was found on 7/24/2024 from Hospital Encounter on 7/23/2024     HPI, PMH, SH: Reviewed    History reviewed. No pertinent surgical history.  Social History     Tobacco Use    Smoking status: Every Day     Types: Cigarettes    Smokeless tobacco: Never   Substance Use Topics    Alcohol use: Yes     Comment: occ     Chief Complaint   Patient presents with    Drug Overdose    Suicidal Ideation    Suicide Attempt     Diagnosis: Drug overdose of undetermined intent, initial encounter [T50.904A]    Unit where seen by Wound Team: [id number]     WOUND CONSULT RELATED TO:  Sacrum    WOUND TEAM PLAN OF CARE - Frequency of Follow-up:   Nursing to follow dressing orders written for wound care. Contact wound team if area fails to progress, deteriorates or with any questions/concerns if something comes up before next scheduled follow up (See below as to whether wound is following and frequency of wound follow up)   Not following, consult as needed  - Sacrum POA Stage 1     WOUND HISTORY:     63-year-old male brought in by ambulance for AMS, suspicion of home medication overdose and possible SI/SA. No meaningful history is gleaned from patient at time of interview. Per report he stated upon ED arrival that he used his amlodipine . Other prescription medications include chlorthalidone, clonazepam, sertraline, indomethacin, oxybutynin, quetiapine, sleep blend which includes melatonin. Hypotensive to SBP 60s per EMS, fluid responsive. Hallucinations noted later.      WOUND ASSESSMENT/LDA  Wound 07/24/24 Pressure Injury Buttocks;Coccyx Left POA Stage 1 (Active)   Date First Assessed: 07/24/24   Present on Original Admission: Yes  Hand Hygiene Completed: Yes  Primary Wound Type: Pressure Injury  Location: Buttocks;Coccyx  Laterality: Left  Wound Description (Comments): POA Stage 1       Assessments 7/24/2024  4:00 PM   Wound Image     Site Assessment Clean;Dry;Intact;Pink;Red   Periwound Assessment Clean;Dry;Intact;Induration;Non-blanchable erythema   Margins Attached edges;Defined edges   Closure Adhesive bandage   Drainage Amount None   Treatments Cleansed;Site care;Offloading   Wound Cleansing Foam Cleanser/Washcloth   Dressing Status Clean;Dry;Intact   Dressing Changed New   Dressing Cleansing/Solutions Not Applicable   Dressing Options Offloading Dressing - Sacral   Dressing Change/Treatment Frequency Every 72 hrs, and As Needed   NEXT Dressing Change/Treatment Date 07/27/24   NEXT Weekly Photo (Inpatient Only) 07/31/24   Wound Team Following Not following   Pressure Injury Stage Stage 1   Wound Length (cm) 10 cm   Wound Width (cm) 12 cm   Wound Depth (cm) 0 cm   Wound Surface Area (cm^2) 120 cm^2   Wound Volume (cm^3) 0 cm^3   Wound Bed Epithelium (%) 100 %   Shape oval   Wound Odor None        Vascular:    CORNELL:   No results found.    Lab Values:    Lab Results   Component Value Date/Time    WBC 14.7 (H) 07/24/2024 03:50 AM    RBC 3.96 (L) 07/24/2024 03:50 AM    HEMOGLOBIN 12.0 (L) 07/24/2024 03:50 AM    HEMATOCRIT 34.7 (L) 07/24/2024 03:50 AM    HBA1C 5.3 03/26/2024 02:18 PM         Culture Results show:  No results found for this or any previous visit (from the past 720 hour(s)).    Pain Level/Medicated:  None, Tolerated without pain medication       INTERVENTIONS BY WOUND TEAM:  Chart and images reviewed. Discussed with bedside RN. All areas of concern (based on picture review, LDA review and discussion with bedside RN) have been thoroughly assessed. Documentation of areas based on significant findings. This RN in to assess patient. Performed standard wound care which includes appropriate positioning, dressing removal and non-selective debridement. Pictures and measurements obtained weekly if/when required.    Wound:  Sacrum - POA Stage 1 pressure injury  Preparation for Dressing  "126  Resp:  [40-48] 48   Birth Weight: 3223 g (7 lb 1.7 oz)   Birth Length: (inches) 20.5   Birth Head Circumference: Head Circumference: 13.78\" (35 cm)     Current Weight: Weight: 3105 g (6 lb 13.5 oz)   Weight Change from Birth Weight: -4%           PHYSICAL EXAMINATION     General appearance Alert and active .   Skin  Moldovan spots to buttock.   Red birthmark to right thigh.  Nevus simplex upper eyelids bilaterally   HEENT: AFSF.  Palate intact.      Chest Clear breath sounds bilaterally. No distress.   Heart  Normal rate and rhythm.  No murmur  Normal pulses.    Abdomen + BS.  Soft, non-tender. No mass/HSM   Genitalia  Healing circumcision. No bleeding.  Possible minimal hypospadius w/slight ventral displacement of urethral meatus on glans  Patent anus   Trunk and Spine Spine normal and intact.  No atypical dimpling   Extremities  Clavicles intact.  No hip clicks/clunks.   Neuro Normal reflexes.  Normal Tone             LABORATORY AND RADIOLOGY RESULTS      LABS:    Recent Results (from the past 96 hour(s))   Cord Blood Evaluation    Collection Time: 21  6:07 PM    Specimen: Umbilical Cord; Cord Blood   Result Value Ref Range    ABO Type O     RH type Positive     LISANDRO IgG Negative    Bilirubin,  Panel    Collection Time: 21  2:48 AM    Specimen: Blood   Result Value Ref Range    Bilirubin, Direct 0.2 0.0 - 0.8 mg/dL    Bilirubin, Indirect 5.1 mg/dL    Total Bilirubin 5.3 0.0 - 8.0 mg/dL       XRAYS:    No orders to display             DIAGNOSIS / ASSESSMENT / PLAN OF TREATMENT      ___________________________________________________________    TERM INFANT    HISTORY:  Gestational Age: 39w4d; male  , Low Transverse; Vertex  BW: 7 lb 1.7 oz (3223 g)  Mother is planning to bottle feed    DAILY ASSESSMENT:  Today's Weight: 3105 g (6 lb 13.5 oz)  Weight change from BW:  -4%  Feedings: Taking 0-30 mL formula/feed  Voids/Stools: Normal  Feeding better per mother's report  SLP working with " diad on feeds  KELTON Prakash today = 5.3  @ 33 hours of age, low risk per Bili tool with current photo level ~ 13.1    PLAN:   Monitor feeds  SLP following  Recheck bili in AM  F/U [place] Screen per routine  Has follow up appointment with PCP scheduled  ___________________________________________________________     EXPOSURE TO THC    HISTORY:  MOB with history of THC use during pregnancy  Maternal UDS + THC on   CordStat sent on admission  Per MSW note: may discharge home with MOB & f/u Cordstat    PLAN:  Follow CordStat and notify MSW for any positive results  ___________________________________________________________    SUSPECTED PENILE ABNORMALITY-- Mild Hypospadius     HISTORY:  Evaluated immediately following circumcision per RN request due to concern for hypospadius  Possible minimal hypospadius with slight ventral displacement of urethral meatus on glans     PLAN:  Recommend PCP to consider referral to Pediatric Urology for evaluation as indicated    ___________________________________________________________                                                                 DISCHARGE PLANNING             HEALTHCARE MAINTENANCE     CCHD Critical Congen Heart Defect Test Date: 21 (21)  Critical Congen Heart Defect Test Result: pass (21)  SpO2: Pre-Ductal (Right Hand): 98 % (21)  SpO2: Post-Ductal (Left or Right Foot): 100 (21)   Car Seat Challenge Test  N/A   [place] Hearing Screen     [place] State  Screen Metabolic Screen Date: 21 (21)         Vitamin K  phytonadione (VITAMIN K) injection 1 mg first administered on 2021  6:31 PM    Erythromycin Eye Ointment  erythromycin (ROMYCIN) ophthalmic ointment 1 application first administered on 2021  6:31 PM    Hepatitis B Vaccine  Immunization History   Administered Date(s) Administered   • Hep B, Adolescent or Pediatric 2021               FOLLOW UP APPOINTMENTS     1)  removal: Open to air  Cleansed/Non-selectively Debrided with:  No rinse foam soap and Moist warm washcloth  Dania wound: Cleansed with No rinse foam soap and Moist warm washcloth, Prepped with No Sting  Primary Dressing:  offloading foam dressing    Advanced Wound Care Discharge Planning  Number of Clinicians necessary to complete wound care: 1  Is patient requiring IV pain medications for dressing changes:  No   Length of time for dressing change 30 min. (This does not include chart review, pre-medication time, set up, clean up or time spent charting.)    Interdisciplinary consultation: Patient, Bedside RN Violeta    EVALUATION / RATIONALE FOR TREATMENT:     Date:  07/24/24  Wound Status:  Initial evaluation    The patient has non-blanching pink red discoloration to dry intact tissue to left buttock and coccyx - POA Stage 1. The area is indurated and edematous, we anticipate that the tissue damage may be deeper than a Stage 1 pressure injury and it may devolve before it improves. Cleansed area and placed an offloading foam dressing to area and a pillow under his left hip.       Goals: Steady decrease in wound area and depth weekly.    NURSING PLAN OF CARE ORDERS:  Dressing changes: See Dressing Care orders  Skin care: See Skin Care orders    NUTRITION RECOMMENDATIONS   Wound Team Recommendations:  N/A    DIET ORDERS (From admission to next 24h)       Start     Ordered    07/24/24 0339  Diet Order Diet: Regular; Tray Modifications (optional): Safety Tray - Plastic dishware, utensils unwrapped (Suicide Watch)  ALL MEALS        Comments: Paperware only on meal tray.   Question Answer Comment   Diet: Regular    Tray Modifications (optional) Safety Tray - Plastic dishware, utensils unwrapped (Suicide Watch)        07/24/24 9788                    PREVENTATIVE INTERVENTIONS:    Q shift Ulises - performed per nursing policy  Q shift pressure point assessments - performed per nursing  PCP: [facility] [address] -- 21 at 10:00AM             PENDING TEST  RESULTS AT TIME OF DISCHARGE     1) [place] STATE  SCREEN  2) CordStat sent           PARENT  UPDATE  / SIGNATURE     Infant examined. Parents updated and questions addressed.      Nazia Mccurdy MD  2021  12:41 EDT     policy    Surface/Positioning  Standard/trauma mattress - Currently in Place  Reposition q 2 hours - Currently in Place    Offloading/Redistribution  Sacral offloading dressing (Silicone dressing) - Currently in Place  Heel offloading dressing (Silicone dressing) - Currently in Place  Float Heels off Bed with Pillows - Currently in Place           Containment/Moisture Prevention    N/A - the patient is continent    Anticipated discharge plans:  TBD        Vac Discharge Needs:  Vac Discharge plan is purely a recommendation from wound team and not a requirement for discharge unless otherwise stated by physician.  Not Applicable Pt not on a wound vac

## 2024-07-26 ENCOUNTER — APPOINTMENT (OUTPATIENT)
Dept: RADIOLOGY | Facility: MEDICAL CENTER | Age: 64
DRG: 907 | End: 2024-07-26
Attending: HOSPITALIST
Payer: MEDICARE

## 2024-07-26 LAB
ALBUMIN SERPL BCP-MCNC: 2.8 G/DL (ref 3.2–4.9)
ALBUMIN/GLOB SERPL: 1.1 G/DL
ALP SERPL-CCNC: 74 U/L (ref 30–99)
ALT SERPL-CCNC: 85 U/L (ref 2–50)
ANION GAP SERPL CALC-SCNC: 12 MMOL/L (ref 7–16)
AST SERPL-CCNC: 92 U/L (ref 12–45)
BILIRUB SERPL-MCNC: 0.3 MG/DL (ref 0.1–1.5)
BUN SERPL-MCNC: 22 MG/DL (ref 8–22)
CALCIUM ALBUM COR SERPL-MCNC: 10.2 MG/DL (ref 8.5–10.5)
CALCIUM SERPL-MCNC: 9.2 MG/DL (ref 8.5–10.5)
CHLORIDE SERPL-SCNC: 103 MMOL/L (ref 96–112)
CK SERPL-CCNC: 4523 U/L (ref 0–154)
CO2 SERPL-SCNC: 23 MMOL/L (ref 20–33)
CREAT SERPL-MCNC: 1.13 MG/DL (ref 0.5–1.4)
GFR SERPLBLD CREATININE-BSD FMLA CKD-EPI: 72 ML/MIN/1.73 M 2
GLOBULIN SER CALC-MCNC: 2.5 G/DL (ref 1.9–3.5)
GLUCOSE SERPL-MCNC: 115 MG/DL (ref 65–99)
POTASSIUM SERPL-SCNC: 4.2 MMOL/L (ref 3.6–5.5)
PROT SERPL-MCNC: 5.3 G/DL (ref 6–8.2)
SODIUM SERPL-SCNC: 138 MMOL/L (ref 135–145)

## 2024-07-26 PROCEDURE — 770001 HCHG ROOM/CARE - MED/SURG/GYN PRIV*

## 2024-07-26 PROCEDURE — 82550 ASSAY OF CK (CPK): CPT

## 2024-07-26 PROCEDURE — 99233 SBSQ HOSP IP/OBS HIGH 50: CPT | Performed by: HOSPITALIST

## 2024-07-26 PROCEDURE — 36415 COLL VENOUS BLD VENIPUNCTURE: CPT

## 2024-07-26 PROCEDURE — 80053 COMPREHEN METABOLIC PANEL: CPT

## 2024-07-26 PROCEDURE — A9270 NON-COVERED ITEM OR SERVICE: HCPCS | Performed by: HOSPITALIST

## 2024-07-26 PROCEDURE — 700105 HCHG RX REV CODE 258: Performed by: HOSPITALIST

## 2024-07-26 PROCEDURE — 700102 HCHG RX REV CODE 250 W/ 637 OVERRIDE(OP): Performed by: HOSPITALIST

## 2024-07-26 PROCEDURE — 700111 HCHG RX REV CODE 636 W/ 250 OVERRIDE (IP): Mod: JZ | Performed by: HOSPITALIST

## 2024-07-26 PROCEDURE — 73502 X-RAY EXAM HIP UNI 2-3 VIEWS: CPT | Mod: LT

## 2024-07-26 RX ORDER — ENOXAPARIN SODIUM 100 MG/ML
40 INJECTION SUBCUTANEOUS DAILY
Status: DISCONTINUED | OUTPATIENT
Start: 2024-07-26 | End: 2024-08-02

## 2024-07-26 RX ADMIN — OXYCODONE HYDROCHLORIDE 5 MG: 5 TABLET ORAL at 19:57

## 2024-07-26 RX ADMIN — ENOXAPARIN SODIUM 40 MG: 100 INJECTION SUBCUTANEOUS at 19:53

## 2024-07-26 RX ADMIN — SENNOSIDES AND DOCUSATE SODIUM 2 TABLET: 50; 8.6 TABLET ORAL at 17:56

## 2024-07-26 RX ADMIN — SODIUM CHLORIDE, POTASSIUM CHLORIDE, SODIUM LACTATE AND CALCIUM CHLORIDE: 600; 310; 30; 20 INJECTION, SOLUTION INTRAVENOUS at 02:30

## 2024-07-26 RX ADMIN — POLYETHYLENE GLYCOL 3350 1 PACKET: 17 POWDER, FOR SOLUTION ORAL at 17:55

## 2024-07-26 ASSESSMENT — ENCOUNTER SYMPTOMS
SHORTNESS OF BREATH: 0
BACK PAIN: 0
CHILLS: 0
DIZZINESS: 0
NERVOUS/ANXIOUS: 1
COUGH: 0
VOMITING: 0
FEVER: 0
HEADACHES: 0
NAUSEA: 0
ABDOMINAL PAIN: 0
LOSS OF CONSCIOUSNESS: 0
DIARRHEA: 0
PALPITATIONS: 0

## 2024-07-26 ASSESSMENT — PAIN DESCRIPTION - PAIN TYPE
TYPE: ACUTE PAIN

## 2024-07-26 NOTE — CARE PLAN
The patient is Watcher - Medium risk of patient condition declining or worsening    Shift Goals  Clinical Goals: Pt will maintain neuro status throughout shift with Q4 neuro checks.  Patient Goals: Comfort  Family Goals: FELICITAS  Pts neuro status was unchanged throughout shift. Q4 neuro checks now discontinued.     Progress made toward(s) clinical / shift goals:    Problem: Pain - Standard  Goal: Alleviation of pain or a reduction in pain to the patient’s comfort goal  Outcome: Progressing  Pt complains of L hip pain throughout shift. Medications given as per MAR.     Problem: Provide Safe Environment  Goal: Suicide environmental safety, protocols, policies, and practices will be implemented  Outcome: Progressing   Safe environment was maintained throughout shift with suicide protocol in place.     Patient is not progressing towards the following goals:NA

## 2024-07-26 NOTE — CARE PLAN
The patient is Watcher - Medium risk of patient condition declining or worsening    Shift Goals  Clinical Goals: Pts neuro status will remain stable throughout shift.  Patient Goals: Comfort  Family Goals: FELICITAS  Pts neuro status remained stable throughout shift and Q4 checks were completed.     Progress made toward(s) clinical / shift goals:    Problem: Pain - Standard  Goal: Alleviation of pain or a reduction in pain to the patient’s comfort goal  Outcome: Progressing  Pt did not complain of pain throughout shift. Pt able to socialize with brothers and daughter.     Problem: Knowledge Deficit - Standard  Goal: Patient and family/care givers will demonstrate understanding of plan of care, disease process/condition, diagnostic tests and medications  Outcome: Progressing   Pt verbalized understanding of plan of care and the importance of legal hold.     Patient is not progressing towards the following goals:NA

## 2024-07-26 NOTE — CARE PLAN
The patient is Watcher - Medium risk of patient condition declining or worsening    Shift Goals  Clinical Goals: Patient will remain free from any falls or injury within the shift  Patient Goals: Rest, Comfort  Family Goals: FELICITAS    Progress made toward(s) clinical / shift goals:  On Legal Hold, maintained on 1:1 safety sitter. Denied any SI. Safety precautions in placed. Patient is Aox3, reoriented. Complained of new numbness on LLE, able to move and follow commands, On call Hospitalist notified, advised to continue monitoring. Remained free from any falls or injuries within the shift.     Patient is not progressing towards the following goals:      Problem: Knowledge Deficit - Standard  Goal: Patient and family/care givers will demonstrate understanding of plan of care, disease process/condition, diagnostic tests and medications  Description: Target End Date:  1-3 days or as soon as patient condition allows    Document in Patient Education    1.  Patient and family/caregiver oriented to unit, equipment, visitation policy and means for communicating concern  2.  Complete/review Learning Assessment  3.  Assess knowledge level of disease process/condition, treatment plan, diagnostic tests and medications  4.  Explain disease process/condition, treatment plan, diagnostic tests and medications  Outcome: Not Progressing     Problem: Provide Safe Environment  Goal: Suicide environmental safety, protocols, policies, and practices will be implemented  Description: Target End Date:  resolve day 1    1.  Remove objects or personal belongings that may cause harm or injury to self or others  2.  Dietary tray modifications (paperware)  3.  Provide a safe environment  4.  Render close patient supervision by sustaining observation or awareness of the patient at all times  Outcome: Not Progressing     Problem: Psychosocial  Goal: Patient's ability to identify and develop effective coping behaviors will improve  Description: Target  End Date:  1 to 3 days    1.  Present opportunities for the patient to express thoughts, and feelings in a nonjudgmental environment  2.  Help the patient with problem-solving in a constructive manner.  3.  Educate the patient on cognitive-behavioral self-management responses to suicidal thoughts.  4.  Introduce the use of self-expression methods to manage suicidal feelings  5.  Provide emotional support  6.  Encourage identification of positive aspects of self  Outcome: Not Progressing

## 2024-07-26 NOTE — PROGRESS NOTES
Sandee from Lab called with critical result of CPK Total at 3;22 am. Critical lab result read back to Sandee. On Call Hospitalist notified of critical lab result at 3;25 am.  Critical lab result read back by Hospitalist Eva.  No new orders made.

## 2024-07-27 PROBLEM — F41.9 ANXIETY: Status: ACTIVE | Noted: 2024-07-27

## 2024-07-27 LAB
ALBUMIN SERPL BCP-MCNC: 3.1 G/DL (ref 3.2–4.9)
ALBUMIN/GLOB SERPL: 1.2 G/DL
ALP SERPL-CCNC: 82 U/L (ref 30–99)
ALT SERPL-CCNC: 104 U/L (ref 2–50)
ANION GAP SERPL CALC-SCNC: 13 MMOL/L (ref 7–16)
AST SERPL-CCNC: 101 U/L (ref 12–45)
BILIRUB SERPL-MCNC: 0.6 MG/DL (ref 0.1–1.5)
BUN SERPL-MCNC: 14 MG/DL (ref 8–22)
CALCIUM ALBUM COR SERPL-MCNC: 9.7 MG/DL (ref 8.5–10.5)
CALCIUM SERPL-MCNC: 9 MG/DL (ref 8.5–10.5)
CHLORIDE SERPL-SCNC: 95 MMOL/L (ref 96–112)
CK SERPL-CCNC: 5167 U/L (ref 0–154)
CO2 SERPL-SCNC: 27 MMOL/L (ref 20–33)
CREAT SERPL-MCNC: 0.87 MG/DL (ref 0.5–1.4)
GFR SERPLBLD CREATININE-BSD FMLA CKD-EPI: 96 ML/MIN/1.73 M 2
GLOBULIN SER CALC-MCNC: 2.5 G/DL (ref 1.9–3.5)
GLUCOSE SERPL-MCNC: 110 MG/DL (ref 65–99)
POTASSIUM SERPL-SCNC: 4.2 MMOL/L (ref 3.6–5.5)
PROT SERPL-MCNC: 5.6 G/DL (ref 6–8.2)
SODIUM SERPL-SCNC: 135 MMOL/L (ref 135–145)

## 2024-07-27 PROCEDURE — 700102 HCHG RX REV CODE 250 W/ 637 OVERRIDE(OP)

## 2024-07-27 PROCEDURE — 80053 COMPREHEN METABOLIC PANEL: CPT

## 2024-07-27 PROCEDURE — 770001 HCHG ROOM/CARE - MED/SURG/GYN PRIV*

## 2024-07-27 PROCEDURE — A9270 NON-COVERED ITEM OR SERVICE: HCPCS | Performed by: HOSPITALIST

## 2024-07-27 PROCEDURE — 700105 HCHG RX REV CODE 258: Performed by: HOSPITALIST

## 2024-07-27 PROCEDURE — 99233 SBSQ HOSP IP/OBS HIGH 50: CPT | Performed by: HOSPITALIST

## 2024-07-27 PROCEDURE — A9270 NON-COVERED ITEM OR SERVICE: HCPCS

## 2024-07-27 PROCEDURE — 700111 HCHG RX REV CODE 636 W/ 250 OVERRIDE (IP): Mod: JZ | Performed by: HOSPITALIST

## 2024-07-27 PROCEDURE — 700102 HCHG RX REV CODE 250 W/ 637 OVERRIDE(OP): Performed by: HOSPITALIST

## 2024-07-27 PROCEDURE — 36415 COLL VENOUS BLD VENIPUNCTURE: CPT

## 2024-07-27 PROCEDURE — 82550 ASSAY OF CK (CPK): CPT

## 2024-07-27 RX ORDER — QUETIAPINE FUMARATE 25 MG/1
50 TABLET, FILM COATED ORAL NIGHTLY
Status: DISCONTINUED | OUTPATIENT
Start: 2024-07-27 | End: 2024-07-29

## 2024-07-27 RX ORDER — LORAZEPAM 2 MG/ML
0.5 INJECTION INTRAMUSCULAR EVERY 6 HOURS PRN
Status: COMPLETED | OUTPATIENT
Start: 2024-07-27 | End: 2024-08-01

## 2024-07-27 RX ORDER — CLONAZEPAM 0.5 MG/1
0.5 TABLET ORAL 2 TIMES DAILY
Status: DISCONTINUED | OUTPATIENT
Start: 2024-07-27 | End: 2024-07-29

## 2024-07-27 RX ADMIN — OXYCODONE HYDROCHLORIDE 5 MG: 5 TABLET ORAL at 21:33

## 2024-07-27 RX ADMIN — OXYCODONE HYDROCHLORIDE 5 MG: 5 TABLET ORAL at 02:12

## 2024-07-27 RX ADMIN — CLONAZEPAM 0.5 MG: 0.5 TABLET ORAL at 17:52

## 2024-07-27 RX ADMIN — LORAZEPAM 0.5 MG: 2 INJECTION INTRAMUSCULAR; INTRAVENOUS at 19:57

## 2024-07-27 RX ADMIN — SODIUM CHLORIDE, POTASSIUM CHLORIDE, SODIUM LACTATE AND CALCIUM CHLORIDE: 600; 310; 30; 20 INJECTION, SOLUTION INTRAVENOUS at 20:08

## 2024-07-27 RX ADMIN — MAGNESIUM HYDROXIDE 30 ML: 1200 LIQUID ORAL at 04:38

## 2024-07-27 RX ADMIN — OXYCODONE HYDROCHLORIDE 5 MG: 5 TABLET ORAL at 14:07

## 2024-07-27 RX ADMIN — SENNOSIDES AND DOCUSATE SODIUM 2 TABLET: 50; 8.6 TABLET ORAL at 17:52

## 2024-07-27 RX ADMIN — LORAZEPAM 0.5 MG: 2 INJECTION INTRAMUSCULAR; INTRAVENOUS at 13:26

## 2024-07-27 RX ADMIN — QUETIAPINE FUMARATE 50 MG: 25 TABLET ORAL at 21:33

## 2024-07-27 RX ADMIN — ENOXAPARIN SODIUM 40 MG: 100 INJECTION SUBCUTANEOUS at 17:52

## 2024-07-27 ASSESSMENT — ENCOUNTER SYMPTOMS
FEVER: 0
NERVOUS/ANXIOUS: 1
SHORTNESS OF BREATH: 0
CHILLS: 0
NAUSEA: 0
HEADACHES: 0
DIARRHEA: 0
PALPITATIONS: 0
COUGH: 0
ABDOMINAL PAIN: 0
LOSS OF CONSCIOUSNESS: 0
VOMITING: 0
DIZZINESS: 0
BACK PAIN: 0

## 2024-07-27 ASSESSMENT — PAIN DESCRIPTION - PAIN TYPE
TYPE: ACUTE PAIN
TYPE: CHRONIC PAIN

## 2024-07-27 ASSESSMENT — FIBROSIS 4 INDEX: FIB4 SCORE: 3.03

## 2024-07-27 NOTE — CONSULTS
"PSYCHIATRIC CONSULTATION - Follow-up  Established Patient    DOS: 07/27/24     Legal Status: on legal hold - extended/court    Reason for Admission:    CC:   Chief Complaint   Patient presents with    Drug Overdose    Suicidal Ideation    Suicide Attempt               S:   Patient observed in bed, awake. Patient able to recall reason for hospitalization, did not disclose situation leading to hospitalization stating \"it's personal.\" Does admits having SI prior to admit, admits Hx of SA via overdose requiring IP MH hospitalization.     Reports decreased sleep, with adequate appetite, observed with adequate energy. Denies change to mood, continues to admits SI without planning while hospitalized, denies HI, A/VH, no Sx of psychosis/ernie reported or observed. Not currently on routine psychotropic medications, has awareness of limited OP medications including Seroquel, clonazepam unable to recall sertraline until prompted.     Patient presents as unfocused during encounter, unable/unwilling to provide additional details following initial questioning. Presents as hopeless, unable to provide reasons for living expressing \"can you get my family back together,\" when asked about last time he didn't feel suicidal. Patient has not requires prn medication per documentation. Continues to present an acute danger to self 2/2 active SI.    O:   Medical ROS (as pertinent):   No results for input(s): \"WBC\", \"RBC\", \"HEMOGLOBIN\", \"HEMATOCRIT\", \"MCV\", \"MCH\", \"RDW\", \"PLATELETCT\", \"MPV\", \"NEUTSPOLYS\", \"LYMPHOCYTES\", \"MONOCYTES\", \"EOSINOPHILS\", \"BASOPHILS\", \"RBCMORPHOLO\" in the last 72 hours.  Recent Labs     07/25/24  0420 07/26/24  0233 07/27/24  0826   SODIUM 137 138 135   POTASSIUM 4.0 4.2 4.2   CHLORIDE 107 103 95*   CO2 22 23 27   GLUCOSE 105* 115* 110*   BUN 30* 22 14   CPKTOTAL 7241* 4523* 5167*     Recent Labs     07/25/24  0420 07/26/24  0233 07/27/24  0826   ALBUMIN 3.0* 2.8* 3.1*   TBILIRUBIN 0.3 0.3 0.6   ALKPHOSPHAT 72 74 82 " "  TOTPROTEIN 5.5* 5.3* 5.6*   ALTSGPT 107* 85* 104*   ASTSGOT 143* 92* 101*   CREATININE 1.44* 1.13 0.87       EKG:   Results for orders placed or performed during the hospital encounter of 24   EKG   Result Value Ref Range    Report       [facility] Emergency Dept.    Test Date:  2024  Pt Name:    FAIZAN GARDNER             Department: ER  MRN:        [id number]                      Room:        12  Gender:     Male                         Technician: [id number]  :        1960                   Requested By:ER TRIAGE PROTOCOL  Order #:    [id number]                    Reading MD: ILDEFONSO ARNOLD DO    Measurements  Intervals                                Axis  Rate:       85                           P:          61  HI:         153                          QRS:        63  QRSD:       106                          T:          25  QT:         413  QTc:        492    Interpretive Statements  Sinus rhythm  Probable left atrial enlargement  Borderline prolonged QT interval  Compared to ECG 2024 17:09:50  No significant changes  Electronically Signed On 2024 00:56:04 PDT by ILDEFONSO ARNOLD DO          MSE:   BP (!) 155/84   Pulse 90   Temp 36.7 °C (98 °F) (Temporal)   Resp 18   Ht 1.803 m (5' 11\")   Wt 85.2 kg (187 lb 13.3 oz)   SpO2 94%     Constitutional: as noted above  General Appearance/Behavior: appears good eye contact superficially cooperative, No behavioral disturbances  Abnormal Movements: akathesia, PMA observed.  Gait and Posture: not observed  Musculoskeletal: as noted above  Mood: \"so so\"  Affect: Restricted   Speech: quiet  Language:  spontaneous, comprehends spoken commands, and fluent   Thought Process: Perseverative  Thought Content: Admits SI. Denies HI, A/VH. No e/o delusions  Insight/Judgement:  impaired based on behavior/impaired based on behavior  Alert/Orientation: alert, only oriented to:, person, place, time  Attn/Concentration: " unfocused  MMSE: deferred this visit     Medications:  Scheduled Medications   Medication Dose Frequency    enoxaparin (LOVENOX) injection  40 mg DAILY AT 1800    senna-docusate  2 Tablet Q EVENING       Allergies:   Allergies   Allergen Reactions    Sulfa Drugs Hives and Rash        Diagnosis:   1. Altered mental status, unspecified altered mental status type    2. Acute renal failure, unspecified acute renal failure type (HCC)    3. Drug overdose of undetermined intent, initial encounter         Psychiatric:   Delirium    Medical: as noted by the medical treatment team.      Recommendations:  Legal Hold: on legal hold - extended/court    Please transfer patient to inpatient psychiatric hospital when medically cleared and bed is available.    Observation status:   - Line of site with sitter    Room phone: Yes, supervised call to family    Visitors: Yes, Jean Paul (brother), Cortes (brother), YOSELIN (son), Tressa (daughter)  Personal belongings: No     Discussed/voalted: JACK Church MD    Medications and treatment: Final orders per Treatment Team  Repeat EKG  Restart medication Zoloft 50mg PO daily for depression to start 7/28  Restart Seroquel 50mg PO QHS for mood  Risks/benefits/side effects discussed, patient verbalized understanding.  Medication reconciliation was completed.    Reviewed safety plan: 911, ER, PCM, MHC, suicide crisis line, nursing staff while inpatient.    Will Continue to Follow. Thank you for the consult.

## 2024-07-27 NOTE — ASSESSMENT & PLAN NOTE
Increased anxiety noted on exam.  Psychiatry added seroquel 50mg qhs, zoloft resumed for a.m.   Ativan prn  Klonopin bid.

## 2024-07-27 NOTE — CARE PLAN
The patient is Watcher - Medium risk of patient condition declining or worsening    Shift Goals  Clinical Goals: pt safety, monitor for new or continued SI  Patient Goals: rest, comfort  Family Goals: ansley    Progress made toward(s) clinical / shift goals:  pt is a&o x3-4. O2 sats maintained on room air. IV patent and infusing. Blake patent and draining clear yellow urine. Sitter in place for SI/legal hold. Pain medication given per MAR. All needs addressed at this time.    Patient is not progressing towards the following goals:      Problem: Knowledge Deficit - Standard  Goal: Patient and family/care givers will demonstrate understanding of plan of care, disease process/condition, diagnostic tests and medications  Outcome: Not Progressing  Reinforcement needed     Problem: Psychosocial  Goal: Patient's ability to identify and develop effective coping behaviors will improve  Outcome: Not Progressing   Pt continues to endorse suicidal ideation this shift.

## 2024-07-27 NOTE — PROGRESS NOTES
Pt had a fall at approximately 0720. Pt on bedpan, got up trying to wipe himself, slid own the bed and landed on his bottom on the floor. Pt not following commands. 1:1 sitter in place during time of event, but CNA reports sitter being on cellphone. CNA in room during time of fall.   Post fall documentation checklist completed, midas completed, MD notified.

## 2024-07-27 NOTE — PROGRESS NOTES
Hospital Medicine Daily Progress Note    Date of Service  7/26/2024    Chief Complaint  Terrell Hensley is a 63 y.o. male admitted 7/23/2024 with altered mental status, found down after intentional OD of his home medications.    Hospital Course  62yo PMHx HTN, gout.  Presented altered after an amlodipine OD.  CPK16k, WILMAR, elevated LFTs.    Interval Problem Update  7/26:  transferred out of ICU to medical unit, remains confused.  States he has left hip pain, xray ordered.  PT/OT ordered, off strict bedrest.  CPK remain 4, 523, cut back  to 125/hr. Started lovenox for DVT ppx.  Head CT on admission negative.    I have discussed this patient's plan of care and discharge plan at IDT rounds today with Case Management, Nursing, Nursing leadership, and other members of the IDT team.    Consultants/Specialty  psychiatry    Code Status  Full Code    Disposition  The patient is not medically cleared for discharge to home or a post-acute facility.  Anticipate discharge to: a psychiatric hospital    I have placed the appropriate orders for post-discharge needs.    Review of Systems  Review of Systems   Constitutional:  Negative for chills and fever.   Respiratory:  Negative for cough and shortness of breath.    Cardiovascular:  Negative for chest pain, palpitations and leg swelling.   Gastrointestinal:  Negative for abdominal pain, diarrhea, nausea and vomiting.   Genitourinary:  Negative for dysuria and urgency.   Musculoskeletal:  Negative for back pain.   Skin:  Negative for rash.   Neurological:  Negative for dizziness, loss of consciousness and headaches.   Psychiatric/Behavioral:  The patient is nervous/anxious.         Physical Exam  Temp:  [36.4 °C (97.5 °F)-37 °C (98.6 °F)] 36.6 °C (97.9 °F)  Pulse:  [76-88] 76  Resp:  [16-18] 18  BP: (139-148)/(75-84) 142/81  SpO2:  [95 %-99 %] 99 %    Physical Exam  Constitutional:       General: He is not in acute distress.     Appearance: He is well-developed. He is not  diaphoretic.   HENT:      Head: Normocephalic and atraumatic.   Eyes:      Conjunctiva/sclera: Conjunctivae normal.   Neck:      Vascular: No JVD.   Cardiovascular:      Rate and Rhythm: Normal rate.      Heart sounds: No murmur heard.     No gallop.   Pulmonary:      Effort: Pulmonary effort is normal. No respiratory distress.      Breath sounds: No stridor. No wheezing or rales.   Abdominal:      Palpations: Abdomen is soft.      Tenderness: There is no abdominal tenderness. There is no guarding or rebound.   Musculoskeletal:      Right lower leg: No edema.      Left lower leg: No edema.   Skin:     General: Skin is warm and dry.      Capillary Refill: Capillary refill takes less than 2 seconds.      Findings: No rash.   Neurological:      Mental Status: He is oriented to person, place, and time.   Psychiatric:         Mood and Affect: Mood normal.         Behavior: Behavior normal.         Thought Content: Thought content normal.      Comments: Denies suicidal ideation again today         Fluids    Intake/Output Summary (Last 24 hours) at 7/26/2024 1853  Last data filed at 7/26/2024 1800  Gross per 24 hour   Intake 3480 ml   Output 5300 ml   Net -1820 ml       Laboratory  Recent Labs     07/23/24 2000 07/24/24  0350   WBC 18.6* 14.7*   RBC 4.12* 3.96*   HEMOGLOBIN 12.8* 12.0*   HEMATOCRIT 35.3* 34.7*   MCV 85.7 87.6   MCH 31.1 30.3   MCHC 36.3 34.6   RDW 41.1 42.7   PLATELETCT 243 209   MPV 9.4 9.3     Recent Labs     07/24/24  0350 07/25/24  0420 07/26/24  0233   SODIUM 137 137 138   POTASSIUM 3.2* 4.0 4.2   CHLORIDE 103 107 103   CO2 19* 22 23   GLUCOSE 126* 105* 115*   BUN 46* 30* 22   CREATININE 2.83* 1.44* 1.13   CALCIUM 8.3* 8.7 9.2                   Imaging  DX-HIP-COMPLETE - UNILATERAL 2+ LEFT   Final Result      No radiographic evidence of acute traumatic injury.      CT-HEAD W/O   Final Result         1.  No acute intracranial abnormality.   2.  Atherosclerosis.                    Assessment/Plan  *  Drug overdose of undetermined intent, initial encounter- (present on admission)  Assessment & Plan  Apparent suicide attempt  Polypharmacy: some amlodipine but other ingestions unclear  Cont supportive care as clinically improving  On Psych hold  Psychiatry team following    Anemia  Assessment & Plan  Mild   Cont to monitor    Leukocytosis  Assessment & Plan  False in light of dehydration, likely aspiration pneumonitis with some neutrophil shift. May consider further intervention once patient stabilizes and is more alert, since unable to assess and suspect it is best to minimize medications at this time owing to unclear clinical picture from overdose.  -CTM    Urinary incontinence  Assessment & Plan  -oxybutynin held due to drug overdose    Rhabdomyolysis  Assessment & Plan  CK elevated to 16k, suspected 2/2 being found down and drug overdose.  Cont agressive IVFs but change to LR from NS  Follow daily AST, ALT, Bun, Creat and K    7/25  CPK now 7k  AST/ALT coming down  Renal function improving  K WNL  Cont to monitor CPK, CMP daily until normalized  Cont IVFs until CPK<3k    Elevated liver enzymes  Assessment & Plan  Transaminase elevation likely from Rhabdo and not acute liver injury though certainly could have a toxic component from OD  Coming down as expected with resolution of Rhabdo  Cont to monitor daily CMP  Consider further evaluation if it does not resolve as CPK drops and Rhabdo resolves    Hypokalemia  Assessment & Plan  Follow and replace    Acute renal failure (ARF) (HCC)  Assessment & Plan  Likely 2/2 prerenal damage from hypotension 2/2 drug overdose and uremia related to rhabdomyolysis.  Creat imroving daily: 1.4 today  Good UOP  Cont LR until CPK<3k  Daily BMP  Renally dose medications as appropriate    High anion gap metabolic acidosis  Assessment & Plan  Likely 2/2 prerenal damage from hypotension 2/2 drug overdose and uremia related to rhabdomyolysis.  -fluid resuscitation with NS 200cc/hr  -IV  potassium 10meq x4      Suicidal behavior with attempted self-injury (HCC)  Assessment & Plan  Repeat suicidal attempt by patient  -psych consult  -sitter    Mitral regurgitation  Assessment & Plan  Suspected murmur, though difficult to auscultate. Unclear if preexisting or related to overdose.   -consider TTE once more clinically stable.    Acute encephalopathy  Assessment & Plan  Acute metabolic/toxic encephalopathy  CT head neg  Mental status improving daily though still not completely back to normal  Cont to monitor clinically  Repeat lab in am  Minimize sedating medications  mobilize    Hypertension- (present on admission)  Assessment & Plan  Hx of HTN, holding home meds in light of hypotension  Holding BP meds for obvious reasons    Hyponatremia- (present on admission)  Assessment & Plan  Likely hypovolemic hypoNa  Has resolved with IVF resuscitation  Cont to monitor         VTE prophylaxis:    enoxaparin ppx      I have performed a physical exam and reviewed and updated ROS and Plan today (7/26/2024). In review of yesterday's note (7/25/2024), there are no changes except as documented above.

## 2024-07-27 NOTE — PROGRESS NOTES
Attempted to call family members, Jean Paul (brother) and Cortes (brother), to complete MRI screening form. Neither brother answered. Left voice messages.

## 2024-07-27 NOTE — CARE PLAN
The patient is Watcher - Medium risk of patient condition declining or worsening    Shift Goals  Clinical Goals: Pt will comply with suicide precautions throughout shift.  Patient Goals: Pain management  Family Goals: FELICITAS    Progress made toward(s) clinical / shift goals:    Problem: Pain - Standard  Goal: Alleviation of pain or a reduction in pain to the patient’s comfort goal  Outcome: Progressing   Pt pain was managed throughout shift with medications given as per MAR.    Patient is not progressing towards the following goals:  Problem: Fall Risk  Goal: Patient will remain free from falls  Outcome: Not Progressing   Pt had fall this shift. Pt not following commands, very impulsive, and restless.

## 2024-07-27 NOTE — PROGRESS NOTES
Hospital Medicine Daily Progress Note    Date of Service  7/27/2024    Chief Complaint  Terrell Hensley is a 63 y.o. male admitted 7/23/2024 with altered mental status, found down after intentional OD of his home medications.    Hospital Course  64yo PMHx HTN, gout.  Presented altered after an amlodipine OD.  CPK16k, WILMAR, elevated LFTs.    Interval Problem Update  7/26:  transferred out of ICU to medical unit, remains confused.  States he has left hip pain, xray ordered.  PT/OT ordered, off strict bedrest.  CPK remain 4, 523, cut back  to 125/hr. Started lovenox for DVT ppx.  Head CT on admission negative.  7/27: Patient appeared quite anxious nervous today.  Psychiatry has recommended Seroquel 50 mg nightly.  QTc on EKG today 492.  Also restart Zoloft tomorrow morning 50 mg in AM.  I have added Klonopin 0.5 mg p.o. twice daily with as needed and Ativan for MRI brain rule out anoxic event.  Apparently patient attempted to get out of bed and his legs collapsed.  PT OT pending.    I have discussed this patient's plan of care and discharge plan at IDT rounds today with Case Management, Nursing, Nursing leadership, and other members of the IDT team.    Consultants/Specialty  psychiatry    Code Status  Full Code    Disposition  The patient is not medically cleared for discharge to home or a post-acute facility.  Anticipate discharge to: a psychiatric hospital    I have placed the appropriate orders for post-discharge needs.    Review of Systems  Review of Systems   Constitutional:  Negative for chills and fever.   Respiratory:  Negative for cough and shortness of breath.    Cardiovascular:  Negative for chest pain, palpitations and leg swelling.   Gastrointestinal:  Negative for abdominal pain, diarrhea, nausea and vomiting.   Genitourinary:  Negative for dysuria and urgency.   Musculoskeletal:  Negative for back pain.   Skin:  Negative for rash.   Neurological:  Negative for dizziness, loss of consciousness  and headaches.   Psychiatric/Behavioral:  The patient is nervous/anxious.         Physical Exam  Temp:  [36.4 °C (97.6 °F)-36.7 °C (98 °F)] 36.7 °C (98 °F)  Pulse:  [76-91] 90  Resp:  [16-18] 18  BP: (142-168)/(81-85) 155/84  SpO2:  [93 %-99 %] 94 %    Physical Exam  Constitutional:       General: He is not in acute distress.     Appearance: He is well-developed. He is not diaphoretic.   HENT:      Head: Normocephalic and atraumatic.   Eyes:      Conjunctiva/sclera: Conjunctivae normal.   Neck:      Vascular: No JVD.   Cardiovascular:      Rate and Rhythm: Normal rate.      Heart sounds: No murmur heard.     No gallop.   Pulmonary:      Effort: Pulmonary effort is normal. No respiratory distress.      Breath sounds: No stridor. No wheezing or rales.   Abdominal:      Palpations: Abdomen is soft.      Tenderness: There is no abdominal tenderness. There is no guarding or rebound.   Musculoskeletal:      Right lower leg: No edema.      Left lower leg: No edema.   Skin:     General: Skin is warm and dry.      Capillary Refill: Capillary refill takes less than 2 seconds.      Findings: No rash.   Neurological:      Mental Status: He is oriented to person, place, and time.   Psychiatric:         Mood and Affect: Mood normal.         Behavior: Behavior normal.         Thought Content: Thought content normal.      Comments: Denies suicidal ideation again today         Fluids    Intake/Output Summary (Last 24 hours) at 7/27/2024 1543  Last data filed at 7/27/2024 1537  Gross per 24 hour   Intake 360 ml   Output 6700 ml   Net -6340 ml       Laboratory        Recent Labs     07/25/24  0420 07/26/24  0233 07/27/24  0826   SODIUM 137 138 135   POTASSIUM 4.0 4.2 4.2   CHLORIDE 107 103 95*   CO2 22 23 27   GLUCOSE 105* 115* 110*   BUN 30* 22 14   CREATININE 1.44* 1.13 0.87   CALCIUM 8.7 9.2 9.0                   Imaging  DX-HIP-COMPLETE - UNILATERAL 2+ LEFT   Final Result      No radiographic evidence of acute traumatic injury.       CT-HEAD W/O   Final Result         1.  No acute intracranial abnormality.   2.  Atherosclerosis.               MR-BRAIN-W/O    (Results Pending)        Assessment/Plan  * Drug overdose of undetermined intent, initial encounter- (present on admission)  Assessment & Plan  Apparent suicide attempt  Polypharmacy: some amlodipine but other ingestions unclear  Cont supportive care as clinically improving  On Psych hold  Psychiatry team following    Anxiety- (present on admission)  Assessment & Plan  Increased anxiety noted on exam.  Psychiatry added seroquel 50mg qhs, zoloft resumed for a.m.   Ativan prn  Klonopin bid.    Anemia- (present on admission)  Assessment & Plan  Mild   Cont to monitor    Leukocytosis- (present on admission)  Assessment & Plan  False in light of dehydration, likely aspiration pneumonitis with some neutrophil shift. May consider further intervention once patient stabilizes and is more alert, since unable to assess and suspect it is best to minimize medications at this time owing to unclear clinical picture from overdose.  -CTM    Urinary incontinence- (present on admission)  Assessment & Plan  -oxybutynin held due to drug overdose    Rhabdomyolysis- (present on admission)  Assessment & Plan  CK elevated to 16k, suspected 2/2 being found down and drug overdose.  Cont agressive IVFs but change to LR from NS  Follow daily AST, ALT, Bun, Creat and K    7/25  CPK now 7k  AST/ALT coming down  Renal function improving  K WNL  Cont to monitor CPK, CMP daily until normalized  Cont IVFs until CPK<3k    Elevated liver enzymes- (present on admission)  Assessment & Plan  Transaminase elevation likely from Rhabdo and not acute liver injury though certainly could have a toxic component from OD  Coming down as expected with resolution of Rhabdo  Cont to monitor daily CMP  Consider further evaluation if it does not resolve as CPK drops and Rhabdo resolves    Hypokalemia- (present on admission)  Assessment &  Plan  Follow and replace    Acute renal failure (ARF) (HCC)- (present on admission)  Assessment & Plan  Likely 2/2 prerenal damage from hypotension 2/2 drug overdose and uremia related to rhabdomyolysis.  Creat imroving daily: 1.4 today  Good UOP  Cont LR until CPK<3k  Daily BMP  Renally dose medications as appropriate    High anion gap metabolic acidosis- (present on admission)  Assessment & Plan  Likely 2/2 prerenal damage from hypotension 2/2 drug overdose and uremia related to rhabdomyolysis.  -fluid resuscitation with NS 200cc/hr  -IV potassium 10meq x4      Suicidal behavior with attempted self-injury (HCC)- (present on admission)  Assessment & Plan  Repeat suicidal attempt by patient  -psych consult  -sitter    Mitral regurgitation- (present on admission)  Assessment & Plan  Suspected murmur, though difficult to auscultate. Unclear if preexisting or related to overdose.   -consider TTE once more clinically stable.    Acute encephalopathy- (present on admission)  Assessment & Plan  Acute metabolic/toxic encephalopathy  CT head neg  Mental status improving daily though still not completely back to normal  Cont to monitor clinically  Repeat lab in am  Minimize sedating medications  7/27:  increased anxiety/confusion, query anoxic brain injury, ordered MRI brain w/o contrast.      Hypertension- (present on admission)  Assessment & Plan  Hx of HTN, holding home meds in light of hypotension  Holding BP meds for obvious reasons    Hyponatremia- (present on admission)  Assessment & Plan  Likely hypovolemic hypoNa  Has resolved with IVF resuscitation  Cont to monitor         VTE prophylaxis: VTE Selection    I have performed a physical exam and reviewed and updated ROS and Plan today (7/27/2024). In review of yesterday's note (7/26/2024), there are no changes except as documented above.

## 2024-07-28 ENCOUNTER — APPOINTMENT (OUTPATIENT)
Dept: RADIOLOGY | Facility: MEDICAL CENTER | Age: 64
DRG: 907 | End: 2024-07-28
Attending: HOSPITALIST
Payer: MEDICARE

## 2024-07-28 LAB
ALBUMIN SERPL BCP-MCNC: 3.1 G/DL (ref 3.2–4.9)
ALBUMIN/GLOB SERPL: 1.3 G/DL
ALP SERPL-CCNC: 76 U/L (ref 30–99)
ALT SERPL-CCNC: 90 U/L (ref 2–50)
ANION GAP SERPL CALC-SCNC: 11 MMOL/L (ref 7–16)
AST SERPL-CCNC: 59 U/L (ref 12–45)
BILIRUB SERPL-MCNC: 0.4 MG/DL (ref 0.1–1.5)
BUN SERPL-MCNC: 13 MG/DL (ref 8–22)
CALCIUM ALBUM COR SERPL-MCNC: 9.6 MG/DL (ref 8.5–10.5)
CALCIUM SERPL-MCNC: 8.9 MG/DL (ref 8.5–10.5)
CHLORIDE SERPL-SCNC: 97 MMOL/L (ref 96–112)
CK SERPL-CCNC: 1915 U/L (ref 0–154)
CO2 SERPL-SCNC: 24 MMOL/L (ref 20–33)
CREAT SERPL-MCNC: 0.85 MG/DL (ref 0.5–1.4)
GFR SERPLBLD CREATININE-BSD FMLA CKD-EPI: 96 ML/MIN/1.73 M 2
GLOBULIN SER CALC-MCNC: 2.4 G/DL (ref 1.9–3.5)
GLUCOSE SERPL-MCNC: 104 MG/DL (ref 65–99)
POTASSIUM SERPL-SCNC: 3.9 MMOL/L (ref 3.6–5.5)
PROT SERPL-MCNC: 5.5 G/DL (ref 6–8.2)
SODIUM SERPL-SCNC: 132 MMOL/L (ref 135–145)

## 2024-07-28 PROCEDURE — 99233 SBSQ HOSP IP/OBS HIGH 50: CPT | Performed by: HOSPITALIST

## 2024-07-28 PROCEDURE — A9270 NON-COVERED ITEM OR SERVICE: HCPCS | Performed by: HOSPITALIST

## 2024-07-28 PROCEDURE — 700105 HCHG RX REV CODE 258: Performed by: HOSPITALIST

## 2024-07-28 PROCEDURE — 82550 ASSAY OF CK (CPK): CPT

## 2024-07-28 PROCEDURE — 51798 US URINE CAPACITY MEASURE: CPT

## 2024-07-28 PROCEDURE — 700111 HCHG RX REV CODE 636 W/ 250 OVERRIDE (IP): Mod: JZ | Performed by: HOSPITALIST

## 2024-07-28 PROCEDURE — 700102 HCHG RX REV CODE 250 W/ 637 OVERRIDE(OP): Performed by: HOSPITALIST

## 2024-07-28 PROCEDURE — 80053 COMPREHEN METABOLIC PANEL: CPT

## 2024-07-28 PROCEDURE — 770001 HCHG ROOM/CARE - MED/SURG/GYN PRIV*

## 2024-07-28 PROCEDURE — 36415 COLL VENOUS BLD VENIPUNCTURE: CPT

## 2024-07-28 PROCEDURE — 73552 X-RAY EXAM OF FEMUR 2/>: CPT | Mod: LT

## 2024-07-28 RX ADMIN — ENOXAPARIN SODIUM 40 MG: 100 INJECTION SUBCUTANEOUS at 17:43

## 2024-07-28 RX ADMIN — SODIUM CHLORIDE, POTASSIUM CHLORIDE, SODIUM LACTATE AND CALCIUM CHLORIDE: 600; 310; 30; 20 INJECTION, SOLUTION INTRAVENOUS at 05:16

## 2024-07-28 RX ADMIN — OXYCODONE HYDROCHLORIDE 5 MG: 5 TABLET ORAL at 17:44

## 2024-07-28 RX ADMIN — OXYCODONE HYDROCHLORIDE 5 MG: 5 TABLET ORAL at 23:52

## 2024-07-28 RX ADMIN — OXYCODONE HYDROCHLORIDE 5 MG: 5 TABLET ORAL at 10:35

## 2024-07-28 RX ADMIN — QUETIAPINE FUMARATE 50 MG: 25 TABLET ORAL at 20:16

## 2024-07-28 RX ADMIN — ACETAMINOPHEN 650 MG: 325 TABLET ORAL at 20:28

## 2024-07-28 RX ADMIN — ACETAMINOPHEN 650 MG: 325 TABLET ORAL at 05:17

## 2024-07-28 RX ADMIN — CLONAZEPAM 0.5 MG: 0.5 TABLET ORAL at 17:44

## 2024-07-28 RX ADMIN — SERTRALINE 50 MG: 50 TABLET, FILM COATED ORAL at 05:17

## 2024-07-28 RX ADMIN — CLONAZEPAM 0.5 MG: 0.5 TABLET ORAL at 05:16

## 2024-07-28 ASSESSMENT — ENCOUNTER SYMPTOMS
COUGH: 0
DIZZINESS: 0
SHORTNESS OF BREATH: 0
HEADACHES: 0
NAUSEA: 0
BACK PAIN: 0
NERVOUS/ANXIOUS: 1
VOMITING: 0
ABDOMINAL PAIN: 0
LOSS OF CONSCIOUSNESS: 0
DIARRHEA: 0
PALPITATIONS: 0
CHILLS: 0
FEVER: 0

## 2024-07-28 ASSESSMENT — PAIN DESCRIPTION - PAIN TYPE
TYPE: CHRONIC PAIN;ACUTE PAIN
TYPE: CHRONIC PAIN

## 2024-07-28 NOTE — CARE PLAN
The patient is Stable - Low risk of patient condition declining or worsening    Shift Goals  Clinical Goals: Patient will not have safety events this shift.  Patient Goals: Pain management and comfort.  Family Goals: Not present.    Progress made toward(s) clinical / shift goals:    Problem: Pain - Standard  Goal: Alleviation of pain or a reduction in pain to the patient’s comfort goal  Description: Target End Date:  Prior to discharge or change in level of care    Document on Vitals flowsheet    1.  Document pain using the appropriate pain scale per order or unit policy  2.  Educate and implement non-pharmacologic comfort measures (i.e. relaxation, distraction, massage, cold/heat therapy, etc.)  3.  Pain management medications as ordered  4.  Reassess pain after pain med administration per policy  5.  If opiods administered assess patient's response to pain medication is appropriate per POSS sedation scale  6.  Follow pain management plan developed in collaboration with patient and interdisciplinary team (including palliative care or pain specialists if applicable)  Outcome: Progressing  Note: PRNs available and patient aware.     Problem: Provide Safe Environment  Goal: Suicide environmental safety, protocols, policies, and practices will be implemented  Description: Target End Date:  resolve day 1    1.  Remove objects or personal belongings that may cause harm or injury to self or others  2.  Dietary tray modifications (paperware)  3.  Provide a safe environment  4.  Render close patient supervision by sustaining observation or awareness of the patient at all times  Outcome: Progressing  Note: Sitter at bedside. Safety protocols in place.     Problem: Fall Risk  Goal: Patient will remain free from falls  Description: Target End Date:  Prior to discharge or change in level of care    Document interventions on the Laura Parkinson Fall Risk Assessment    1.  Assess for fall risk factors  2.  Implement fall  precautions  Outcome: Progressing       Patient is not progressing towards the following goals:

## 2024-07-28 NOTE — PROGRESS NOTES
Nurse to nurse report at beginning of shift, pt with 2 prev fail of void trial. Communication with MD at this time Jaimie Church with inquiry if this RN should remove gonzalez for nursing protocol.

## 2024-07-28 NOTE — CARE PLAN
The patient is Watcher - Medium risk of patient condition declining or worsening    Shift Goals  Clinical Goals: Pt will be free of injury or fall by the end of shift.  Patient Goals: rest  Family Goals: MRI of brain    Progress made toward(s) clinical / shift goals:  Pt with use of fall precautions, one to one sitter (CNA); pt is free of injury or fall by the end of shift.    Patient is not progressing towards the following goals: progressing.

## 2024-07-28 NOTE — CONSULTS
"PSYCHIATRIC CONSULTATION - Follow-up  Established Patient    DOS: 07/28/24     Legal Status: on legal hold - extended/court    Reason for Admission:    CC:   Chief Complaint   Patient presents with    Drug Overdose    Suicidal Ideation    Suicide Attempt               S:   Patient observed in bed, with visitor at bedside, able to recall this writer from previous encounter. Reports improved sleep, and energy. Vocalizing improved mood 2/2 \"less worried.\" Denies SI/HI, A/VH, no Sx of psychosis/ernie reported or observed. Patient is more engaged in conversation than previous encounter, able to focus on topics and answer questions without hesitation. Continues to express a desire to get assistance to \"put my life back together,\" easily redirected from this negative pattern that was present during previous encounter. Accepting oral medications, has knowledge about medication, denies GI distress, HA, dizziness; appears to be tolerating medications. Agreeable to current POC for medication adjustment. Appears trending towards baseline, able to deny SI and vocalize at least 1 reason for living without prompts.     O:   Medical ROS (as pertinent):   No results for input(s): \"WBC\", \"RBC\", \"HEMOGLOBIN\", \"HEMATOCRIT\", \"MCV\", \"MCH\", \"RDW\", \"PLATELETCT\", \"MPV\", \"NEUTSPOLYS\", \"LYMPHOCYTES\", \"MONOCYTES\", \"EOSINOPHILS\", \"BASOPHILS\", \"RBCMORPHOLO\" in the last 72 hours.  Recent Labs     07/26/24  0233 07/27/24  0826 07/28/24  0740   SODIUM 138 135 132*   POTASSIUM 4.2 4.2 3.9   CHLORIDE 103 95* 97   CO2 23 27 24   GLUCOSE 115* 110* 104*   BUN 22 14 13   CPKTOTAL 4523* 5167* 1915*     Recent Labs     07/26/24  0233 07/27/24  0826 07/28/24  0740   ALBUMIN 2.8* 3.1* 3.1*   TBILIRUBIN 0.3 0.6 0.4   ALKPHOSPHAT 74 82 76   TOTPROTEIN 5.3* 5.6* 5.5*   ALTSGPT 85* 104* 90*   ASTSGOT 92* 101* 59*   CREATININE 1.13 0.87 0.85       EKG:   Results for orders placed or performed during the hospital encounter of 07/23/24   EKG   Result Value Ref " "Range    Report       [facility] Emergency Dept.    Test Date:  2024  Pt Name:    FAIZAN GARDNER             Department: ER  MRN:        [id number]                      Room:       RD 12  Gender:     Male                         Technician: [id number]  :        1960                   Requested By:ER TRIAGE PROTOCOL  Order #:    [id number]                    Reading MD: ILDEFONSO ARNOLD DO    Measurements  Intervals                                Axis  Rate:       85                           P:          61  CO:         153                          QRS:        63  QRSD:       106                          T:          25  QT:         413  QTc:        492    Interpretive Statements  Sinus rhythm  Probable left atrial enlargement  Borderline prolonged QT interval  Compared to ECG 2024 17:09:50  No significant changes  Electronically Signed On 2024 00:56:04 PDT by ILDEFONSO ARNOLD DO          MSE:   /80   Pulse 96   Temp 36.6 °C (97.9 °F) (Oral)   Resp 19   Ht 1.803 m (5' 11\")   Wt 85.3 kg (188 lb 0.8 oz)   SpO2 95%     Constitutional: as noted above  General Appearance/Behavior: appears good eye contact cooperative, No behavioral disturbances  Abnormal Movements: none, no PMA/PMR or tremor observed.  Gait and Posture: not observed  Musculoskeletal: as noted above  Mood: \"better\"  Affect: Mood/Congruent and Appropriate   Speech: normal rate, normal rhythm, normal tone, normal volume, and normal fluency  Language:  spontaneous, comprehends spoken commands, and fluent   Thought Process: Logical and Goal Directed, Future Oriented  Thought Content: Denies SI/HI, A/VH. No e/o delusions, paranoia, or internal preoccupation  Insight/Judgement:  improved/improved  Alert/Orientation: alert, oriented to person, place and time  Attn/Concentration: normal  MMSE: deferred this visit     Medications:  Scheduled Medications   Medication Dose Frequency    QUEtiapine  50 mg Nightly    " sertraline  50 mg DAILY    clonazePAM  0.5 mg BID    enoxaparin (LOVENOX) injection  40 mg DAILY AT 1800    senna-docusate  2 Tablet Q EVENING       Allergies:   Allergies   Allergen Reactions    Sulfa Drugs Hives and Rash        Diagnosis:   1. Altered mental status, unspecified altered mental status type    2. Acute renal failure, unspecified acute renal failure type (HCC)    3. Drug overdose of undetermined intent, initial encounter         Psychiatric:   Delirium     Medical: as noted by the medical treatment team.      Recommendations:  Legal Hold: on legal hold - extended/court     Please transfer patient to inpatient psychiatric hospital when medically cleared and bed is available.     Observation status:   - Line of site with sitter     Room phone: Yes, supervised call to family     Visitors: Yes, Jean Paul (brother), Cortes (brother), YOSELIN (son), Tressa (daughter)  Personal belongings: No      Medications and treatment: Final orders per Treatment Team  No new medication recommendations: continue Zoloft 50mg daily, Seroquel 50mg QHS  Risks/benefits/side effects discussed, patient verbalized understanding.     Reviewed safety plan: 911, ER, PCM, MHC, suicide crisis line, nursing staff while inpatient.     Will Continue to Follow. Thank you for the consult.

## 2024-07-28 NOTE — PROGRESS NOTES
Assumed care of patient. Assessment performed. Sitter at bedside. Patient medicated as per MAR for anxiety and for chronic pain. He is A&Ox4 but appears anxious and has poor attention. He is cooperative and pleasant, has flat affect. Bed alarm on. Call light within reach. Potentially dangerous items removed from room. All needs met at this time.

## 2024-07-28 NOTE — PROGRESS NOTES
Hospital Medicine Daily Progress Note    Date of Service  7/28/2024    Chief Complaint  Terrell Hensley is a 63 y.o. male admitted 7/23/2024 with altered mental status, found down after intentional OD of his home medications.    Hospital Course  64yo PMHx HTN, gout.  Presented altered after an amlodipine OD.  CPK16k, WILMAR, elevated LFTs.    Interval Problem Update  7/26:  transferred out of ICU to medical unit, remains confused.  States he has left hip pain, xray ordered.  PT/OT ordered, off strict bedrest.  CPK remain 4, 523, cut back  to 125/hr. Started lovenox for DVT ppx.  Head CT on admission negative.  7/27: Patient appeared quite anxious nervous today.  Psychiatry has recommended Seroquel 50 mg nightly.  QTc on EKG today 492.  Also restart Zoloft tomorrow morning 50 mg in AM.  I have added Klonopin 0.5 mg p.o. twice daily with as needed and Ativan for MRI brain rule out anoxic event.  Apparently patient attempted to get out of bed and his legs collapsed.  PT OT pending.  7/28:  patient improved anxiety and acute psychosis with addition of medications.  He asks if he will be wheelchair bound.  C/o left knee pain.  Ordered xray left femur. No obvious deformity noted on exam. Blake discontinued today.    I have discussed this patient's plan of care and discharge plan at IDT rounds today with Case Management, Nursing, Nursing leadership, and other members of the IDT team.    Consultants/Specialty  psychiatry    Code Status  Full Code    Disposition  The patient is not medically cleared for discharge to home or a post-acute facility.  Anticipate discharge to: a psychiatric hospital    I have placed the appropriate orders for post-discharge needs.    Review of Systems  Review of Systems   Constitutional:  Negative for chills and fever.   Respiratory:  Negative for cough and shortness of breath.    Cardiovascular:  Negative for chest pain, palpitations and leg swelling.   Gastrointestinal:  Negative for  abdominal pain, diarrhea, nausea and vomiting.   Genitourinary:  Negative for dysuria and urgency.   Musculoskeletal:  Negative for back pain.   Skin:  Negative for rash.   Neurological:  Negative for dizziness, loss of consciousness and headaches.   Psychiatric/Behavioral:  The patient is nervous/anxious.         Physical Exam  Temp:  [36.4 °C (97.5 °F)-36.8 °C (98.2 °F)] 36.7 °C (98 °F)  Pulse:  [82-96] 82  Resp:  [16-19] 16  BP: (129-149)/(67-80) 129/67  SpO2:  [92 %-96 %] 92 %    Physical Exam  Constitutional:       General: He is not in acute distress.     Appearance: He is well-developed. He is not diaphoretic.   HENT:      Head: Normocephalic and atraumatic.   Eyes:      Conjunctiva/sclera: Conjunctivae normal.   Neck:      Vascular: No JVD.   Cardiovascular:      Rate and Rhythm: Normal rate.      Heart sounds: No murmur heard.     No gallop.   Pulmonary:      Effort: Pulmonary effort is normal. No respiratory distress.      Breath sounds: No stridor. No wheezing or rales.   Abdominal:      Palpations: Abdomen is soft.      Tenderness: There is no abdominal tenderness. There is no guarding or rebound.   Musculoskeletal:      Right lower leg: No edema.      Left lower leg: No edema.   Skin:     General: Skin is warm and dry.      Capillary Refill: Capillary refill takes less than 2 seconds.      Findings: No rash.   Neurological:      Mental Status: He is oriented to person, place, and time.   Psychiatric:         Mood and Affect: Mood normal.         Behavior: Behavior normal.         Thought Content: Thought content normal.      Comments: Denies suicidal ideation again today         Fluids    Intake/Output Summary (Last 24 hours) at 7/28/2024 1652  Last data filed at 7/28/2024 1608  Gross per 24 hour   Intake 440 ml   Output 7350 ml   Net -6910 ml       Laboratory        Recent Labs     07/26/24  0233 07/27/24  0826 07/28/24  0740   SODIUM 138 135 132*   POTASSIUM 4.2 4.2 3.9   CHLORIDE 103 95* 97   CO2 23  27 24   GLUCOSE 115* 110* 104*   BUN 22 14 13   CREATININE 1.13 0.87 0.85   CALCIUM 9.2 9.0 8.9                   Imaging  DX-HIP-COMPLETE - UNILATERAL 2+ LEFT   Final Result      No radiographic evidence of acute traumatic injury.      CT-HEAD W/O   Final Result         1.  No acute intracranial abnormality.   2.  Atherosclerosis.               MR-BRAIN-W/O    (Results Pending)   DX-FEMUR-2+ LEFT    (Results Pending)        Assessment/Plan  * Drug overdose of undetermined intent, initial encounter- (present on admission)  Assessment & Plan  Apparent suicide attempt  Polypharmacy: some amlodipine but other ingestions unclear  Cont supportive care as clinically improving  On Psych hold  Psychiatry team following    Anxiety- (present on admission)  Assessment & Plan  Increased anxiety noted on exam.  Psychiatry added seroquel 50mg qhs, zoloft resumed for a.m.   Ativan prn  Klonopin bid.    Anemia- (present on admission)  Assessment & Plan  Mild   Cont to monitor    Leukocytosis- (present on admission)  Assessment & Plan  False in light of dehydration, likely aspiration pneumonitis with some neutrophil shift. May consider further intervention once patient stabilizes and is more alert, since unable to assess and suspect it is best to minimize medications at this time owing to unclear clinical picture from overdose.  -CTM    Urinary incontinence- (present on admission)  Assessment & Plan  -oxybutynin held due to drug overdose    Rhabdomyolysis- (present on admission)  Assessment & Plan  CK elevated to 16k, suspected 2/2 being found down and drug overdose.  Cont agressive IVFs but change to LR from NS  Follow daily AST, ALT, Bun, Creat and K    7/25  CPK now 7k  AST/ALT coming down  Renal function improving  K WNL  Cont to monitor CPK, CMP daily until normalized  Cont IVFs until CPK<3k    Elevated liver enzymes- (present on admission)  Assessment & Plan  Transaminase elevation likely from Rhabdo and not acute liver injury  though certainly could have a toxic component from OD  Coming down as expected with resolution of Rhabdo  Cont to monitor daily CMP  Consider further evaluation if it does not resolve as CPK drops and Rhabdo resolves    Hypokalemia- (present on admission)  Assessment & Plan  Follow and replace    Acute renal failure (ARF) (HCC)- (present on admission)  Assessment & Plan  Likely 2/2 prerenal damage from hypotension 2/2 drug overdose and uremia related to rhabdomyolysis.  Creat imroving daily: 1.4 today  Good UOP  Cont LR until CPK<3k  Daily BMP  Renally dose medications as appropriate    High anion gap metabolic acidosis- (present on admission)  Assessment & Plan  Likely 2/2 prerenal damage from hypotension 2/2 drug overdose and uremia related to rhabdomyolysis.  -fluid resuscitation with NS 200cc/hr  -IV potassium 10meq x4      Suicidal behavior with attempted self-injury (HCC)- (present on admission)  Assessment & Plan  Repeat suicidal attempt by patient  -psych consult  -sitter    Mitral regurgitation- (present on admission)  Assessment & Plan  Suspected murmur, though difficult to auscultate. Unclear if preexisting or related to overdose.   -consider TTE once more clinically stable.    Acute encephalopathy- (present on admission)  Assessment & Plan  Acute metabolic/toxic encephalopathy  CT head neg  Mental status improving daily though still not completely back to normal  Cont to monitor clinically  Repeat lab in am  Minimize sedating medications  7/27:  increased anxiety/confusion, query anoxic brain injury, ordered MRI brain w/o contrast.      Hypertension- (present on admission)  Assessment & Plan  Hx of HTN, holding home meds in light of hypotension  Holding BP meds for obvious reasons    Hyponatremia- (present on admission)  Assessment & Plan  Likely hypovolemic hypoNa  Has resolved with IVF resuscitation  Cont to monitor         VTE prophylaxis:    enoxaparin ppx      I have performed a physical exam and  reviewed and updated ROS and Plan today (7/28/2024). In review of yesterday's note (7/27/2024), there are no changes except as documented above.

## 2024-07-29 ENCOUNTER — APPOINTMENT (OUTPATIENT)
Dept: RADIOLOGY | Facility: MEDICAL CENTER | Age: 64
DRG: 907 | End: 2024-07-29
Attending: HOSPITALIST
Payer: MEDICARE

## 2024-07-29 PROBLEM — L03.317 CELLULITIS OF BUTTOCK: Status: ACTIVE | Noted: 2024-07-29

## 2024-07-29 PROBLEM — M54.2 NECK PAIN, ACUTE: Status: ACTIVE | Noted: 2024-07-29

## 2024-07-29 LAB
ALBUMIN SERPL BCP-MCNC: 3.1 G/DL (ref 3.2–4.9)
ALBUMIN/GLOB SERPL: 1.2 G/DL
ALP SERPL-CCNC: 79 U/L (ref 30–99)
ALT SERPL-CCNC: 93 U/L (ref 2–50)
ANION GAP SERPL CALC-SCNC: 11 MMOL/L (ref 7–16)
APPEARANCE UR: CLEAR
AST SERPL-CCNC: 53 U/L (ref 12–45)
BACTERIA #/AREA URNS HPF: NEGATIVE /HPF
BILIRUB SERPL-MCNC: 0.4 MG/DL (ref 0.1–1.5)
BILIRUB UR QL STRIP.AUTO: NEGATIVE
BUN SERPL-MCNC: 13 MG/DL (ref 8–22)
CALCIUM ALBUM COR SERPL-MCNC: 9.4 MG/DL (ref 8.5–10.5)
CALCIUM SERPL-MCNC: 8.7 MG/DL (ref 8.5–10.5)
CHLORIDE SERPL-SCNC: 94 MMOL/L (ref 96–112)
CK SERPL-CCNC: 1034 U/L (ref 0–154)
CO2 SERPL-SCNC: 24 MMOL/L (ref 20–33)
COLOR UR: YELLOW
CREAT SERPL-MCNC: 0.88 MG/DL (ref 0.5–1.4)
EPI CELLS #/AREA URNS HPF: NEGATIVE /HPF
GFR SERPLBLD CREATININE-BSD FMLA CKD-EPI: 95 ML/MIN/1.73 M 2
GLOBULIN SER CALC-MCNC: 2.6 G/DL (ref 1.9–3.5)
GLUCOSE SERPL-MCNC: 106 MG/DL (ref 65–99)
GLUCOSE UR STRIP.AUTO-MCNC: 100 MG/DL
HYALINE CASTS #/AREA URNS LPF: ABNORMAL /LPF
KETONES UR STRIP.AUTO-MCNC: NEGATIVE MG/DL
LEUKOCYTE ESTERASE UR QL STRIP.AUTO: NEGATIVE
MICRO URNS: ABNORMAL
NITRITE UR QL STRIP.AUTO: NEGATIVE
PH UR STRIP.AUTO: 7.5 [PH] (ref 5–8)
POTASSIUM SERPL-SCNC: 3.6 MMOL/L (ref 3.6–5.5)
PROT SERPL-MCNC: 5.7 G/DL (ref 6–8.2)
PROT UR QL STRIP: NEGATIVE MG/DL
RBC # URNS HPF: ABNORMAL /HPF
RBC UR QL AUTO: ABNORMAL
SODIUM SERPL-SCNC: 129 MMOL/L (ref 135–145)
SP GR UR STRIP.AUTO: 1.01
UROBILINOGEN UR STRIP.AUTO-MCNC: 0.2 MG/DL
WBC #/AREA URNS HPF: ABNORMAL /HPF

## 2024-07-29 PROCEDURE — 700102 HCHG RX REV CODE 250 W/ 637 OVERRIDE(OP): Performed by: HOSPITALIST

## 2024-07-29 PROCEDURE — 99233 SBSQ HOSP IP/OBS HIGH 50: CPT | Performed by: HOSPITALIST

## 2024-07-29 PROCEDURE — 72193 CT PELVIS W/DYE: CPT

## 2024-07-29 PROCEDURE — 72141 MRI NECK SPINE W/O DYE: CPT

## 2024-07-29 PROCEDURE — 700117 HCHG RX CONTRAST REV CODE 255: Performed by: HOSPITALIST

## 2024-07-29 PROCEDURE — 97162 PT EVAL MOD COMPLEX 30 MIN: CPT

## 2024-07-29 PROCEDURE — 700111 HCHG RX REV CODE 636 W/ 250 OVERRIDE (IP): Performed by: HOSPITALIST

## 2024-07-29 PROCEDURE — 97602 WOUND(S) CARE NON-SELECTIVE: CPT

## 2024-07-29 PROCEDURE — 36415 COLL VENOUS BLD VENIPUNCTURE: CPT

## 2024-07-29 PROCEDURE — 700105 HCHG RX REV CODE 258: Performed by: HOSPITALIST

## 2024-07-29 PROCEDURE — 70551 MRI BRAIN STEM W/O DYE: CPT

## 2024-07-29 PROCEDURE — 82550 ASSAY OF CK (CPK): CPT

## 2024-07-29 PROCEDURE — 97166 OT EVAL MOD COMPLEX 45 MIN: CPT

## 2024-07-29 PROCEDURE — 770001 HCHG ROOM/CARE - MED/SURG/GYN PRIV*

## 2024-07-29 PROCEDURE — 99232 SBSQ HOSP IP/OBS MODERATE 35: CPT | Mod: GC | Performed by: STUDENT IN AN ORGANIZED HEALTH CARE EDUCATION/TRAINING PROGRAM

## 2024-07-29 PROCEDURE — A9270 NON-COVERED ITEM OR SERVICE: HCPCS | Performed by: HOSPITALIST

## 2024-07-29 PROCEDURE — 97535 SELF CARE MNGMENT TRAINING: CPT

## 2024-07-29 PROCEDURE — 80053 COMPREHEN METABOLIC PANEL: CPT

## 2024-07-29 PROCEDURE — 81001 URINALYSIS AUTO W/SCOPE: CPT

## 2024-07-29 PROCEDURE — 51798 US URINE CAPACITY MEASURE: CPT

## 2024-07-29 RX ORDER — TRAZODONE HYDROCHLORIDE 50 MG/1
50 TABLET, FILM COATED ORAL
Status: DISCONTINUED | OUTPATIENT
Start: 2024-07-29 | End: 2024-07-30

## 2024-07-29 RX ORDER — OXYCODONE HYDROCHLORIDE 5 MG/1
5 TABLET ORAL EVERY 4 HOURS PRN
Status: DISCONTINUED | OUTPATIENT
Start: 2024-07-29 | End: 2024-08-22 | Stop reason: HOSPADM

## 2024-07-29 RX ORDER — SODIUM CHLORIDE 1 G/1
1 TABLET ORAL
Status: DISCONTINUED | OUTPATIENT
Start: 2024-07-29 | End: 2024-08-06

## 2024-07-29 RX ADMIN — TRAZODONE HYDROCHLORIDE 50 MG: 50 TABLET ORAL at 20:32

## 2024-07-29 RX ADMIN — OXYCODONE HYDROCHLORIDE 5 MG: 5 TABLET ORAL at 05:04

## 2024-07-29 RX ADMIN — CEFAZOLIN 2 G: 2 INJECTION, POWDER, FOR SOLUTION INTRAMUSCULAR; INTRAVENOUS at 22:18

## 2024-07-29 RX ADMIN — IOHEXOL 100 ML: 350 INJECTION, SOLUTION INTRAVENOUS at 21:20

## 2024-07-29 RX ADMIN — Medication 1 G: at 12:02

## 2024-07-29 RX ADMIN — Medication 1 G: at 17:07

## 2024-07-29 RX ADMIN — ENOXAPARIN SODIUM 40 MG: 100 INJECTION SUBCUTANEOUS at 17:07

## 2024-07-29 RX ADMIN — ACETAMINOPHEN 650 MG: 325 TABLET ORAL at 12:02

## 2024-07-29 RX ADMIN — ACETAMINOPHEN 650 MG: 325 TABLET ORAL at 17:07

## 2024-07-29 RX ADMIN — OXYCODONE HYDROCHLORIDE 5 MG: 5 TABLET ORAL at 22:07

## 2024-07-29 RX ADMIN — CLONAZEPAM 0.5 MG: 0.5 TABLET ORAL at 05:04

## 2024-07-29 RX ADMIN — SERTRALINE 50 MG: 50 TABLET, FILM COATED ORAL at 05:04

## 2024-07-29 ASSESSMENT — COGNITIVE AND FUNCTIONAL STATUS - GENERAL
CLIMB 3 TO 5 STEPS WITH RAILING: A LOT
DRESSING REGULAR LOWER BODY CLOTHING: A LITTLE
HELP NEEDED FOR BATHING: A LITTLE
WALKING IN HOSPITAL ROOM: A LITTLE
PERSONAL GROOMING: A LITTLE
SUGGESTED CMS G CODE MODIFIER DAILY ACTIVITY: CK
TOILETING: A LITTLE
STANDING UP FROM CHAIR USING ARMS: A LITTLE
DAILY ACTIVITIY SCORE: 19
DRESSING REGULAR UPPER BODY CLOTHING: A LITTLE
MOVING TO AND FROM BED TO CHAIR: A LITTLE
SUGGESTED CMS G CODE MODIFIER MOBILITY: CK
MOBILITY SCORE: 19

## 2024-07-29 ASSESSMENT — GAIT ASSESSMENTS
DISTANCE (FEET): 20
GAIT LEVEL OF ASSIST: MINIMAL ASSIST
DEVIATION: ANTALGIC;STEP TO;BRADYKINETIC
ASSISTIVE DEVICE: FRONT WHEEL WALKER

## 2024-07-29 ASSESSMENT — ACTIVITIES OF DAILY LIVING (ADL): TOILETING: INDEPENDENT

## 2024-07-29 ASSESSMENT — PATIENT HEALTH QUESTIONNAIRE - PHQ9
5. POOR APPETITE OR OVEREATING: NOT AT ALL
4. FEELING TIRED OR HAVING LITTLE ENERGY: NEARLY EVERY DAY
1. LITTLE INTEREST OR PLEASURE IN DOING THINGS: NEARLY EVERY DAY
6. FEELING BAD ABOUT YOURSELF - OR THAT YOU ARE A FAILURE OR HAVE LET YOURSELF OR YOUR FAMILY DOWN: MORE THAN HALF THE DAYS
2. FEELING DOWN, DEPRESSED, IRRITABLE, OR HOPELESS: NEARLY EVERY DAY
SUM OF ALL RESPONSES TO PHQ QUESTIONS 1-9: 15
8. MOVING OR SPEAKING SO SLOWLY THAT OTHER PEOPLE COULD HAVE NOTICED. OR THE OPPOSITE, BEING SO FIGETY OR RESTLESS THAT YOU HAVE BEEN MOVING AROUND A LOT MORE THAN USUAL: NOT AT ALL
SUM OF ALL RESPONSES TO PHQ9 QUESTIONS 1 AND 2: 6
7. TROUBLE CONCENTRATING ON THINGS, SUCH AS READING THE NEWSPAPER OR WATCHING TELEVISION: NOT AT ALL
3. TROUBLE FALLING OR STAYING ASLEEP OR SLEEPING TOO MUCH: NEARLY EVERY DAY
9. THOUGHTS THAT YOU WOULD BE BETTER OFF DEAD, OR OF HURTING YOURSELF: SEVERAL DAYS

## 2024-07-29 ASSESSMENT — ENCOUNTER SYMPTOMS
BACK PAIN: 0
SHORTNESS OF BREATH: 0
COUGH: 0
VOMITING: 0
CHILLS: 0
HEADACHES: 0
FEVER: 0
NERVOUS/ANXIOUS: 1
LOSS OF CONSCIOUSNESS: 0
DIZZINESS: 0
ABDOMINAL PAIN: 0
DIARRHEA: 0
PALPITATIONS: 0
NAUSEA: 0

## 2024-07-29 ASSESSMENT — PAIN DESCRIPTION - PAIN TYPE
TYPE: ACUTE PAIN
TYPE: ACUTE PAIN;CHRONIC PAIN

## 2024-07-29 NOTE — CARE PLAN
The patient is Stable - Low risk of patient condition declining or worsening    Shift Goals  Clinical Goals: Patient will not have safety event this shift; patient will urinate appropriately.  Patient Goals: Rest, pain control. Fight.  Family Goals: Not present.    Progress made toward(s) clinical / shift goals:    Problem: Pain - Standard  Goal: Alleviation of pain or a reduction in pain to the patient’s comfort goal  Description: Target End Date:  Prior to discharge or change in level of care    Document on Vitals flowsheet    1.  Document pain using the appropriate pain scale per order or unit policy  2.  Educate and implement non-pharmacologic comfort measures (i.e. relaxation, distraction, massage, cold/heat therapy, etc.)  3.  Pain management medications as ordered  4.  Reassess pain after pain med administration per policy  5.  If opiods administered assess patient's response to pain medication is appropriate per POSS sedation scale  6.  Follow pain management plan developed in collaboration with patient and interdisciplinary team (including palliative care or pain specialists if applicable)  Outcome: Progressing  Note: PRN medication available, patient aware.       Problem: Provide Safe Environment  Goal: Suicide environmental safety, protocols, policies, and practices will be implemented  Description: Target End Date:  resolve day 1    1.  Remove objects or personal belongings that may cause harm or injury to self or others  2.  Dietary tray modifications (paperware)  3.  Provide a safe environment  4.  Render close patient supervision by sustaining observation or awareness of the patient at all times  Outcome: Progressing     Problem: Skin Integrity  Goal: Skin integrity is maintained or improved  Description: Target End Date:  Prior to discharge or change in level of care    Document interventions on Skin Risk/Ulises flowsheet groups and corresponding LDA    1.  Assess and monitor skin integrity,  appearance and/or temperature  2.  Assess risk factors for impaired skin integrity and/or pressures ulcers  3.  Implement precautions to protect skin integrity in collaboration with interdisciplinary team  4.  Implement pressure ulcer prevention protocol if at risk for skin breakdown  5.  Confirm wound care consult if at risk for skin breakdown  6.  Ensure patient use of pressure relieving devices  (Low air loss bed, waffle overlay, heel protectors, ROHO cushion, etc)  Outcome: Progressing  Note: Patient visualized turning self effectively.       Patient is not progressing towards the following goals:

## 2024-07-29 NOTE — THERAPY
Physical Therapy   Initial Evaluation     Patient Name: Terrell Hensley  Age:  63 y.o., Sex:  male  Medical Record #: [id number]  Today's Date: 7/29/2024     Precautions  Precautions: Fall Risk    Assessment  Patient is 63 y.o. male presenting for AMS d/t intentional OD w/ a PMH of mitral regurgitation, HTN and anxiety.  He reports living in a trailer w/ 2STE on his brother's property, and his brother works during the day, otherwise is able to assist w/ IADL's (see flowsheet for home set-up and PLOF).      Currently, the pt displays gross L>R LE weakness, reduced LLE ROM, impaired balance and gait stability, and poor activity tolerance limiting his ability to perform functional mobility tasks.  He was able to demo supine->sit EOB SBA w/ HOB flat and no rails, along w/ STS EOB w/ FWW Sweta.  The pt completed gait 20' (x2) using FWW Sweta for stability and weight shift via slow edilson and antalgic LLE, no LOB observed and distance limited by pain and weakness.  Recommend placement at this time given pt's inability to ambulate functional household distances, and high risk of future falls.  Will follow for acute PT needs.      Plan    Physical Therapy Initial Treatment Plan   Treatment Plan : Bed Mobility, Equipment, Gait Training, Neuro Re-Education / Balance, Orthotics Training , Self Care / Home Evaluation, Stair Training, Therapeutic Activities, Therapeutic Exercise  Treatment Frequency: 4 Times per Week  Duration: Until Therapy Goals Met    DC Equipment Recommendations: Unable to determine at this time  Discharge Recommendations: Recommend post-acute placement for additional physical therapy services prior to discharge home       Subjective/Objective       07/29/24 1402   Prior Living Situation   Prior Services Home-Independent   Housing / Facility Other (Comments)  (Living in a trailer on his brother's property)   Steps Into Home 2   Steps In Home 0   Equipment Owned Single Point Cane   Lives with - Patient's  Self Care Capacity Alone and Able to Care For Self   Comments Pt lives in a trailer on his brother's property.  His brother works during the day, otherwise is able to assist him at home w/ IADL's.   Prior Level of Functional Mobility   Bed Mobility Independent   Transfer Status Independent   Ambulation Independent   Ambulation Distance Community distances   Assistive Devices Used None   Stairs Independent   History of Falls   History of Falls No   Date of Last Fall   (Pt denies any recent falls)   Cognition    Cognition / Consciousness WDL   Level of Consciousness Alert   Comments pt pleasant and cooperative   Passive ROM Lower Body   Passive ROM Lower Body WDL   Active ROM Lower Body    Active ROM Lower Body  X   Comments LLE grossly limited by pain and weakness, RLE grossly WFL   Strength Lower Body   Lower Body Strength  X   Comments LLE grossly 3-/5 (hip flexion, knee extension, ankle DF); RLE grossly WFL   Sensation Lower Body   Lower Extremity Sensation   X   Comments Sensation to LT diminished in tissues surrounding L patella, intact to DP.   Coordination Lower Body    Coordination Lower Body  X   Toe Tapping Left Impaired   Other Treatments   Other Treatments Provided Edu provided re: use of FWW   Balance Assessment   Sitting Balance (Static) Fair +   Sitting Balance (Dynamic) Fair +   Standing Balance (Static) Fair   Standing Balance (Dynamic) Fair -   Weight Shift Sitting Good   Weight Shift Standing Fair   Comments stand w/ FWW   Bed Mobility    Supine to Sit Standby Assist   Sit to Supine   (pt position seated EOB w/ OT at the end of assessment)   Scooting Supervised   Rolling Supervised   Comments HOB flat, no bed rails   Gait Analysis   Gait Level Of Assist Minimal Assist   Assistive Device Front Wheel Walker   Distance (Feet) 20   # of Times Distance was Traveled 2   Deviation Antalgic;Step To;Bradykinetic   Weight Bearing Status no restrictions   Vision Deficits Impacting Mobility pt denies    Comments distance limited by pain and weakness   Functional Mobility   Sit to Stand Minimal Assist   Bed, Chair, Wheelchair Transfer Minimal Assist   Transfer Method Stand Step   Mobility STS EOB w/ FWW; EOB<>bathroom w/ FWW   Activity Tolerance   Sitting Edge of Bed >15min   Standing 5min total   Edema / Skin Assessment   Edema / Skin  X   Comments Significant swelling/edema noted surrounding L medial>lateral knee joint   Short Term Goals    Short Term Goal # 1 Pt will demo STS EOB w/ FWW SPV in 6 visits to prepare for OOB mobility tasks.   Short Term Goal # 2 Pt will demo gait >150' using FWW SPV in a moving environment in 6 visits for household ambulation.   Short Term Goal # 3 Pt will demo ability to ascend/descend 2 stairs w/ UE support SPV in 6 visits for access to his home environment.   Education Group   Education Provided Role of Physical Therapist;Use of Assistive Device   Role of Physical Therapist Patient Response Patient;Acceptance;Explanation;Demonstration;Action Demonstration   Use of Assistive Device Patient Response Patient;Acceptance;Explanation;Demonstration;Action Demonstration   Additional Comments Pt receptive of edu provided   Problem List    Problems Pain;Impaired Bed Mobility;Impaired Transfers;Impaired Ambulation;Functional ROM Deficit;Functional Strength Deficit;Impaired Balance;Impaired Coordination;Decreased Activity Tolerance   Interdisciplinary Plan of Care Collaboration   IDT Collaboration with  Nursing   Patient Position at End of Therapy Seated;Edge of Bed;Call Light within Reach;Tray Table within Reach;Phone within Reach;Other (Comments)  (OT present in room w/ pt)   Collaboration Comments regarding outcome of tx session   Session Information   Date / Session Number  7/29- 1 (1/4, 8/4)

## 2024-07-29 NOTE — WOUND TEAM
[facility]  Inpatient Services  Wound and Skin Care Follow-up    Admission Date: 7/23/2024     Last order of IP CONSULT TO WOUND CARE was found on 7/28/2024 from Hospital Encounter on 7/23/2024     HPI, PMH, SH: Reviewed    History reviewed. No pertinent surgical history.  Social History     Tobacco Use    Smoking status: Every Day     Types: Cigarettes    Smokeless tobacco: Never   Substance Use Topics    Alcohol use: Yes     Comment: occ     Chief Complaint   Patient presents with    Drug Overdose    Suicidal Ideation    Suicide Attempt     Diagnosis: Drug overdose of undetermined intent, initial encounter [T50.904A]    Unit where seen by Wound Team: S605/00     WOUND FOLLOW UP RELATED TO:  Sacrum       WOUND TEAM PLAN OF CARE - Frequency of Follow-up:   Nursing to follow dressing orders written for wound care. Contact wound team if area fails to progress, deteriorates or with any questions/concerns if something comes up before next scheduled follow up (See below as to whether wound is following and frequency of wound follow up)  Dressing changes by wound team:                   Not following, consult as needed  - Sacrum    WOUND HISTORY:       63-year-old male brought in by ambulance for AMS, suspicion of home medication overdose and possible SI/SA. No meaningful history is gleaned from patient at time of interview. Per report he stated upon ED arrival that he used his amlodipine . Other prescription medications include chlorthalidone, clonazepam, sertraline, indomethacin, oxybutynin, quetiapine, sleep blend which includes melatonin. Hypotensive to SBP 60s per EMS, fluid responsive. Hallucinations noted later.        WOUND ASSESSMENT/LDA  Wound 07/24/24 Pressure Injury Buttocks;Coccyx Left POA Stage 1-->opening DTI (Active)   Date First Assessed: 07/24/24   Present on Original Admission: Yes  Hand Hygiene Completed: Yes  Primary Wound Type: Pressure Injury  Location: Buttocks;Coccyx  Laterality:  Left  Wound Description (Comments): POA Stage 1-->opening DTI      Assessments 7/29/2024  3:00 PM   Wound Image      Site Assessment Red;Purple   Periwound Assessment Purple;Red;Induration;Hot;Painful   Margins Defined edges;Unattached edges   Closure Open to air   Drainage Amount None   Treatments Cleansed;Nonselective debridement;Site care;Offloading   Wound Cleansing Normal Saline Irrigation   Periwound Protectant Barrier Paste   Dressing Status Intact   Dressing Changed New   Dressing Cleansing/Solutions Not Applicable   Dressing Options Offloading Dressing - Sacral   Dressing Change/Treatment Frequency Every 72 hrs, and As Needed   NEXT Dressing Change/Treatment Date 08/01/24   NEXT Weekly Photo (Inpatient Only) 08/05/24   Wound Team Following Not following   Pressure Injury Stage Deep Tissue   Shape irregular        Vascular:    CORNELL:   No results found.    Lab Values:    Lab Results   Component Value Date/Time    WBC 14.7 (H) 07/24/2024 03:50 AM    RBC 3.96 (L) 07/24/2024 03:50 AM    HEMOGLOBIN 12.0 (L) 07/24/2024 03:50 AM    HEMATOCRIT 34.7 (L) 07/24/2024 03:50 AM    HBA1C 5.3 03/26/2024 02:18 PM         Culture Results show:  No results found for this or any previous visit (from the past 720 hour(s)).    Pain Level/Medicated:  None, Tolerated without pain medication       INTERVENTIONS BY WOUND TEAM:  Chart and images reviewed. Discussed with bedside RN. All areas of concern (based on picture review, LDA review and discussion with bedside RN) have been thoroughly assessed. Documentation of areas based on significant findings. This RN in to assess patient. Performed standard wound care which includes appropriate positioning, dressing removal and non-selective debridement. Pictures and measurements obtained weekly if/when required.    Wound:  Sacrum  Cleansed/Non-selectively Debrided with:  Normal Saline and Gauze  Dania wound: Cleansed with Normal Saline and Gauze, Prepped with N/A  Primary Dressing:   offloading adhesive foams.      Advanced Wound Care Discharge Planning  Number of Clinicians necessary to complete wound care: 1  Is patient requiring IV pain medications for dressing changes:  No   Length of time for dressing change 35 min. (This does not include chart review, pre-medication time, set up, clean up or time spent charting.)    Interdisciplinary consultation: Patient, Bedside RN (Ruth), Provider Dr. Church .  Pressure injury and staging reviewed with N/A.    EVALUATION / RATIONALE FOR TREATMENT:     Date:  07/29/24  Wound Status:  Wound deteriorating    Patient's L. Buttocks is indurated and hot to the touch updated Dr. Church on possible need for a CT to rule out abscess,  Patient found down for unknown amount of time and it appears that it is opening continue to offload area.  If patient becomes incontinent please remove offloading adhesive foam.  .  Date:  07/24/24  Wound Status:  Initial evaluation    The patient has non-blanching pink red discoloration to dry intact tissue to left buttock and coccyx - POA Stage 1. The area is indurated and edematous, we anticipate that the tissue damage may be deeper than a Stage 1 pressure injury and it may devolve before it improves. Cleansed area and placed an offloading foam dressing to area and a pillow under his left hip.          Goals: Steady decrease in wound area and depth weekly.    NURSING PLAN OF CARE ORDERS:  Dressing changes: See Dressing Care orders  Skin care: See Skin Care orders  RN Prevention Protocol    NUTRITION RECOMMENDATIONS   Wound Team Recommendations:  Dietary consult     DIET ORDERS (From admission to next 24h)       Start     Ordered    07/24/24 3869  Diet Order Diet: Regular; Tray Modifications (optional): Safety Tray - Plastic dishware, utensils unwrapped (Suicide Watch)  ALL MEALS        Comments: Paperware only on meal tray.   Question Answer Comment   Diet: Regular    Tray Modifications (optional) Safety Tray - Plastic  dishware, utensils unwrapped (Suicide Watch)        07/24/24 5069                    PREVENTATIVE INTERVENTIONS:   Q shift Ulises - performed per nursing policy  Q shift pressure point assessments - performed per nursing policy    Surface/Positioning  Standard/trauma mattress - Currently in Place  Reposition q 2 hours - Currently in Place    Offloading/Redistribution  Sacral offloading dressing (Silicone dressing) - Ordered  Heel offloading dressing (Silicone dressing) - Ordered      Mobilization      Not Mobilizing      Anticipated discharge plans:  TBD        Vac Discharge Needs:  Vac Discharge plan is purely a recommendation from wound team and not a requirement for discharge unless otherwise stated by physician.  Not Applicable Pt not on a wound vac

## 2024-07-29 NOTE — CONSULTS
"PSYCHIATRIC FOLLOW-UP: (established)  Reason for admission:   SI  Legal Hold Status:   on legal hold - extended/court   Chart reviewed.         HPI:          Terrell Hensley is a 63 y.o. male admitted 7/23/2024 with altered mental status, found down after intentional OD of his home medications amlodipine in order to kill himself.       Interval hx  On Zoloft 50mg po daily  Seroquel 50mg po qHS  Clonazepam 0.5mg po BID    Today pt was interviewed in his room. Pt reports \"slightly better but still depressed\" mood. He reports very poor sleep that has been going on for the past 5-6 years. He uses clonazepam (home meds) for sleep. His appetite has also been poor.  He denies SI/HI and AVH, denies anxiety.  He says the biggest factor for his acute worsening depression in the past 3-4 months and SA that led to this hospitalization is : His hours being cut at [employer] (Worked for 20 years, currently on leave), his sciatica pain and very poor sleep. His depression started when he  with his ex wife a few years ago. Currently he lives alone in a trailer on his brother's property. He states that he \"feels very guilty that he had attempt to take his own life\". He also has been reflecting/ruminating a lot on his decision made throughout his life. Pt repots he just wants to \"end the emotional and physical pain\".    He has been seeing his outpt psychiatrist, Dr. Bushra Dubose for the past 7 years. He had another suicide attempt in 2018. He recalls only being on Sertraline for depression and it was only started 3 weeks ago. Pt signed KARAN for us to reach out to his outpt psychiatrist for more collateral information.     We discussed reaching out to his outpt psychiatrist to adjust his antidepressant medication given his insomnia, lack of motivation and chronic pain. We also discussed adding Trazodone and discontinue his quetiapine to better target his insomnia, risk and benefit explained to the pt and he was " "agreeable.     No additional concerns reported at this time.    Medical ROS (as pertinent):     ROS        Psychiatric Examination:  Vitals:   Vitals:    07/29/24 0828   BP:    Pulse:    Resp:    Temp: 37.9 °C (100.2 °F)   SpO2:         General Appearance: Appears state age, appropriate grooming & hygiene, no acute distress  Behavior:    -Appropriate activity, appropriate eye contact, pleasant & cooperative  Neuro:   -No tics, tremors or dyskinesias noted   -No psychomotor agitation or retardation   -No posture or gait abnormalities appreciated  Speech: Appropriate quantity, spontaneous. Regular rate and rhythm. Appropriate volume and intonation. Articulation is clear  Language: English  Thought processes: Coherent, linear, logical and goal-directed. No evidence of loose associations  Thought content: No evidence of SI/HI/AVH. Not observed responding to internal stimuli. No evidence of delusions or paranoia. Does endorse passive death wishes \"for some time\"  Mood: \"slightly better but still depressed mood\" as stated by patient  Affect: Congruent with stated mood. Constricted, slightly melancholic, appropriately reactive to conversation. No evidence of lability, ernie, or agitation  Judgement and Insight: Fair/Fair  Cognition:    -Alert & oriented x 3 (Person, place and time)   -Attention & concentration grossly intact   -Immediate/delayed memory not formally tested but grossly intact   -Age-appropriate fund of knowledge      New PAST MEDICAL/PSYCH/FAMILY/SOCIAL(as reported by patient):       None      Current Facility-Administered Medications:     sodium chloride (Salt) tablet 1 g, 1 g, Oral, TID WITH MEALS, Jaimie Church M.D., 1 g at 07/29/24 1707    traZODone (Desyrel) tablet 50 mg, 50 mg, Oral, QHS, Jaimie Church M.D.    oxyCODONE immediate-release (Roxicodone) tablet 5 mg, 5 mg, Oral, Q4HRS PRN, Jaimie Church M.D.    ceFAZolin (Ancef) 2 g in  mL IVPB, 2 g, Intravenous, Q8HRS, Jaimie Church M.D.    " magnesium hydroxide (Milk Of Magnesia) suspension 30 mL, 30 mL, Oral, QDAY PRN, PALMA CoteP.RJeremyN., 30 mL at 24 0438    LORazepam (Ativan) injection 0.5 mg, 0.5 mg, Intravenous, Q6HRS PRN, Jaimie Church M.D., 0.5 mg at 24    sertraline (Zoloft) tablet 50 mg, 50 mg, Oral, DAILY, Jaimie Church M.D., 50 mg at 24 0504    enoxaparin (Lovenox) inj 40 mg, 40 mg, Subcutaneous, DAILY AT 1800, Jaimie Church M.D., 40 mg at 24 170    acetaminophen (Tylenol) tablet 650 mg, 650 mg, Oral, Q4HRS PRN, Cristofer Tejeda, D.O., 650 mg at 24 170    ondansetron (Zofran) syringe/vial injection 4 mg, 4 mg, Intravenous, Q4HRS PRN, Cristofer Tejeda, D.O.        EKG:   Results for orders placed or performed during the hospital encounter of 24   EKG   Result Value Ref Range    Report       [facility] Emergency Dept.    Test Date:  2024  Pt Name:    FAIZAN GARDNER             Department: ER  MRN:        [id number]                      Room:       [facility]  Gender:     Male                         Technician: [id number]  :        1960                   Requested By:ER TRIAGE PROTOCOL  Order #:    [id number]                    Reading MD: ILDEFONSO ARNOLD DO    Measurements  Intervals                                Axis  Rate:       85                           P:          61  SC:         153                          QRS:        63  QRSD:       106                          T:          25  QT:         413  QTc:        492    Interpretive Statements  Sinus rhythm  Probable left atrial enlargement  Borderline prolonged QT interval  Compared to ECG 2024 17:09:50  No significant changes  Electronically Signed On 2024 00:56:04 PDT by ILDEFONSO ARNOLD DO        Brain Imagin2024 11:12 PM   CT of the head without contrast.     Sequential axial images were obtained from the vertex to the skull base without contrast.     Up to date radiation  dose reduction adjustments have been utilized to meet ALARA standards for radiation dose reduction.     COMPARISON: March 26, 2024     FINDINGS:  The brain appears normal in volume and morphology. The ventricles are normal in caliber and configuration. No space occupying lesions or areas of acute vascular territory infarctions are identified. There are no abnormal extra axial fluid collections or extra axial hemorrhage identified.  The visualized paranasal sinuses and mastoid air cells are well aerated bilaterally. No depressed calvarial fractures are identified. The visualized globes and retrobulbar soft tissues appear within normal limits.  Atherosclerotic intracranial calcifications are seen.     IMPRESSION:        1.  No acute intracranial abnormality.  2.  Atherosclerosis.  EEG:  None     Labs personally reviewed:   Recent Results (from the past 24 hour(s))   Comp Metabolic Panel    Collection Time: 07/29/24  5:30 AM   Result Value Ref Range    Sodium 129 (L) 135 - 145 mmol/L    Potassium 3.6 3.6 - 5.5 mmol/L    Chloride 94 (L) 96 - 112 mmol/L    Co2 24 20 - 33 mmol/L    Anion Gap 11.0 7.0 - 16.0    Glucose 106 (H) 65 - 99 mg/dL    Bun 13 8 - 22 mg/dL    Creatinine 0.88 0.50 - 1.40 mg/dL    Calcium 8.7 8.5 - 10.5 mg/dL    Correct Calcium 9.4 8.5 - 10.5 mg/dL    AST(SGOT) 53 (H) 12 - 45 U/L    ALT(SGPT) 93 (H) 2 - 50 U/L    Alkaline Phosphatase 79 30 - 99 U/L    Total Bilirubin 0.4 0.1 - 1.5 mg/dL    Albumin 3.1 (L) 3.2 - 4.9 g/dL    Total Protein 5.7 (L) 6.0 - 8.2 g/dL    Globulin 2.6 1.9 - 3.5 g/dL    A-G Ratio 1.2 g/dL   CREATINE KINASE    Collection Time: 07/29/24  5:30 AM   Result Value Ref Range    CPK Total 1034 (H) 0 - 154 U/L   ESTIMATED GFR    Collection Time: 07/29/24  5:30 AM   Result Value Ref Range    GFR (CKD-EPI) 95 >60 mL/min/1.73 m 2         Assessment:  Trerell Hensley is a 63 y.o. male admitted 7/23/2024 with altered mental status, found down after intentional OD of his home medications  amlodipine in order to kill himself.     Today pt denies any SI/HI AVH, however still endorses depressive mood, we explored major factors that impact his depression including poor sleep, financial stress and pain. We'll discontinue quetiapine as it is not used first line sleep aid, trazodone should be used instead. Will continue to taper off Clonazepam given pt does not primary complaint of severe anxiety/panic attacks. Owen get in touch with his outpt psychiatrist to investigative his past antidepressant hx and discuss medication adjustment and f/u.    Dx:  # MDD, recurrent, severe   # Suicidal Ideation  # Drug overdose    Medical :  Per primary team      Plan:  Legal hold: on legal hold - extended as the patient continues to have significant untreated depression prohibiting development of regular safety plan  Psychotropic medications  Discontinue Quetiapine  Start Trazodone 50mg po qHS for sleep  Continue Zoloft 50 mg po daily.  Continue Clonazepam 0.5mg po BID - goal is to taper off and discontinue in the future.   Please transfer pt to inpatient psychiatric hospital when medically cleared and bed is available  Labs reviewed  EKG reviewed  Old records ordered/reviewed/summarized  Collateral obtained  Discussed the case with: Dr. Church  Psychiatry will follow up     Thank you for the consult.     Sitter: 1:1  Phone: Yes room phone  Visitors: Yes, Jean Paul (brother), Cortes (brother), YOSELIN (son), Tressa (daughter)   Personal belongings:  NO

## 2024-07-29 NOTE — PROGRESS NOTES
"Assumed care of patient. Assessment performed. Sitter at bedside. Patient medicated as per MAR for pain. He does not report new or worsening SI and said, \"I'm trying to fight.\" This RN offered support and therapeutic communication. Bed alarm on. All needs met at this time.     Bladder scan 2020 of 270 mL after void of 400. Patient encouraged to attempt urination often. Sitter reports patient repositions frequently.   "

## 2024-07-29 NOTE — CARE PLAN
The patient is Watcher - Medium risk of patient condition declining or worsening    Shift Goals  Clinical Goals: Pt will mobilize with nursing at least one time by the end of shift.  Patient Goals: rest  Family Goals: MRI of brain    Progress made toward(s) clinical / shift goals:  Pt to have refusals to ambulate with nursing, pt was able to ambulate with therapies. Communication with MD r/t pt ambulation. Pt continues with adequate bed mobility, sba with turns and reminders. Pt is able to mobilize in bed independently.     Patient is not progressing towards the following goals: Pt to have refusals to ambulate with nursing

## 2024-07-29 NOTE — THERAPY
Occupational Therapy   Initial Evaluation     Patient Name: Terrell Hensley  Age:  63 y.o., Sex:  male  Medical Record #: [id number]  Today's Date: 7/29/2024       Precautions: Fall Risk    Assessment    Patient is 63 y.o. male admitted for altered mental status, found down after intentional OD of his home medications. Prior to admission pt reports being independent in ADLs living in a trailor on his brothers property. Pt completed UBD SPV, LBD SPV, g/h seated SPV. Pt is at his functional baseline and does not have any OT needs at this time.     Plan    Occupational Therapy Initial Treatment Plan   Duration: Evaluation only    DC Equipment Recommendations: None  Discharge Recommendations: Anticipate that the patient will have no further occupational therapy needs after discharge from the hospital        Objective       07/29/24 1410   Prior Living Situation   Prior Services None   Housing / Facility Motor Home   Steps Into Home 2   Steps In Home 0   Bathroom Set up Walk In Shower   Equipment Owned None   Lives with - Patient's Self Care Capacity Alone and Able to Care For Self   Comments pt reports that he lives in a trailor on his brothers property, pt reports that he uses his brother walk-in shower inside the house.   Prior Level of ADL Function   Self Feeding Independent   Grooming / Hygiene Independent   Bathing Independent   Dressing Independent   Toileting Independent   Prior Level of IADL Function   Medication Management Independent   Laundry Independent   Kitchen Mobility Independent   Finances Independent   Home Management Independent   Shopping Independent   Prior Level Of Mobility Independent Without Device in Community   Driving / Transportation Driving Independent   Occupation (Pre-Hospital Vocational) Employed Full Time   Leisure Interests Family   History of Falls   History of Falls No   Precautions   Precautions Fall Risk   Vitals   O2 (LPM) 0   O2 Delivery Device None - Room Air   Pain    Intervention Repositioned;Rest   Pain 0 - 10 Group   Location Knee;Leg   Location Orientation Left   Pain Rating Scale (NPRS) 3   Description Aching;Constant;Numb   Comfort Goal Sleep Comfortably;Comfort with Movement   Therapist Pain Assessment Post Activity Pain Same as Prior to Activity   Cognition    Cognition / Consciousness WDL   Level of Consciousness Alert   Passive ROM Upper Body   Passive ROM Upper Body WDL   Active ROM Upper Body   Active ROM Upper Body  WDL   Dominant Hand Right   Strength Upper Body   Upper Body Strength  WDL   Sensation Upper Body   Upper Extremity Sensation  WDL   Balance Assessment   Sitting Balance (Static) Fair +   Sitting Balance (Dynamic) Fair +   Standing Balance (Static) Fair   Standing Balance (Dynamic) Fair   Weight Shift Sitting Fair   Weight Shift Standing Fair   Comments EOB and ambulation   Bed Mobility    Sit to Supine Supervised   Scooting Supervised   Comments pt received sitting EOB w/ PT, in bed end of session   ADL Assessment   Grooming Supervision;Seated   Upper Body Dressing Supervision   Lower Body Dressing Supervision   How much help from another person does the patient currently need...   Putting on and taking off regular lower body clothing? 3   Bathing (including washing, rinsing, and drying)? 3   Toileting, which includes using a toilet, bedpan, or urinal? 3   Putting on and taking off regular upper body clothing? 3   Taking care of personal grooming such as brushing teeth? 3   Eating meals? 4   6 Clicks Daily Activity Score 19   Functional Mobility   Sit to Stand Supervised   Transfer Method Stand Step   Mobility bed mobility, STS at bedside for LBD   Comments no A/E   Activity Tolerance   Sitting Edge of Bed 8 mins end of session   Standing 1 min   Education Group   Education Provided Role of Occupational Therapist;Activities of Daily Living   Role of Occupational Therapist Patient Response Patient;Acceptance;Explanation;Verbal Demonstration   ADL  Patient Response Patient;Acceptance;Explanation;Verbal Demonstration   Occupational Therapy Initial Treatment Plan    Duration Evaluation only   Problem List   Problem List None   Anticipated Discharge Equipment and Recommendations   DC Equipment Recommendations None   Discharge Recommendations Anticipate that the patient will have no further occupational therapy needs after discharge from the hospital   Interdisciplinary Plan of Care Collaboration   IDT Collaboration with  Nursing   Patient Position at End of Therapy In Bed;Bed Alarm On;Call Light within Reach;Tray Table within Reach;Phone within Reach   Collaboration Comments RN updated

## 2024-07-30 PROBLEM — M79.605 LEFT LEG PAIN: Status: ACTIVE | Noted: 2024-07-30

## 2024-07-30 LAB
ANION GAP SERPL CALC-SCNC: 13 MMOL/L (ref 7–16)
BASOPHILS # BLD AUTO: 0.7 % (ref 0–1.8)
BASOPHILS # BLD: 0.04 K/UL (ref 0–0.12)
BUN SERPL-MCNC: 17 MG/DL (ref 8–22)
CALCIUM SERPL-MCNC: 8.8 MG/DL (ref 8.5–10.5)
CHLORIDE SERPL-SCNC: 98 MMOL/L (ref 96–112)
CO2 SERPL-SCNC: 23 MMOL/L (ref 20–33)
CREAT SERPL-MCNC: 0.86 MG/DL (ref 0.5–1.4)
CRP SERPL HS-MCNC: 7.82 MG/DL (ref 0–0.75)
EOSINOPHIL # BLD AUTO: 0.14 K/UL (ref 0–0.51)
EOSINOPHIL NFR BLD: 2.4 % (ref 0–6.9)
ERYTHROCYTE [DISTWIDTH] IN BLOOD BY AUTOMATED COUNT: 41 FL (ref 35.9–50)
ERYTHROCYTE [SEDIMENTATION RATE] IN BLOOD BY WESTERGREN METHOD: 45 MM/HOUR (ref 0–20)
GFR SERPLBLD CREATININE-BSD FMLA CKD-EPI: 96 ML/MIN/1.73 M 2
GLUCOSE SERPL-MCNC: 115 MG/DL (ref 65–99)
HCT VFR BLD AUTO: 37 % (ref 42–52)
HGB BLD-MCNC: 12.9 G/DL (ref 14–18)
IMM GRANULOCYTES # BLD AUTO: 0.05 K/UL (ref 0–0.11)
IMM GRANULOCYTES NFR BLD AUTO: 0.9 % (ref 0–0.9)
LYMPHOCYTES # BLD AUTO: 1.03 K/UL (ref 1–4.8)
LYMPHOCYTES NFR BLD: 17.9 % (ref 22–41)
MCH RBC QN AUTO: 30.1 PG (ref 27–33)
MCHC RBC AUTO-ENTMCNC: 34.9 G/DL (ref 32.3–36.5)
MCV RBC AUTO: 86.2 FL (ref 81.4–97.8)
MONOCYTES # BLD AUTO: 0.69 K/UL (ref 0–0.85)
MONOCYTES NFR BLD AUTO: 12 % (ref 0–13.4)
NEUTROPHILS # BLD AUTO: 3.81 K/UL (ref 1.82–7.42)
NEUTROPHILS NFR BLD: 66.1 % (ref 44–72)
NRBC # BLD AUTO: 0 K/UL
NRBC BLD-RTO: 0 /100 WBC (ref 0–0.2)
PLATELET # BLD AUTO: 200 K/UL (ref 164–446)
PMV BLD AUTO: 9 FL (ref 9–12.9)
POTASSIUM SERPL-SCNC: 3.7 MMOL/L (ref 3.6–5.5)
PROCALCITONIN SERPL-MCNC: 0.46 NG/ML
RBC # BLD AUTO: 4.29 M/UL (ref 4.7–6.1)
SODIUM SERPL-SCNC: 134 MMOL/L (ref 135–145)
WBC # BLD AUTO: 5.8 K/UL (ref 4.8–10.8)

## 2024-07-30 PROCEDURE — 84145 PROCALCITONIN (PCT): CPT

## 2024-07-30 PROCEDURE — 99232 SBSQ HOSP IP/OBS MODERATE 35: CPT | Mod: GC | Performed by: STUDENT IN AN ORGANIZED HEALTH CARE EDUCATION/TRAINING PROGRAM

## 2024-07-30 PROCEDURE — 700111 HCHG RX REV CODE 636 W/ 250 OVERRIDE (IP): Mod: JZ | Performed by: HOSPITALIST

## 2024-07-30 PROCEDURE — 36415 COLL VENOUS BLD VENIPUNCTURE: CPT

## 2024-07-30 PROCEDURE — 99232 SBSQ HOSP IP/OBS MODERATE 35: CPT | Performed by: STUDENT IN AN ORGANIZED HEALTH CARE EDUCATION/TRAINING PROGRAM

## 2024-07-30 PROCEDURE — 85652 RBC SED RATE AUTOMATED: CPT

## 2024-07-30 PROCEDURE — 700102 HCHG RX REV CODE 250 W/ 637 OVERRIDE(OP): Performed by: HOSPITALIST

## 2024-07-30 PROCEDURE — 80048 BASIC METABOLIC PNL TOTAL CA: CPT

## 2024-07-30 PROCEDURE — 86140 C-REACTIVE PROTEIN: CPT

## 2024-07-30 PROCEDURE — A9270 NON-COVERED ITEM OR SERVICE: HCPCS | Performed by: STUDENT IN AN ORGANIZED HEALTH CARE EDUCATION/TRAINING PROGRAM

## 2024-07-30 PROCEDURE — 700105 HCHG RX REV CODE 258: Performed by: HOSPITALIST

## 2024-07-30 PROCEDURE — 700102 HCHG RX REV CODE 250 W/ 637 OVERRIDE(OP): Performed by: STUDENT IN AN ORGANIZED HEALTH CARE EDUCATION/TRAINING PROGRAM

## 2024-07-30 PROCEDURE — 770001 HCHG ROOM/CARE - MED/SURG/GYN PRIV*

## 2024-07-30 PROCEDURE — A9270 NON-COVERED ITEM OR SERVICE: HCPCS | Performed by: HOSPITALIST

## 2024-07-30 PROCEDURE — 85025 COMPLETE CBC W/AUTO DIFF WBC: CPT

## 2024-07-30 RX ORDER — TRAZODONE HYDROCHLORIDE 50 MG/1
100 TABLET, FILM COATED ORAL
Status: DISCONTINUED | OUTPATIENT
Start: 2024-07-30 | End: 2024-08-01

## 2024-07-30 RX ORDER — BUPROPION HYDROCHLORIDE 150 MG/1
150 TABLET, EXTENDED RELEASE ORAL DAILY
Status: DISCONTINUED | OUTPATIENT
Start: 2024-07-31 | End: 2024-08-01

## 2024-07-30 RX ADMIN — ENOXAPARIN SODIUM 40 MG: 100 INJECTION SUBCUTANEOUS at 18:06

## 2024-07-30 RX ADMIN — SERTRALINE 50 MG: 50 TABLET, FILM COATED ORAL at 05:04

## 2024-07-30 RX ADMIN — CEFAZOLIN 2 G: 2 INJECTION, POWDER, FOR SOLUTION INTRAMUSCULAR; INTRAVENOUS at 15:04

## 2024-07-30 RX ADMIN — OXYCODONE HYDROCHLORIDE 5 MG: 5 TABLET ORAL at 21:18

## 2024-07-30 RX ADMIN — Medication 1 G: at 12:06

## 2024-07-30 RX ADMIN — CEFAZOLIN 2 G: 2 INJECTION, POWDER, FOR SOLUTION INTRAMUSCULAR; INTRAVENOUS at 05:08

## 2024-07-30 RX ADMIN — TRAZODONE HYDROCHLORIDE 100 MG: 50 TABLET ORAL at 21:18

## 2024-07-30 RX ADMIN — Medication 1 G: at 18:06

## 2024-07-30 RX ADMIN — CEFAZOLIN 2 G: 2 INJECTION, POWDER, FOR SOLUTION INTRAMUSCULAR; INTRAVENOUS at 21:21

## 2024-07-30 RX ADMIN — OXYCODONE HYDROCHLORIDE 5 MG: 5 TABLET ORAL at 06:30

## 2024-07-30 ASSESSMENT — ENCOUNTER SYMPTOMS
HEADACHES: 0
NAUSEA: 0
SHORTNESS OF BREATH: 0
CHILLS: 0
DIZZINESS: 0
ABDOMINAL PAIN: 0
VOMITING: 0
COUGH: 0
FEVER: 0
DEPRESSION: 1
MYALGIAS: 0
MYALGIAS: 1
PALPITATIONS: 0

## 2024-07-30 ASSESSMENT — PAIN DESCRIPTION - PAIN TYPE
TYPE: ACUTE PAIN
TYPE: ACUTE PAIN

## 2024-07-30 NOTE — CONSULTS
"PSYCHIATRIC FOLLOW-UP: (established)  Reason for admission:   Overdose, Suicidal ideation  Legal Hold Status:    Extended        Chart reviewed.         HPI:          Terrell Hensley is a 63 y.o. male admitted 7/23/2024 with altered mental status, found down after intentional OD of his home medications amlodipine in order to kill himself.     Interval hx    Today pt was interviewed in his bed, he was in no acute distress.  Pt reports improved sleep with Trazodone, he had a 3 hrs uninterrupted sleep and feels more rested today. Mood is \"better\" and appetite has somewhat improved too. He denies ongoing SI/HI AVH, said, \"I just want a very good night of sleep\". He mentioned his biggest mental health obstacle is lack of motivation and general anhedonia. He disclosed that his SA in 2018 was also trying to overdose, and for a long time his brother has been dispensing his medications on a daily basis to limit his access to large quantity of medications.     His son and daughter will be visiting him this afternoon from 1-2pm, he is agreeable to complete the safety plan with his son and daughter. We went over some techniques that pt can use to distract himself when being in emotional crisis such as: listening to music, talking to his son/daughter.     Received pt's medication record from Dr. Dubose this AM, Also noted hyponatremia  134 while on salt tablet.    Family meeting was held this afternoon with  his brothers Cortes Reaves and his daughter Tressa. Pt admitted that his SA this time was impulsive because he felt \"hopeless\". We explored some warning signs of him spiraling into anxiety and related SI such as: Rumination on low self-worth, excessive guilt of being a burden to his family etc. He mentioned he could play keyboard and guitar to distract himself from catastrophic thinking and rumination. Pt identified a few people he can reach out to to talk: His brothers Jean Paul Kolb, his son and daughter. Lastly we discussed " "professional resources (his psychiatrist) and acute care facilities ([facility]) he can go to when he experiences mental health crisis.      We discussed increasing trazodone to 100mg po qHS. Also recommended discontinuing Zoloft and starting Wellbutrin for better motivation and improved anhedonia. Pt was agreeable to plan discussed above. Safety plan completed and is scanned into his chart.    No additional concerns reported at this time.    Medical ROS (as pertinent):     Review of Systems   Musculoskeletal:  Positive for myalgias.           Psychiatric Examination:  Vitals:   Vitals:    07/30/24 0930   BP:    Pulse:    Resp: 16   Temp:    SpO2:         General Appearance: Appears state age, appropriate grooming & hygiene, no acute distress  Behavior:    -Appropriate activity, appropriate eye contact, pleasant & cooperative  Neuro:   -No tremors noted   -No psychomotor agitation or retardation   -No posture or gait abnormalities appreciated  Speech: Appropriate quantity, spontaneous. Regular rate and rhythm. Appropriate volume and intonation. Articulation is clear  Language: English  Thought processes: Coherent, linear, logical and goal-directed. No evidence of loose associations  Thought content: No evidence of SI/HI/AVH but states he still has passive SI that is \"coming and giong\". Not observed responding to internal stimuli. No evidence of delusions or paranoia  Mood: \"Better\" as stated by patient  Affect: Congruent with stated mood. Less constricted, less dysphoric appropriately reactive to conversation. No evidence of lability, ernie, or agitation  Judgement and Insight: Fair/Fair  Cognition:    -Alert & oriented x 3 (Person, place and time)   -Attention & concentration grossly intact   -Immediate/delayed memory not formally tested but grossly intact   -Age-appropriate fund of knowledge      New PAST MEDICAL/PSYCH/FAMILY/SOCIAL(as reported by patient):       None    Medications    Current Facility-Administered " Medications:     traZODone (Desyrel) tablet 100 mg, 100 mg, Oral, QHS, Roman Valle M.D., 100 mg at 24    [START ON 2024] buPROPion SR (Wellbutrin-SR) tablet 150 mg, 150 mg, Oral, DAILY, Roman Valle M.D.    sodium chloride (Salt) tablet 1 g, 1 g, Oral, TID WITH MEALS, Jaimie Church M.D., 1 g at 24    oxyCODONE immediate-release (Roxicodone) tablet 5 mg, 5 mg, Oral, Q4HRS PRN, Jaimie Church M.D., 5 mg at 24    ceFAZolin (Ancef) 2 g in  mL IVPB, 2 g, Intravenous, Q8HRS, Jaimie Church M.D., Stopped at 24    magnesium hydroxide (Milk Of Magnesia) suspension 30 mL, 30 mL, Oral, QDAY PRN, Ildefonso Velasquez, JUNE.P.R.N., 30 mL at 24 0438    LORazepam (Ativan) injection 0.5 mg, 0.5 mg, Intravenous, Q6HRS PRN, Jaimie Church M.D., 0.5 mg at 24    enoxaparin (Lovenox) inj 40 mg, 40 mg, Subcutaneous, DAILY AT 1800, Jaimie Church M.D., 40 mg at 24    acetaminophen (Tylenol) tablet 650 mg, 650 mg, Oral, Q4HRS PRN, Cristofer Tejeda D.O., 650 mg at 24 1707    ondansetron (Zofran) syringe/vial injection 4 mg, 4 mg, Intravenous, Q4HRS PRN, RYAN Aguirre.O.    Allergies  Allergies   Allergen Reactions    Sulfa Drugs Hives and Rash           EKG:   Results for orders placed or performed during the hospital encounter of 24   EKG   Result Value Ref Range    Report       [facility] Emergency Dept.    Test Date:  2024  Pt Name:    FAIZAN GARDNER             Department: ER  MRN:        [id number]                      Room:        12  Gender:     Male                         Technician: [id number]  :        1960                   Requested By:ER TRIAGE PROTOCOL  Order #:    [id number]                    Reading MD: ILDEFONSO ARNOLD, DO    Measurements  Intervals                                Axis  Rate:       85                           P:          61  ND:         153                          QRS:         63  QRSD:       106                          T:          25  QT:         413  QTc:        492    Interpretive Statements  Sinus rhythm  Probable left atrial enlargement  Borderline prolonged QT interval  Compared to ECG 05/23/2024 17:09:50  No significant changes  Electronically Signed On 07- 00:56:04 PDT by ILDEFONSO ARNOLD DO        Brain Imaging: No new brain imaging  EEG:  None.     Labs personally reviewed:   Recent Results (from the past 24 hour(s))   URINALYSIS    Collection Time: 07/29/24 12:06 PM    Specimen: Urine, Clean Catch   Result Value Ref Range    Color Yellow     Character Clear     Specific Gravity 1.010 <1.035    Ph 7.5 5.0 - 8.0    Glucose 100 (A) Negative mg/dL    Ketones Negative Negative mg/dL    Protein Negative Negative mg/dL    Bilirubin Negative Negative    Urobilinogen, Urine 0.2 Negative    Nitrite Negative Negative    Leukocyte Esterase Negative Negative    Occult Blood Trace (A) Negative    Micro Urine Req Microscopic    URINE MICROSCOPIC (W/UA)    Collection Time: 07/29/24 12:06 PM   Result Value Ref Range    WBC 0-2 (A) /hpf    RBC 2-5 (A) /hpf    Bacteria Negative None /hpf    Epithelial Cells Negative /hpf    Hyaline Cast 0-2 /lpf   Basic Metabolic Panel    Collection Time: 07/30/24 12:51 AM   Result Value Ref Range    Sodium 134 (L) 135 - 145 mmol/L    Potassium 3.7 3.6 - 5.5 mmol/L    Chloride 98 96 - 112 mmol/L    Co2 23 20 - 33 mmol/L    Glucose 115 (H) 65 - 99 mg/dL    Bun 17 8 - 22 mg/dL    Creatinine 0.86 0.50 - 1.40 mg/dL    Calcium 8.8 8.5 - 10.5 mg/dL    Anion Gap 13.0 7.0 - 16.0   CBC WITH DIFFERENTIAL    Collection Time: 07/30/24 12:51 AM   Result Value Ref Range    WBC 5.8 4.8 - 10.8 K/uL    RBC 4.29 (L) 4.70 - 6.10 M/uL    Hemoglobin 12.9 (L) 14.0 - 18.0 g/dL    Hematocrit 37.0 (L) 42.0 - 52.0 %    MCV 86.2 81.4 - 97.8 fL    MCH 30.1 27.0 - 33.0 pg    MCHC 34.9 32.3 - 36.5 g/dL    RDW 41.0 35.9 - 50.0 fL    Platelet Count 200 164 - 446 K/uL    MPV 9.0  9.0 - 12.9 fL    Neutrophils-Polys 66.10 44.00 - 72.00 %    Lymphocytes 17.90 (L) 22.00 - 41.00 %    Monocytes 12.00 0.00 - 13.40 %    Eosinophils 2.40 0.00 - 6.90 %    Basophils 0.70 0.00 - 1.80 %    Immature Granulocytes 0.90 0.00 - 0.90 %    Nucleated RBC 0.00 0.00 - 0.20 /100 WBC    Neutrophils (Absolute) 3.81 1.82 - 7.42 K/uL    Lymphs (Absolute) 1.03 1.00 - 4.80 K/uL    Monos (Absolute) 0.69 0.00 - 0.85 K/uL    Eos (Absolute) 0.14 0.00 - 0.51 K/uL    Baso (Absolute) 0.04 0.00 - 0.12 K/uL    Immature Granulocytes (abs) 0.05 0.00 - 0.11 K/uL    NRBC (Absolute) 0.00 K/uL   ESTIMATED GFR    Collection Time: 07/30/24 12:51 AM   Result Value Ref Range    GFR (CKD-EPI) 96 >60 mL/min/1.73 m 2   Sed Rate    Collection Time: 07/30/24 12:51 AM   Result Value Ref Range    Sed Rate Westergren 45 (H) 0 - 20 mm/hour   CRP QUANTITIVE (NON-CARDIAC)    Collection Time: 07/30/24 12:51 AM   Result Value Ref Range    Stat C-Reactive Protein 7.82 (H) 0.00 - 0.75 mg/dL   PROCALCITONIN    Collection Time: 07/30/24 12:51 AM   Result Value Ref Range    Procalcitonin 0.46 (H) <0.25 ng/mL     Recent Labs     07/24/24  0350 07/25/24  0420 07/26/24  0233 07/27/24  0826 07/28/24  0740 07/29/24  0530   ASTSGOT 226* 143* 92* 101* 59* 53*   ALTSGPT 105* 107* 85* 104* 90* 93*   TBILIRUBIN 0.4 0.3 0.3 0.6 0.4 0.4   GLOBULIN 2.0 2.5 2.5 2.5 2.4 2.6         Assessment:  Terrell Hensley is a 63 y.o. male admitted 7/23/2024 with altered mental status, found down after intentional OD of his home medications amlodipine in order to kill himself.      Today pt denies any SI/HI AVH. His mood seems to have improved with better sleep quality.  Will aim to taper off Clonazepam given pt does not primary complaint of severe anxiety/panic attacks. We will discontinue Sertraline (rock considering SIADH side effect of sertraline and can possibly contribute to his hyponatremia) and start Wellbutrin. Pt has benefited from Trazodone and reports improved sleep,  can further titrate up Trazodone for optimal insomnia management.      Dx:  # MDD, recurrent, severe   # Suicidal Ideation  # Drug overdose    Medical :  Per primary team      Plan:  Legal hold: Extended - will re-eval tomorrow.  Psychotropic medications  Increase Trazodone to 100mg po qHS for sleep  Discontinue Zoloft   Start Wellbutrin XL 150mg po Daily  Klonopin dcd by [facility] team yesterday, no signs of withdrawal or rebound anxiety so far  Please transfer pt to inpatient psychiatric hospital when medically cleared and bed is available  Labs ordered/reviewed  EKG ordered/reviewed  Old records ordered/reviewed/summarized  Collateral obtained: Brothers Jean Paul, Cortes and Daughter Tressa.  Safety plan completed, copy in chart 7/30/2024  Discussed the case with: Dr. Valle  Psychiatry will follow up     Thank you for the consult.     Sitter: 1:1  Phone: Yes room phone  Visitors: Yes, Jean Paul (brother), Cortes (brother), AJ (son), Tressa (daughter)   Personal belongings:  NO

## 2024-07-30 NOTE — CARE PLAN
The patient is Stable - Low risk of patient condition declining or worsening    Shift Goals  Clinical Goals: CT completed, safety (pt will have ct completed by end of shift, pt will remain free of fall or injury throughout shift, 1:1 sitter will be continued throughout shift)  Patient Goals: have CT done, rest  Family Goals: ansley    Progress made toward(s) clinical / shift goals:  Day shift RNRuth, replaced IV via u/s. Report called down to CT. CT rescheduled and transport scheduled. CT completed. Pt bed locked in lowest position with call light in reach. 1:1 sitter at bedside.       Problem: Pain - Standard  Goal: Alleviation of pain or a reduction in pain to the patient’s comfort goal  Outcome: Progressing     Problem: Provide Safe Environment  Goal: Suicide environmental safety, protocols, policies, and practices will be implemented  Outcome: Progressing     Problem: Skin Integrity  Goal: Skin integrity is maintained or improved  Outcome: Progressing       Patient is not progressing towards the following goals:

## 2024-07-30 NOTE — ASSESSMENT & PLAN NOTE
Patient with erythema, warmth, pain left buttock s/p found down at home.  CT pelvis with contrast ordered to evaluate for abscess.  Started ancef 2gm IV q 8 x 7 days.  Wound care  Off loading, out of bed, increase activity.

## 2024-07-30 NOTE — PROGRESS NOTES
[facility] Medicine Daily Progress Note    Date of Service  7/29/2024    Chief Complaint  Terrell Hensley is a 63 y.o. male admitted 7/23/2024 with altered mental status, found down after intentional OD of his home medications.    Hospital Course  62yo PMHx HTN, gout.  Presented altered after an amlodipine OD.  CPK16k, WILMAR, elevated LFTs.    Interval Problem Update  7/26:  transferred out of ICU to medical unit, remains confused.  States he has left hip pain, xray ordered.  PT/OT ordered, off strict bedrest.  CPK remain 4, 523, cut back  to 125/hr. Started lovenox for DVT ppx.  Head CT on admission negative.  7/27: Patient appeared quite anxious nervous today.  Psychiatry has recommended Seroquel 50 mg nightly.  QTc on EKG today 492.  Also restart Zoloft tomorrow morning 50 mg in AM.  I have added Klonopin 0.5 mg p.o. twice daily with as needed and Ativan for MRI brain rule out anoxic event.  Apparently patient attempted to get out of bed and his legs collapsed.  PT OT pending.  7/28:  patient improved anxiety and acute psychosis with addition of medications.  He asks if he will be wheelchair bound.  C/o left knee pain.  Ordered xray left femur. No obvious deformity noted on exam. Blake discontinued today.  7/29: Patient still complains of left hip pain.  Finding of left buttock erythema, swelling, appearance of cellulitis.  I have ordered a CT pelvis to rule out abscess.  Start Ancef 2 g IV every 8 x 7 days.  Patient complains of numbness in his left lower leg.  He is difficult historian since he states yes to almost all questions asked.  He does complain of cervical neck pain.  When asked if he has any numbness or pain in his arms he says yes.  I have added MRI cervical spine to MRI brain without contrast.  As needed pain medications added.  PT eval recommending postacute placement for physical therapy.  They did get patient up to bathroom with assistance.    I have discussed this patient's plan of care  and discharge plan at IDT rounds today with Case Management, Nursing, Nursing leadership, and other members of the IDT team.    Consultants/Specialty  psychiatry    Code Status  Full Code    Disposition  The patient is not medically cleared for discharge to home or a post-acute facility.  Anticipate discharge to: a psychiatric hospital    I have placed the appropriate orders for post-discharge needs.    Review of Systems  Review of Systems   Constitutional:  Negative for chills and fever.   Respiratory:  Negative for cough and shortness of breath.    Cardiovascular:  Negative for chest pain, palpitations and leg swelling.   Gastrointestinal:  Negative for abdominal pain, diarrhea, nausea and vomiting.   Genitourinary:  Negative for dysuria and urgency.   Musculoskeletal:  Negative for back pain.   Skin:  Negative for rash.   Neurological:  Negative for dizziness, loss of consciousness and headaches.   Psychiatric/Behavioral:  The patient is nervous/anxious.         Physical Exam  Temp:  [36.2 °C (97.1 °F)-37.9 °C (100.2 °F)] 36.2 °C (97.1 °F)  Pulse:  [78-85] 78  Resp:  [16] 16  BP: (114-139)/(56-81) 114/56  SpO2:  [93 %-96 %] 96 %    Physical Exam  Constitutional:       General: He is not in acute distress.     Appearance: He is well-developed. He is not diaphoretic.   HENT:      Head: Normocephalic and atraumatic.   Eyes:      Conjunctiva/sclera: Conjunctivae normal.   Neck:      Vascular: No JVD.   Cardiovascular:      Rate and Rhythm: Normal rate.      Heart sounds: No murmur heard.     No gallop.   Pulmonary:      Effort: Pulmonary effort is normal. No respiratory distress.      Breath sounds: No stridor. No wheezing or rales.   Abdominal:      Palpations: Abdomen is soft.      Tenderness: There is no abdominal tenderness. There is no guarding or rebound.   Musculoskeletal:      Right lower leg: No edema.      Left lower leg: No edema.   Skin:     General: Skin is warm and dry.      Capillary Refill: Capillary  refill takes less than 2 seconds.      Findings: No rash.   Neurological:      Mental Status: He is oriented to person, place, and time.   Psychiatric:         Mood and Affect: Mood normal.         Behavior: Behavior normal.         Thought Content: Thought content normal.      Comments: Denies suicidal ideation again today         Fluids    Intake/Output Summary (Last 24 hours) at 7/29/2024 1921  Last data filed at 7/29/2024 1625  Gross per 24 hour   Intake 360 ml   Output 3800 ml   Net -3440 ml       Laboratory        Recent Labs     07/27/24  0826 07/28/24  0740 07/29/24  0530   SODIUM 135 132* 129*   POTASSIUM 4.2 3.9 3.6   CHLORIDE 95* 97 94*   CO2 27 24 24   GLUCOSE 110* 104* 106*   BUN 14 13 13   CREATININE 0.87 0.85 0.88   CALCIUM 9.0 8.9 8.7                   Imaging  DX-FEMUR-2+ LEFT   Final Result      Unremarkable LEFT femur.      DX-HIP-COMPLETE - UNILATERAL 2+ LEFT   Final Result      No radiographic evidence of acute traumatic injury.      CT-HEAD W/O   Final Result         1.  No acute intracranial abnormality.   2.  Atherosclerosis.               CT-PELVIS WITH    (Results Pending)   MR-CERVICAL SPINE-W/O    (Results Pending)   MR-BRAIN-W/O    (Results Pending)        Assessment/Plan  * Drug overdose of undetermined intent, initial encounter- (present on admission)  Assessment & Plan  Apparent suicide attempt  Polypharmacy: some amlodipine but other ingestions unclear  Cont supportive care as clinically improving  On Psych hold  Psychiatry team following    Neck pain, acute- (present on admission)  Assessment & Plan  Patient complaining of LLE numbness, neck pain and bilateral arm numbness.  Head injury 4/2024.  Ordered MRI c spine with head MRI.  Oxycodone prn pain.    Cellulitis of buttock  Assessment & Plan  Patient with erythema, warmth, pain left buttock s/p found down at home.  CT pelvis with contrast ordered to evaluate for abscess.  Started ancef 2gm IV q 8 x 7 days.  Wound care  Off  loading, out of bed, increase activity.    Anxiety- (present on admission)  Assessment & Plan  Increased anxiety noted on exam.  Psychiatry added seroquel 50mg qhs, zoloft resumed for a.m.   Ativan prn  Klonopin bid.    Anemia- (present on admission)  Assessment & Plan  Mild   Cont to monitor    Leukocytosis- (present on admission)  Assessment & Plan  False in light of dehydration, likely aspiration pneumonitis with some neutrophil shift. May consider further intervention once patient stabilizes and is more alert, since unable to assess and suspect it is best to minimize medications at this time owing to unclear clinical picture from overdose.  -CTM    Urinary incontinence- (present on admission)  Assessment & Plan  -oxybutynin held due to drug overdose    Rhabdomyolysis- (present on admission)  Assessment & Plan  CK elevated to 16k, suspected 2/2 being found down and drug overdose.  Cont agressive IVFs but change to LR from NS  Follow daily AST, ALT, Bun, Creat and K    7/25  CPK now 7k  AST/ALT coming down  Renal function improving  K WNL  Cont to monitor CPK, CMP daily until normalized  Cont IVFs until CPK<3k    Elevated liver enzymes- (present on admission)  Assessment & Plan  Transaminase elevation likely from Rhabdo and not acute liver injury though certainly could have a toxic component from OD  Coming down as expected with resolution of Rhabdo  Cont to monitor daily CMP  Consider further evaluation if it does not resolve as CPK drops and Rhabdo resolves    Hypokalemia- (present on admission)  Assessment & Plan  Follow and replace    Acute renal failure (ARF) (HCC)- (present on admission)  Assessment & Plan  Likely 2/2 prerenal damage from hypotension 2/2 drug overdose and uremia related to rhabdomyolysis.  Creat imroving daily: 1.4 today  Good UOP  Cont LR until CPK<3k  Daily BMP  Renally dose medications as appropriate    High anion gap metabolic acidosis- (present on admission)  Assessment & Plan  Likely  2/2 prerenal damage from hypotension 2/2 drug overdose and uremia related to rhabdomyolysis.  -fluid resuscitation with NS 200cc/hr  -IV potassium 10meq x4      Suicidal behavior with attempted self-injury (HCC)- (present on admission)  Assessment & Plan  Repeat suicidal attempt by patient  -psych consult  -sitter    Mitral regurgitation- (present on admission)  Assessment & Plan  Suspected murmur, though difficult to auscultate. Unclear if preexisting or related to overdose.   -consider TTE once more clinically stable.    Acute encephalopathy- (present on admission)  Assessment & Plan  Acute metabolic/toxic encephalopathy  CT head neg  Mental status improving daily though still not completely back to normal  Cont to monitor clinically  Repeat lab in am  Minimize sedating medications  7/27:  increased anxiety/confusion, query anoxic brain injury, ordered MRI brain w/o contrast.      Hypertension- (present on admission)  Assessment & Plan  Hx of HTN, holding home meds in light of hypotension  Holding BP meds for obvious reasons    Hyponatremia- (present on admission)  Assessment & Plan  Likely hypovolemic hypoNa  Has resolved with IVF resuscitation  Cont to monitor         VTE prophylaxis: VTE Selection    I have performed a physical exam and reviewed and updated ROS and Plan today (7/29/2024). In review of yesterday's note (7/28/2024), there are no changes except as documented above.

## 2024-07-30 NOTE — ASSESSMENT & PLAN NOTE
Patient complaining of LLE numbness, neck pain and bilateral arm numbness.  Head injury 4/2024.  Ordered MRI c spine with head MRI.  Oxycodone prn pain.

## 2024-07-31 ENCOUNTER — APPOINTMENT (OUTPATIENT)
Dept: RADIOLOGY | Facility: MEDICAL CENTER | Age: 64
DRG: 907 | End: 2024-07-31
Attending: STUDENT IN AN ORGANIZED HEALTH CARE EDUCATION/TRAINING PROGRAM
Payer: MEDICARE

## 2024-07-31 VITALS
OXYGEN SATURATION: 97 % | SYSTOLIC BLOOD PRESSURE: 157 MMHG | HEIGHT: 71 IN | BODY MASS INDEX: 26.33 KG/M2 | WEIGHT: 188.05 LBS | HEART RATE: 72 BPM | DIASTOLIC BLOOD PRESSURE: 74 MMHG | RESPIRATION RATE: 18 BRPM | TEMPERATURE: 97.3 F

## 2024-07-31 PROBLEM — M79.89 PAIN AND SWELLING OF RIGHT UPPER EXTREMITY: Status: ACTIVE | Noted: 2024-07-31

## 2024-07-31 PROBLEM — M79.601 PAIN AND SWELLING OF RIGHT UPPER EXTREMITY: Status: ACTIVE | Noted: 2024-07-31

## 2024-07-31 LAB
BASOPHILS # BLD AUTO: 0.6 % (ref 0–1.8)
BASOPHILS # BLD: 0.04 K/UL (ref 0–0.12)
EOSINOPHIL # BLD AUTO: 0.09 K/UL (ref 0–0.51)
EOSINOPHIL NFR BLD: 1.3 % (ref 0–6.9)
ERYTHROCYTE [DISTWIDTH] IN BLOOD BY AUTOMATED COUNT: 41.5 FL (ref 35.9–50)
HCT VFR BLD AUTO: 34 % (ref 42–52)
HGB BLD-MCNC: 11.9 G/DL (ref 14–18)
IMM GRANULOCYTES # BLD AUTO: 0.04 K/UL (ref 0–0.11)
IMM GRANULOCYTES NFR BLD AUTO: 0.6 % (ref 0–0.9)
LYMPHOCYTES # BLD AUTO: 1.07 K/UL (ref 1–4.8)
LYMPHOCYTES NFR BLD: 15.2 % (ref 22–41)
MCH RBC QN AUTO: 30.5 PG (ref 27–33)
MCHC RBC AUTO-ENTMCNC: 35 G/DL (ref 32.3–36.5)
MCV RBC AUTO: 87.2 FL (ref 81.4–97.8)
MONOCYTES # BLD AUTO: 0.66 K/UL (ref 0–0.85)
MONOCYTES NFR BLD AUTO: 9.4 % (ref 0–13.4)
NEUTROPHILS # BLD AUTO: 5.12 K/UL (ref 1.82–7.42)
NEUTROPHILS NFR BLD: 72.9 % (ref 44–72)
NRBC # BLD AUTO: 0 K/UL
NRBC BLD-RTO: 0 /100 WBC (ref 0–0.2)
PLATELET # BLD AUTO: 199 K/UL (ref 164–446)
PMV BLD AUTO: 9 FL (ref 9–12.9)
RBC # BLD AUTO: 3.9 M/UL (ref 4.7–6.1)
WBC # BLD AUTO: 7 K/UL (ref 4.8–10.8)

## 2024-07-31 PROCEDURE — 700105 HCHG RX REV CODE 258: Performed by: HOSPITALIST

## 2024-07-31 PROCEDURE — 700102 HCHG RX REV CODE 250 W/ 637 OVERRIDE(OP): Performed by: HOSPITALIST

## 2024-07-31 PROCEDURE — 90837 PSYTX W PT 60 MINUTES: CPT | Performed by: SOCIAL WORKER

## 2024-07-31 PROCEDURE — 700111 HCHG RX REV CODE 636 W/ 250 OVERRIDE (IP): Mod: JZ | Performed by: HOSPITALIST

## 2024-07-31 PROCEDURE — A9270 NON-COVERED ITEM OR SERVICE: HCPCS | Performed by: STUDENT IN AN ORGANIZED HEALTH CARE EDUCATION/TRAINING PROGRAM

## 2024-07-31 PROCEDURE — 770001 HCHG ROOM/CARE - MED/SURG/GYN PRIV*

## 2024-07-31 PROCEDURE — 36415 COLL VENOUS BLD VENIPUNCTURE: CPT

## 2024-07-31 PROCEDURE — 700102 HCHG RX REV CODE 250 W/ 637 OVERRIDE(OP): Performed by: STUDENT IN AN ORGANIZED HEALTH CARE EDUCATION/TRAINING PROGRAM

## 2024-07-31 PROCEDURE — 99232 SBSQ HOSP IP/OBS MODERATE 35: CPT | Performed by: STUDENT IN AN ORGANIZED HEALTH CARE EDUCATION/TRAINING PROGRAM

## 2024-07-31 PROCEDURE — A9270 NON-COVERED ITEM OR SERVICE: HCPCS | Performed by: HOSPITALIST

## 2024-07-31 PROCEDURE — 85025 COMPLETE CBC W/AUTO DIFF WBC: CPT

## 2024-07-31 PROCEDURE — 93971 EXTREMITY STUDY: CPT | Mod: RT

## 2024-07-31 RX ADMIN — ACETAMINOPHEN 650 MG: 325 TABLET ORAL at 23:15

## 2024-07-31 RX ADMIN — Medication 1 G: at 16:26

## 2024-07-31 RX ADMIN — BUPROPION HYDROCHLORIDE 150 MG: 150 TABLET, EXTENDED RELEASE ORAL at 05:44

## 2024-07-31 RX ADMIN — Medication 1 G: at 12:58

## 2024-07-31 RX ADMIN — ENOXAPARIN SODIUM 40 MG: 100 INJECTION SUBCUTANEOUS at 16:25

## 2024-07-31 RX ADMIN — CEFAZOLIN 2 G: 2 INJECTION, POWDER, FOR SOLUTION INTRAMUSCULAR; INTRAVENOUS at 05:48

## 2024-07-31 RX ADMIN — CEFAZOLIN 2 G: 2 INJECTION, POWDER, FOR SOLUTION INTRAMUSCULAR; INTRAVENOUS at 22:26

## 2024-07-31 RX ADMIN — TRAZODONE HYDROCHLORIDE 100 MG: 50 TABLET ORAL at 21:50

## 2024-07-31 RX ADMIN — CEFAZOLIN 2 G: 2 INJECTION, POWDER, FOR SOLUTION INTRAMUSCULAR; INTRAVENOUS at 14:22

## 2024-07-31 RX ADMIN — Medication 1 G: at 08:49

## 2024-07-31 ASSESSMENT — PAIN DESCRIPTION - PAIN TYPE
TYPE: ACUTE PAIN

## 2024-07-31 ASSESSMENT — ENCOUNTER SYMPTOMS
CHILLS: 0
PALPITATIONS: 0
ABDOMINAL PAIN: 0
DIZZINESS: 0
VOMITING: 0
COUGH: 0
NAUSEA: 0
SHORTNESS OF BREATH: 0
DEPRESSION: 1
HEADACHES: 0
MYALGIAS: 0
FEVER: 0

## 2024-07-31 NOTE — CARE PLAN
The patient is Stable - Low risk of patient condition declining or worsening    Shift Goals  Clinical Goals: mobilization, pain management (pt will mobilize to bathroom with assistance,)  Patient Goals: Get Better, Get a plan for after hospital  Family Goals: FELICITAS-not present    Progress made toward(s) clinical / shift goals:  Pt encouraged to mobilize as much as possible. Pt using restroom instead of urinal. Pt knee pain medicated with PRN. Pt able to rest with no additional complaints.      Problem: Provide Safe Environment  Goal: Suicide environmental safety, protocols, policies, and practices will be implemented  Outcome: Progressing     Problem: Fall Risk  Goal: Patient will remain free from falls  Outcome: Progressing     Problem: Skin Integrity  Goal: Skin integrity is maintained or improved  Outcome: Progressing       Patient is not progressing towards the following goals:

## 2024-07-31 NOTE — DISCHARGE PLANNING
Received Stipulation and Order from the court continuing pt's legal hold until 8/8, scanned copy into pt's chart.

## 2024-07-31 NOTE — ASSESSMENT & PLAN NOTE
7/30/2024  Suspect related to left distal psoas muscle enlargement concerning for myositis versus infection.  Continue cefazolin    7/31/2024  Continue cefazolin

## 2024-07-31 NOTE — CONSULTS
"[facility]    INPATIENT ASSESSMENT    Name: Terrell Hensley  MRN: [id number]  : 1960  Age: 64 y.o.  Date of assessment: 2024  PCP: Neelima Rivera M.D.  Persons in attendance: Patient and Siblings    HPI: Per Medical Record: \"Terrell Hensley is a 63 year-old male who presented 2024 after suicidal attempt drug overdose.\" [facility] was referred to [facility] for a psychotherapy session.     Psychotherapy Session Summary (as stated by Patient and brother): Terrell Hensley is a 64 year old male seen seated on edge of bed, alert, oriented to self, however pre-occupied with internal stimuli and delusional thought patterns related to demonic influences in his life. Patient reports being overwhelmed with demonic forces as soon as he wakes up in the morning. As a result, patient feels hopeless and controlled by these demonic influences without hope of escape. Patient's brother was at bedside, agreeing with patient stating, \"we are Christians and we believe what the Bible says that we struggle not against flesh and blood but against principalities in high places\". Patient's brother agrees that patient could be influenced by demonic forces.     In addition, patient feels ongoing guilt related to having an affair while previously . Patient reports, \"my shame was exposed in front of my children\". Patient reports \"I tried to kill myself then, I could not take the same\". Patient's brother reports this was in 2018. Patient states, \"I did the same thing then that I did this time, just took a bunch of pills\". Patients speech was very impoverished, slow, and required significant prompting to elicit an answer from patient. Patient also struggled to follow the conversation, however he was able to actively repeat every question I asked, to allow him time to process the question and eventually respond. When challenged on this practice, patient states, \"I cannot organize my " "thoughts, its hard for me to say what I am thinking\".     Patient appears to suffer from psychotic symptoms and delusions related to demonic influences. Patient is focused on internal stimuli which interferes with his functioning and ability to concentrate, or process information at a normal pace. Patient also in consumed with cognitive distortions, partly from the demonic forces he hears-accusing him of being a bad person and guilty. Patient cannot release himself from these experiences and beliefs. Patient becomes overwhelmed with hopelessness and becomes actively suicidal. Patient feels unhappy in general with life and has not hope or goals for his future. Patient continues to be at risk and would benefit from medication evaluation, and inpatient psychiatric treatment.      Chief Complaint   Patient presents with    Drug Overdose    Suicidal Ideation    Suicide Attempt        CURRENT LIVING SITUATION/SOCIAL SUPPORT: Patient resides in a mobile home on his younger brothers land. Patient was concerned because of bed bugs in the mobile home. Patient was also hungry and could not buy food but felt he could not tell anyone he was hungry. Patients cognitive distortions and delusional thought patterns highly influence patients decision making. Patient has two adult children and siblings who are available for support . 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    BEHAVIORAL HEALTH TREATMENT HISTORY  Does patient/parent report a history of prior behavioral health treatment for patient?   Patient reports a previous overdose. Patient is a poor historian and cannot recall if he was transferred to an inpatient hospital. Patient reports he started intensive outpatient but felt he did not need to attend. Patient has historically been noncompliant with treatment recommendations.    SAFETY ASSESSMENT - SELF  Does patient acknowledge current or past symptoms of dangerousness to self? yes  Does " parent/significant other report patient has current or past symptoms of dangerousness to self? yes  Does presenting problem suggest symptoms of dangerousness to self? Yes:     Past Current    Suicidal Thoughts: [x]  [x]    Suicidal Plans: [x]  [x]    Suicidal Intent: [x]  [x]    Suicide Attempts: [x]  [x]    Self-Injury []  []      For any boxes checked above, provide detail: At least two intentional ingestion of medications with the goal of committing suicide.    History of suicide by family member: no  History of suicide by friend/significant other: no  Recent change in frequency/specificity/intensity of suicidal thoughts or self-harm behavior?   Current access to firearms, medications, or other identified means of suicide/self-harm? no  If yes, willing to restrict access to means of suicide/self-harm? yes -   Protective factors present:   none reported    SAFETY ASSESSMENT - OTHERS  Does patient acknowledge current or past symptoms of aggressive behavior or risk to others? no  Does parent/significant other report patient has current or past symptoms of aggressive behavior or risk to others?  no  Does presenting problem suggest symptoms of dangerousness to others? No    Crisis Safety Plan completed and copy given to patient? no    ABUSE/NEGLECT SCREENING  Does patient report feeling “unsafe” in his/her home, or afraid of anyone?  no  Does patient report any history of physical, sexual, or emotional abuse?  no  Does parent or significant other report any of the above? no  Is there evidence of neglect by self?  yes  Is there evidence of neglect by a caregiver? no  Does the patient/parent report any history of CPS/APS/police involvement related to suspected abuse/neglect or domestic violence? no  Based on the information provided during the current assessment, is a mandated report of suspected abuse/neglect being made?  No    SUBSTANCE USE SCREENING  Yes:  Omar all substances used in the past 30 days:      Last Use  "Amount   []   Alcohol     []   Marijuana     []   Heroin     []   Prescription Opioids  (used without prescription, for    recreation, or in excess of prescribed amount)     []   Other Prescription  (used without prescription, for    recreation, or in excess of prescribed amount)     []   Cocaine      []   Methamphetamine     []   \"\" drugs (ectasy, MDMA)     []   Other substances        UDS results: none reported  Breathalyzer results: none reported    What consequences does the patient associate with any of the above substance use and or addictive behaviors? None    Risk factors for detox (check all that apply):  []  Seizures   []  Diaphoretic (sweating)   []  Tremors   []  Hallucinations   []  Increased blood pressure   []  Decreased blood pressure   []  Other   []  None      [] Patient education on risk factors for detoxification and instructed to return to ER as needed.      MENTAL STATUS              Participation: Limited verbal participation  Grooming: Neat  Orientation: Alert-  Behavior: Tense  Eye contact: Limited  Mood: Anxious  Affect: Blunted  Thought process: Loose associations  Thought content: Evidence of delusion, preocuppation  Speech: Latency of response  Perception: Evidence of auditory hallucination  Memory:  Poor memory for chronology of events  Insight: Poor  Judgment:  Poor  Other:    Collateral information:   Source:   Significant other present in person: brother   Significant other by telephone   [facility]   [facility] Record-reviewed   Other: consulted with psychiatry     Unable to complete full assessment due to:   Acute intoxication   Patient declined to participate/engage   Patient verbally unresponsive   Significant cognitive deficits   Significant perceptual distortions or behavioral disorganization   Other:             CLINICAL IMPRESSIONS:  Primary:  R/O Major Depressive disorder with psychotic features  Secondary:                         "                 IDENTIFIED NEEDS/PLAN:  [Trigger DISPOSITION list for any items marked]    x  Imminent safety risk - self  Imminent safety risk - others     Acute substance withdrawal   Psychosis/Impaired reality testing   x  Mood/anxiety   Substance use/Addictive behavior    x Maladaptive behavior   Parent/child conflict     Family/Couples conflict   Biomedical     Housing   Financial      Legal  Occupational/Educational     Domestic violence   Other:     Recommendations and Observation Level:  Sitter: one to one  Phone: yes  Visitors: yes  Personal belongings: no      Legal Hold: extended      Please transfer to an inpatient psychiatric facility    Nichole Damico, Ph.D., [facility]  7/31/2024   Length of intervention: 60 minutes

## 2024-07-31 NOTE — PROGRESS NOTES
[facility] Medicine Daily Progress Note    Date of Service  7/30/2024    Chief Complaint  Terrell Hensley is a 63 y.o. male admitted 7/23/2024 with altered mental status, found down after intentional OD of his home medications.    Hospital Course  64yo PMHx HTN, gout.  Presented altered after an amlodipine OD.  CPK16k, WILMAR, elevated LFTs.    Interval Problem Update  7/26:  transferred out of ICU to medical unit, remains confused.  States he has left hip pain, xray ordered.  PT/OT ordered, off strict bedrest.  CPK remain 4, 523, cut back  to 125/hr. Started lovenox for DVT ppx.  Head CT on admission negative.  7/27: Patient appeared quite anxious nervous today.  Psychiatry has recommended Seroquel 50 mg nightly.  QTc on EKG today 492.  Also restart Zoloft tomorrow morning 50 mg in AM.  I have added Klonopin 0.5 mg p.o. twice daily with as needed and Ativan for MRI brain rule out anoxic event.  Apparently patient attempted to get out of bed and his legs collapsed.  PT OT pending.  7/28:  patient improved anxiety and acute psychosis with addition of medications.  He asks if he will be wheelchair bound.  C/o left knee pain.  Ordered xray left femur. No obvious deformity noted on exam. Blake discontinued today.  7/29: Patient still complains of left hip pain.  Finding of left buttock erythema, swelling, appearance of cellulitis.  I have ordered a CT pelvis to rule out abscess.  Start Ancef 2 g IV every 8 x 7 days.  Patient complains of numbness in his left lower leg.  He is difficult historian since he states yes to almost all questions asked.  He does complain of cervical neck pain.  When asked if he has any numbness or pain in his arms he says yes.  I have added MRI cervical spine to MRI brain without contrast.  As needed pain medications added.  PT eval recommending postacute placement for physical therapy.  They did get patient up to bathroom with assistance.    Above per previous  hospitalist.    7/30/2024  Continues on legal hold.  Potassium of 3.7 being replaced.  Discussed with psychiatry.  Changing Lexapro to Wellbutrin  Increasing trazodone to 100 mg nightly.    I have discussed this patient's plan of care and discharge plan at IDT rounds today with Case Management, Nursing, Nursing leadership, and other members of the IDT team.    Consultants/Specialty  psychiatry    Code Status  Full Code    Disposition  The patient is not medically cleared for discharge to home or a post-acute facility.  Anticipate discharge to: a psychiatric hospital    I have placed the appropriate orders for post-discharge needs.    Review of Systems  Review of Systems   Constitutional:  Negative for chills and fever.   Respiratory:  Negative for cough and shortness of breath.    Cardiovascular:  Negative for chest pain and palpitations.   Gastrointestinal:  Negative for abdominal pain, nausea and vomiting.   Musculoskeletal:  Negative for joint pain and myalgias.   Neurological:  Negative for dizziness and headaches.   Psychiatric/Behavioral:  Positive for depression. Negative for suicidal ideas.         Physical Exam  Temp:  [36.4 °C (97.5 °F)-37.6 °C (99.6 °F)] 37.6 °C (99.6 °F)  Pulse:  [72-79] 72  Resp:  [15-17] 15  BP: (125-156)/(61-83) 156/83  SpO2:  [94 %-98 %] 94 %    Physical Exam  Vitals and nursing note reviewed. Exam conducted with a chaperone present.   Constitutional:       General: He is not in acute distress.     Appearance: Normal appearance. He is not ill-appearing.   HENT:      Head: Normocephalic and atraumatic.      Mouth/Throat:      Mouth: Mucous membranes are moist.      Pharynx: Oropharynx is clear. No oropharyngeal exudate.   Eyes:      General: No scleral icterus.        Right eye: No discharge.         Left eye: No discharge.      Conjunctiva/sclera: Conjunctivae normal.   Cardiovascular:      Rate and Rhythm: Normal rate and regular rhythm.      Pulses: Normal pulses.      Heart  sounds: Normal heart sounds. No murmur heard.  Pulmonary:      Effort: Pulmonary effort is normal. No respiratory distress.      Breath sounds: Normal breath sounds.   Abdominal:      General: Abdomen is flat. Bowel sounds are normal. There is no distension.      Palpations: Abdomen is soft.   Musculoskeletal:         General: No swelling.      Cervical back: Neck supple. No tenderness.      Right lower leg: No edema.      Left lower leg: No edema.   Skin:     General: Skin is warm and dry.      Coloration: Skin is not pale.   Neurological:      Mental Status: He is alert and oriented to person, place, and time. Mental status is at baseline.      Motor: Weakness (Left lower extremity) present.   Psychiatric:         Thought Content: Thought content normal.         Judgment: Judgment normal.         Fluids    Intake/Output Summary (Last 24 hours) at 7/30/2024 1805  Last data filed at 7/30/2024 0248  Gross per 24 hour   Intake 360 ml   Output 400 ml   Net -40 ml       Laboratory  Recent Labs     07/30/24  0051   WBC 5.8   RBC 4.29*   HEMOGLOBIN 12.9*   HEMATOCRIT 37.0*   MCV 86.2   MCH 30.1   MCHC 34.9   RDW 41.0   PLATELETCT 200   MPV 9.0       Recent Labs     07/28/24  0740 07/29/24  0530 07/30/24  0051   SODIUM 132* 129* 134*   POTASSIUM 3.9 3.6 3.7   CHLORIDE 97 94* 98   CO2 24 24 23   GLUCOSE 104* 106* 115*   BUN 13 13 17   CREATININE 0.85 0.88 0.86   CALCIUM 8.9 8.7 8.8                   Imaging  CT-PELVIS WITH   Final Result         1.  Low-density enlargement of the distal left psoas muscle and iliacus muscle extending to the anterior left thigh. Could represent changes of myositis or possibly early forming infectious process. No definite enhancing fluid collection identified to    indicate abscess at this time.   2.  Distended bladder with nondependent air, consider history of recent instrumentation or changes of urinary tract infection, correlate with urinalysis as clinically appropriate.   3.  Bilateral  fat-containing inguinal hernias   4.  Diverticulosis      MR-CERVICAL SPINE-W/O   Final Result      1.  Degenerative disease in the segment cervical spine as described above.   2.  Left paracentral/foraminal disc protrusion at C6-7 causing severe left C7 neural foraminal stenosis.      MR-BRAIN-W/O   Final Result      1.  No acute abnormality.   2.  A few punctate nonspecific supratentorial T2 hyperintensities likely representing nonspecific foci of gliosis/chronic ischemia.   3.  Mild cerebral volume loss.      DX-FEMUR-2+ LEFT   Final Result      Unremarkable LEFT femur.      DX-HIP-COMPLETE - UNILATERAL 2+ LEFT   Final Result      No radiographic evidence of acute traumatic injury.      CT-HEAD W/O   Final Result         1.  No acute intracranial abnormality.   2.  Atherosclerosis.                    Assessment/Plan  * Drug overdose of undetermined intent, initial encounter- (present on admission)  Assessment & Plan  Apparent suicide attempt  Polypharmacy: some amlodipine but other ingestions unclear  Cont supportive care as clinically improving  On Psych hold  Psychiatry team following    Left leg pain- (present on admission)  Assessment & Plan  7/30/2024  Suspect related to left distal psoas muscle enlargement concerning for myositis versus infection.  Continue cefazolin    Neck pain, acute- (present on admission)  Assessment & Plan  Patient complaining of LLE numbness, neck pain and bilateral arm numbness.  Head injury 4/2024.  Ordered MRI c spine with head MRI.  Oxycodone prn pain.    Cellulitis of buttock  Assessment & Plan  Patient with erythema, warmth, pain left buttock s/p found down at home.  CT pelvis with contrast ordered to evaluate for abscess.  Started ancef 2gm IV q 8 x 7 days.  Wound care  Off loading, out of bed, increase activity.    Anxiety- (present on admission)  Assessment & Plan  Increased anxiety noted on exam.  Psychiatry added seroquel 50mg qhs, zoloft resumed for a.m.   Ativan  prn  Klonopin bid.    Anemia- (present on admission)  Assessment & Plan  Mild   Cont to monitor    Leukocytosis- (present on admission)  Assessment & Plan  False in light of dehydration, likely aspiration pneumonitis with some neutrophil shift. May consider further intervention once patient stabilizes and is more alert, since unable to assess and suspect it is best to minimize medications at this time owing to unclear clinical picture from overdose.  -CTM    Urinary incontinence- (present on admission)  Assessment & Plan  -oxybutynin held due to drug overdose    Rhabdomyolysis- (present on admission)  Assessment & Plan  CK elevated to 16k, suspected 2/2 being found down and drug overdose.  Cont agressive IVFs but change to LR from NS  Follow daily AST, ALT, Bun, Creat and K    7/25  CPK now 7k  AST/ALT coming down  Renal function improving  K WNL  Cont to monitor CPK, CMP daily until normalized  Cont IVFs until CPK<3k    7/30/2024  Resolved    Elevated liver enzymes- (present on admission)  Assessment & Plan  Transaminase elevation likely from Rhabdo and not acute liver injury though certainly could have a toxic component from OD  Coming down as expected with resolution of Rhabdo  Cont to monitor daily CMP  Consider further evaluation if it does not resolve as CPK drops and Rhabdo resolves    Hypokalemia- (present on admission)  Assessment & Plan  7/30/2024  Potassium of 3.7.  I will order replacement for goal 4.    Acute renal failure (ARF) (HCC)- (present on admission)  Assessment & Plan  Likely 2/2 prerenal damage from hypotension 2/2 drug overdose and uremia related to rhabdomyolysis.  Creat imroving daily: 1.4 today  Good UOP  Cont LR until CPK<3k  Daily BMP  Renally dose medications as appropriate    7/30/2024  Resolved    High anion gap metabolic acidosis- (present on admission)  Assessment & Plan  Likely 2/2 prerenal damage from hypotension 2/2 drug overdose and uremia related to rhabdomyolysis.  -fluid  resuscitation with NS 200cc/hr  -IV potassium 10meq x4      7/30/2024  Resolved    Suicidal behavior with attempted self-injury (HCC)- (present on admission)  Assessment & Plan  Repeat suicidal attempt by patient  -psych consult  -sitter    7/30/2024  Continue one-on-one sitter  Continues to be on legal hold.  Psychiatry recommending psychiatric hospitalization  Changing sertraline to Wellbutrin    Mitral regurgitation- (present on admission)  Assessment & Plan  Suspected murmur, though difficult to auscultate. Unclear if preexisting or related to overdose.   -consider TTE once more clinically stable.    Acute encephalopathy- (present on admission)  Assessment & Plan  Acute metabolic/toxic encephalopathy  CT head neg  Mental status improving daily though still not completely back to normal  Cont to monitor clinically  Repeat lab in am  Minimize sedating medications  7/27:  increased anxiety/confusion, query anoxic brain injury, ordered MRI brain w/o contrast.      Hypertension- (present on admission)  Assessment & Plan  Hx of HTN, holding home meds in light of hypotension  Holding BP meds for obvious reasons    Hyponatremia- (present on admission)  Assessment & Plan  Likely hypovolemic hypoNa  Has resolved with IVF resuscitation  Cont to monitor         VTE prophylaxis:    enoxaparin ppx      I have performed a physical exam and reviewed and updated ROS and Plan today (7/30/2024). In review of yesterday's note (7/29/2024), there are no changes except as documented above.

## 2024-07-31 NOTE — CARE PLAN
The patient is Watcher - Medium risk of patient condition declining or worsening    Shift Goals  Clinical Goals: Pt will mobilize to bathroom and maintain continence throughout the shift  Patient Goals: Get Better, Get a plan for after hospital  Family Goals: FELICITAS-not present    Progress made toward(s) clinical / shift goals:  Pt to mobilize to bathroom throughout day, up in chair for part of day, pt encouraged oob.     Patient is not progressing towards the following goals: progressing.

## 2024-07-31 NOTE — CARE PLAN
The patient is Stable - Low risk of patient condition declining or worsening    Shift Goals  Clinical Goals: less pain  Patient Goals: Get Better, Get a plan for after hospital  Family Goals: FELICITAS-not present    Progress made toward(s) clinical / shift goals:    Problem: Provide Safe Environment  Goal: Suicide environmental safety, protocols, policies, and practices will be implemented  Description: Target End Date:  resolve day 1    1.  Remove objects or personal belongings that may cause harm or injury to self or others  2.  Dietary tray modifications (paperware)  3.  Provide a safe environment  4.  Render close patient supervision by sustaining observation or awareness of the patient at all times  Outcome: Progressing  Flowsheets (Taken 7/31/2024 9398)  Safety Interventions:   Discussed no self harm or elopement and safe behavior with patient   Patient Wearing Hospital Clothing   Provided Safety Education  Note: 1:1 safety precautions. Sitter at bedside. Legal hold.      Patient alert, had approved visitors today. Refused dressing to sacral area.

## 2024-08-01 PROBLEM — I80.8 THROMBOPHLEBITIS OF CEPHALIC VEIN: Status: ACTIVE | Noted: 2024-08-01

## 2024-08-01 PROCEDURE — 93005 ELECTROCARDIOGRAM TRACING: CPT | Performed by: STUDENT IN AN ORGANIZED HEALTH CARE EDUCATION/TRAINING PROGRAM

## 2024-08-01 PROCEDURE — A9270 NON-COVERED ITEM OR SERVICE: HCPCS | Performed by: STUDENT IN AN ORGANIZED HEALTH CARE EDUCATION/TRAINING PROGRAM

## 2024-08-01 PROCEDURE — 700102 HCHG RX REV CODE 250 W/ 637 OVERRIDE(OP): Performed by: STUDENT IN AN ORGANIZED HEALTH CARE EDUCATION/TRAINING PROGRAM

## 2024-08-01 PROCEDURE — A9270 NON-COVERED ITEM OR SERVICE: HCPCS | Performed by: HOSPITALIST

## 2024-08-01 PROCEDURE — 90832 PSYTX W PT 30 MINUTES: CPT | Performed by: SOCIAL WORKER

## 2024-08-01 PROCEDURE — 770001 HCHG ROOM/CARE - MED/SURG/GYN PRIV*

## 2024-08-01 PROCEDURE — 99232 SBSQ HOSP IP/OBS MODERATE 35: CPT | Performed by: STUDENT IN AN ORGANIZED HEALTH CARE EDUCATION/TRAINING PROGRAM

## 2024-08-01 PROCEDURE — 99232 SBSQ HOSP IP/OBS MODERATE 35: CPT | Mod: GC | Performed by: STUDENT IN AN ORGANIZED HEALTH CARE EDUCATION/TRAINING PROGRAM

## 2024-08-01 PROCEDURE — 700102 HCHG RX REV CODE 250 W/ 637 OVERRIDE(OP): Performed by: HOSPITALIST

## 2024-08-01 PROCEDURE — 700111 HCHG RX REV CODE 636 W/ 250 OVERRIDE (IP): Performed by: HOSPITALIST

## 2024-08-01 PROCEDURE — 700105 HCHG RX REV CODE 258: Performed by: HOSPITALIST

## 2024-08-01 RX ORDER — LORAZEPAM 2 MG/ML
0.5 INJECTION INTRAMUSCULAR EVERY 6 HOURS PRN
Status: COMPLETED | OUTPATIENT
Start: 2024-08-01 | End: 2024-08-06

## 2024-08-01 RX ORDER — VENLAFAXINE HYDROCHLORIDE 75 MG/1
75 CAPSULE, EXTENDED RELEASE ORAL
Status: DISCONTINUED | OUTPATIENT
Start: 2024-08-02 | End: 2024-08-06

## 2024-08-01 RX ORDER — OLANZAPINE 5 MG/1
5 TABLET ORAL
Status: DISCONTINUED | OUTPATIENT
Start: 2024-08-01 | End: 2024-08-05

## 2024-08-01 RX ORDER — TRAZODONE HYDROCHLORIDE 50 MG/1
100 TABLET, FILM COATED ORAL
Status: DISCONTINUED | OUTPATIENT
Start: 2024-08-01 | End: 2024-08-05

## 2024-08-01 RX ADMIN — CEFAZOLIN 2 G: 2 INJECTION, POWDER, FOR SOLUTION INTRAMUSCULAR; INTRAVENOUS at 05:43

## 2024-08-01 RX ADMIN — OLANZAPINE 5 MG: 5 TABLET, FILM COATED ORAL at 22:06

## 2024-08-01 RX ADMIN — Medication 1 G: at 16:21

## 2024-08-01 RX ADMIN — Medication 1 G: at 08:48

## 2024-08-01 RX ADMIN — ACETAMINOPHEN 650 MG: 325 TABLET ORAL at 20:07

## 2024-08-01 RX ADMIN — Medication 1 G: at 12:49

## 2024-08-01 RX ADMIN — CEFAZOLIN 2 G: 2 INJECTION, POWDER, FOR SOLUTION INTRAMUSCULAR; INTRAVENOUS at 21:41

## 2024-08-01 RX ADMIN — LORAZEPAM 0.5 MG: 2 INJECTION INTRAMUSCULAR; INTRAVENOUS at 00:20

## 2024-08-01 RX ADMIN — BUPROPION HYDROCHLORIDE 150 MG: 150 TABLET, EXTENDED RELEASE ORAL at 05:40

## 2024-08-01 RX ADMIN — CEFAZOLIN 2 G: 2 INJECTION, POWDER, FOR SOLUTION INTRAMUSCULAR; INTRAVENOUS at 13:50

## 2024-08-01 RX ADMIN — TRAZODONE HYDROCHLORIDE 100 MG: 50 TABLET ORAL at 23:17

## 2024-08-01 RX ADMIN — ENOXAPARIN SODIUM 40 MG: 100 INJECTION SUBCUTANEOUS at 16:22

## 2024-08-01 ASSESSMENT — PAIN DESCRIPTION - PAIN TYPE
TYPE: ACUTE PAIN

## 2024-08-01 ASSESSMENT — ENCOUNTER SYMPTOMS
MYALGIAS: 1
HALLUCINATIONS: 1
COUGH: 0
HEADACHES: 0
DEPRESSION: 1
PALPITATIONS: 0
FEVER: 0
NAUSEA: 0
SHORTNESS OF BREATH: 0
ABDOMINAL PAIN: 0
VOMITING: 0
CHILLS: 0
DIZZINESS: 0
NERVOUS/ANXIOUS: 1

## 2024-08-01 NOTE — CARE PLAN
The patient is Stable - Low risk of patient condition declining or worsening    Shift Goals  Clinical Goals: NA levels,  Patient Goals: walk,  Family Goals: n/a none present    Progress made toward(s) clinical / shift goals:    Problem: Provide Safe Environment  Goal: Suicide environmental safety, protocols, policies, and practices will be implemented  Description: Target End Date:  resolve day 1    1.  Remove objects or personal belongings that may cause harm or injury to self or others  2.  Dietary tray modifications (paperware)  3.  Provide a safe environment  4.  Render close patient supervision by sustaining observation or awareness of the patient at all times  Flowsheets (Taken 8/1/2024 1023)  Safety Interventions:   Plastic Utensils / Paper Ware Ordered   Discussed no self harm or elopement and safe behavior with patient   Patient Wearing Hospital Clothing   Provided Safety Education

## 2024-08-01 NOTE — PROGRESS NOTES
[facility] Medicine Daily Progress Note    Date of Service  7/31/2024    Chief Complaint  Terrell Hensley is a 63 y.o. male admitted 7/23/2024 with altered mental status, found down after intentional OD of his home medications.    Hospital Course  64yo PMHx HTN, gout.  Presented altered after an amlodipine OD.  CPK16k, WILMAR, elevated LFTs.    Interval Problem Update  7/26:  transferred out of ICU to medical unit, remains confused.  States he has left hip pain, xray ordered.  PT/OT ordered, off strict bedrest.  CPK remain 4, 523, cut back  to 125/hr. Started lovenox for DVT ppx.  Head CT on admission negative.  7/27: Patient appeared quite anxious nervous today.  Psychiatry has recommended Seroquel 50 mg nightly.  QTc on EKG today 492.  Also restart Zoloft tomorrow morning 50 mg in AM.  I have added Klonopin 0.5 mg p.o. twice daily with as needed and Ativan for MRI brain rule out anoxic event.  Apparently patient attempted to get out of bed and his legs collapsed.  PT OT pending.  7/28:  patient improved anxiety and acute psychosis with addition of medications.  He asks if he will be wheelchair bound.  C/o left knee pain.  Ordered xray left femur. No obvious deformity noted on exam. Blake discontinued today.  7/29: Patient still complains of left hip pain.  Finding of left buttock erythema, swelling, appearance of cellulitis.  I have ordered a CT pelvis to rule out abscess.  Start Ancef 2 g IV every 8 x 7 days.  Patient complains of numbness in his left lower leg.  He is difficult historian since he states yes to almost all questions asked.  He does complain of cervical neck pain.  When asked if he has any numbness or pain in his arms he says yes.  I have added MRI cervical spine to MRI brain without contrast.  As needed pain medications added.  PT eval recommending postacute placement for physical therapy.  They did get patient up to bathroom with assistance.    Above per previous  hospitalist.    7/30/2024  Continues on legal hold.  Potassium of 3.7 being replaced.  Discussed with psychiatry.  Changing Lexapro to Wellbutrin  Increasing trazodone to 100 mg nightly.    7/31/2024  Continues on legal hold  Tolerating medication changes.  Complaining of right upper extremity pain for past several days.  States that he is feeling down today.  Denies any suicidal or homicidal ideation.  Continue on IV cefazolin.  Right upper extremity ultrasound ordered due to pain    I have discussed this patient's plan of care and discharge plan at IDT rounds today with Case Management, Nursing, Nursing leadership, and other members of the IDT team.    Consultants/Specialty  psychiatry    Code Status  Full Code    Disposition  The patient is not medically cleared for discharge to home or a post-acute facility.  Anticipate discharge to: a psychiatric hospital    I have placed the appropriate orders for post-discharge needs.    Review of Systems  Review of Systems   Constitutional:  Negative for chills and fever.   Respiratory:  Negative for cough and shortness of breath.    Cardiovascular:  Negative for chest pain and palpitations.   Gastrointestinal:  Negative for abdominal pain, nausea and vomiting.   Musculoskeletal:  Negative for joint pain and myalgias.   Neurological:  Negative for dizziness and headaches.   Psychiatric/Behavioral:  Positive for depression. Negative for suicidal ideas.         Physical Exam  Temp:  [36.1 °C (97 °F)-36.9 °C (98.5 °F)] 36.1 °C (97 °F)  Pulse:  [71-78] 78  Resp:  [16-19] 19  BP: (127-159)/(56-87) 159/87  SpO2:  [95 %-97 %] 96 %    Physical Exam  Vitals and nursing note reviewed. Exam conducted with a chaperone present.   Constitutional:       General: He is not in acute distress.     Appearance: Normal appearance. He is not ill-appearing.   HENT:      Head: Normocephalic and atraumatic.      Mouth/Throat:      Mouth: Mucous membranes are moist.      Pharynx: Oropharynx is clear.  No oropharyngeal exudate.   Eyes:      General: No scleral icterus.        Right eye: No discharge.         Left eye: No discharge.      Conjunctiva/sclera: Conjunctivae normal.   Cardiovascular:      Rate and Rhythm: Normal rate and regular rhythm.      Pulses: Normal pulses.      Heart sounds: Normal heart sounds. No murmur heard.  Pulmonary:      Effort: Pulmonary effort is normal. No respiratory distress.      Breath sounds: Normal breath sounds.   Abdominal:      General: Abdomen is flat. Bowel sounds are normal. There is no distension.      Palpations: Abdomen is soft.   Musculoskeletal:         General: Swelling and tenderness present.      Cervical back: Neck supple. No tenderness.      Right lower leg: No edema.      Left lower leg: No edema.      Comments: Swelling and tenderness along the right bicep   Skin:     General: Skin is warm and dry.      Coloration: Skin is not pale.   Neurological:      Mental Status: He is alert and oriented to person, place, and time. Mental status is at baseline.      Motor: Weakness (Left lower extremity) present.   Psychiatric:         Thought Content: Thought content normal.         Judgment: Judgment normal.         Fluids    Intake/Output Summary (Last 24 hours) at 7/31/2024 1723  Last data filed at 7/30/2024 1954  Gross per 24 hour   Intake 360 ml   Output --   Net 360 ml       Laboratory  Recent Labs     07/30/24  0051 07/31/24  0811   WBC 5.8 7.0   RBC 4.29* 3.90*   HEMOGLOBIN 12.9* 11.9*   HEMATOCRIT 37.0* 34.0*   MCV 86.2 87.2   MCH 30.1 30.5   MCHC 34.9 35.0   RDW 41.0 41.5   PLATELETCT 200 199   MPV 9.0 9.0       Recent Labs     07/29/24  0530 07/30/24  0051   SODIUM 129* 134*   POTASSIUM 3.6 3.7   CHLORIDE 94* 98   CO2 24 23   GLUCOSE 106* 115*   BUN 13 17   CREATININE 0.88 0.86   CALCIUM 8.7 8.8                   Imaging  CT-PELVIS WITH   Final Result         1.  Low-density enlargement of the distal left psoas muscle and iliacus muscle extending to the  anterior left thigh. Could represent changes of myositis or possibly early forming infectious process. No definite enhancing fluid collection identified to    indicate abscess at this time.   2.  Distended bladder with nondependent air, consider history of recent instrumentation or changes of urinary tract infection, correlate with urinalysis as clinically appropriate.   3.  Bilateral fat-containing inguinal hernias   4.  Diverticulosis      MR-CERVICAL SPINE-W/O   Final Result      1.  Degenerative disease in the segment cervical spine as described above.   2.  Left paracentral/foraminal disc protrusion at C6-7 causing severe left C7 neural foraminal stenosis.      MR-BRAIN-W/O   Final Result      1.  No acute abnormality.   2.  A few punctate nonspecific supratentorial T2 hyperintensities likely representing nonspecific foci of gliosis/chronic ischemia.   3.  Mild cerebral volume loss.      DX-FEMUR-2+ LEFT   Final Result      Unremarkable LEFT femur.      DX-HIP-COMPLETE - UNILATERAL 2+ LEFT   Final Result      No radiographic evidence of acute traumatic injury.      CT-HEAD W/O   Final Result         1.  No acute intracranial abnormality.   2.  Atherosclerosis.               US-EXTREMITY VENOUS UPPER UNILAT RIGHT    (Results Pending)        Assessment/Plan  * Drug overdose of undetermined intent, initial encounter- (present on admission)  Assessment & Plan  Apparent suicide attempt  Polypharmacy: some amlodipine but other ingestions unclear  Cont supportive care as clinically improving  On Psych hold  Psychiatry team following    7/31/2024  Continues on a legal hold    Pain and swelling of right upper extremity- (present on admission)  Assessment & Plan  7/31/2024  Have ordered ultrasound of the right upper extremity to assess for DVT    Left leg pain- (present on admission)  Assessment & Plan  7/30/2024  Suspect related to left distal psoas muscle enlargement concerning for myositis versus infection.  Continue  cefazolin    7/31/2024  Continue cefazolin    Neck pain, acute- (present on admission)  Assessment & Plan  Patient complaining of LLE numbness, neck pain and bilateral arm numbness.  Head injury 4/2024.  Ordered MRI c spine with head MRI.  Oxycodone prn pain.    Cellulitis of buttock  Assessment & Plan  Patient with erythema, warmth, pain left buttock s/p found down at home.  CT pelvis with contrast ordered to evaluate for abscess.  Started ancef 2gm IV q 8 x 7 days.  Wound care  Off loading, out of bed, increase activity.    Anxiety- (present on admission)  Assessment & Plan  Increased anxiety noted on exam.  Psychiatry added seroquel 50mg qhs, zoloft resumed for a.m.   Ativan prn  Klonopin bid.    Anemia- (present on admission)  Assessment & Plan  Mild   Cont to monitor    Leukocytosis- (present on admission)  Assessment & Plan  False in light of dehydration, likely aspiration pneumonitis with some neutrophil shift. May consider further intervention once patient stabilizes and is more alert, since unable to assess and suspect it is best to minimize medications at this time owing to unclear clinical picture from overdose.  -CTM    Urinary incontinence- (present on admission)  Assessment & Plan  -oxybutynin held due to drug overdose    Rhabdomyolysis- (present on admission)  Assessment & Plan  CK elevated to 16k, suspected 2/2 being found down and drug overdose.  Cont agressive IVFs but change to LR from NS  Follow daily AST, ALT, Bun, Creat and K    7/25  CPK now 7k  AST/ALT coming down  Renal function improving  K WNL  Cont to monitor CPK, CMP daily until normalized  Cont IVFs until CPK<3k    7/30/2024  Resolved    Elevated liver enzymes- (present on admission)  Assessment & Plan  Transaminase elevation likely from Rhabdo and not acute liver injury though certainly could have a toxic component from OD  Coming down as expected with resolution of Rhabdo  Cont to monitor daily CMP  Consider further evaluation if it  does not resolve as CPK drops and Rhabdo resolves    Hypokalemia- (present on admission)  Assessment & Plan  7/30/2024  Potassium of 3.7.  I will order replacement for goal 4.    Acute renal failure (ARF) (HCC)- (present on admission)  Assessment & Plan  Likely 2/2 prerenal damage from hypotension 2/2 drug overdose and uremia related to rhabdomyolysis.  Creat imroving daily: 1.4 today  Good UOP  Cont LR until CPK<3k  Daily BMP  Renally dose medications as appropriate    7/30/2024  Resolved    High anion gap metabolic acidosis- (present on admission)  Assessment & Plan  Likely 2/2 prerenal damage from hypotension 2/2 drug overdose and uremia related to rhabdomyolysis.  -fluid resuscitation with NS 200cc/hr  -IV potassium 10meq x4      7/30/2024  Resolved    Suicidal behavior with attempted self-injury (HCC)- (present on admission)  Assessment & Plan  Repeat suicidal attempt by patient  -psych consult  -sitter    7/30/2024  Continue one-on-one sitter  Continues to be on legal hold.  Psychiatry recommending psychiatric hospitalization  Changing sertraline to Wellbutrin    Mitral regurgitation- (present on admission)  Assessment & Plan  Suspected murmur, though difficult to auscultate. Unclear if preexisting or related to overdose.   -consider TTE once more clinically stable.    Acute encephalopathy- (present on admission)  Assessment & Plan  Acute metabolic/toxic encephalopathy  CT head neg  Mental status improving daily though still not completely back to normal  Cont to monitor clinically  Repeat lab in am  Minimize sedating medications  7/27:  increased anxiety/confusion, query anoxic brain injury, ordered MRI brain w/o contrast.    7/31/2024  Resolved. MRI of the brain without acute changes.       Hypertension- (present on admission)  Assessment & Plan  Hx of HTN, holding home meds in light of hypotension  Holding BP meds for obvious reasons    Hyponatremia- (present on admission)  Assessment & Plan  Likely  hypovolemic hypoNa  Has resolved with IVF resuscitation  Cont to monitor         VTE prophylaxis:    enoxaparin ppx      I have performed a physical exam and reviewed and updated ROS and Plan today (7/31/2024). In review of yesterday's note (7/30/2024), there are no changes except as documented above.

## 2024-08-01 NOTE — CARE PLAN
The patient is Stable - Low risk of patient condition declining or worsening    Shift Goals  Clinical Goals: Patient will maintain skin integrity and safety when ambulating  Patient Goals: pain managment and walk  Family Goals: FELICITAS    Patient a/o x3. Patient stated pain in left hip. Gave patient Tylenol. Patient has sitter due to SI at bedside. CNA walked w/ pt all around the halls a few times. I was notified by CNA that pt was restless and anxious. Gave pt ativan. Notified on call hospitalist MELY Belle. She put in order for Ativan. Patient currently sleeping. Call light is w/in reach. Bed is in low locked position.     Progress made toward(s) clinical / shift goals:    Problem: Pain - Standard  Goal: Alleviation of pain or a reduction in pain to the patient’s comfort goal  Outcome: Progressing     Problem: Knowledge Deficit - Standard  Goal: Patient and family/care givers will demonstrate understanding of plan of care, disease process/condition, diagnostic tests and medications  Outcome: Progressing     Problem: Provide Safe Environment  Goal: Suicide environmental safety, protocols, policies, and practices will be implemented  Outcome: Progressing     Problem: Psychosocial  Goal: Patient's ability to identify and develop effective coping behaviors will improve  Outcome: Progressing  Goal: Patient's ability to identify and utilize available support systems will improve  Outcome: Progressing     Problem: Fall Risk  Goal: Patient will remain free from falls  Outcome: Progressing     Problem: Skin Integrity  Goal: Skin integrity is maintained or improved  Outcome: Progressing

## 2024-08-01 NOTE — PROGRESS NOTES
CNA let me know that patient is restless and anxious. He has been up and down walking all over in the halls. I went in there and patient was sitting up on bed. Asked patient if he was sleepy and he said no. Gave patient Ativan.  Messaged on call hospitalist MELY Matthews. She ordered more Ativan if needed.

## 2024-08-01 NOTE — CONSULTS
"PSYCHIATRIC FOLLOW-UP: (established)  Reason for admission:   Overdose  Legal Hold Status:    Extended         Chart reviewed.                 Interval Hx    Currently on   Wellbutrin SR (need to correct to XL) 150mg po qD  Trazodone 100mg po qHS    Last night pt was restless and anxious pacing down the [address] way. Ativan given.  Pt was seen by Dr. Damico  yesterday, was discovered that he is \"pre-occupied with internal stimuli and delusional thought patterns related to demonic influences in his life. Patient reports being overwhelmed with demonic forces as soon as he wakes up in the morning. ... . Patient also in consumed with cognitive distortions, partly from the demonic forces he hears-accusing him of being a bad person and guilty. Patient cannot release himself from these experiences and beliefs. Patient becomes overwhelmed with hopelessness and becomes actively suicidal. \"    Last night pt was reported to be rather restless and was pacing down the hallway.     Today pt was interviewed in his room. He had a very good night of sleep and slept through the night. His appetite has not changed. Pt appeared to be more preoccupied, anxious and distractible. We further explored the nature of his reported auditory hallucination. Pt reports the voice is \"in his own head.\" And is often critical and mockery in nature. This voice sounds a lot like his own and he hears it mostly at night. Pt described this voice as demonic voice was because of the \"very negative\" nature of the voice.     He started hearing this voice after he got extremely depressed after she was found out by his wife that he was having an affair. The voice has been telling him that he is \"a bad person and is worthless.\" This voice also later challenged him to kill himself. Last time he heard this voice was 2 nights ago.    Pt also reports a lot of catastrophizing obsessive thought surrounding his family and his own safety. He spends more then 3 hrs per day " "thinking about these harm-themed intrusive/obsessive thoughts. He notices that These obsession induces a lot of anxiety and he feels compelled to re-arrange things in front of him.  Today he denies any SI/HI and VH.    We discussed starting Olanzapine to target this auditory hallucination and related anxiety. We also recommended Effexor to target his low energy and low motivation issues. Pt was agreeable.     No additional concerns reported at this time.    Medical ROS (as pertinent):     Review of Systems   Psychiatric/Behavioral:  Positive for depression and hallucinations. Negative for suicidal ideas. The patient is nervous/anxious.            Psychiatric Examination:  Vitals:   Vitals:    08/01/24 0751   BP: (!) 151/84   Pulse: 89   Resp: 17   Temp: 36.6 °C (97.8 °F)   SpO2: 95%        General Appearance: Appears state age, appropriate grooming & hygiene, distracted, preoccupied.   Behavior:    -Appropriate activity, appropriate eye contact, pleasant & cooperative  Neuro:   -No tics, tremors dyskinesias or dystonias noted.    -No psychomotor agitation or retardation   -No posture or gait abnormalities appreciated. Gait steady with walker   Speech: Appropriate quantity, spontaneous. Regular rate and rhythm. Low volume and intonation. Sometimes murmuring.  Language: English  Thought processes: Latency, thought blocking, superficially linear. No evidence of loose associations  Thought content: No evidence of SI/HI/AVH. Appears to respond to be attending to internal stimuli. No evidence of delusions or paranoia  Mood: \"I don't known\" as stated by patient  Affect:  Somewhat congruent with stated mood.Blunted, highly anxious, appropriately reactive to conversation. No evidence of lability, ernie, or agitation  Judgement and Insight: Limited/Poor  Cognition:    -Alert & oriented x 3 (Person, place and time)   -Attention & concentration grossly intact   -Immediate/delayed memory not formally tested but grossly " intact   -Age-appropriate fund of knowledge      New PAST MEDICAL/PSYCH/FAMILY/SOCIAL(as reported by patient):       None      EKG:   Results for orders placed or performed during the hospital encounter of 24   EKG   Result Value Ref Range    Report       [facility] Emergency Dept.    Test Date:  2024  Pt Name:    FAIZAN GARDNER             Department: ER  MRN:        [id number]                      Room:        12  Gender:     Male                         Technician: [id number]  :        1960                   Requested By:ER TRIAGE PROTOCOL  Order #:    [id number]                    Reading MD: ILDEFONSO ARNOLD DO    Measurements  Intervals                                Axis  Rate:       85                           P:          61  WY:         153                          QRS:        63  QRSD:       106                          T:          25  QT:         413  QTc:        492    Interpretive Statements  Sinus rhythm  Probable left atrial enlargement  Borderline prolonged QT interval  Compared to ECG 2024 17:09:50  No significant changes  Electronically Signed On 2024 00:56:04 PDT by ILDEFONSO ARNOLD DO        Brain Imaging: None  EEG:  None     Labs personally reviewed:   Recent Results (from the past 24 hour(s))   CBC WITH DIFFERENTIAL    Collection Time: 24  8:11 AM   Result Value Ref Range    WBC 7.0 4.8 - 10.8 K/uL    RBC 3.90 (L) 4.70 - 6.10 M/uL    Hemoglobin 11.9 (L) 14.0 - 18.0 g/dL    Hematocrit 34.0 (L) 42.0 - 52.0 %    MCV 87.2 81.4 - 97.8 fL    MCH 30.5 27.0 - 33.0 pg    MCHC 35.0 32.3 - 36.5 g/dL    RDW 41.5 35.9 - 50.0 fL    Platelet Count 199 164 - 446 K/uL    MPV 9.0 9.0 - 12.9 fL    Neutrophils-Polys 72.90 (H) 44.00 - 72.00 %    Lymphocytes 15.20 (L) 22.00 - 41.00 %    Monocytes 9.40 0.00 - 13.40 %    Eosinophils 1.30 0.00 - 6.90 %    Basophils 0.60 0.00 - 1.80 %    Immature Granulocytes 0.60 0.00 - 0.90 %    Nucleated RBC 0.00 0.00 - 0.20  /100 WBC    Neutrophils (Absolute) 5.12 1.82 - 7.42 K/uL    Lymphs (Absolute) 1.07 1.00 - 4.80 K/uL    Monos (Absolute) 0.66 0.00 - 0.85 K/uL    Eos (Absolute) 0.09 0.00 - 0.51 K/uL    Baso (Absolute) 0.04 0.00 - 0.12 K/uL    Immature Granulocytes (abs) 0.04 0.00 - 0.11 K/uL    NRBC (Absolute) 0.00 K/uL         Assessment:  Terrell Hensley is a 63 y.o. male admitted 7/23/2024 with altered mental status, found down after intentional OD of his home medications amlodipine in order to kill himself.      Today pt denies any SI/HI, however there is newly reported chronic auditory hallucination that reveals to be the cause of pt's high distractibility and preoccupation. His auditory hallucination also elevates his suicide risk as it has commanded him to self-harm. Wellbutrin seems to have worsened his restlessness. We'll discontinue Wellbutrin and start pt on Effexor and Olanzapine to both target his low mood and energy, auditory hallucination and improve sleep. Can change Trazodone to PRN as Olanzapine already has sedative effect.      Dx:  #MDD with Psychosis  R/O Schizoaffective disorder    #Suicidal Ideation  #Drug Overdose    Medical :  Per primary team      Plan:  Legal hold: Extended.  Psychotropic medications:  Discontinue Wellbutrin  Start Olanzapine 5mg po qHS  Start Effexor 75mg po qD  Change Trazodone 100mg qHS to PRN.   Please transfer pt to inpatient psychiatric hospital when medically cleared and bed is available  Labs reviewed  EKG reviewed  Old records ordered/reviewed/summarized  Collateral obtained  Safety plan completed, copy in chart  Discussed the case with: Dr. Valle  Psychiatry will follow up      Thank you for the consult.     Sitter: 1:1  Phone: Yes room phone  Visitors: Yes, Jean Paul (brother), Cortes (brother), YOSELIN (son), Tressa (daughter)   Personal belongings:  NO

## 2024-08-01 NOTE — PROGRESS NOTES
[facility] Medicine Daily Progress Note    Date of Service  8/1/2024    Chief Complaint  Terrell Hensley is a 63 y.o. male admitted 7/23/2024 with altered mental status, found down after intentional OD of his home medications.    Hospital Course  62yo PMHx HTN, gout.  Presented altered after an amlodipine OD.  CPK16k, WILMAR, elevated LFTs.    Interval Problem Update  7/26:  transferred out of ICU to medical unit, remains confused.  States he has left hip pain, xray ordered.  PT/OT ordered, off strict bedrest.  CPK remain 4, 523, cut back  to 125/hr. Started lovenox for DVT ppx.  Head CT on admission negative.  7/27: Patient appeared quite anxious nervous today.  Psychiatry has recommended Seroquel 50 mg nightly.  QTc on EKG today 492.  Also restart Zoloft tomorrow morning 50 mg in AM.  I have added Klonopin 0.5 mg p.o. twice daily with as needed and Ativan for MRI brain rule out anoxic event.  Apparently patient attempted to get out of bed and his legs collapsed.  PT OT pending.  7/28:  patient improved anxiety and acute psychosis with addition of medications.  He asks if he will be wheelchair bound.  C/o left knee pain.  Ordered xray left femur. No obvious deformity noted on exam. Blake discontinued today.  7/29: Patient still complains of left hip pain.  Finding of left buttock erythema, swelling, appearance of cellulitis.  I have ordered a CT pelvis to rule out abscess.  Start Ancef 2 g IV every 8 x 7 days.  Patient complains of numbness in his left lower leg.  He is difficult historian since he states yes to almost all questions asked.  He does complain of cervical neck pain.  When asked if he has any numbness or pain in his arms he says yes.  I have added MRI cervical spine to MRI brain without contrast.  As needed pain medications added.  PT eval recommending postacute placement for physical therapy.  They did get patient up to bathroom with assistance.    Above per previous  hospitalist.    7/30/2024  Continues on legal hold.  Potassium of 3.7 being replaced.  Discussed with psychiatry.  Changing Lexapro to Wellbutrin  Increasing trazodone to 100 mg nightly.    7/31/2024  Continues on legal hold  Tolerating medication changes.  Complaining of right upper extremity pain for past several days.  States that he is feeling down today.  Denies any suicidal or homicidal ideation.  Continue on IV cefazolin.  Right upper extremity ultrasound ordered due to pain    8/1/2024  Right upper extremity pain improving  Ultrasound with no DVT but does have occlusive superficial thrombosis in the cephalic vein.  Warm compresses ordered  Continue on cefazolin      I have discussed this patient's plan of care and discharge plan at IDT rounds today with Case Management, Nursing, Nursing leadership, and other members of the IDT team.    Consultants/Specialty  psychiatry    Code Status  Full Code    Disposition  The patient is not medically cleared for discharge to home or a post-acute facility.  Anticipate discharge to: a psychiatric hospital    I have placed the appropriate orders for post-discharge needs.    Review of Systems  Review of Systems   Constitutional:  Negative for chills and fever.   Respiratory:  Negative for cough and shortness of breath.    Cardiovascular:  Negative for chest pain and palpitations.   Gastrointestinal:  Negative for abdominal pain, nausea and vomiting.   Musculoskeletal:  Positive for myalgias. Negative for joint pain.   Neurological:  Negative for dizziness and headaches.   Psychiatric/Behavioral:  Positive for depression. Negative for suicidal ideas.         Physical Exam  Temp:  [36.3 °C (97.3 °F)-37.1 °C (98.7 °F)] 37.1 °C (98.7 °F)  Pulse:  [72-89] 81  Resp:  [17-18] 18  BP: (130-157)/(74-94) 130/94  SpO2:  [95 %-97 %] 95 %    Physical Exam  Vitals and nursing note reviewed. Exam conducted with a chaperone present.   Constitutional:       General: He is not in acute  distress.     Appearance: Normal appearance. He is not ill-appearing.   HENT:      Head: Normocephalic and atraumatic.      Mouth/Throat:      Mouth: Mucous membranes are moist.      Pharynx: Oropharynx is clear. No oropharyngeal exudate.   Eyes:      General: No scleral icterus.        Right eye: No discharge.         Left eye: No discharge.      Conjunctiva/sclera: Conjunctivae normal.   Cardiovascular:      Rate and Rhythm: Normal rate and regular rhythm.      Pulses: Normal pulses.      Heart sounds: Normal heart sounds. No murmur heard.  Pulmonary:      Effort: Pulmonary effort is normal. No respiratory distress.      Breath sounds: Normal breath sounds.   Abdominal:      General: Abdomen is flat. Bowel sounds are normal. There is no distension.      Palpations: Abdomen is soft.   Musculoskeletal:         General: Swelling and tenderness present.      Cervical back: Neck supple. No tenderness.      Right lower leg: No edema.      Left lower leg: No edema.      Comments: Swelling and tenderness along the right bicep   Skin:     General: Skin is warm and dry.      Coloration: Skin is not pale.   Neurological:      Mental Status: He is alert and oriented to person, place, and time. Mental status is at baseline.      Motor: Weakness (Left lower extremity) present.   Psychiatric:         Thought Content: Thought content normal.         Judgment: Judgment normal.         Fluids    Intake/Output Summary (Last 24 hours) at 8/1/2024 1700  Last data filed at 8/1/2024 1500  Gross per 24 hour   Intake 720 ml   Output --   Net 720 ml       Laboratory  Recent Labs     07/30/24  0051 07/31/24  0811   WBC 5.8 7.0   RBC 4.29* 3.90*   HEMOGLOBIN 12.9* 11.9*   HEMATOCRIT 37.0* 34.0*   MCV 86.2 87.2   MCH 30.1 30.5   MCHC 34.9 35.0   RDW 41.0 41.5   PLATELETCT 200 199   MPV 9.0 9.0       Recent Labs     07/30/24  0051   SODIUM 134*   POTASSIUM 3.7   CHLORIDE 98   CO2 23   GLUCOSE 115*   BUN 17   CREATININE 0.86   CALCIUM 8.8                    Imaging  US-EXTREMITY VENOUS UPPER UNILAT RIGHT   Final Result      CT-PELVIS WITH   Final Result         1.  Low-density enlargement of the distal left psoas muscle and iliacus muscle extending to the anterior left thigh. Could represent changes of myositis or possibly early forming infectious process. No definite enhancing fluid collection identified to    indicate abscess at this time.   2.  Distended bladder with nondependent air, consider history of recent instrumentation or changes of urinary tract infection, correlate with urinalysis as clinically appropriate.   3.  Bilateral fat-containing inguinal hernias   4.  Diverticulosis      MR-CERVICAL SPINE-W/O   Final Result      1.  Degenerative disease in the segment cervical spine as described above.   2.  Left paracentral/foraminal disc protrusion at C6-7 causing severe left C7 neural foraminal stenosis.      MR-BRAIN-W/O   Final Result      1.  No acute abnormality.   2.  A few punctate nonspecific supratentorial T2 hyperintensities likely representing nonspecific foci of gliosis/chronic ischemia.   3.  Mild cerebral volume loss.      DX-FEMUR-2+ LEFT   Final Result      Unremarkable LEFT femur.      DX-HIP-COMPLETE - UNILATERAL 2+ LEFT   Final Result      No radiographic evidence of acute traumatic injury.      CT-HEAD W/O   Final Result         1.  No acute intracranial abnormality.   2.  Atherosclerosis.                    Assessment/Plan  * Drug overdose of undetermined intent, initial encounter- (present on admission)  Assessment & Plan  Apparent suicide attempt  Polypharmacy: some amlodipine but other ingestions unclear  Cont supportive care as clinically improving  On Psych hold  Psychiatry team following    7/31/2024  Continues on a legal hold    8/1/2024  Continues on legal hold.  Unable to go to inpatient psych facility due to weakness.    Thrombophlebitis of cephalic vein- (present on admission)  Assessment & Plan  8/1/2024  Right upper  extremity pain improving  Ultrasound with no DVT but does have occlusive superficial thrombosis in the cephalic vein.  Warm compresses ordered    Pain and swelling of right upper extremity- (present on admission)  Assessment & Plan  7/31/2024  Have ordered ultrasound of the right upper extremity to assess for DVT    Left leg pain- (present on admission)  Assessment & Plan  7/30/2024  Suspect related to left distal psoas muscle enlargement concerning for myositis versus infection.  Continue cefazolin    7/31/2024  Continue cefazolin    Neck pain, acute- (present on admission)  Assessment & Plan  Patient complaining of LLE numbness, neck pain and bilateral arm numbness.  Head injury 4/2024.  Ordered MRI c spine with head MRI.  Oxycodone prn pain.    Cellulitis of buttock  Assessment & Plan  Patient with erythema, warmth, pain left buttock s/p found down at home.  CT pelvis with contrast ordered to evaluate for abscess.  Started ancef 2gm IV q 8 x 7 days.  Wound care  Off loading, out of bed, increase activity.    Anxiety- (present on admission)  Assessment & Plan  Increased anxiety noted on exam.  Psychiatry added seroquel 50mg qhs, zoloft resumed for a.m.   Ativan prn  Klonopin bid.    Anemia- (present on admission)  Assessment & Plan  Mild   Cont to monitor    Leukocytosis- (present on admission)  Assessment & Plan  False in light of dehydration, likely aspiration pneumonitis with some neutrophil shift. May consider further intervention once patient stabilizes and is more alert, since unable to assess and suspect it is best to minimize medications at this time owing to unclear clinical picture from overdose.  -CTM    Urinary incontinence- (present on admission)  Assessment & Plan  -oxybutynin held due to drug overdose    Rhabdomyolysis- (present on admission)  Assessment & Plan  CK elevated to 16k, suspected 2/2 being found down and drug overdose.  Cont agressive IVFs but change to LR from NS  Follow daily AST, ALT,  Bun, Creat and K    7/25  CPK now 7k  AST/ALT coming down  Renal function improving  K WNL  Cont to monitor CPK, CMP daily until normalized  Cont IVFs until CPK<3k    7/30/2024  Resolved    Elevated liver enzymes- (present on admission)  Assessment & Plan  Transaminase elevation likely from Rhabdo and not acute liver injury though certainly could have a toxic component from OD  Coming down as expected with resolution of Rhabdo  Cont to monitor daily CMP  Consider further evaluation if it does not resolve as CPK drops and Rhabdo resolves    Hypokalemia- (present on admission)  Assessment & Plan  7/30/2024  Potassium of 3.7.  I will order replacement for goal 4.    Acute renal failure (ARF) (HCC)- (present on admission)  Assessment & Plan  Likely 2/2 prerenal damage from hypotension 2/2 drug overdose and uremia related to rhabdomyolysis.  Creat imroving daily: 1.4 today  Good UOP  Cont LR until CPK<3k  Daily BMP  Renally dose medications as appropriate    7/30/2024  Resolved    High anion gap metabolic acidosis- (present on admission)  Assessment & Plan  Likely 2/2 prerenal damage from hypotension 2/2 drug overdose and uremia related to rhabdomyolysis.  -fluid resuscitation with NS 200cc/hr  -IV potassium 10meq x4      7/30/2024  Resolved    Suicidal behavior with attempted self-injury (HCC)- (present on admission)  Assessment & Plan  Repeat suicidal attempt by patient  -psych consult  -sitter    7/30/2024  Continue one-on-one sitter  Continues to be on legal hold.  Psychiatry recommending psychiatric hospitalization  Changing sertraline to Wellbutrin    Mitral regurgitation- (present on admission)  Assessment & Plan  Suspected murmur, though difficult to auscultate. Unclear if preexisting or related to overdose.   -consider TTE once more clinically stable.    Acute encephalopathy- (present on admission)  Assessment & Plan  Acute metabolic/toxic encephalopathy  CT head neg  Mental status improving daily though still  not completely back to normal  Cont to monitor clinically  Repeat lab in am  Minimize sedating medications  7/27:  increased anxiety/confusion, query anoxic brain injury, ordered MRI brain w/o contrast.    7/31/2024  Resolved. MRI of the brain without acute changes.       Hypertension- (present on admission)  Assessment & Plan  Hx of HTN, holding home meds in light of hypotension  Holding BP meds for obvious reasons    Hyponatremia- (present on admission)  Assessment & Plan  Likely hypovolemic hypoNa  Has resolved with IVF resuscitation  Cont to monitor         VTE prophylaxis:    enoxaparin ppx      I have performed a physical exam and reviewed and updated ROS and Plan today (8/1/2024). In review of yesterday's note (7/31/2024), there are no changes except as documented above.

## 2024-08-02 LAB
ALBUMIN SERPL BCP-MCNC: 3.4 G/DL (ref 3.2–4.9)
ALBUMIN/GLOB SERPL: 1.5 G/DL
ALP SERPL-CCNC: 80 U/L (ref 30–99)
ALT SERPL-CCNC: 33 U/L (ref 2–50)
ANION GAP SERPL CALC-SCNC: 12 MMOL/L (ref 7–16)
AST SERPL-CCNC: 23 U/L (ref 12–45)
BASOPHILS # BLD AUTO: 0.1 % (ref 0–1.8)
BASOPHILS # BLD: 0.01 K/UL (ref 0–0.12)
BILIRUB SERPL-MCNC: 0.3 MG/DL (ref 0.1–1.5)
BUN SERPL-MCNC: 8 MG/DL (ref 8–22)
CALCIUM ALBUM COR SERPL-MCNC: 9 MG/DL (ref 8.5–10.5)
CALCIUM SERPL-MCNC: 8.5 MG/DL (ref 8.5–10.5)
CHLORIDE SERPL-SCNC: 98 MMOL/L (ref 96–112)
CO2 SERPL-SCNC: 21 MMOL/L (ref 20–33)
CREAT SERPL-MCNC: 0.65 MG/DL (ref 0.5–1.4)
EKG IMPRESSION: NORMAL
EOSINOPHIL # BLD AUTO: 0.09 K/UL (ref 0–0.51)
EOSINOPHIL NFR BLD: 1.3 % (ref 0–6.9)
ERYTHROCYTE [DISTWIDTH] IN BLOOD BY AUTOMATED COUNT: 41.5 FL (ref 35.9–50)
GFR SERPLBLD CREATININE-BSD FMLA CKD-EPI: 105 ML/MIN/1.73 M 2
GLOBULIN SER CALC-MCNC: 2.3 G/DL (ref 1.9–3.5)
GLUCOSE SERPL-MCNC: 100 MG/DL (ref 65–99)
HCT VFR BLD AUTO: 32.9 % (ref 42–52)
HGB BLD-MCNC: 11.7 G/DL (ref 14–18)
IMM GRANULOCYTES # BLD AUTO: 0.03 K/UL (ref 0–0.11)
IMM GRANULOCYTES NFR BLD AUTO: 0.4 % (ref 0–0.9)
LYMPHOCYTES # BLD AUTO: 0.98 K/UL (ref 1–4.8)
LYMPHOCYTES NFR BLD: 14.7 % (ref 22–41)
MAGNESIUM SERPL-MCNC: 2 MG/DL (ref 1.5–2.5)
MCH RBC QN AUTO: 30.8 PG (ref 27–33)
MCHC RBC AUTO-ENTMCNC: 35.6 G/DL (ref 32.3–36.5)
MCV RBC AUTO: 86.6 FL (ref 81.4–97.8)
MONOCYTES # BLD AUTO: 0.54 K/UL (ref 0–0.85)
MONOCYTES NFR BLD AUTO: 8.1 % (ref 0–13.4)
NEUTROPHILS # BLD AUTO: 5.03 K/UL (ref 1.82–7.42)
NEUTROPHILS NFR BLD: 75.4 % (ref 44–72)
NRBC # BLD AUTO: 0 K/UL
NRBC BLD-RTO: 0 /100 WBC (ref 0–0.2)
PLATELET # BLD AUTO: 251 K/UL (ref 164–446)
PMV BLD AUTO: 8.9 FL (ref 9–12.9)
POTASSIUM SERPL-SCNC: 3.6 MMOL/L (ref 3.6–5.5)
PROCALCITONIN SERPL-MCNC: 0.19 NG/ML
PROT SERPL-MCNC: 5.7 G/DL (ref 6–8.2)
RBC # BLD AUTO: 3.8 M/UL (ref 4.7–6.1)
SODIUM SERPL-SCNC: 131 MMOL/L (ref 135–145)
WBC # BLD AUTO: 6.7 K/UL (ref 4.8–10.8)

## 2024-08-02 PROCEDURE — 83735 ASSAY OF MAGNESIUM: CPT

## 2024-08-02 PROCEDURE — 97116 GAIT TRAINING THERAPY: CPT | Mod: CQ

## 2024-08-02 PROCEDURE — 700111 HCHG RX REV CODE 636 W/ 250 OVERRIDE (IP): Performed by: HOSPITALIST

## 2024-08-02 PROCEDURE — 700102 HCHG RX REV CODE 250 W/ 637 OVERRIDE(OP): Performed by: HOSPITALIST

## 2024-08-02 PROCEDURE — 99232 SBSQ HOSP IP/OBS MODERATE 35: CPT | Performed by: STUDENT IN AN ORGANIZED HEALTH CARE EDUCATION/TRAINING PROGRAM

## 2024-08-02 PROCEDURE — A9270 NON-COVERED ITEM OR SERVICE: HCPCS | Performed by: HOSPITALIST

## 2024-08-02 PROCEDURE — A9270 NON-COVERED ITEM OR SERVICE: HCPCS | Performed by: STUDENT IN AN ORGANIZED HEALTH CARE EDUCATION/TRAINING PROGRAM

## 2024-08-02 PROCEDURE — 700102 HCHG RX REV CODE 250 W/ 637 OVERRIDE(OP): Performed by: STUDENT IN AN ORGANIZED HEALTH CARE EDUCATION/TRAINING PROGRAM

## 2024-08-02 PROCEDURE — 770001 HCHG ROOM/CARE - MED/SURG/GYN PRIV*

## 2024-08-02 PROCEDURE — 85025 COMPLETE CBC W/AUTO DIFF WBC: CPT

## 2024-08-02 PROCEDURE — 93010 ELECTROCARDIOGRAM REPORT: CPT | Performed by: INTERNAL MEDICINE

## 2024-08-02 PROCEDURE — 97530 THERAPEUTIC ACTIVITIES: CPT | Mod: CQ

## 2024-08-02 PROCEDURE — 700105 HCHG RX REV CODE 258: Performed by: HOSPITALIST

## 2024-08-02 PROCEDURE — 84145 PROCALCITONIN (PCT): CPT

## 2024-08-02 PROCEDURE — 80053 COMPREHEN METABOLIC PANEL: CPT

## 2024-08-02 PROCEDURE — 700111 HCHG RX REV CODE 636 W/ 250 OVERRIDE (IP)

## 2024-08-02 PROCEDURE — 36415 COLL VENOUS BLD VENIPUNCTURE: CPT

## 2024-08-02 RX ADMIN — LORAZEPAM 0.5 MG: 2 INJECTION INTRAMUSCULAR; INTRAVENOUS at 01:30

## 2024-08-02 RX ADMIN — Medication 1 G: at 08:50

## 2024-08-02 RX ADMIN — CEFAZOLIN 2 G: 2 INJECTION, POWDER, FOR SOLUTION INTRAMUSCULAR; INTRAVENOUS at 05:45

## 2024-08-02 RX ADMIN — OXYCODONE HYDROCHLORIDE 5 MG: 5 TABLET ORAL at 20:14

## 2024-08-02 RX ADMIN — OLANZAPINE 5 MG: 5 TABLET, FILM COATED ORAL at 21:25

## 2024-08-02 RX ADMIN — RIVAROXABAN 10 MG: 10 TABLET, FILM COATED ORAL at 17:27

## 2024-08-02 RX ADMIN — ACETAMINOPHEN 650 MG: 325 TABLET ORAL at 17:27

## 2024-08-02 RX ADMIN — Medication 1 G: at 13:18

## 2024-08-02 RX ADMIN — CEFAZOLIN 2 G: 2 INJECTION, POWDER, FOR SOLUTION INTRAMUSCULAR; INTRAVENOUS at 21:31

## 2024-08-02 RX ADMIN — Medication 1 G: at 17:27

## 2024-08-02 RX ADMIN — CEFAZOLIN 2 G: 2 INJECTION, POWDER, FOR SOLUTION INTRAMUSCULAR; INTRAVENOUS at 13:18

## 2024-08-02 RX ADMIN — VENLAFAXINE HYDROCHLORIDE 75 MG: 75 CAPSULE, EXTENDED RELEASE ORAL at 08:50

## 2024-08-02 ASSESSMENT — ENCOUNTER SYMPTOMS
BLURRED VISION: 1
COUGH: 0
NAUSEA: 0
HEADACHES: 0
ABDOMINAL PAIN: 0
DEPRESSION: 0
MYALGIAS: 0
SHORTNESS OF BREATH: 0
VOMITING: 0
PALPITATIONS: 0
DIZZINESS: 0
FEVER: 0
CHILLS: 0

## 2024-08-02 ASSESSMENT — COGNITIVE AND FUNCTIONAL STATUS - GENERAL
MOBILITY SCORE: 19
MOVING TO AND FROM BED TO CHAIR: A LITTLE
SUGGESTED CMS G CODE MODIFIER MOBILITY: CK
CLIMB 3 TO 5 STEPS WITH RAILING: A LOT
WALKING IN HOSPITAL ROOM: A LITTLE
STANDING UP FROM CHAIR USING ARMS: A LITTLE

## 2024-08-02 ASSESSMENT — PAIN DESCRIPTION - PAIN TYPE
TYPE: ACUTE PAIN
TYPE: ACUTE PAIN

## 2024-08-02 ASSESSMENT — GAIT ASSESSMENTS
GAIT LEVEL OF ASSIST: SUPERVISED
ASSISTIVE DEVICE: FRONT WHEEL WALKER
DEVIATION: ANTALGIC

## 2024-08-02 NOTE — PROGRESS NOTES
Pt. Walked with therapy today tolerating well. IV abx given earlier and pt. Saline locked on his IV. 1:1 care aide sitter at bedside also ambulated with the pt. No complaints of pain or soreness from pt. When he ambulates. Needs attended.

## 2024-08-02 NOTE — THERAPY
Physical Therapy   Daily Treatment     Patient Name: Terrell Hensley  Age:  64 y.o., Sex:  male  Medical Record #: [id number]  Today's Date: 8/2/2024     Precautions  Precautions: (P) Fall Risk  Comments: (P) + legal hold (Suicide attempt)    Assessment    The pt cleared to participate w/PT. Functionally he is @ supervision level w/bed mobility, STS/transfers w/wo FWW, and hallway amb w/FWW. Today the pt managed distances > 250', c/o Lft hip/knee pain w/knee buckling event that the pt was able to catch w/the walker.  The pt is cleared for d/c home from PT standpoint, the pt's family is going to be providing 24/7 supervision.   Recommend FWW, anticipate no further post acute needs.     Plan    Treatment Plan Status: (P) Continue Current Treatment Plan  Type of Treatment: Bed Mobility, Equipment, Gait Training, Neuro Re-Education / Balance, Orthotics Training , Self Care / Home Evaluation, Stair Training, Therapeutic Activities, Therapeutic Exercise  Treatment Frequency: 4 Times per Week  Treatment Duration: Until Therapy Goals Met    DC Equipment Recommendations: (P) Front-Wheel Walker  Discharge Recommendations: (P) Anticipate that the patient will have no further physical therapy needs after discharge from the hospital     Objective       08/02/24 1352   Time In/Time Out   Therapy Start Time 1325   Therapy End Time 1352   Total Therapy Time 27   Charge Group   PT Gait Training (Units) 1   PT Therapeutic Activities (Units) 1   Total Time Spent   PT Gait Training Time Spent (Mins) 10   PT Therapeutic Activities Time Spent (Mins) 17   PT Total Time Spent (Calculated) 27   Precautions   Precautions Fall Risk   Comments + legal hold (Suicide attempt)   Pain 0 - 10 Group   Location Hip;Knee   Location Orientation Left   Pain Rating Scale (NPRS) 5   Therapist Pain Assessment During Activity;Nurse Notified   Balance   Sitting Balance (Static) Good   Sitting Balance (Dynamic) Good   Standing Balance (Static) Fair +    Standing Balance (Dynamic) Fair   Weight Shift Sitting Good   Weight Shift Standing Fair   Skilled Intervention Verbal Cuing   Comments standing w/FWW   Bed Mobility    Supine to Sit Supervised   Sit to Supine Supervised   Scooting Supervised   Rolling Supervised   Skilled Intervention Verbal Cuing   Comments HOB partially elevated and no railing assist.   Gait Analysis   Gait Level Of Assist Supervised   Assistive Device Front Wheel Walker   Distance (Feet)   (hallway amb > 250')   # of Times Distance was Traveled 1   Deviation Antalgic   Skilled Intervention Verbal Cuing   Comments Improvement w/pt's amb efforts from last PT session, managed hallway distances, c/o Lft hip/knee pain. The pt conts to require use of FWW w/his amb efforts. The pt did knee buckle w/Rt LE when increasing his gait speed.   Functional Mobility   Sit to Stand Supervised   Bed, Chair, Wheelchair Transfer Supervised   Skilled Intervention Verbal Cuing   Comments The pt is now @ supervision level w/STS and transfers using the FWW.   6 Clicks Assessment - How much HELP from from another person do you currently need... (If the patient hasn't done an activity recently, how much help from another person do you think he/she would need if he/she tried?)   Turning from your back to your side while in a flat bed without using bedrails? 4   Moving from lying on your back to sitting on the side of a flat bed without using bedrails? 4   Moving to and from a bed to a chair (including a wheelchair)? 3   Standing up from a chair using your arms (e.g., wheelchair, or bedside chair)? 3   Walking in hospital room? 3   Climbing 3-5 steps with a railing? 2   6 clicks Mobility Score 19   Short Term Goals    Short Term Goal # 1 Pt will demo STS EOB w/ FWW SPV in 6 visits to prepare for OOB mobility tasks.   Goal Outcome # 1 Goal met   Short Term Goal # 2 Pt will demo gait >150' using FWW SPV in a moving environment in 6 visits for household ambulation.   Goal  Outcome # 2 Goal met   Short Term Goal # 3 Pt will demo ability to ascend/descend 2 stairs w/ UE support SPV in 6 visits for access to his home environment.   Goal Outcome # 3 Goal not met   Education Group   Role of Physical Therapist Patient Response Patient;Acceptance;Explanation;Action Demonstration   Physical Therapy Treatment Plan   Physical Therapy Treatment Plan Continue Current Treatment Plan   Anticipated Discharge Equipment and Recommendations   DC Equipment Recommendations Front-Wheel Walker   Discharge Recommendations Anticipate that the patient will have no further physical therapy needs after discharge from the hospital   Interdisciplinary Plan of Care Collaboration   IDT Collaboration with  Nursing   Patient Position at End of Therapy In Bed;Call Light within Reach;Tray Table within Reach;Phone within Reach   Collaboration Comments Nrsg BS following PT session   Session Information   Date / Session Number  8/2--2 (2/4, 8/4) PTA/1   Supervising Physical Therapist (PTA Treatments Only)   Supervising Physical Therapist Jerri Arndt

## 2024-08-02 NOTE — CARE PLAN
Pt AO x 4.  Pt c/o pain in 3/10, medication intervention given per MAR. Patient restless and took multiple walks with sitter at night. Call light and belongings within reach. 1:1 safety sitter in the room. Bed locked and in lowest position.  Hourly rounding.  Needs currently met.       The patient is Stable - Low risk of patient condition declining or worsening    Shift Goals  Clinical Goals: rest and continue abx  Patient Goals: walk  Family Goals: FELICITAS    Progress made toward(s) clinical / shift goals:      Problem: Pain - Standard  Goal: Alleviation of pain or a reduction in pain to the patient’s comfort goal  Outcome: Progressing     Problem: Knowledge Deficit - Standard  Goal: Patient and family/care givers will demonstrate understanding of plan of care, disease process/condition, diagnostic tests and medications  Outcome: Progressing     Problem: Provide Safe Environment  Goal: Suicide environmental safety, protocols, policies, and practices will be implemented  Outcome: Progressing     Problem: Fall Risk  Goal: Patient will remain free from falls  Outcome: Progressing     Problem: Depression  Goal: Patient and family/caregiver will verbalize accurate information about at least two of the possible causes of depression, three-four of the signs and symptoms of depression  Outcome: Progressing       Patient is not progressing towards the following goals:

## 2024-08-02 NOTE — CONSULTS
"[facility]    INPATIENT ASSESSMENT    Name: Terrell Hensley  MRN: [id number]  : 1960  Age: 64 y.o.  Date of assessment: 2024  PCP: Neelima Rivera M.D.  Persons in attendance: Patient and Siblings    CHIEF COMPLAINT/PRESENTING ISSUE (as stated by Patient ): Terrell Hensley is a 64 year old male seen sitting on edge of bed, with RN at Bedside taking vitals. Patient's brother was also present during this session. Patient reports having thoughts of jumping off a building to commit suicide. Patients speech is impoverished, as he was having difficulty completing sentences. Patient was observed pointing out the window stating, \"I was having bad thoughts, jumping\" Patient was challenged in completing a sentence or finding words to express himself. Patient states, \"my thoughts, I cannot say what I am thinking, I am trying \". Patient states, \"I am feeling better, the pool of water I kept seeing is not there right now\". Patient's symptoms appear to be worsening with increased suicidal ideations and visual hallucinations.   Patient's brother acknowledged his concern in relationship to some of patients statements.   Psychiatry has already began to address these symptoms with progressive medication management.  Clinician provided encouragement and support while assisting patient in refocusing his thoughts by doing reality testing with patient. Clinician will continue to monitor patient symptoms.  At this time patient  continues to present imminent risk for his own safety in addition to impaired judgment as indicated by delusional thoughts patterns, visual hallucinations and active suicidal ideations.    Chief Complaint   Patient presents with    Drug Overdose    Suicidal Ideation    Suicide Attempt      MENTAL STATUS              Participation: Limited verbal participation  Grooming: Neat  Orientation: Alert-  Behavior: Tense  Eye contact: Limited  Mood: Anxious  Affect: Blunted  Thought " process: Loose associations  Thought content: Evidence of delusion, preocuppation  Speech: Latency of response  Perception: Evidence of auditory and visual hallucination  Memory:  Poor memory for chronology of events  Insight: Poor  Judgment:  Poor  Other:     Collateral information:   Source:   Significant other present in person: brother   Significant other by telephone   [facility]    [facility] Nursing Staff-consulted   [facility] Medical Record-reviewed   Other: consulted with psychiatry      Unable to complete full assessment due to:   Acute intoxication   Patient declined to participate/engage   Patient verbally unresponsive   Significant cognitive deficits   Significant perceptual distortions or behavioral disorganization   Other:              CLINICAL IMPRESSIONS:  Primary:  R/O Major Depressive disorder with psychotic features  Secondary:                                          IDENTIFIED NEEDS/PLAN:  [Trigger DISPOSITION list for any items marked]     x  Imminent safety risk - self   Imminent safety risk - others     Acute substance withdrawal   Psychosis/Impaired reality testing   x  Mood/anxiety   Substance use/Addictive behavior    x Maladaptive behavior   Parent/child conflict     Family/Couples conflict   Biomedical     Housing   Financial      Legal   Occupational/Educational     Domestic violence   Other:      Recommendations and Observation Level:  Sitter: one to one  Phone: yes  Visitors: yes  Personal belongings: no        Legal Hold: extended        Please transfer to an inpatient psychiatric facility    Nichole Damico, Ph.D., [facility]  8/1/2024   Length of intervention: 30 minutes

## 2024-08-02 NOTE — PROGRESS NOTES
[facility] Medicine Daily Progress Note    Date of Service  8/2/2024    Chief Complaint  Terrell Hensley is a 63 y.o. male admitted 7/23/2024 with altered mental status, found down after intentional OD of his home medications.    Hospital Course  64yo PMHx HTN, gout.  Presented altered after an amlodipine OD.  CPK16k, WILMAR, elevated LFTs.    Interval Problem Update  7/26:  transferred out of ICU to medical unit, remains confused.  States he has left hip pain, xray ordered.  PT/OT ordered, off strict bedrest.  CPK remain 4, 523, cut back  to 125/hr. Started lovenox for DVT ppx.  Head CT on admission negative.  7/27: Patient appeared quite anxious nervous today.  Psychiatry has recommended Seroquel 50 mg nightly.  QTc on EKG today 492.  Also restart Zoloft tomorrow morning 50 mg in AM.  I have added Klonopin 0.5 mg p.o. twice daily with as needed and Ativan for MRI brain rule out anoxic event.  Apparently patient attempted to get out of bed and his legs collapsed.  PT OT pending.  7/28:  patient improved anxiety and acute psychosis with addition of medications.  He asks if he will be wheelchair bound.  C/o left knee pain.  Ordered xray left femur. No obvious deformity noted on exam. Blake discontinued today.  7/29: Patient still complains of left hip pain.  Finding of left buttock erythema, swelling, appearance of cellulitis.  I have ordered a CT pelvis to rule out abscess.  Start Ancef 2 g IV every 8 x 7 days.  Patient complains of numbness in his left lower leg.  He is difficult historian since he states yes to almost all questions asked.  He does complain of cervical neck pain.  When asked if he has any numbness or pain in his arms he says yes.  I have added MRI cervical spine to MRI brain without contrast.  As needed pain medications added.  PT eval recommending postacute placement for physical therapy.  They did get patient up to bathroom with assistance.    Above per previous  hospitalist.    7/30/2024  Continues on legal hold.  Potassium of 3.7 being replaced.  Discussed with psychiatry.  Changing Lexapro to Wellbutrin  Increasing trazodone to 100 mg nightly.    7/31/2024  Continues on legal hold  Tolerating medication changes.  Complaining of right upper extremity pain for past several days.  States that he is feeling down today.  Denies any suicidal or homicidal ideation.  Continue on IV cefazolin.  Right upper extremity ultrasound ordered due to pain    8/1/2024  Right upper extremity pain improving  Ultrasound with no DVT but does have occlusive superficial thrombosis in the cephalic vein.  Warm compresses ordered  Continue on cefazolin    8/2/2024  Complaining of cloudy eyes this morning.  Continuing on cefazolin.  Ambulating with walker.  PT and OT will reevaluate.      I have discussed this patient's plan of care and discharge plan at IDT rounds today with Case Management, Nursing, Nursing leadership, and other members of the IDT team.    Consultants/Specialty  psychiatry    Code Status  Full Code    Disposition  The patient is not medically cleared for discharge to home or a post-acute facility.  Anticipate discharge to: a psychiatric hospital    I have placed the appropriate orders for post-discharge needs.    Review of Systems  Review of Systems   Constitutional:  Negative for chills and fever.   Eyes:  Positive for blurred vision.   Respiratory:  Negative for cough and shortness of breath.    Cardiovascular:  Negative for chest pain and palpitations.   Gastrointestinal:  Negative for abdominal pain, nausea and vomiting.   Musculoskeletal:  Negative for joint pain and myalgias.   Neurological:  Negative for dizziness and headaches.   Psychiatric/Behavioral:  Negative for depression and suicidal ideas.         Physical Exam  Temp:  [36.3 °C (97.4 °F)-37.1 °C (98.7 °F)] 36.4 °C (97.5 °F)  Pulse:  [74-85] 81  Resp:  [18] 18  BP: (130-163)/(68-94) 138/68  SpO2:  [93 %-95 %] 93  %    Physical Exam  Vitals and nursing note reviewed. Exam conducted with a chaperone present.   Constitutional:       General: He is not in acute distress.     Appearance: Normal appearance. He is not ill-appearing.   HENT:      Head: Normocephalic and atraumatic.      Mouth/Throat:      Mouth: Mucous membranes are moist.      Pharynx: Oropharynx is clear. No oropharyngeal exudate.   Eyes:      General: No scleral icterus.        Right eye: No discharge.         Left eye: No discharge.      Conjunctiva/sclera: Conjunctivae normal.   Cardiovascular:      Rate and Rhythm: Normal rate and regular rhythm.      Pulses: Normal pulses.      Heart sounds: Normal heart sounds. No murmur heard.  Pulmonary:      Effort: Pulmonary effort is normal. No respiratory distress.      Breath sounds: Normal breath sounds.   Abdominal:      General: Abdomen is flat. Bowel sounds are normal. There is no distension.      Palpations: Abdomen is soft.   Musculoskeletal:         General: Swelling and tenderness present.      Cervical back: Neck supple. No tenderness.      Right lower leg: No edema.      Left lower leg: No edema.      Comments: Swelling and tenderness along the right bicep   Skin:     General: Skin is warm and dry.      Coloration: Skin is not pale.   Neurological:      Mental Status: He is alert and oriented to person, place, and time. Mental status is at baseline.      Motor: Weakness (Left lower extremity) present.   Psychiatric:         Thought Content: Thought content normal.         Judgment: Judgment normal.         Fluids    Intake/Output Summary (Last 24 hours) at 8/2/2024 1232  Last data filed at 8/2/2024 0930  Gross per 24 hour   Intake 800 ml   Output 1635 ml   Net -835 ml       Laboratory  Recent Labs     07/31/24  0811 08/02/24  0752   WBC 7.0 6.7   RBC 3.90* 3.80*   HEMOGLOBIN 11.9* 11.7*   HEMATOCRIT 34.0* 32.9*   MCV 87.2 86.6   MCH 30.5 30.8   MCHC 35.0 35.6   RDW 41.5 41.5   PLATELETCT 199 251   MPV 9.0  8.9*       Recent Labs     08/02/24  0752   SODIUM 131*   POTASSIUM 3.6   CHLORIDE 98   CO2 21   GLUCOSE 100*   BUN 8   CREATININE 0.65   CALCIUM 8.5                   Imaging  US-EXTREMITY VENOUS UPPER UNILAT RIGHT   Final Result      CT-PELVIS WITH   Final Result         1.  Low-density enlargement of the distal left psoas muscle and iliacus muscle extending to the anterior left thigh. Could represent changes of myositis or possibly early forming infectious process. No definite enhancing fluid collection identified to    indicate abscess at this time.   2.  Distended bladder with nondependent air, consider history of recent instrumentation or changes of urinary tract infection, correlate with urinalysis as clinically appropriate.   3.  Bilateral fat-containing inguinal hernias   4.  Diverticulosis      MR-CERVICAL SPINE-W/O   Final Result      1.  Degenerative disease in the segment cervical spine as described above.   2.  Left paracentral/foraminal disc protrusion at C6-7 causing severe left C7 neural foraminal stenosis.      MR-BRAIN-W/O   Final Result      1.  No acute abnormality.   2.  A few punctate nonspecific supratentorial T2 hyperintensities likely representing nonspecific foci of gliosis/chronic ischemia.   3.  Mild cerebral volume loss.      DX-FEMUR-2+ LEFT   Final Result      Unremarkable LEFT femur.      DX-HIP-COMPLETE - UNILATERAL 2+ LEFT   Final Result      No radiographic evidence of acute traumatic injury.      CT-HEAD W/O   Final Result         1.  No acute intracranial abnormality.   2.  Atherosclerosis.                    Assessment/Plan  * Drug overdose of undetermined intent, initial encounter- (present on admission)  Assessment & Plan  Apparent suicide attempt  Polypharmacy: some amlodipine but other ingestions unclear  Cont supportive care as clinically improving  On Psych hold  Psychiatry team following    7/31/2024  Continues on a legal hold    8/1/2024  Continues on legal hold.   Unable to go to inpatient psych facility due to weakness.    8/2/2024  Continues on legal hold.  Unable to go to inpatient psych facility due to use of walker.  Repeating PT and OT evaluation.    Thrombophlebitis of cephalic vein- (present on admission)  Assessment & Plan  8/1/2024  Right upper extremity pain improving  Ultrasound with no DVT but does have occlusive superficial thrombosis in the cephalic vein.  Warm compresses ordered    Pain and swelling of right upper extremity- (present on admission)  Assessment & Plan  7/31/2024  Have ordered ultrasound of the right upper extremity to assess for DVT    8/2/2024  Continue warm compresses    Left leg pain- (present on admission)  Assessment & Plan  7/30/2024  Suspect related to left distal psoas muscle enlargement concerning for myositis versus infection.  Continue cefazolin    7/31/2024  Continue cefazolin    Neck pain, acute- (present on admission)  Assessment & Plan  Patient complaining of LLE numbness, neck pain and bilateral arm numbness.  Head injury 4/2024.  Ordered MRI c spine with head MRI.  Oxycodone prn pain.    Cellulitis of buttock  Assessment & Plan  Patient with erythema, warmth, pain left buttock s/p found down at home.  CT pelvis with contrast ordered to evaluate for abscess.  Started ancef 2gm IV q 8 x 7 days.  Wound care  Off loading, out of bed, increase activity.    8/2/2024  Continue IV cefazolin    Anxiety- (present on admission)  Assessment & Plan  Increased anxiety noted on exam.  Psychiatry added seroquel 50mg qhs, zoloft resumed for a.m.   Ativan prn  Klonopin bid.    Anemia- (present on admission)  Assessment & Plan  Mild   Cont to monitor    Leukocytosis- (present on admission)  Assessment & Plan  False in light of dehydration, likely aspiration pneumonitis with some neutrophil shift. May consider further intervention once patient stabilizes and is more alert, since unable to assess and suspect it is best to minimize medications at  this time owing to unclear clinical picture from overdose.  -CTM    Urinary incontinence- (present on admission)  Assessment & Plan  -oxybutynin held due to drug overdose    Rhabdomyolysis- (present on admission)  Assessment & Plan  CK elevated to 16k, suspected 2/2 being found down and drug overdose.  Cont agressive IVFs but change to LR from NS  Follow daily AST, ALT, Bun, Creat and K    7/25  CPK now 7k  AST/ALT coming down  Renal function improving  K WNL  Cont to monitor CPK, CMP daily until normalized  Cont IVFs until CPK<3k    7/30/2024  Resolved    Elevated liver enzymes- (present on admission)  Assessment & Plan  Transaminase elevation likely from Rhabdo and not acute liver injury though certainly could have a toxic component from OD  Coming down as expected with resolution of Rhabdo  Cont to monitor daily CMP  Consider further evaluation if it does not resolve as CPK drops and Rhabdo resolves    Hypokalemia- (present on admission)  Assessment & Plan  7/30/2024  Potassium of 3.7.  I will order replacement for goal 4.    Acute renal failure (ARF) (HCC)- (present on admission)  Assessment & Plan  Likely 2/2 prerenal damage from hypotension 2/2 drug overdose and uremia related to rhabdomyolysis.  Creat imroving daily: 1.4 today  Good UOP  Cont LR until CPK<3k  Daily BMP  Renally dose medications as appropriate    7/30/2024  Resolved    High anion gap metabolic acidosis- (present on admission)  Assessment & Plan  Likely 2/2 prerenal damage from hypotension 2/2 drug overdose and uremia related to rhabdomyolysis.  -fluid resuscitation with NS 200cc/hr  -IV potassium 10meq x4      7/30/2024  Resolved    Suicidal behavior with attempted self-injury (HCC)- (present on admission)  Assessment & Plan  Repeat suicidal attempt by patient  -psych consult  -sitter    7/30/2024  Continue one-on-one sitter  Continues to be on legal hold.  Psychiatry recommending psychiatric hospitalization  Changing sertraline to  Wellbutrin    8/2/2024  Off of Wellbutrin  Continue venlafaxine, olanzapine at bedtime    Mitral regurgitation- (present on admission)  Assessment & Plan  Suspected murmur, though difficult to auscultate. Unclear if preexisting or related to overdose.   -consider TTE once more clinically stable.    Acute encephalopathy- (present on admission)  Assessment & Plan  Acute metabolic/toxic encephalopathy  CT head neg  Mental status improving daily though still not completely back to normal  Cont to monitor clinically  Repeat lab in am  Minimize sedating medications  7/27:  increased anxiety/confusion, query anoxic brain injury, ordered MRI brain w/o contrast.    7/31/2024  Resolved. MRI of the brain without acute changes.       Hypertension- (present on admission)  Assessment & Plan  Hx of HTN, holding home meds in light of hypotension  Holding BP meds for obvious reasons    Hyponatremia- (present on admission)  Assessment & Plan  Likely hypovolemic hypoNa  Has resolved with IVF resuscitation  Cont to monitor         VTE prophylaxis:    Xarelto 10mg daily as prophylaxis      I have performed a physical exam and reviewed and updated ROS and Plan today (8/2/2024). In review of yesterday's note (8/1/2024), there are no changes except as documented above.

## 2024-08-02 NOTE — PROGRESS NOTES
Pt. Had a fall around 1605 in room with the care aide sitter. Per Care aide pt. Was up and walking in room with his FWW. Pt. Tolerating walking around, no complaints of soreness while he walks or slowing down while walking. Pt. Then when getting back in bed he was about to sit down to edge of bed his knees buckled and he slid down on the edge of bed. Care aide sitter was unable to catch pt. And break his fall as he had already fallen to the ground, pt. Wearing treaded socks on. Covering on the bed though is a flat sheet since pt. Is a legal hold patient. Pt. Fell on his buttocks. New picture taken on his buttocks area. Charge RN aware and at bedside. VS taken and recorded. Pt. Denied hitting his head. Safety sitter stated also pt. Hitting the ground on his buttocks. Dr. Valle made aware  at 1621 no order made. Brothjed Reaves notified at 1628. Brooklynn from pharmacy informed at 1624. Ensured all fall prec in place, educated pt. About safety measures.

## 2024-08-02 NOTE — CONSULTS
PSYCHIATRIC FOLLOW-UP: (established)  Reason for admission:   Overdose  Legal Hold Status:    Extended         Chart reviewed.                 Interval Hx    Patient was seen today with his brothers at bedside.  He reports that the voices are less intense today, rates them as a 2 out of 10 with 10 being the worst.  He does acknowledge that he was having auditory hallucinations of an intensity of 10 out of 10 when he was at home just prior to his suicide attempt.  He reports he has not noted side effects so far from Zyprexa but also that it did not help much with sleep last night.  Advised that it is difficult to assess full effectiveness after 1 night so we will continue current dose so he can continue to monitor for any further benefit and side effects.  No side effects noted with first dose of venlafaxine so far today.  The patient does continue to endorse negative thoughts regarding his self.  He does not express any thoughts of harming self or others.  No delusions expressed today.    His brothers at bedside continue to report that they can be supportive with the patient upon discharge.  They did provide FMLA paperwork that does need to be filled out prior to the patient's discharge so that he can have appropriate time off from work.    Reviewed nursing notes.  The patient did require as needed trazodone for insomnia.  He also did receive 1 dose of Ativan 0.5 mg for anxiety.  No reports of agitation or aggression.     Medical ROS (as pertinent):     Review of Systems   Patient does not endorse any chest pain or shortness of breath  The patient does not endorse any nausea, vomiting or abdominal pain  The patient does not report dizziness or sedation            Psychiatric Examination:  Vitals:   Vitals:    08/02/24 0800   BP: 138/68   Pulse: 81   Resp: 18   Temp: 36.4 °C (97.5 °F)   SpO2: 93%       General Appearance: Appears state age, appropriate grooming & hygiene, distracted, preoccupied.   Behavior:           "     -Appropriate activity, appropriate eye contact, pleasant & cooperative  Neuro:              -No tics, tremors dyskinesias or dystonias noted.               -No psychomotor agitation or retardation              -No posture or gait abnormalities appreciated. Gait steady with walker   Speech: Appropriate quantity, spontaneous. Regular rate and rhythm. Low volume and intonation.   Language: English, able to name objects and repeat phrases  Thought processes: Less latency noted today but thought processes still slowed.  Does not appear as preoccupied by auditory hallucinations this morning.  Largely linear and logical in conversation   thought content: No evidence of SI/HI.  He does not express delusions.  Content largely appropriate to conversation  Mood: \"Okay I guess\" as stated by patient  Affect:  Blunted, highly anxious, appropriately reactive to conversation. No evidence of lability, ernie, or agitation  Judgement and Insight: Fair/fair  Cognition:               -Alert & oriented x 3 (Person, place and time)              -Attention & concentration grossly intact              -Immediate/delayed memory not formally tested but grossly intact              -Age-appropriate fund of knowledge        New PAST MEDICAL/PSYCH/FAMILY/SOCIAL(as reported by patient):                None    Medications    Current Facility-Administered Medications:     rivaroxaban (Xarelto) tablet 10 mg, 10 mg, Oral, DAILY AT 1800, Roman Valle M.D.    LORazepam (Ativan) injection 0.5 mg, 0.5 mg, Intravenous, Q6HRS PRN, ELLEN BarcenasNJeremyP., 0.5 mg at 08/02/24 0130    traZODone (Desyrel) tablet 100 mg, 100 mg, Oral, QHS PRN, ELLEN WingO., 100 mg at 08/01/24 2317    OLANZapine (ZyPREXA) tablet 5 mg, 5 mg, Oral, QHS, ELLEN WingO., 5 mg at 08/01/24 2206    venlafaxine XR (Effexor XR) capsule 75 mg, 75 mg, Oral, QDAY with Breakfast, ELLEN WingO., 75 mg at 08/02/24 0850    sodium chloride (Salt) " tablet 1 g, 1 g, Oral, TID WITH MEALS, Jaimie Church M.D., 1 g at 24 1318    oxyCODONE immediate-release (Roxicodone) tablet 5 mg, 5 mg, Oral, Q4HRS PRN, Jaimie Church M.D., 5 mg at 248    ceFAZolin (Ancef) 2 g in  mL IVPB, 2 g, Intravenous, Q8HRS, Jaimie Church M.D., Last Rate: 200 mL/hr at 24 1318, 2 g at 24 1318    magnesium hydroxide (Milk Of Magnesia) suspension 30 mL, 30 mL, Oral, QDAY PRN, GISSEL Cote, 30 mL at 24 0438    acetaminophen (Tylenol) tablet 650 mg, 650 mg, Oral, Q4HRS PRN, Cristofer Teejda D.O., 650 mg at 24    ondansetron (Zofran) syringe/vial injection 4 mg, 4 mg, Intravenous, Q4HRS PRN, Cristofer Tejeda, D.O.    Allergies  Allergies   Allergen Reactions    Sulfa Drugs Hives and Rash                                 EK24  Intervals                                Axis   Rate:       82                           P:          67   MD:         176                          QRS:        55   QRSD:       93                           T:          35   QT:         355   QTc:        415     Interpretive Statements   Sinus rhythm   Consider right atrial enlargement     Brain Imaging:   CT head 24  FINDINGS:     The brain appears normal in volume and morphology. The ventricles are normal in caliber and configuration. No space occupying lesions or areas of acute vascular territory infarctions are identified. There are no abnormal extra axial fluid collections or   extra axial hemorrhage identified.     The visualized paranasal sinuses and mastoid air cells are well aerated bilaterally. No depressed calvarial fractures are identified. The visualized globes and retrobulbar soft tissues appear within normal limits.  Atherosclerotic intracranial   calcifications are seen.     IMPRESSION:        1.  No acute intracranial abnormality.  2.  Atherosclerosis.     EEG:  None     Labs personally reviewed:   Recent Results     Lab  Results   Component Value Date/Time    SODIUM 131 (L) 08/02/2024 07:52 AM    POTASSIUM 3.6 08/02/2024 07:52 AM    CHLORIDE 98 08/02/2024 07:52 AM    CO2 21 08/02/2024 07:52 AM    GLUCOSE 100 (H) 08/02/2024 07:52 AM    BUN 8 08/02/2024 07:52 AM    CREATININE 0.65 08/02/2024 07:52 AM    BUNCREATRAT 16 05/15/2023 11:24 AM      Recent Labs     07/27/24  0826 07/28/24  0740 07/29/24  0530 08/02/24  0752   ASTSGOT 101* 59* 53* 23   ALTSGPT 104* 90* 93* 33   TBILIRUBIN 0.6 0.4 0.4 0.3   GLOBULIN 2.5 2.4 2.6 2.3     Lab Results   Component Value Date/Time    WBC 6.7 08/02/2024 07:52 AM    RBC 3.80 (L) 08/02/2024 07:52 AM    HEMOGLOBIN 11.7 (L) 08/02/2024 07:52 AM    HEMATOCRIT 32.9 (L) 08/02/2024 07:52 AM    MCV 86.6 08/02/2024 07:52 AM    MCH 30.8 08/02/2024 07:52 AM    MCHC 35.6 08/02/2024 07:52 AM    MPV 8.9 (L) 08/02/2024 07:52 AM    NEUTSPOLYS 75.40 (H) 08/02/2024 07:52 AM    LYMPHOCYTES 14.70 (L) 08/02/2024 07:52 AM    MONOCYTES 8.10 08/02/2024 07:52 AM    EOSINOPHILS 1.30 08/02/2024 07:52 AM    BASOPHILS 0.10 08/02/2024 07:52 AM   Plt-251,000       Assessment:  Terrell Hensley is a 63 y.o. male admitted 7/23/2024 with altered mental status, found down after intentional OD of his home medications amlodipine in order to kill himself.      The patient reports that his mood is fair.  He continues to have auditory hallucinations though they are decreased in intensity from yesterday.  He of note does not appear as internally preoccupied by these voices.  No delusions expressed.  He does not express any thoughts of harming self or others.  So far tolerating first doses of Zyprexa and Effexor without any major side effects.     Dx:  #MDD with Psychosis  R/O Schizoaffective disorder     #Suicidal Ideation  #Drug Overdose     Medical :  Per primary team        Plan:  Legal hold: Extended due to continued psychotic symptoms  Psychotropic medications:  Continue olanzapine 5mg po qHS as adjunct of treatment for depression  and insomnia  Continue Effexor 75mg po qD for depression  Continue trazodone 100mg qHS to PRN.   Please transfer pt to inpatient psychiatric hospital when medically cleared and bed is available  Labs reviewed  EKG reviewed, QTc within normal limits  Old records ordered/reviewed/summarized  Collateral obtained from brother is at bedside    Psychiatry will follow up       Thank you for the consult.      Sitter: 1:1  Phone: Yes room phone  Visitors: Yes, Jean Paul (brother), Cortes (brother), YOSELIN (son), Tressa (daughter)   Personal belongings:  NO

## 2024-08-03 LAB — NT-PROBNP SERPL IA-MCNC: 359 PG/ML (ref 0–125)

## 2024-08-03 PROCEDURE — A9270 NON-COVERED ITEM OR SERVICE: HCPCS | Performed by: HOSPITALIST

## 2024-08-03 PROCEDURE — 770001 HCHG ROOM/CARE - MED/SURG/GYN PRIV*

## 2024-08-03 PROCEDURE — A9270 NON-COVERED ITEM OR SERVICE: HCPCS | Performed by: STUDENT IN AN ORGANIZED HEALTH CARE EDUCATION/TRAINING PROGRAM

## 2024-08-03 PROCEDURE — 700102 HCHG RX REV CODE 250 W/ 637 OVERRIDE(OP): Performed by: STUDENT IN AN ORGANIZED HEALTH CARE EDUCATION/TRAINING PROGRAM

## 2024-08-03 PROCEDURE — 83880 ASSAY OF NATRIURETIC PEPTIDE: CPT

## 2024-08-03 PROCEDURE — 36415 COLL VENOUS BLD VENIPUNCTURE: CPT

## 2024-08-03 PROCEDURE — 700102 HCHG RX REV CODE 250 W/ 637 OVERRIDE(OP): Performed by: HOSPITALIST

## 2024-08-03 PROCEDURE — 700111 HCHG RX REV CODE 636 W/ 250 OVERRIDE (IP): Performed by: HOSPITALIST

## 2024-08-03 PROCEDURE — 99232 SBSQ HOSP IP/OBS MODERATE 35: CPT | Performed by: STUDENT IN AN ORGANIZED HEALTH CARE EDUCATION/TRAINING PROGRAM

## 2024-08-03 PROCEDURE — 700105 HCHG RX REV CODE 258: Performed by: HOSPITALIST

## 2024-08-03 RX ORDER — LOSARTAN POTASSIUM 50 MG/1
50 TABLET ORAL
Status: DISCONTINUED | OUTPATIENT
Start: 2024-08-04 | End: 2024-08-05

## 2024-08-03 RX ORDER — AMLODIPINE BESYLATE 5 MG/1
5 TABLET ORAL
Status: DISCONTINUED | OUTPATIENT
Start: 2024-08-04 | End: 2024-08-04

## 2024-08-03 RX ADMIN — Medication 1 G: at 11:13

## 2024-08-03 RX ADMIN — Medication 1 G: at 16:47

## 2024-08-03 RX ADMIN — RIVAROXABAN 10 MG: 10 TABLET, FILM COATED ORAL at 16:48

## 2024-08-03 RX ADMIN — CEFAZOLIN 2 G: 2 INJECTION, POWDER, FOR SOLUTION INTRAMUSCULAR; INTRAVENOUS at 14:21

## 2024-08-03 RX ADMIN — CEFAZOLIN 2 G: 2 INJECTION, POWDER, FOR SOLUTION INTRAMUSCULAR; INTRAVENOUS at 22:05

## 2024-08-03 RX ADMIN — OLANZAPINE 5 MG: 5 TABLET, FILM COATED ORAL at 20:34

## 2024-08-03 RX ADMIN — Medication 1 G: at 07:51

## 2024-08-03 RX ADMIN — VENLAFAXINE HYDROCHLORIDE 75 MG: 75 CAPSULE, EXTENDED RELEASE ORAL at 07:51

## 2024-08-03 RX ADMIN — CEFAZOLIN 2 G: 2 INJECTION, POWDER, FOR SOLUTION INTRAMUSCULAR; INTRAVENOUS at 05:18

## 2024-08-03 ASSESSMENT — ENCOUNTER SYMPTOMS
NAUSEA: 0
MYALGIAS: 0
FEVER: 0
PALPITATIONS: 0
DIZZINESS: 0
CHILLS: 0
BLURRED VISION: 0
WEAKNESS: 1
SHORTNESS OF BREATH: 0
DEPRESSION: 0
VOMITING: 0
COUGH: 0
HEADACHES: 0
ABDOMINAL PAIN: 0

## 2024-08-03 ASSESSMENT — PAIN DESCRIPTION - PAIN TYPE: TYPE: ACUTE PAIN

## 2024-08-03 NOTE — CARE PLAN
Problem: Fall Risk  Goal: Patient will remain free from falls  Description: Target End Date:  Prior to discharge or change in level of care    Document interventions on the Laura Parkinson Fall Risk Assessment    1.  Assess for fall risk factors  2.  Implement fall precautions  Outcome: Progressing   The patient is Stable - Low risk of patient condition declining or worsening    Shift Goals  Clinical Goals: fall safety  Patient Goals: rest  Family Goals: ansley    Progress made toward(s) clinical / shift goals:  1:1 sitter in place    Patient is not progressing towards the following goals:

## 2024-08-03 NOTE — CARE PLAN
The patient is Stable - Low risk of patient condition declining or worsening    Shift Goals  Clinical Goals: free from thoughts of harming himself, free from falls      Progress made toward(s) clinical / shift goals:  pt. Denies any thoughts of harming himself, SI prec in place.     Patient is not progressing towards the following goals:      Problem: Fall Risk  Goal: Patient will remain free from falls  8/2/2024 1629 by Tia Mann, R.N.  Outcome: Not Progressing  8/2/2024 1628 by Tia Mann, R.N.  Outcome: Progressing

## 2024-08-03 NOTE — CARE PLAN
Pt AO x 4.  Pt c/o pain in the left hip and leg, medication intervention given per MAR, repositioned, and heat pad. Patient declined having SI thoughts. Reminded patient to get up slowly, use walker, and ask for help to prevent a fall. 1:1 safety sitter in the room with patient. Call light and belongings within reach. Bed alarm on. Bed locked and in lowest position.  Hourly rounding.  Needs currently met.         The patient is Watcher - Medium risk of patient condition declining or worsening    Shift Goals  Clinical Goals: free of injury and SI thoughts  Patient Goals: rest and pain control  Family Goals: FELICITAS    Progress made toward(s) clinical / shift goals:      Problem: Knowledge Deficit - Standard  Goal: Patient and family/care givers will demonstrate understanding of plan of care, disease process/condition, diagnostic tests and medications  Outcome: Progressing     Problem: Provide Safe Environment  Goal: Suicide environmental safety, protocols, policies, and practices will be implemented  Outcome: Progressing     Problem: Skin Integrity  Goal: Skin integrity is maintained or improved  Outcome: Progressing       Patient is not progressing towards the following goals:      Problem: Fall Risk  Goal: Patient will remain free from falls  Outcome: Not Progressing

## 2024-08-04 PROCEDURE — A9270 NON-COVERED ITEM OR SERVICE: HCPCS | Performed by: STUDENT IN AN ORGANIZED HEALTH CARE EDUCATION/TRAINING PROGRAM

## 2024-08-04 PROCEDURE — 700102 HCHG RX REV CODE 250 W/ 637 OVERRIDE(OP): Performed by: STUDENT IN AN ORGANIZED HEALTH CARE EDUCATION/TRAINING PROGRAM

## 2024-08-04 PROCEDURE — A9270 NON-COVERED ITEM OR SERVICE: HCPCS | Performed by: HOSPITALIST

## 2024-08-04 PROCEDURE — 700105 HCHG RX REV CODE 258: Performed by: HOSPITALIST

## 2024-08-04 PROCEDURE — 700102 HCHG RX REV CODE 250 W/ 637 OVERRIDE(OP): Performed by: HOSPITALIST

## 2024-08-04 PROCEDURE — 700111 HCHG RX REV CODE 636 W/ 250 OVERRIDE (IP): Performed by: HOSPITALIST

## 2024-08-04 PROCEDURE — 700111 HCHG RX REV CODE 636 W/ 250 OVERRIDE (IP)

## 2024-08-04 PROCEDURE — 99232 SBSQ HOSP IP/OBS MODERATE 35: CPT | Performed by: STUDENT IN AN ORGANIZED HEALTH CARE EDUCATION/TRAINING PROGRAM

## 2024-08-04 PROCEDURE — 770001 HCHG ROOM/CARE - MED/SURG/GYN PRIV*

## 2024-08-04 RX ORDER — AMLODIPINE BESYLATE 10 MG/1
10 TABLET ORAL
Status: DISCONTINUED | OUTPATIENT
Start: 2024-08-05 | End: 2024-08-22 | Stop reason: HOSPADM

## 2024-08-04 RX ORDER — AMLODIPINE BESYLATE 5 MG/1
5 TABLET ORAL ONCE
Status: COMPLETED | OUTPATIENT
Start: 2024-08-04 | End: 2024-08-04

## 2024-08-04 RX ADMIN — OLANZAPINE 5 MG: 5 TABLET, FILM COATED ORAL at 20:17

## 2024-08-04 RX ADMIN — AMLODIPINE BESYLATE 5 MG: 5 TABLET ORAL at 15:02

## 2024-08-04 RX ADMIN — Medication 1 G: at 13:36

## 2024-08-04 RX ADMIN — VENLAFAXINE HYDROCHLORIDE 75 MG: 75 CAPSULE, EXTENDED RELEASE ORAL at 07:47

## 2024-08-04 RX ADMIN — LOSARTAN POTASSIUM 50 MG: 50 TABLET, FILM COATED ORAL at 05:09

## 2024-08-04 RX ADMIN — RIVAROXABAN 10 MG: 10 TABLET, FILM COATED ORAL at 18:04

## 2024-08-04 RX ADMIN — LORAZEPAM 0.5 MG: 2 INJECTION INTRAMUSCULAR; INTRAVENOUS at 04:12

## 2024-08-04 RX ADMIN — Medication 1 G: at 07:46

## 2024-08-04 RX ADMIN — CEFAZOLIN 2 G: 2 INJECTION, POWDER, FOR SOLUTION INTRAMUSCULAR; INTRAVENOUS at 21:12

## 2024-08-04 RX ADMIN — Medication 1 G: at 18:04

## 2024-08-04 RX ADMIN — CEFAZOLIN 2 G: 2 INJECTION, POWDER, FOR SOLUTION INTRAMUSCULAR; INTRAVENOUS at 13:43

## 2024-08-04 RX ADMIN — CEFAZOLIN 2 G: 2 INJECTION, POWDER, FOR SOLUTION INTRAMUSCULAR; INTRAVENOUS at 05:12

## 2024-08-04 RX ADMIN — AMLODIPINE BESYLATE 5 MG: 5 TABLET ORAL at 05:09

## 2024-08-04 ASSESSMENT — ENCOUNTER SYMPTOMS
ABDOMINAL PAIN: 0
MYALGIAS: 0
BLURRED VISION: 0
FEVER: 0
NAUSEA: 0
CHILLS: 0
WEAKNESS: 1
DEPRESSION: 0
DIZZINESS: 0
COUGH: 0
SHORTNESS OF BREATH: 0
VOMITING: 0
HEADACHES: 0
PALPITATIONS: 0

## 2024-08-04 ASSESSMENT — PAIN DESCRIPTION - PAIN TYPE
TYPE: ACUTE PAIN
TYPE: ACUTE PAIN

## 2024-08-04 NOTE — PROGRESS NOTES
Assumed care of Pt from NOC RN. Pt is awake at bedside report. Bed is locked, low, and alarm is on. Sitter is in room with Pt. All needs are met.

## 2024-08-04 NOTE — PROGRESS NOTES
[facility] Medicine Daily Progress Note    Date of Service  8/3/2024    Chief Complaint  Terrell Hensley is a 63 y.o. male admitted 7/23/2024 with altered mental status, found down after intentional OD of his home medications.    Hospital Course  64yo PMHx HTN, gout.  Presented altered after an amlodipine OD.  CPK16k, WILMAR, elevated LFTs.    Interval Problem Update  7/26:  transferred out of ICU to medical unit, remains confused.  States he has left hip pain, xray ordered.  PT/OT ordered, off strict bedrest.  CPK remain 4, 523, cut back  to 125/hr. Started lovenox for DVT ppx.  Head CT on admission negative.  7/27: Patient appeared quite anxious nervous today.  Psychiatry has recommended Seroquel 50 mg nightly.  QTc on EKG today 492.  Also restart Zoloft tomorrow morning 50 mg in AM.  I have added Klonopin 0.5 mg p.o. twice daily with as needed and Ativan for MRI brain rule out anoxic event.  Apparently patient attempted to get out of bed and his legs collapsed.  PT OT pending.  7/28:  patient improved anxiety and acute psychosis with addition of medications.  He asks if he will be wheelchair bound.  C/o left knee pain.  Ordered xray left femur. No obvious deformity noted on exam. Blake discontinued today.  7/29: Patient still complains of left hip pain.  Finding of left buttock erythema, swelling, appearance of cellulitis.  I have ordered a CT pelvis to rule out abscess.  Start Ancef 2 g IV every 8 x 7 days.  Patient complains of numbness in his left lower leg.  He is difficult historian since he states yes to almost all questions asked.  He does complain of cervical neck pain.  When asked if he has any numbness or pain in his arms he says yes.  I have added MRI cervical spine to MRI brain without contrast.  As needed pain medications added.  PT eval recommending postacute placement for physical therapy.  They did get patient up to bathroom with assistance.    Above per previous  hospitalist.    7/30/2024  Continues on legal hold.  Potassium of 3.7 being replaced.  Discussed with psychiatry.  Changing Lexapro to Wellbutrin  Increasing trazodone to 100 mg nightly.    7/31/2024  Continues on legal hold  Tolerating medication changes.  Complaining of right upper extremity pain for past several days.  States that he is feeling down today.  Denies any suicidal or homicidal ideation.  Continue on IV cefazolin.  Right upper extremity ultrasound ordered due to pain    8/1/2024  Right upper extremity pain improving  Ultrasound with no DVT but does have occlusive superficial thrombosis in the cephalic vein.  Warm compresses ordered  Continue on cefazolin    8/2/2024  Complaining of cloudy eyes this morning.  Continuing on cefazolin.  Ambulating with walker.  PT and OT will reevaluate.    8/3/2024  Patient continued to ambulate with four-wheel walker.  Left buttock pain and left leg weakness improving.    Patient is medically clear for discharge to inpatient psychiatry facility.  I have signed the papers.    I have discussed this patient's plan of care and discharge plan at IDT rounds today with Case Management, Nursing, Nursing leadership, and other members of the IDT team.    Consultants/Specialty  psychiatry    Code Status  Full Code    Disposition  The patient is medically cleared for discharge to home or a post-acute facility.  Anticipate discharge to: a psychiatric hospital    I have placed the appropriate orders for post-discharge needs.    Review of Systems  Review of Systems   Constitutional:  Negative for chills and fever.   Eyes:  Negative for blurred vision.   Respiratory:  Negative for cough and shortness of breath.    Cardiovascular:  Negative for chest pain and palpitations.   Gastrointestinal:  Negative for abdominal pain, nausea and vomiting.   Musculoskeletal:  Negative for joint pain and myalgias.   Neurological:  Positive for weakness. Negative for dizziness and headaches.    Psychiatric/Behavioral:  Negative for depression and suicidal ideas.         Physical Exam  Temp:  [36.1 °C (97 °F)-36.4 °C (97.5 °F)] 36.3 °C (97.4 °F)  Pulse:  [72-76] 76  Resp:  [17-18] 17  BP: (157-160)/(81-87) 160/87  SpO2:  [93 %-98 %] 98 %    Physical Exam  Vitals and nursing note reviewed. Exam conducted with a chaperone present.   Constitutional:       General: He is not in acute distress.     Appearance: Normal appearance. He is not ill-appearing.   HENT:      Head: Normocephalic and atraumatic.      Mouth/Throat:      Mouth: Mucous membranes are moist.      Pharynx: Oropharynx is clear. No oropharyngeal exudate.   Eyes:      General: No scleral icterus.        Right eye: No discharge.         Left eye: No discharge.      Conjunctiva/sclera: Conjunctivae normal.   Cardiovascular:      Rate and Rhythm: Normal rate and regular rhythm.      Pulses: Normal pulses.      Heart sounds: Normal heart sounds. No murmur heard.  Pulmonary:      Effort: Pulmonary effort is normal. No respiratory distress.      Breath sounds: Normal breath sounds.   Abdominal:      General: Abdomen is flat. Bowel sounds are normal. There is no distension.      Palpations: Abdomen is soft.   Musculoskeletal:         General: No swelling or tenderness.      Cervical back: Neck supple. No tenderness.      Right lower leg: No edema.      Left lower leg: No edema.      Comments: Swelling and tenderness along the right bicep   Skin:     General: Skin is warm and dry.      Coloration: Skin is not pale.   Neurological:      Mental Status: He is alert and oriented to person, place, and time. Mental status is at baseline.      Motor: Weakness (Left lower extremity) present.   Psychiatric:         Thought Content: Thought content normal.         Judgment: Judgment normal.         Fluids    Intake/Output Summary (Last 24 hours) at 8/3/2024 1812  Last data filed at 8/3/2024 0548  Gross per 24 hour   Intake 250 ml   Output --   Net 250 ml        Laboratory  Recent Labs     08/02/24  0752   WBC 6.7   RBC 3.80*   HEMOGLOBIN 11.7*   HEMATOCRIT 32.9*   MCV 86.6   MCH 30.8   MCHC 35.6   RDW 41.5   PLATELETCT 251   MPV 8.9*       Recent Labs     08/02/24  0752   SODIUM 131*   POTASSIUM 3.6   CHLORIDE 98   CO2 21   GLUCOSE 100*   BUN 8   CREATININE 0.65   CALCIUM 8.5                   Imaging  US-EXTREMITY VENOUS UPPER UNILAT RIGHT   Final Result      CT-PELVIS WITH   Final Result         1.  Low-density enlargement of the distal left psoas muscle and iliacus muscle extending to the anterior left thigh. Could represent changes of myositis or possibly early forming infectious process. No definite enhancing fluid collection identified to    indicate abscess at this time.   2.  Distended bladder with nondependent air, consider history of recent instrumentation or changes of urinary tract infection, correlate with urinalysis as clinically appropriate.   3.  Bilateral fat-containing inguinal hernias   4.  Diverticulosis      MR-CERVICAL SPINE-W/O   Final Result      1.  Degenerative disease in the segment cervical spine as described above.   2.  Left paracentral/foraminal disc protrusion at C6-7 causing severe left C7 neural foraminal stenosis.      MR-BRAIN-W/O   Final Result      1.  No acute abnormality.   2.  A few punctate nonspecific supratentorial T2 hyperintensities likely representing nonspecific foci of gliosis/chronic ischemia.   3.  Mild cerebral volume loss.      DX-FEMUR-2+ LEFT   Final Result      Unremarkable LEFT femur.      DX-HIP-COMPLETE - UNILATERAL 2+ LEFT   Final Result      No radiographic evidence of acute traumatic injury.      CT-HEAD W/O   Final Result         1.  No acute intracranial abnormality.   2.  Atherosclerosis.                    Assessment/Plan  * Drug overdose of undetermined intent, initial encounter- (present on admission)  Assessment & Plan  Apparent suicide attempt  Polypharmacy: some amlodipine but other ingestions  unclear  Cont supportive care as clinically improving  On Psych hold  Psychiatry team following    7/31/2024  Continues on a legal hold    8/1/2024  Continues on legal hold.  Unable to go to inpatient psych facility due to weakness.    8/2/2024  Continues on legal hold.  Unable to go to inpatient psych facility due to use of walker.  Repeating PT and OT evaluation.    8/3/2024  Patient is ambulating with a four-wheel walker.  He is medically cleared to go to inpatient psychiatric facility.    Thrombophlebitis of cephalic vein- (present on admission)  Assessment & Plan  8/1/2024  Right upper extremity pain improving  Ultrasound with no DVT but does have occlusive superficial thrombosis in the cephalic vein.  Warm compresses ordered    Pain and swelling of right upper extremity- (present on admission)  Assessment & Plan  7/31/2024  Have ordered ultrasound of the right upper extremity to assess for DVT    8/2/2024  Continue warm compresses    Left leg pain- (present on admission)  Assessment & Plan  7/30/2024  Suspect related to left distal psoas muscle enlargement concerning for myositis versus infection.  Continue cefazolin    7/31/2024  Continue cefazolin    Neck pain, acute- (present on admission)  Assessment & Plan  Patient complaining of LLE numbness, neck pain and bilateral arm numbness.  Head injury 4/2024.  Ordered MRI c spine with head MRI.  Oxycodone prn pain.    Cellulitis of buttock  Assessment & Plan  Patient with erythema, warmth, pain left buttock s/p found down at home.  CT pelvis with contrast ordered to evaluate for abscess.  Started ancef 2gm IV q 8 x 7 days.  Wound care  Off loading, out of bed, increase activity.    8/2/2024  Continue IV cefazolin    8/3/2024  Continue IV cefazolin until tomorrow.    Anxiety- (present on admission)  Assessment & Plan  Increased anxiety noted on exam.  Psychiatry added seroquel 50mg qhs, zoloft resumed for a.m.   Ativan prn  Klonopin bid.    Anemia- (present on  admission)  Assessment & Plan  Mild   Cont to monitor    Leukocytosis- (present on admission)  Assessment & Plan  False in light of dehydration, likely aspiration pneumonitis with some neutrophil shift. May consider further intervention once patient stabilizes and is more alert, since unable to assess and suspect it is best to minimize medications at this time owing to unclear clinical picture from overdose.  -CTM    Urinary incontinence- (present on admission)  Assessment & Plan  -oxybutynin held due to drug overdose    Rhabdomyolysis- (present on admission)  Assessment & Plan  CK elevated to 16k, suspected 2/2 being found down and drug overdose.  Cont agressive IVFs but change to LR from NS  Follow daily AST, ALT, Bun, Creat and K    7/25  CPK now 7k  AST/ALT coming down  Renal function improving  K WNL  Cont to monitor CPK, CMP daily until normalized  Cont IVFs until CPK<3k    7/30/2024  Resolved    Elevated liver enzymes- (present on admission)  Assessment & Plan  Transaminase elevation likely from Rhabdo and not acute liver injury though certainly could have a toxic component from OD  Coming down as expected with resolution of Rhabdo  Cont to monitor daily CMP  Consider further evaluation if it does not resolve as CPK drops and Rhabdo resolves    Hypokalemia- (present on admission)  Assessment & Plan  7/30/2024  Potassium of 3.7.  I will order replacement for goal 4.    Acute renal failure (ARF) (HCC)- (present on admission)  Assessment & Plan  Likely 2/2 prerenal damage from hypotension 2/2 drug overdose and uremia related to rhabdomyolysis.  Creat imroving daily: 1.4 today  Good UOP  Cont LR until CPK<3k  Daily BMP  Renally dose medications as appropriate    7/30/2024  Resolved    High anion gap metabolic acidosis- (present on admission)  Assessment & Plan  Likely 2/2 prerenal damage from hypotension 2/2 drug overdose and uremia related to rhabdomyolysis.  -fluid resuscitation with NS 200cc/hr  -IV potassium  10meq x4      7/30/2024  Resolved    Suicidal behavior with attempted self-injury (HCC)- (present on admission)  Assessment & Plan  Repeat suicidal attempt by patient  -psych consult  -sitter    7/30/2024  Continue one-on-one sitter  Continues to be on legal hold.  Psychiatry recommending psychiatric hospitalization  Changing sertraline to Wellbutrin    8/2/2024  Off of Wellbutrin  Continue venlafaxine, olanzapine at bedtime    Mitral regurgitation- (present on admission)  Assessment & Plan  Suspected murmur, though difficult to auscultate. Unclear if preexisting or related to overdose.   -consider TTE once more clinically stable.    Acute encephalopathy- (present on admission)  Assessment & Plan  Acute metabolic/toxic encephalopathy  CT head neg  Mental status improving daily though still not completely back to normal  Cont to monitor clinically  Repeat lab in am  Minimize sedating medications  7/27:  increased anxiety/confusion, query anoxic brain injury, ordered MRI brain w/o contrast.    7/31/2024  Resolved. MRI of the brain without acute changes.       Hypertension- (present on admission)  Assessment & Plan  Hx of HTN, holding home meds in light of hypotension  Holding BP meds for obvious reasons    8/3/2024  Blood pressure is increasing into the 160s.  Starting losartan 50 mg daily  Restarting amlodipine 5 mg daily    Hyponatremia- (present on admission)  Assessment & Plan  Likely hypovolemic hypoNa  Has resolved with IVF resuscitation  Cont to monitor         VTE prophylaxis:    Xarelto 10mg daily as prophylaxis      I have performed a physical exam and reviewed and updated ROS and Plan today (8/3/2024). In review of yesterday's note (8/2/2024), there are no changes except as documented above.

## 2024-08-04 NOTE — PROGRESS NOTES
[facility] Medicine Daily Progress Note    Date of Service  8/4/2024    Chief Complaint  Terrell Hensley is a 63 y.o. male admitted 7/23/2024 with altered mental status, found down after intentional OD of his home medications.    Hospital Course  64yo PMHx HTN, gout.  Presented altered after an amlodipine OD.  CPK16k, WILMAR, elevated LFTs.    Interval Problem Update  7/26:  transferred out of ICU to medical unit, remains confused.  States he has left hip pain, xray ordered.  PT/OT ordered, off strict bedrest.  CPK remain 4, 523, cut back  to 125/hr. Started lovenox for DVT ppx.  Head CT on admission negative.  7/27: Patient appeared quite anxious nervous today.  Psychiatry has recommended Seroquel 50 mg nightly.  QTc on EKG today 492.  Also restart Zoloft tomorrow morning 50 mg in AM.  I have added Klonopin 0.5 mg p.o. twice daily with as needed and Ativan for MRI brain rule out anoxic event.  Apparently patient attempted to get out of bed and his legs collapsed.  PT OT pending.  7/28:  patient improved anxiety and acute psychosis with addition of medications.  He asks if he will be wheelchair bound.  C/o left knee pain.  Ordered xray left femur. No obvious deformity noted on exam. Blake discontinued today.  7/29: Patient still complains of left hip pain.  Finding of left buttock erythema, swelling, appearance of cellulitis.  I have ordered a CT pelvis to rule out abscess.  Start Ancef 2 g IV every 8 x 7 days.  Patient complains of numbness in his left lower leg.  He is difficult historian since he states yes to almost all questions asked.  He does complain of cervical neck pain.  When asked if he has any numbness or pain in his arms he says yes.  I have added MRI cervical spine to MRI brain without contrast.  As needed pain medications added.  PT eval recommending postacute placement for physical therapy.  They did get patient up to bathroom with assistance.    Above per previous  hospitalist.    7/30/2024  Continues on legal hold.  Potassium of 3.7 being replaced.  Discussed with psychiatry.  Changing Lexapro to Wellbutrin  Increasing trazodone to 100 mg nightly.    7/31/2024  Continues on legal hold  Tolerating medication changes.  Complaining of right upper extremity pain for past several days.  States that he is feeling down today.  Denies any suicidal or homicidal ideation.  Continue on IV cefazolin.  Right upper extremity ultrasound ordered due to pain    8/1/2024  Right upper extremity pain improving  Ultrasound with no DVT but does have occlusive superficial thrombosis in the cephalic vein.  Warm compresses ordered  Continue on cefazolin    8/2/2024  Complaining of cloudy eyes this morning.  Continuing on cefazolin.  Ambulating with walker.  PT and OT will reevaluate.    8/3/2024  Patient continued to ambulate with four-wheel walker.  Left buttock pain and left leg weakness improving.    Patient is medically clear for discharge to inpatient psychiatry facility.  I have signed the papers.    8/4/2024  Patient with no new complaints today.  Discussed patient's brother.  Patient is making good progress with his buttock infection.  Completing cefazolin today.  Blood pressure improving to 147/75.  Awaiting acceptance to inpatient psychiatric facility.    I have discussed this patient's plan of care and discharge plan at IDT rounds today with Case Management, Nursing, Nursing leadership, and other members of the IDT team.    Consultants/Specialty  psychiatry    Code Status  Full Code    Disposition  The patient is medically cleared for discharge to home or a post-acute facility.  Anticipate discharge to: a psychiatric hospital    I have placed the appropriate orders for post-discharge needs.    Review of Systems  Review of Systems   Constitutional:  Negative for chills and fever.   Eyes:  Negative for blurred vision.   Respiratory:  Negative for cough and shortness of breath.     Cardiovascular:  Negative for chest pain and palpitations.   Gastrointestinal:  Negative for abdominal pain, nausea and vomiting.   Musculoskeletal:  Negative for joint pain and myalgias.   Neurological:  Positive for weakness. Negative for dizziness and headaches.   Psychiatric/Behavioral:  Negative for depression and suicidal ideas.         Physical Exam  Temp:  [36.2 °C (97.1 °F)-37.3 °C (99.2 °F)] 37.3 °C (99.2 °F)  Pulse:  [76-85] 82  Resp:  [17-24] 18  BP: (146-163)/(75-87) 147/75  SpO2:  [96 %-100 %] 99 %    Physical Exam  Vitals and nursing note reviewed. Exam conducted with a chaperone present.   Constitutional:       General: He is not in acute distress.     Appearance: Normal appearance. He is not ill-appearing.   HENT:      Head: Normocephalic and atraumatic.      Mouth/Throat:      Mouth: Mucous membranes are moist.      Pharynx: Oropharynx is clear. No oropharyngeal exudate.   Eyes:      General: No scleral icterus.        Right eye: No discharge.         Left eye: No discharge.      Conjunctiva/sclera: Conjunctivae normal.   Cardiovascular:      Rate and Rhythm: Normal rate and regular rhythm.      Pulses: Normal pulses.      Heart sounds: Normal heart sounds. No murmur heard.  Pulmonary:      Effort: Pulmonary effort is normal. No respiratory distress.      Breath sounds: Normal breath sounds.   Abdominal:      General: Abdomen is flat. Bowel sounds are normal. There is no distension.      Palpations: Abdomen is soft.   Musculoskeletal:         General: No swelling or tenderness.      Cervical back: Neck supple. No tenderness.      Right lower leg: No edema.      Left lower leg: No edema.      Comments: Swelling and tenderness along the right bicep   Skin:     General: Skin is warm and dry.      Coloration: Skin is not pale.   Neurological:      Mental Status: He is alert and oriented to person, place, and time. Mental status is at baseline.      Motor: Weakness (Left lower extremity) present.    Psychiatric:         Thought Content: Thought content normal.         Judgment: Judgment normal.         Fluids    Intake/Output Summary (Last 24 hours) at 8/4/2024 1400  Last data filed at 8/4/2024 0756  Gross per 24 hour   Intake 640 ml   Output --   Net 640 ml       Laboratory  Recent Labs     08/02/24  0752   WBC 6.7   RBC 3.80*   HEMOGLOBIN 11.7*   HEMATOCRIT 32.9*   MCV 86.6   MCH 30.8   MCHC 35.6   RDW 41.5   PLATELETCT 251   MPV 8.9*       Recent Labs     08/02/24  0752   SODIUM 131*   POTASSIUM 3.6   CHLORIDE 98   CO2 21   GLUCOSE 100*   BUN 8   CREATININE 0.65   CALCIUM 8.5                   Imaging  US-EXTREMITY VENOUS UPPER UNILAT RIGHT   Final Result      CT-PELVIS WITH   Final Result         1.  Low-density enlargement of the distal left psoas muscle and iliacus muscle extending to the anterior left thigh. Could represent changes of myositis or possibly early forming infectious process. No definite enhancing fluid collection identified to    indicate abscess at this time.   2.  Distended bladder with nondependent air, consider history of recent instrumentation or changes of urinary tract infection, correlate with urinalysis as clinically appropriate.   3.  Bilateral fat-containing inguinal hernias   4.  Diverticulosis      MR-CERVICAL SPINE-W/O   Final Result      1.  Degenerative disease in the segment cervical spine as described above.   2.  Left paracentral/foraminal disc protrusion at C6-7 causing severe left C7 neural foraminal stenosis.      MR-BRAIN-W/O   Final Result      1.  No acute abnormality.   2.  A few punctate nonspecific supratentorial T2 hyperintensities likely representing nonspecific foci of gliosis/chronic ischemia.   3.  Mild cerebral volume loss.      DX-FEMUR-2+ LEFT   Final Result      Unremarkable LEFT femur.      DX-HIP-COMPLETE - UNILATERAL 2+ LEFT   Final Result      No radiographic evidence of acute traumatic injury.      CT-HEAD W/O   Final Result         1.  No acute  intracranial abnormality.   2.  Atherosclerosis.                    Assessment/Plan  * Drug overdose of undetermined intent, initial encounter- (present on admission)  Assessment & Plan  Apparent suicide attempt  Polypharmacy: some amlodipine but other ingestions unclear  Cont supportive care as clinically improving  On Psych hold  Psychiatry team following    7/31/2024  Continues on a legal hold    8/1/2024  Continues on legal hold.  Unable to go to inpatient psych facility due to weakness.    8/2/2024  Continues on legal hold.  Unable to go to inpatient psych facility due to use of walker.  Repeating PT and OT evaluation.    8/3/2024  Patient is ambulating with a four-wheel walker.  He is medically cleared to go to inpatient psychiatric facility.    8/4/2024  Awaiting acceptance to inpatient psychiatric facility    Thrombophlebitis of cephalic vein- (present on admission)  Assessment & Plan  8/1/2024  Right upper extremity pain improving  Ultrasound with no DVT but does have occlusive superficial thrombosis in the cephalic vein.  Warm compresses ordered    Pain and swelling of right upper extremity- (present on admission)  Assessment & Plan  7/31/2024  Have ordered ultrasound of the right upper extremity to assess for DVT    8/2/2024  Continue warm compresses    Suicidal behavior with attempted self-injury (HCC)- (present on admission)  Assessment & Plan  Repeat suicidal attempt by patient  -psych consult  -sitter    7/30/2024  Continue one-on-one sitter  Continues to be on legal hold.  Psychiatry recommending psychiatric hospitalization  Changing sertraline to Wellbutrin    8/2/2024  Off of Wellbutrin  Continue venlafaxine, olanzapine at bedtime    Left leg pain- (present on admission)  Assessment & Plan  7/30/2024  Suspect related to left distal psoas muscle enlargement concerning for myositis versus infection.  Continue cefazolin    7/31/2024  Continue cefazolin    Neck pain, acute- (present on  admission)  Assessment & Plan  Patient complaining of LLE numbness, neck pain and bilateral arm numbness.  Head injury 4/2024.  Ordered MRI c spine with head MRI.  Oxycodone prn pain.    Cellulitis of buttock  Assessment & Plan  Patient with erythema, warmth, pain left buttock s/p found down at home.  CT pelvis with contrast ordered to evaluate for abscess.  Started ancef 2gm IV q 8 x 7 days.  Wound care  Off loading, out of bed, increase activity.    8/2/2024  Continue IV cefazolin    8/3/2024  Continue IV cefazolin until tomorrow.    Anxiety- (present on admission)  Assessment & Plan  Increased anxiety noted on exam.  Psychiatry added seroquel 50mg qhs, zoloft resumed for a.m.   Ativan prn  Klonopin bid.    Anemia- (present on admission)  Assessment & Plan  Mild   Cont to monitor    Leukocytosis- (present on admission)  Assessment & Plan  False in light of dehydration, likely aspiration pneumonitis with some neutrophil shift. May consider further intervention once patient stabilizes and is more alert, since unable to assess and suspect it is best to minimize medications at this time owing to unclear clinical picture from overdose.  -CTM    Urinary incontinence- (present on admission)  Assessment & Plan  -oxybutynin held due to drug overdose    Rhabdomyolysis- (present on admission)  Assessment & Plan  CK elevated to 16k, suspected 2/2 being found down and drug overdose.  Cont agressive IVFs but change to LR from NS  Follow daily AST, ALT, Bun, Creat and K    7/25  CPK now 7k  AST/ALT coming down  Renal function improving  K WNL  Cont to monitor CPK, CMP daily until normalized  Cont IVFs until CPK<3k    7/30/2024  Resolved    Elevated liver enzymes- (present on admission)  Assessment & Plan  Transaminase elevation likely from Rhabdo and not acute liver injury though certainly could have a toxic component from OD  Coming down as expected with resolution of Rhabdo  Cont to monitor daily CMP  Consider further  evaluation if it does not resolve as CPK drops and Rhabdo resolves    Hypokalemia- (present on admission)  Assessment & Plan  7/30/2024  Potassium of 3.7.  I will order replacement for goal 4.    Acute renal failure (ARF) (HCC)- (present on admission)  Assessment & Plan  Likely 2/2 prerenal damage from hypotension 2/2 drug overdose and uremia related to rhabdomyolysis.  Creat imroving daily: 1.4 today  Good UOP  Cont LR until CPK<3k  Daily BMP  Renally dose medications as appropriate    7/30/2024  Resolved    High anion gap metabolic acidosis- (present on admission)  Assessment & Plan  Likely 2/2 prerenal damage from hypotension 2/2 drug overdose and uremia related to rhabdomyolysis.  -fluid resuscitation with NS 200cc/hr  -IV potassium 10meq x4      7/30/2024  Resolved    Mitral regurgitation- (present on admission)  Assessment & Plan  Suspected murmur, though difficult to auscultate. Unclear if preexisting or related to overdose.   -consider TTE once more clinically stable.    8/4/2024  Does not appear to have any clinically significant heart failure symptoms    Acute encephalopathy- (present on admission)  Assessment & Plan  Acute metabolic/toxic encephalopathy  CT head neg  Mental status improving daily though still not completely back to normal  Cont to monitor clinically  Repeat lab in am  Minimize sedating medications  7/27:  increased anxiety/confusion, query anoxic brain injury, ordered MRI brain w/o contrast.    7/31/2024  Resolved. MRI of the brain without acute changes.       Hypertension- (present on admission)  Assessment & Plan  Hx of HTN, holding home meds in light of hypotension  Holding BP meds for obvious reasons    8/3/2024  Blood pressure is increasing into the 160s.  Starting losartan 50 mg daily  Restarting amlodipine 5 mg daily    8/4/2024  Improving.    Increase amlodipine to 10 mg daily.  Continue losartan 50 mg daily.    Hyponatremia- (present on admission)  Assessment & Plan  Likely  hypovolemic hypoNa  Has resolved with IVF resuscitation  Cont to monitor         VTE prophylaxis:    Xarelto 10mg daily as prophylaxis      I have performed a physical exam and reviewed and updated ROS and Plan today (8/4/2024). In review of yesterday's note (8/3/2024), there are no changes except as documented above.

## 2024-08-04 NOTE — PROGRESS NOTES
Brother, Jean Paul is here to see pt.  Jean Paul said that pt is different from yesterday.  Jean Paul said that pt seems more confused and less conversing.  Jean Paul said that pt seems very sleepy.

## 2024-08-04 NOTE — DISCHARGE PLANNING
Case Management Discharge Planning    Admission Date: 7/23/2024  GMLOS: 3.9  ALOS: 11    6-Clicks ADL Score: 19  6-Clicks Mobility Score: 19      Anticipated Discharge Dispo: Discharge Disposition: D/T to psych hosp or distinct part unit (65)    DME Needed: No    Action(s) Taken: Updated Provider/Nurse on Discharge Plan, Infection control form faxed at request of Behavior health, for 2300 transfer.    Escalations Completed: None    Medically Clear: Yes    Next Steps: Case Management will continue to follow for discharge planning needs.    Barriers to Discharge: None    Is the patient up for discharge tomorrow: No

## 2024-08-04 NOTE — PROGRESS NOTES
T/C from Destiny at [facility]. Dsetiny asked how Pt is medically and I advised he is medically clear. Destiny states they are still looking at him but there is no bed today but possibly tomorrow.

## 2024-08-04 NOTE — CARE PLAN
Pt AO x 4.  Pt didn't c/o pain during initial assessment. Reinforced to get up slowly, use walker correctly, and ask sitter for help to prevent a fall. 1:1 safety sitter. Call light and belongings within reach.  Bed locked and in lowest position.  Hourly rounding.  Needs currently met.       The patient is Stable - Low risk of patient condition declining or worsening    Shift Goals  Clinical Goals: free of injury  Patient Goals: rest  Family Goals: ansley    Progress made toward(s) clinical / shift goals:      Problem: Knowledge Deficit - Standard  Goal: Patient and family/care givers will demonstrate understanding of plan of care, disease process/condition, diagnostic tests and medications  Outcome: Progressing     Problem: Provide Safe Environment  Goal: Suicide environmental safety, protocols, policies, and practices will be implemented  Outcome: Progressing     Problem: Fall Risk  Goal: Patient will remain free from falls  Outcome: Progressing       Patient is not progressing towards the following goals:

## 2024-08-05 PROBLEM — N17.0 ACUTE RENAL FAILURE WITH TUBULAR NECROSIS (HCC): Status: ACTIVE | Noted: 2024-07-24

## 2024-08-05 PROBLEM — R29.898 LEFT LEG WEAKNESS: Status: ACTIVE | Noted: 2024-08-05

## 2024-08-05 PROBLEM — F29 PSYCHOSIS (HCC): Status: ACTIVE | Noted: 2024-08-05

## 2024-08-05 LAB
ALBUMIN SERPL BCP-MCNC: 3.6 G/DL (ref 3.2–4.9)
ALBUMIN/GLOB SERPL: 1.3 G/DL
ALP SERPL-CCNC: 91 U/L (ref 30–99)
ALT SERPL-CCNC: 21 U/L (ref 2–50)
ANION GAP SERPL CALC-SCNC: 15 MMOL/L (ref 7–16)
AST SERPL-CCNC: 16 U/L (ref 12–45)
BASOPHILS # BLD AUTO: 0.4 % (ref 0–1.8)
BASOPHILS # BLD: 0.04 K/UL (ref 0–0.12)
BILIRUB SERPL-MCNC: 0.4 MG/DL (ref 0.1–1.5)
BUN SERPL-MCNC: 10 MG/DL (ref 8–22)
CALCIUM ALBUM COR SERPL-MCNC: 9 MG/DL (ref 8.5–10.5)
CALCIUM SERPL-MCNC: 8.7 MG/DL (ref 8.5–10.5)
CHLORIDE SERPL-SCNC: 99 MMOL/L (ref 96–112)
CO2 SERPL-SCNC: 19 MMOL/L (ref 20–33)
CREAT SERPL-MCNC: 0.65 MG/DL (ref 0.5–1.4)
CREAT UR-MCNC: 17.4 MG/DL
EOSINOPHIL # BLD AUTO: 0.08 K/UL (ref 0–0.51)
EOSINOPHIL NFR BLD: 0.7 % (ref 0–6.9)
ERYTHROCYTE [DISTWIDTH] IN BLOOD BY AUTOMATED COUNT: 43.8 FL (ref 35.9–50)
GFR SERPLBLD CREATININE-BSD FMLA CKD-EPI: 105 ML/MIN/1.73 M 2
GLOBULIN SER CALC-MCNC: 2.7 G/DL (ref 1.9–3.5)
GLUCOSE SERPL-MCNC: 114 MG/DL (ref 65–99)
HCT VFR BLD AUTO: 37.7 % (ref 42–52)
HGB BLD-MCNC: 12.9 G/DL (ref 14–18)
IMM GRANULOCYTES # BLD AUTO: 0.06 K/UL (ref 0–0.11)
IMM GRANULOCYTES NFR BLD AUTO: 0.5 % (ref 0–0.9)
LYMPHOCYTES # BLD AUTO: 1.48 K/UL (ref 1–4.8)
LYMPHOCYTES NFR BLD: 13.3 % (ref 22–41)
MAGNESIUM SERPL-MCNC: 1.9 MG/DL (ref 1.5–2.5)
MCH RBC QN AUTO: 30 PG (ref 27–33)
MCHC RBC AUTO-ENTMCNC: 34.2 G/DL (ref 32.3–36.5)
MCV RBC AUTO: 87.7 FL (ref 81.4–97.8)
MONOCYTES # BLD AUTO: 0.88 K/UL (ref 0–0.85)
MONOCYTES NFR BLD AUTO: 7.9 % (ref 0–13.4)
NEUTROPHILS # BLD AUTO: 8.56 K/UL (ref 1.82–7.42)
NEUTROPHILS NFR BLD: 77.2 % (ref 44–72)
NRBC # BLD AUTO: 0 K/UL
NRBC BLD-RTO: 0 /100 WBC (ref 0–0.2)
OSMOLALITY SERPL: 277 MOSM/KG H2O (ref 278–298)
OSMOLALITY UR: 163 MOSM/KG H2O (ref 300–900)
PHOSPHATE SERPL-MCNC: 2.3 MG/DL (ref 2.5–4.5)
PLATELET # BLD AUTO: 431 K/UL (ref 164–446)
PMV BLD AUTO: 8.8 FL (ref 9–12.9)
POTASSIUM SERPL-SCNC: 3.6 MMOL/L (ref 3.6–5.5)
PROT SERPL-MCNC: 6.3 G/DL (ref 6–8.2)
RBC # BLD AUTO: 4.3 M/UL (ref 4.7–6.1)
SODIUM SERPL-SCNC: 133 MMOL/L (ref 135–145)
SODIUM UR-SCNC: 34 MMOL/L
WBC # BLD AUTO: 11.1 K/UL (ref 4.8–10.8)

## 2024-08-05 PROCEDURE — 770001 HCHG ROOM/CARE - MED/SURG/GYN PRIV*

## 2024-08-05 PROCEDURE — 83935 ASSAY OF URINE OSMOLALITY: CPT

## 2024-08-05 PROCEDURE — 80053 COMPREHEN METABOLIC PANEL: CPT

## 2024-08-05 PROCEDURE — A9270 NON-COVERED ITEM OR SERVICE: HCPCS | Performed by: HOSPITALIST

## 2024-08-05 PROCEDURE — 84300 ASSAY OF URINE SODIUM: CPT

## 2024-08-05 PROCEDURE — 700105 HCHG RX REV CODE 258: Performed by: HOSPITALIST

## 2024-08-05 PROCEDURE — A9270 NON-COVERED ITEM OR SERVICE: HCPCS | Performed by: STUDENT IN AN ORGANIZED HEALTH CARE EDUCATION/TRAINING PROGRAM

## 2024-08-05 PROCEDURE — 700111 HCHG RX REV CODE 636 W/ 250 OVERRIDE (IP): Performed by: HOSPITALIST

## 2024-08-05 PROCEDURE — 85025 COMPLETE CBC W/AUTO DIFF WBC: CPT

## 2024-08-05 PROCEDURE — 36415 COLL VENOUS BLD VENIPUNCTURE: CPT

## 2024-08-05 PROCEDURE — 83930 ASSAY OF BLOOD OSMOLALITY: CPT

## 2024-08-05 PROCEDURE — 84100 ASSAY OF PHOSPHORUS: CPT

## 2024-08-05 PROCEDURE — 700102 HCHG RX REV CODE 250 W/ 637 OVERRIDE(OP): Performed by: STUDENT IN AN ORGANIZED HEALTH CARE EDUCATION/TRAINING PROGRAM

## 2024-08-05 PROCEDURE — 99232 SBSQ HOSP IP/OBS MODERATE 35: CPT | Mod: GC | Performed by: STUDENT IN AN ORGANIZED HEALTH CARE EDUCATION/TRAINING PROGRAM

## 2024-08-05 PROCEDURE — 700102 HCHG RX REV CODE 250 W/ 637 OVERRIDE(OP): Performed by: HOSPITALIST

## 2024-08-05 PROCEDURE — 83735 ASSAY OF MAGNESIUM: CPT

## 2024-08-05 PROCEDURE — 99232 SBSQ HOSP IP/OBS MODERATE 35: CPT | Performed by: STUDENT IN AN ORGANIZED HEALTH CARE EDUCATION/TRAINING PROGRAM

## 2024-08-05 PROCEDURE — 82570 ASSAY OF URINE CREATININE: CPT

## 2024-08-05 PROCEDURE — 90837 PSYTX W PT 60 MINUTES: CPT | Performed by: SOCIAL WORKER

## 2024-08-05 RX ORDER — LOSARTAN POTASSIUM 50 MG/1
50 TABLET ORAL ONCE
Status: COMPLETED | OUTPATIENT
Start: 2024-08-05 | End: 2024-08-05

## 2024-08-05 RX ORDER — TRAZODONE HYDROCHLORIDE 50 MG/1
100 TABLET, FILM COATED ORAL
Status: DISCONTINUED | OUTPATIENT
Start: 2024-08-05 | End: 2024-08-05

## 2024-08-05 RX ORDER — OLANZAPINE 5 MG/1
10 TABLET ORAL
Status: DISCONTINUED | OUTPATIENT
Start: 2024-08-05 | End: 2024-08-07

## 2024-08-05 RX ORDER — TRAZODONE HYDROCHLORIDE 50 MG/1
100 TABLET, FILM COATED ORAL
Status: DISCONTINUED | OUTPATIENT
Start: 2024-08-05 | End: 2024-08-06

## 2024-08-05 RX ORDER — LOSARTAN POTASSIUM 50 MG/1
100 TABLET ORAL
Status: DISCONTINUED | OUTPATIENT
Start: 2024-08-06 | End: 2024-08-22 | Stop reason: HOSPADM

## 2024-08-05 RX ORDER — CHLORTHALIDONE 25 MG/1
25 TABLET ORAL DAILY
Status: DISCONTINUED | OUTPATIENT
Start: 2024-08-05 | End: 2024-08-05

## 2024-08-05 RX ADMIN — RIVAROXABAN 10 MG: 10 TABLET, FILM COATED ORAL at 17:36

## 2024-08-05 RX ADMIN — AMLODIPINE BESYLATE 10 MG: 10 TABLET ORAL at 05:05

## 2024-08-05 RX ADMIN — Medication 1 G: at 17:36

## 2024-08-05 RX ADMIN — CEFAZOLIN 2 G: 2 INJECTION, POWDER, FOR SOLUTION INTRAMUSCULAR; INTRAVENOUS at 05:07

## 2024-08-05 RX ADMIN — VENLAFAXINE HYDROCHLORIDE 75 MG: 75 CAPSULE, EXTENDED RELEASE ORAL at 09:22

## 2024-08-05 RX ADMIN — CEFAZOLIN 2 G: 2 INJECTION, POWDER, FOR SOLUTION INTRAMUSCULAR; INTRAVENOUS at 14:42

## 2024-08-05 RX ADMIN — TRAZODONE HYDROCHLORIDE 100 MG: 50 TABLET ORAL at 20:48

## 2024-08-05 RX ADMIN — OLANZAPINE 10 MG: 5 TABLET, FILM COATED ORAL at 20:49

## 2024-08-05 RX ADMIN — LOSARTAN POTASSIUM 50 MG: 50 TABLET, FILM COATED ORAL at 05:05

## 2024-08-05 RX ADMIN — ACETAMINOPHEN 650 MG: 325 TABLET ORAL at 09:22

## 2024-08-05 RX ADMIN — Medication 1 G: at 14:41

## 2024-08-05 RX ADMIN — LOSARTAN POTASSIUM 50 MG: 50 TABLET, FILM COATED ORAL at 14:41

## 2024-08-05 RX ADMIN — Medication 1 G: at 09:22

## 2024-08-05 ASSESSMENT — PAIN DESCRIPTION - PAIN TYPE
TYPE: ACUTE PAIN
TYPE: ACUTE PAIN

## 2024-08-05 ASSESSMENT — ENCOUNTER SYMPTOMS
NAUSEA: 0
CHILLS: 0
FEVER: 0
PALPITATIONS: 0
VOMITING: 0
COUGH: 0
DIZZINESS: 0
WEAKNESS: 1
BLURRED VISION: 0
MYALGIAS: 0
SHORTNESS OF BREATH: 0
DEPRESSION: 1
HEADACHES: 0
ABDOMINAL PAIN: 0

## 2024-08-05 NOTE — CARE PLAN
The patient is Stable - Low risk of patient condition declining or worsening    Shift Goals  Clinical Goals: safety  Patient Goals: rest  Family Goals: ansley    Progress made toward(s) clinical / shift goals:  updated patient on POC and verbalized understanding. Medicated per MAR and tolerated well. Hourly rounds performed. Needs attended.     Patient is not progressing towards the following goals:    Problem: Knowledge Deficit - Standard  Goal: Patient and family/care givers will demonstrate understanding of plan of care, disease process/condition, diagnostic tests and medications  Description: Target End Date:  1-3 days or as soon as patient condition allows    Document in Patient Education    1.  Patient and family/caregiver oriented to unit, equipment, visitation policy and means for communicating concern  2.  Complete/review Learning Assessment  3.  Assess knowledge level of disease process/condition, treatment plan, diagnostic tests and medications  4.  Explain disease process/condition, treatment plan, diagnostic tests and medications  Outcome: Not Progressing

## 2024-08-05 NOTE — PROGRESS NOTES
Daughter Tressa in to visit this shift. A  came to sign papers but was advised that Pt does not have the capacity to sign papers at this time. This was verified with MD. Pt wanted to go over his list of medications while his daughter was present and I read the list of meds. Daughter wrote down the meds and Pt was fine with that. Daughter states she is just looking for side effects. No documentation was given to daughter.

## 2024-08-05 NOTE — CONSULTS
"PSYCHIATRIC FOLLOW-UP: (established)  Reason for admission:   Overdose  Legal Hold Status:   Extended         Chart reviewed.         HPI:            On Olanzapine 5mg po qHS  Effexor 75mg po qAM  Trazodone 100mg po qHS PRN      Interval hx    Today pt was interviewed in his room. He appears to be very preoccupied, distracted and quite hard to engage. He reports poor night of sleep but did admit that he didn't use any PRN Trazodone. When asked if he is still experiencing AVH, pt starts murmuring to himself, responding to internal stimuli. It is hard to elicit the content of his AH/internal stimuli - however pt repeated bits and pieces of it:\"Catastrophe... if I leave... Terrible things will happen.\" We asked if he is still having obsessive thoughts of fearing something terrible might happen to his family. He nodded yes.    Pt speech's volume is very low, he says his voice has been low for \"some time\" Sometimes he speaks the voice in his head and then replies it in a whispering manner.    He could not answer the safety question in a direct manner today, only said: \"Doing it is not good.\"  When asked to do a imagination based exercise (\"picturing a relaxing day at the beach\") Pt showed loose association when hearing \"ocean\", he proceeds to murmur:\"water fall, falling down, the room is in front of me, the walls are closing in on me.\"  He's A&Ox4 and CAM ICU screened negative.    Nursing staff reports his daughter came yesterday and asked him to sign POA documents. We informed the daughter via Dr. Damico today that Terrell's capacity to make decision is temporarily impaired by his ongoing psychosis. Daughter expressed understanding.    No additional concerns reported at this time.    Medical ROS (as pertinent):     ROS    Patient was distracted, could not answer a lot of these questions    Psychiatric Examination:  Vitals:   Vitals:    08/05/24 0728   BP: (!) 169/80   Pulse: 97   Resp: 16   Temp: 37.2 °C (99 °F)   SpO2: " "98%        General Appearance: Appears state age, appropriate grooming & hygiene, appearing tense and flat.  Behavior:    -Hypoactive, poor eye contact, staring.  Neuro:   -No tremors,tics, dyskinesias or dystonias.    -Repetitive brushing hair using fingers, grimacing and closes eyes constantly   -No posture or gait abnormalities appreciated, gait not observed  Speech: Non-spontaneous,Murmuring, whispering, low voice with occasional echolalia.   Language: English  Thought processes: Disorganized, tangential.   loose associations is present  Thought content: Pt's answer on SI/HI AVH extremely vague. Pre-occupied by internal stimuli. Paranoia and obsession evident.  Mood: \"Okay\" as stated by patient  Affect: Congruent with stated mood. Blunted. No evidence of lability, ernie, or agitation  Judgement and Insight: Limited/Poor  Cognition:    -Alert & oriented x 3 (Person, place and time)   -Attention & concentration poor due to internal occupation.   -Immediate/delayed memory not formally tested but grossly intact   -Age-appropriate fund of knowledge    Screenings:  CAM-ICU screen negative (24)    New PAST MEDICAL/PSYCH/FAMILY/SOCIAL(as reported by patient):       None      EKG:   Results for orders placed or performed during the hospital encounter of 24   EKG   Result Value Ref Range    Report       [facility] Emergency Dept.    Test Date:  2024  Pt Name:    FAIZAN GARDNER             Department: ER  MRN:        [id number]                      Room:        12  Gender:     Male                         Technician: [id number]  :        1960                   Requested By:ER TRIAGE PROTOCOL  Order #:    [id number]                    Sari MD: ILDEFONSO ARNOLD, DO    Measurements  Intervals                                Axis  Rate:       85                           P:          61  ME:         153                          QRS:        63  QRSD:       106                          T: "          25  QT:         413  QTc:        492    Interpretive Statements  Sinus rhythm  Probable left atrial enlargement  Borderline prolonged QT interval  Compared to ECG 2024 17:09:50  No significant changes  Electronically Signed On 2024 00:56:04 PDT by ILDEFONSO ARNOLD DO     EKG   Result Value Ref Range    Report       [facility]    Test Date:  2024  Pt Name:    FAIZAN GARDNER             Department:   MRN:        [id number]                      Room:       [facility]  Gender:     Male                         Technician: ALANNA  :        1960                   Requested By:ANICETO AMBRIZ  Order #:    [id number]                    Reading MD: Lucio Guzman MD    Measurements  Intervals                                Axis  Rate:       82                           P:          67  IN:         176                          QRS:        55  QRSD:       93                           T:          35  QT:         355  QTc:        415    Interpretive Statements  Sinus rhythm  Consider right atrial enlargement  Compared to ECG 2024 19:51:19  No significant changes  Electronically Signed On 2024 06:48:42 PDT by Lucio Guzman MD        Brain Imaging: No new brain imaging  EEG:  None     Labs personally reviewed:   Recent Results (from the past 24 hour(s))   CBC WITH DIFFERENTIAL    Collection Time: 24  7:39 AM   Result Value Ref Range    WBC 11.1 (H) 4.8 - 10.8 K/uL    RBC 4.30 (L) 4.70 - 6.10 M/uL    Hemoglobin 12.9 (L) 14.0 - 18.0 g/dL    Hematocrit 37.7 (L) 42.0 - 52.0 %    MCV 87.7 81.4 - 97.8 fL    MCH 30.0 27.0 - 33.0 pg    MCHC 34.2 32.3 - 36.5 g/dL    RDW 43.8 35.9 - 50.0 fL    Platelet Count 431 164 - 446 K/uL    MPV 8.8 (L) 9.0 - 12.9 fL    Neutrophils-Polys 77.20 (H) 44.00 - 72.00 %    Lymphocytes 13.30 (L) 22.00 - 41.00 %    Monocytes 7.90 0.00 - 13.40 %    Eosinophils 0.70 0.00 - 6.90 %    Basophils 0.40 0.00 - 1.80 %    Immature Granulocytes 0.50 0.00 - 0.90  %    Nucleated RBC 0.00 0.00 - 0.20 /100 WBC    Neutrophils (Absolute) 8.56 (H) 1.82 - 7.42 K/uL    Lymphs (Absolute) 1.48 1.00 - 4.80 K/uL    Monos (Absolute) 0.88 (H) 0.00 - 0.85 K/uL    Eos (Absolute) 0.08 0.00 - 0.51 K/uL    Baso (Absolute) 0.04 0.00 - 0.12 K/uL    Immature Granulocytes (abs) 0.06 0.00 - 0.11 K/uL    NRBC (Absolute) 0.00 K/uL     Lab Results   Component Value Date/Time    SODIUM 133 (L) 08/05/2024 07:39 AM    POTASSIUM 3.6 08/05/2024 07:39 AM    CHLORIDE 99 08/05/2024 07:39 AM    CO2 19 (L) 08/05/2024 07:39 AM    GLUCOSE 114 (H) 08/05/2024 07:39 AM    BUN 10 08/05/2024 07:39 AM    CREATININE 0.65 08/05/2024 07:39 AM    BUNCREATRAT 16 05/15/2023 11:24 AM      Recent Labs     08/02/24  0752 08/05/24  0739   ASTSGOT 23 16   ALTSGPT 33 21   TBILIRUBIN 0.3 0.4   GLOBULIN 2.3 2.7         Assessment:  Terrell Hensley is a 63 y.o. male admitted 7/23/2024 with altered mental status, found down after intentional OD of his home medications amlodipine in order to kill himself.      Today pt's presentation has worsened. Pt was not able to participate in the interview in a meaningful manner and seems to be more preoccupied with internal stimuli today. His current presentation could be worsening of psychosis, can further titrate up Olanzapine. However his behavior appeared to be odder: increased mutism (non-spontaneous speech, progressively lowering volume), occasional echolalia, grimacing. He at this time does not meet criteria for catatonia but will monitor for symptoms    Informed daughter of how pt's current capacity is impaired due to his psychosis hence he should defer signing of POA. Daughter expressed understanding.       Dx:  #MDD with Psychosis  R/O Schizoaffective disorder  R/O Catatonia     #Suicidal Ideation  #Drug Overdose    Medical :  Per primary team      Plan:  Legal hold: Extended due to continued psychotic symptoms  Psychotropic medications:  Increase olanzapine to 10mg po qHS as  adjunct of treatment for depression and insomnia  Continue Effexor 75mg po qD for depression  Continue trazodone 100mg qHS PRN for insomnia (made PRN to ensure no excessive sedation when combined with olanzapine)  Please transfer pt to inpatient psychiatric hospital when medically cleared and bed is available  Labs reviewed  EKG reviewed, QTc within normal limits     Discussed with Dr. Valle  Psychiatry will follow up       Thank you for the consult.      Sitter: 1:1  Phone: Yes room phone  Visitors: Yes, Jean Paul (brother), Cortes (brother), YOSELIN (son), Tressa (daughter)   Personal belongings:  NO    Mother states baby feeding frequently and sometimes frantic at breast. Baby's diaper count and weight loss WNL - 8 urine and 5 stool at 29 hours old. Encouraged continued feedings on demand and waking baby up to prevent stretches of >3 hrs between feedings. Did not discuss breast aug d/t visitor in room. Will discuss with RN.

## 2024-08-05 NOTE — CARE PLAN
The patient is Stable - Low risk of patient condition declining or worsening    Shift Goals  Clinical Goals: Safety  Patient Goals: rest  Family Goals: ansley    Progress made toward(s) clinical / shift goals:    Problem: Pain - Standard  Goal: Alleviation of pain or a reduction in pain to the patient’s comfort goal  Outcome: Progressing     Problem: Provide Safe Environment  Goal: Suicide environmental safety, protocols, policies, and practices will be implemented  Outcome: Progressing       Patient is not progressing towards the following goals:

## 2024-08-05 NOTE — DISCHARGE PLANNING
Spoke with Michelle with Alert Team she will arrange transport for patient to [facility].  Per Leadership Request                                                                                                             .

## 2024-08-05 NOTE — DISCHARGE PLANNING
Notice of Medical Clerance filed to the court via [employer] notifying the court that pt is now medically clear, scanned copy into pt's chart.

## 2024-08-05 NOTE — DISCHARGE PLANNING
Alert Team Note:     Received message via [employer] from PT Assistant Radha stating pt is cleared from PT.     Faxed PT notes to [facility] with PT medical clearance.

## 2024-08-05 NOTE — CONSULTS
Brief Behavioral Health Care Note:    Clinician attempting to facilitate transfer to a inpatient [facility] hospital however there is concern patient is not fully independent with ambulation given the most recent PT/OT notes. Please order a re-consult for PT/OT to determine patients level of independent ambulation without the use of a walker.    Thank you,    Nichole Damico, Ph.D., [facility]

## 2024-08-05 NOTE — PROGRESS NOTES
Received bedside report from previous RN. Patient is alert and oriented x4. On RA on saline lock, signs of infiltration and phlebitis at this time. Fall precautions in place and call light within reach. 1:1 sitter at bedside for safety. All other needs met at this time.

## 2024-08-05 NOTE — DISCHARGE PLANNING
Received a call from Destiny at [facility] at 1815, reports that when she ran patients insurance it is ineligible. She states to cancel transport and admission. Notified Charge Nurse Misty, bedside RN Dora via [employer].

## 2024-08-05 NOTE — DISCHARGE PLANNING
Alert Team Note:    Received phone call from Guadalupe at [facility] regarding pt's referral. Was advised that pt is declined for now due to inability to ambulate without FWW and unable to accommodate weekly physical therapy. Was advised to resend referral if pt progresses and is able to ambulate without FWW.

## 2024-08-05 NOTE — PROGRESS NOTES
Assumed care of Pt from NOC RN. Pt is asleep during bedside report. Bed is low and locked with alarm on. 1:1 sitter is in room. Pt has call light within reach. Pt has no s/sx of pain at this time. Room safety precautions are in place.

## 2024-08-05 NOTE — DISCHARGE PLANNING
Alert Team Note:     Spoke to Guadalupe at [facility], pt's referral was received and pending review. Requested updated notes and labs. Faxed notes and labs to [facility].     Spoke to Mallory at [facility]. Pt's referral was received and pending review.

## 2024-08-05 NOTE — DISCHARGE PLANNING
Alert Team Note:     Contacted Guadalupe at [facility] regarding pt's referral. Was advised pt is declined for now. [facility] requesting a PT/OT eval. Will resend referral after eval.     Spoke with psychiatrist Dr. Damico. Was advised Dr. Damico will request PT/OT eval from Dr. Valle.

## 2024-08-05 NOTE — DISCHARGE PLANNING
Received a call from Shavon with discharge planning regarding setting up [facility] transport for patient that was accepted to [facility], if she was responsible for setting it up or the alert team. Advised her writer would inquire further with [facility] and arrange transport if patient was accepted.   Spoke with Destiny at [facility] at 1712 and she confirmed patient was accepted by  for admission at 2300 on 8/4/2024.

## 2024-08-06 ENCOUNTER — APPOINTMENT (OUTPATIENT)
Dept: RADIOLOGY | Facility: MEDICAL CENTER | Age: 64
DRG: 907 | End: 2024-08-06
Attending: STUDENT IN AN ORGANIZED HEALTH CARE EDUCATION/TRAINING PROGRAM
Payer: MEDICARE

## 2024-08-06 PROBLEM — K35.33: Status: ACTIVE | Noted: 2024-08-06

## 2024-08-06 LAB
ERYTHROCYTE [DISTWIDTH] IN BLOOD BY AUTOMATED COUNT: 44.4 FL (ref 35.9–50)
HCT VFR BLD AUTO: 37.3 % (ref 42–52)
HGB BLD-MCNC: 13.1 G/DL (ref 14–18)
MCH RBC QN AUTO: 30.8 PG (ref 27–33)
MCHC RBC AUTO-ENTMCNC: 35.1 G/DL (ref 32.3–36.5)
MCV RBC AUTO: 87.6 FL (ref 81.4–97.8)
PLATELET # BLD AUTO: 450 K/UL (ref 164–446)
PMV BLD AUTO: 8.6 FL (ref 9–12.9)
PROCALCITONIN SERPL-MCNC: 0.09 NG/ML
RBC # BLD AUTO: 4.26 M/UL (ref 4.7–6.1)
SCCMEC + MECA PNL NOSE NAA+PROBE: NEGATIVE
WBC # BLD AUTO: 8.3 K/UL (ref 4.8–10.8)

## 2024-08-06 PROCEDURE — 87040 BLOOD CULTURE FOR BACTERIA: CPT | Mod: 91

## 2024-08-06 PROCEDURE — 72158 MRI LUMBAR SPINE W/O & W/DYE: CPT

## 2024-08-06 PROCEDURE — A9270 NON-COVERED ITEM OR SERVICE: HCPCS

## 2024-08-06 PROCEDURE — 99232 SBSQ HOSP IP/OBS MODERATE 35: CPT | Mod: GC | Performed by: STUDENT IN AN ORGANIZED HEALTH CARE EDUCATION/TRAINING PROGRAM

## 2024-08-06 PROCEDURE — A9579 GAD-BASE MR CONTRAST NOS,1ML: HCPCS | Mod: JZ | Performed by: STUDENT IN AN ORGANIZED HEALTH CARE EDUCATION/TRAINING PROGRAM

## 2024-08-06 PROCEDURE — 700111 HCHG RX REV CODE 636 W/ 250 OVERRIDE (IP): Performed by: STUDENT IN AN ORGANIZED HEALTH CARE EDUCATION/TRAINING PROGRAM

## 2024-08-06 PROCEDURE — 84145 PROCALCITONIN (PCT): CPT

## 2024-08-06 PROCEDURE — 700111 HCHG RX REV CODE 636 W/ 250 OVERRIDE (IP): Performed by: GENERAL PRACTICE

## 2024-08-06 PROCEDURE — 700102 HCHG RX REV CODE 250 W/ 637 OVERRIDE(OP): Performed by: STUDENT IN AN ORGANIZED HEALTH CARE EDUCATION/TRAINING PROGRAM

## 2024-08-06 PROCEDURE — 700105 HCHG RX REV CODE 258: Performed by: GENERAL PRACTICE

## 2024-08-06 PROCEDURE — 51798 US URINE CAPACITY MEASURE: CPT

## 2024-08-06 PROCEDURE — A9270 NON-COVERED ITEM OR SERVICE: HCPCS | Performed by: STUDENT IN AN ORGANIZED HEALTH CARE EDUCATION/TRAINING PROGRAM

## 2024-08-06 PROCEDURE — 85027 COMPLETE CBC AUTOMATED: CPT

## 2024-08-06 PROCEDURE — 700117 HCHG RX CONTRAST REV CODE 255: Mod: JZ | Performed by: STUDENT IN AN ORGANIZED HEALTH CARE EDUCATION/TRAINING PROGRAM

## 2024-08-06 PROCEDURE — 99233 SBSQ HOSP IP/OBS HIGH 50: CPT | Performed by: GENERAL PRACTICE

## 2024-08-06 PROCEDURE — 770001 HCHG ROOM/CARE - MED/SURG/GYN PRIV*

## 2024-08-06 PROCEDURE — 700102 HCHG RX REV CODE 250 W/ 637 OVERRIDE(OP)

## 2024-08-06 PROCEDURE — 700102 HCHG RX REV CODE 250 W/ 637 OVERRIDE(OP): Performed by: GENERAL PRACTICE

## 2024-08-06 PROCEDURE — 36415 COLL VENOUS BLD VENIPUNCTURE: CPT

## 2024-08-06 PROCEDURE — 87641 MR-STAPH DNA AMP PROBE: CPT

## 2024-08-06 PROCEDURE — A9270 NON-COVERED ITEM OR SERVICE: HCPCS | Performed by: GENERAL PRACTICE

## 2024-08-06 RX ORDER — TRAZODONE HYDROCHLORIDE 50 MG/1
100 TABLET, FILM COATED ORAL
Status: DISCONTINUED | OUTPATIENT
Start: 2024-08-06 | End: 2024-08-19

## 2024-08-06 RX ORDER — VENLAFAXINE HYDROCHLORIDE 75 MG/1
150 CAPSULE, EXTENDED RELEASE ORAL
Status: DISCONTINUED | OUTPATIENT
Start: 2024-08-07 | End: 2024-08-16

## 2024-08-06 RX ORDER — SODIUM CHLORIDE 1 G/1
1 TABLET ORAL 2 TIMES DAILY WITH MEALS
Status: DISCONTINUED | OUTPATIENT
Start: 2024-08-06 | End: 2024-08-06

## 2024-08-06 RX ORDER — HYDROXYZINE HYDROCHLORIDE 50 MG/1
25 TABLET, FILM COATED ORAL 3 TIMES DAILY PRN
Status: DISCONTINUED | OUTPATIENT
Start: 2024-08-06 | End: 2024-08-07

## 2024-08-06 RX ORDER — DIAZEPAM 10 MG/2ML
5 INJECTION, SOLUTION INTRAMUSCULAR; INTRAVENOUS
Status: COMPLETED | OUTPATIENT
Start: 2024-08-06 | End: 2024-08-06

## 2024-08-06 RX ADMIN — RIVAROXABAN 10 MG: 10 TABLET, FILM COATED ORAL at 16:29

## 2024-08-06 RX ADMIN — Medication 1 G: at 10:45

## 2024-08-06 RX ADMIN — LORAZEPAM 0.5 MG: 2 INJECTION INTRAMUSCULAR; INTRAVENOUS at 01:36

## 2024-08-06 RX ADMIN — TRAZODONE HYDROCHLORIDE 100 MG: 50 TABLET ORAL at 20:38

## 2024-08-06 RX ADMIN — HYDROXYZINE HYDROCHLORIDE 25 MG: 50 TABLET, FILM COATED ORAL at 23:48

## 2024-08-06 RX ADMIN — LOSARTAN POTASSIUM 100 MG: 50 TABLET, FILM COATED ORAL at 05:57

## 2024-08-06 RX ADMIN — AMLODIPINE BESYLATE 10 MG: 10 TABLET ORAL at 05:57

## 2024-08-06 RX ADMIN — VANCOMYCIN HYDROCHLORIDE 2250 MG: 5 INJECTION, POWDER, LYOPHILIZED, FOR SOLUTION INTRAVENOUS at 16:35

## 2024-08-06 RX ADMIN — VENLAFAXINE HYDROCHLORIDE 75 MG: 75 CAPSULE, EXTENDED RELEASE ORAL at 10:45

## 2024-08-06 RX ADMIN — DIAZEPAM 5 MG: 5 INJECTION, SOLUTION INTRAMUSCULAR; INTRAVENOUS at 10:46

## 2024-08-06 RX ADMIN — GADOTERIDOL 17 ML: 279.3 INJECTION, SOLUTION INTRAVENOUS at 11:45

## 2024-08-06 RX ADMIN — PIPERACILLIN AND TAZOBACTAM 4.5 G: 4; .5 INJECTION, POWDER, FOR SOLUTION INTRAVENOUS at 21:14

## 2024-08-06 RX ADMIN — OLANZAPINE 10 MG: 5 TABLET, FILM COATED ORAL at 20:38

## 2024-08-06 RX ADMIN — PIPERACILLIN AND TAZOBACTAM 4.5 G: 4; .5 INJECTION, POWDER, FOR SOLUTION INTRAVENOUS at 15:54

## 2024-08-06 RX ADMIN — VANCOMYCIN HYDROCHLORIDE 1250 MG: 5 INJECTION, POWDER, LYOPHILIZED, FOR SOLUTION INTRAVENOUS at 23:21

## 2024-08-06 ASSESSMENT — PAIN DESCRIPTION - PAIN TYPE
TYPE: CHRONIC PAIN
TYPE: ACUTE PAIN

## 2024-08-06 NOTE — ASSESSMENT & PLAN NOTE
8/5/2024  Per discussion with the psychiatry team, concerned that the patient does not have decision-making capacity due to ongoing psychosis.

## 2024-08-06 NOTE — ASSESSMENT & PLAN NOTE
Completed course of ancef for buttock cellulitis    Patient recently completed course of Ancef for buttock cellulitis.  MRI imaging of the lumbar spine was performed, noted iliac abscess 3.5 cm. Original plan was to have IR drain placed however fluid collection significantly reduced in size, aspiration was performed.  Will follow cultures.MRSA swab negative, continue Zosyn.    Abx completed  Remains clinically stable

## 2024-08-06 NOTE — PROGRESS NOTES
Assumed care of Pt from NOC RN. Pt is asleep at bedside report. Bed is locked, low position and alarm is on. Call light is within reach and all needs are met. 1:1 sitter in room.

## 2024-08-06 NOTE — CONSULTS
[facility]    INPATIENT ASSESSMENT    Name: Terrell Hensley  MRN: [id number]  : 1960  Age: 64 y.o.  Date of assessment: 2024  PCP: Neelima Rivera M.D.  Persons in attendance: Patient and Children    CHIEF COMPLAINT/PRESENTING ISSUE (as stated by Patient and daughter Solomon): Terrell Hensley is a 64 year old male seen lying on hospital bed with daughter at bedside. Patient appeared to be intermittently alert, with his eyes open at times while other times his eyes were closed. Patients speech was fragmented with incomplete sentences. Patient appeared to be confused and unable to organize his thoughts. Patient appears to be cognitive decompensating from the previous assessment.  Patient continues to see a pool of water in the room. Patients daughter was observed asking patient questions in which he was unable to respond. Daughter expressed concern related to  the symptoms she is observing and questioning if these symptoms were new as a result of medications received in the hospital. Clinician referred this question to psychiatry.    Clinician explained to patient and daughter the symptoms observed at the time of admission in addition to what patient expressed he was experiencing while at home prior to his overdose. Daughter was unaware patient was experiencing hallucinations and delusional thoughts. Clinician asked patient to describe the experiences he has already shared with his daughter. Unfortunately, patient was unable to recall what he previous shared with his daughter or with this clinician. Patient was able to give verbal permission to speak to daughter freely regarding his medication and treatment in the hospital so far.    Patient attempted to explain the experiences he had with the demons prior to his hospitalization, but was unable to explain the content of the hallucinations as he was able  to previously.   Daughter explained she wanted patient to sign a power of   however was told patient was did not have capacity to review and understand documents at this point.      Chief Complaint   Patient presents with    Drug Overdose    Suicidal Ideation    Suicide Attempt      [facility] regarding concerns of patients cognitions changing and the inability patient is having to organize his thoughts and complete sentences. Patient is also still hallucinating and is unable to focus .    Nichole Damico, Ph.D., LCSW  8/5/2024   Length of intervention: 60 minutes

## 2024-08-06 NOTE — DISCHARGE PLANNING
Legal Hold     Referral: Legal Hold Court     Intervention: Pt presented for legal hold meeting with  via phone call to discuss legal options of contesting hold and meeting with the court doctors tomorrow afternoon, or not contesting legal hold and continuing the hold for one week.  advised that pt's legal hold will be be continued for one week (8/15).       Plan: Pt's legal hold has been continued for one week. Pt awaiting transfer to an in patient psych facility.

## 2024-08-06 NOTE — PROGRESS NOTES
Hospital Medicine Daily Progress Note    Date of Service  8/6/2024    Chief Complaint  Terrell Hensley is a 64 y.o. male admitted 7/23/2024 with SI attempt    Hospital Course  This is a 64-year-old male with past medical history of hypertension, mood disorder, who was admitted on 7/23/2024 after SI attempt.    Patient with encephalopathy, WILMAR, transaminitis, rhabdomyolysis, and high anion gap metabolic acidosis on admission. Patient had gonzalez in place and completed aggressive IVF.    Patient noted to have buttock cellulitis, CT imaging negative for abscess.      Psychiatry was consulted, patient to discharge to inpatient psychiatry facility.    Interval Problem Update  Case discussed today with psychiatry, medications being adjusted, patient's mood seems much better today.  Legal hold extended.    Patient reported left-sided weakness, reporting a inability to ambulate (however seen by psychiatry ambulating without a walker), patient recently completed course of Ancef for buttock cellulitis.      MRI imaging of the lumbar spine was performed, noted possible iliac us muscle abscess 3.5 cm.  Consult placed for IR to determine if this is amendable to drainage. Blood cultures drawn, MRSA swab pending, patient started on Zosyn and vancomycin.    I have discussed this patient's plan of care and discharge plan at IDT rounds today with Case Management, Nursing, Nursing leadership, and other members of the IDT team.    Consultants/Specialty  psychiatry    Code Status  Full Code    Disposition  Patient is not medically cleared to discharge to inpatient psych facility.    I have placed the appropriate orders for post-discharge needs.    Review of Systems  Review of Systems   All other systems reviewed and are negative.       Physical Exam  Temp:  [36.7 °C (98 °F)-36.9 °C (98.4 °F)] 36.8 °C (98.2 °F)  Pulse:  [78-98] 82  Resp:  [16-18] 18  BP: (112-154)/(54-87) 112/54  SpO2:  [97 %-98 %] 97 %    Physical Exam  Vitals and  nursing note reviewed.   Constitutional:       General: He is not in acute distress.     Appearance: Normal appearance.   HENT:      Head: Normocephalic and atraumatic.   Eyes:      Extraocular Movements: Extraocular movements intact.      Conjunctiva/sclera: Conjunctivae normal.      Pupils: Pupils are equal, round, and reactive to light.   Cardiovascular:      Rate and Rhythm: Normal rate and regular rhythm.      Pulses: Normal pulses.      Heart sounds: No murmur heard.     No friction rub. No gallop.   Pulmonary:      Effort: Pulmonary effort is normal. No respiratory distress.      Breath sounds: Normal breath sounds. No wheezing, rhonchi or rales.   Abdominal:      General: Bowel sounds are normal. There is no distension.      Palpations: Abdomen is soft.      Tenderness: There is no abdominal tenderness.   Musculoskeletal:         General: No swelling or tenderness. Normal range of motion.      Cervical back: Normal range of motion and neck supple. No muscular tenderness.      Right lower leg: No edema.      Left lower leg: No edema.   Skin:     General: Skin is warm and dry.      Capillary Refill: Capillary refill takes less than 2 seconds.      Findings: No bruising, erythema or rash.   Neurological:      General: No focal deficit present.      Mental Status: He is alert and oriented to person, place, and time.         Fluids    Intake/Output Summary (Last 24 hours) at 8/6/2024 1702  Last data filed at 8/6/2024 0108  Gross per 24 hour   Intake 200 ml   Output --   Net 200 ml       Laboratory  Recent Labs     08/05/24  0739 08/06/24  0740   WBC 11.1* 8.3   RBC 4.30* 4.26*   HEMOGLOBIN 12.9* 13.1*   HEMATOCRIT 37.7* 37.3*   MCV 87.7 87.6   MCH 30.0 30.8   MCHC 34.2 35.1   RDW 43.8 44.4   PLATELETCT 431 450*   MPV 8.8* 8.6*     Recent Labs     08/05/24  0739   SODIUM 133*   POTASSIUM 3.6   CHLORIDE 99   CO2 19*   GLUCOSE 114*   BUN 10   CREATININE 0.65   CALCIUM 8.7                   Imaging  MR-LUMBAR  SPINE-WITH & W/O   Final Result      1.  There are multiloculated fluid collection noted involving left iliacus muscle likely representing abscess. It measures an approximately 3.5 cm in the transverse dimension.   2.  There is no evidence of osteomyelitis, discitis or epidural abscess.   3.  Degenerative disease as described above.      US-EXTREMITY VENOUS UPPER UNILAT RIGHT   Final Result      CT-PELVIS WITH   Final Result         1.  Low-density enlargement of the distal left psoas muscle and iliacus muscle extending to the anterior left thigh. Could represent changes of myositis or possibly early forming infectious process. No definite enhancing fluid collection identified to    indicate abscess at this time.   2.  Distended bladder with nondependent air, consider history of recent instrumentation or changes of urinary tract infection, correlate with urinalysis as clinically appropriate.   3.  Bilateral fat-containing inguinal hernias   4.  Diverticulosis      MR-CERVICAL SPINE-W/O   Final Result      1.  Degenerative disease in the segment cervical spine as described above.   2.  Left paracentral/foraminal disc protrusion at C6-7 causing severe left C7 neural foraminal stenosis.      MR-BRAIN-W/O   Final Result      1.  No acute abnormality.   2.  A few punctate nonspecific supratentorial T2 hyperintensities likely representing nonspecific foci of gliosis/chronic ischemia.   3.  Mild cerebral volume loss.      DX-FEMUR-2+ LEFT   Final Result      Unremarkable LEFT femur.      DX-HIP-COMPLETE - UNILATERAL 2+ LEFT   Final Result      No radiographic evidence of acute traumatic injury.      CT-HEAD W/O   Final Result         1.  No acute intracranial abnormality.   2.  Atherosclerosis.               IR-CONSULT AND TREAT    (Results Pending)        Assessment/Plan  * Drug overdose of undetermined intent, initial encounter- (present on admission)  Assessment & Plan  Apparent suicide attempt  Polypharmacy: some  amlodipine but other ingestions unclear  Cont supportive care as clinically improving  On Psych hold  Psychiatry team following    Abscess of iliac fossa  Assessment & Plan  CONCERN FOR ON MRI, completed course of ancef for buttock cellulitis    Patient reported left-sided weakness, reporting a inability to ambulate (however seen by psychiatry ambulating without a walker), MRI imaging of the lumbar spine was performed, noted possible iliac us muscle abscess 3.5 cm.  Consult placed for IR to determine if this is amendable to drainage.    Blood cultures drawn, MRSA swab pending, patient started on Zosyn and vancomycin.    Cellulitis of buttock  Assessment & Plan  Patient with erythema, warmth, pain left buttock s/p found down at home.  CT pelvis with contrast- negative for abscess  Completed ancef 2gm IV q 8 x 7 days.  Wound care  Off loading, out of bed, increase activity.    Acute renal failure with tubular necrosis (HCC)- (present on admission)  Assessment & Plan  Likely 2/2 prerenal damage from hypotension 2/2 drug overdose and uremia related to rhabdomyolysis.  Creat imroving daily: 1.4 today  Good UOP  Cont LR until CPK<3k  Daily BMP  Renally dose medications as appropriate  Resolved    Acute encephalopathy- (present on admission)  Assessment & Plan  Acute metabolic/toxic encephalopathy  CT head neg  Mental status improving daily though still not completely back to normal  Cont to monitor clinically  Repeat lab in am  Minimize sedating medications  Resolved. MRI of the brain without acute changes.       Thrombophlebitis of cephalic vein- (present on admission)  Assessment & Plan  Right upper extremity pain improving  Ultrasound with no DVT but does have occlusive superficial thrombosis in the cephalic vein.  Warm compresses ordered    Neck pain, acute- (present on admission)  Assessment & Plan  Patient complaining of LLE numbness, neck pain and bilateral arm numbness.  Head injury 4/2024.  Ordered MRI c spine  with head MRI - negative  Oxycodone prn pain.    Anxiety- (present on admission)  Assessment & Plan  Increased anxiety noted on exam.  Psychiatry added seroquel 50mg qhs, zoloft resumed for a.m.   Ativan prn  Klonopin bid.    Anemia- (present on admission)  Assessment & Plan  Mild   Cont to monitor    Urinary incontinence- (present on admission)  Assessment & Plan  -oxybutynin held due to drug overdose    Rhabdomyolysis- (present on admission)  Assessment & Plan  CK elevated to 16k, suspected 2/2 being found down and drug overdose.  Cont agressive IVFs but change to LR from NS  Follow daily AST, ALT, Bun, Creat and K  Resolved    Elevated liver enzymes- (present on admission)  Assessment & Plan  Transaminase elevation likely from Rhabdo and not acute liver injury though certainly could have a toxic component from OD  Coming down as expected with resolution of Rhabdo  Cont to monitor daily CMP  Consider further evaluation if it does not resolve as CPK drops and Rhabdo resolves    High anion gap metabolic acidosis- (present on admission)  Assessment & Plan  Likely 2/2 prerenal damage from hypotension 2/2 drug overdose and uremia related to rhabdomyolysis.  -fluid resuscitation with NS 200cc/hr  -IV potassium 10meq x4  Resolved    Mitral regurgitation- (present on admission)  Assessment & Plan  Suspected murmur, though difficult to auscultate. Unclear if preexisting or related to overdose.   Does not appear to have any clinically significant heart failure symptoms    Hypertension- (present on admission)  Assessment & Plan  Hx of HTN, holding home meds in light of hypotension  Blood pressure still elevated.  Increase losartan to 100 mg daily  Continue amlodipine 10 mg daily    Hyponatremia- (present on admission)  Assessment & Plan  Likely hypovolemic hypoNa  Has resolved with IVF resuscitation  Cont to monitor         VTE prophylaxis: Xarelto ppx    I have performed a physical exam and reviewed and updated ROS and Plan  today (8/6/2024). In review of yesterday's note (8/5/2024), there are no changes except as documented above.    Greater than 51 minutes spent prepping to see patient (e.g. reviewing EMR) obtaining and/or reviewing separately obtained history. Performing a medically appropriate examination and evaluation. Independently interpreting results and communicating results to patient/family/caregiver. Counseling and educating the patient/family/caregiver. Ordering medications, tests, and/or procedures. Referring and communicating with other health care professionals.  Documenting clinical information in EPIC. Care coordination.

## 2024-08-06 NOTE — DISCHARGE PLANNING
Contacted [facility] intake and was adv that they will need to check on status of referral and give me a call back.

## 2024-08-06 NOTE — HOSPITAL COURSE
This is a 64-year-old male with past medical history of hypertension, mood disorder, who was admitted on 7/23/2024 after SI attempt.    Patient with encephalopathy, WILMAR, transaminitis, rhabdomyolysis, and high anion gap metabolic acidosis on admission. Patient had gonzalez in place and completed aggressive IVF.    Patient recently completed course of Ancef for buttock cellulitis.  MRI imaging of the lumbar spine was performed, noted iliac abscess 3.5 cm. Original plan was to have IR drain placed however fluid collection significantly reduced in size on day of drain placement, aspiration was performed, NGTD. Completed antiobiotic course 8/13.    Psychiatry was consulted and monitor the patient while he was on legal hold    Patient did have hyponatremia which was felt to be secondary to SIADH.    Patient also had an left hip and knee pain since admission likely from fall prior to admission.  Plain film radiographs and CT of his left knee were unremarkable.

## 2024-08-06 NOTE — PROGRESS NOTES
Patient is complaining of anxiety. He is restless, and his heart rate is 138 bpm. Per patient, he feels anxious because of the upcoming MRI. APRN notified. PRN ativan 0.5mg IV was administered. The heart rate was rechecked 15 mins later, and it was 99 bpm.

## 2024-08-06 NOTE — CARE PLAN
The patient is Stable - Low risk of patient condition declining or worsening    Shift Goals  Clinical Goals: Safety  Patient Goals: rest  Family Goals: ansley    Patient continues to have a difficult time with his thoughts. He appears to have excessive thoughts in his head and searches for understanding.     Progress made toward(s) clinical / shift goals:    Problem: Pain - Standard  Goal: Alleviation of pain or a reduction in pain to the patient’s comfort goal  Outcome: Progressing     Problem: Knowledge Deficit - Standard  Goal: Patient and family/care givers will demonstrate understanding of plan of care, disease process/condition, diagnostic tests and medications  Outcome: Progressing     Problem: Provide Safe Environment  Goal: Suicide environmental safety, protocols, policies, and practices will be implemented  Outcome: Progressing       Patient is not progressing towards the following goals:

## 2024-08-06 NOTE — PROGRESS NOTES
"Pharmacy Vancomycin Kinetics Note for 8/6/2024     64 y.o. male on Vancomycin day # 1     Vancomycin Indication (AUC Dosing): Skin/skin structure infection    Provider specified end date: 08/13/24    Active Antibiotics (From admission, onward)      Ordered     Ordering Provider       Tue Aug 6, 2024  2:55 PM    08/06/24 1455  vancomycin (Vancocin) 1,250 mg in  mL IVPB  (vancomycin (VANCOCIN) IV (LD + Maintenance))  EVERY 12 HOURS         Tahira Guzman D.O.       Tue Aug 6, 2024  2:23 PM    08/06/24 1423  vancomycin (Vancocin) 2,250 mg in  mL IVPB  (vancomycin (VANCOCIN) IV (LD + Maintenance))  ONCE         Tahira Guzman D.O.       Tue Aug 6, 2024  2:08 PM    08/06/24 1408  MD Alert...Vancomycin per Pharmacy  (MD Alert...Vancomycin per Pharmacy)  PHARMACY TO DOSE        Question:  Indication(s) for vancomycin?  Answer:  Skin and soft tissue infection    Tahira Guzman D.O.    08/06/24 1408  piperacillin-tazobactam (Zosyn) 4.5 g in  mL IVPB  (piperacillin-tazobactam (ZOSYN) IV (Extended-infusion) PANEL )  ONCE        Placed in \"And\" Linked Group    Tahira Guzman D.O.    08/06/24 1408  piperacillin-tazobactam (Zosyn) 4.5 g in  mL IVPB  (piperacillin-tazobactam (ZOSYN) IV (Extended-infusion) PANEL )  EVERY 8 HOURS        Placed in \"And\" Linked Group    Tahira Guzman D.O.            Dosing Weight: 85 kg (187 lb 6.3 oz)      Admission History: Admitted on 7/23/2024 for Drug overdose of undetermined intent, initial encounter [T50.784A]  Pertinent history: Patient with left buttock cellulitis and initial CT on 7/29 showed enlargement of the distal left psoas muscle and iliacus muscle extending to the anterior left thigh representing changes of myositis or possibly an early forming infectious process. There was no definite enhancing fluid collection identified to indicate abscess at that time and cefazolin was started. Patient has had persistent left leg pain and weakness and a MRI was " "subsequently ordered for today. On the MRI there is no evidence of osteomyelitis, discitis or epidural abscess. A MRSA PCR and blood cultures have been ordered. Vancomycin and cefepime have both been ordered empirically pending MRSA PCR and updated cultures.    Allergies:     Sulfa drugs     Pertinent cultures to date:     Results       Procedure Component Value Units Date/Time    BLOOD CULTURE [[id number]] Collected: 24 1452    Order Status: Sent Specimen: Blood from Peripheral Updated: 24 1510    BLOOD CULTURE [[id number]] Collected: 24 145    Order Status: Sent Specimen: Blood from Peripheral Updated: 24 1510    MRSA By PCR (Amp) [[id number]]     Order Status: No result Specimen: Respirate from Nares     URINALYSIS [[id number]]  (Abnormal) Collected: 24 1206    Order Status: Completed Specimen: Urine, Clean Catch Updated: 24 1235     Color Yellow     Character Clear     Specific Gravity 1.010     Ph 7.5     Glucose 100 mg/dL      Ketones Negative mg/dL      Protein Negative mg/dL      Bilirubin Negative     Urobilinogen, Urine 0.2     Nitrite Negative     Leukocyte Esterase Negative     Occult Blood Trace     Micro Urine Req Microscopic            Labs:     Estimated Creatinine Clearance: 120.7 mL/min (by C-G formula based on SCr of 0.65 mg/dL).  Recent Labs     24  0739 24  0740   WBC 11.1* 8.3   NEUTSPOLYS 77.20*  --      Recent Labs     24  0739   BUN 10   CREATININE 0.65   ALBUMIN 3.6       Intake/Output Summary (Last 24 hours) at 2024 1543  Last data filed at 2024 0108  Gross per 24 hour   Intake 200 ml   Output --   Net 200 ml      /54   Pulse 82   Temp 36.8 °C (98.2 °F) (Temporal)   Resp 18   Ht 1.803 m (5' 11\")   Wt 85.3 kg (188 lb 0.8 oz)   SpO2 97%  Temp (24hrs), Av.8 °C (98.3 °F), Min:36.7 °C (98 °F), Max:37.1 °C (98.8 °F)      List concerns for Vancomycin clearance:     Receipt of contrast dye    Pharmacokinetics:     AUC " kinetics:   Ke (hr ^-1): 0.0974 hr^-1  Half life: 7.12 hr  Clearance: 5.381  Estimated TDD: 2690.5  Estimated Dose: 1020  Estimated interval: 9.1      A/P:     -  Vancomycin dose: 1250 mg q12H    -  Next vancomycin level(s): approximately 3 days    -  Predicted vancomycin AUC from initial AUC test calculator: 465 mg·hr/L    -  Comments: Patient currently has blood cultures and MRSA PCR pending. Pharmacy will continue to monitor daily.    Bridgette Sanchez, PharmD  Discussed with Lore Gonzalez, TjD

## 2024-08-06 NOTE — ASSESSMENT & PLAN NOTE
8/5/2024  Concern for possible myositis on CT scan.  Myositis possibly related to buttock cellulitis, which appears to be improving with cefazolin.  Patient continues to have persistent left leg weakness and pain.  I have ordered MRI of the lumbar spine with and without contrast.

## 2024-08-06 NOTE — CONSULTS
"PSYCHIATRIC FOLLOW-UP: (established)  Reason for admission:   Overdose/SA  Legal Hold Status:     Extended       Chart reviewed.         HPI:          On Olanzapine 10mg po qHS  Effexor 75mg po qAM  Trazodone 100mg po qHS     Interval hx    Urine osmolality noted to be low at 163. Latoya 34, Ucr 17.4. Primary IM team is going to stop salt tablets and monitor sodium levels  Last night pt had a lot of anxiety due to the upcoming MRIand was restless, HR was at 138bpm. Received PRN ativan 0.5mg inj.    Today pt was interviewed in his room. His affect appeared to be less blunted compared to yesterday, thought organization is slightly better. However he's still very much pre-occupied. Pt reports a very poor night of sleep due to worrying about today's MRI last night, he reports decent appetite, finished 75% of his breakfast today.     He reports the voice is gone at one point of the interview and denies any other visual hallucination. He reports dizziness and \"seeing whirlpool\" on the ground when feeling dizzy. However later in interview he denied dizziness and did not endorse dizziness when walking around his room. He also reports constipation (last BM was 8/4), dry mouth and urinary retention. However urine was noted in the toilet and his sitter says he has been urinating today. Sitter also mentions the patient has been having flatus. Patient denies any abdominal pain.  His voice today still gets progressively low today and closes his eyes/grimaces frequently, rubbing his forehead with his left hand.     We explored his internal stimuli a little more. Pt reports he's been having obsessive thoughts \"my entire life\", some of his obsessions/compulsions include:  - Repeated sexual imagery entering his head: he counters it by reciting bible scripture.  - Imagery of blood spilling on the ground  - Thoughts and imagery of something terrible happening to him or his loved ones (catastrophes)   - Looking for numbers in things " "containing numbers and or patterns looking like numbers (However this is also a technique he uses to distract himself from other obsessive thoughts)  - [employer]ism concern with wrong/sinful acts.  - Decades (of the 20th century) and colors he assigned to each.    Pt also reports having pretty frequent panic attacks (pt did not know what a panic attack looks/feels like until I explained to him) since he was young - it often happens when he's in front of the classmates. When he had elevated heart rate and SOB, he felt very embarrassed because his teacher thought he was \"stoned\". Pt also reports claustrophobia - which made him nervous about the upcoming MRI.  We discussed some general psych education of how OCD presents and some treatment options for OCD. I also taught some relaxation techniques and mindfulness practice to cope with obsessive thought.    Pt commented that he feels it's easier to trust the treatment team these days as he is learning more about his mental health. Updated his brother Jean Paul this afternoon explained his current presentation and medication changes. His brother expressed gratitude and mentioned pt's obsession and preoccupation drastically worsened since Mar 2024 after he had his TBI.     No additional concerns reported at this time.        Medical ROS (as pertinent):     ROS    See interval hx    Psychiatric Examination:  Vitals:   Vitals:    08/06/24 0726   BP: (!) 146/60   Pulse: 90   Resp: 18   Temp: 36.8 °C (98.2 °F)   SpO2: 98%        General Appearance: Appears state age, appropriate grooming & hygiene, appearing tense and constricted.  Behavior:               -Hypoactive, poor eye contact, staring.  Neuro:              -No tremors,tics, dyskinesias or dystonias.               -Repetitive brushing hair using fingers, grimacing and closes eyes constantly              -No posture or gait abnormalities appreciated, gait appears steady with walker and without walker  Speech: " "Non-spontaneous,Murmuring, whispering, low voice with occasional echolalia.   Language: English  Thought processes: More organized, more linear.  Loose associations is present  Thought content: Denies SI/HI. AH is intermittent, VH of \"whirlpool\" only when he's dizzy.  Pre-occupied by internal stimuli. Paranoia lessened but obsession is still present.  Mood: \"Okay\" as stated by patient  Affect: Congruent with stated mood. Constricted, more flexible than yesterday. No evidence of lability, ernie, or agitation  Judgement and Insight: Limited/Limited  Cognition:               -Alert & oriented x 3 (Person, place and time)              -Attention & concentration limited due to internal occupation.              -Immediate/delayed memory not formally tested but grossly intact              -Age-appropriate fund of knowledge    [facility] ICU screen negative today    New PAST MEDICAL/PSYCH/FAMILY/SOCIAL(as reported by patient):       None      EKG:   Results for orders placed or performed during the hospital encounter of 24   EKG   Result Value Ref Range    Report       [facility]    Test Date:  2024  Pt Name:    FAIZAN GARDNER             Department: ER  MRN:        [id number]                      Room:        12  Gender:     Male                         Technician: [id number]  :        1960                   Requested By:ER TRIAGE PROTOCOL  Order #:    [id number]                    Reading MD: ILDEFONSO ARNOLD, DO    Measurements  Intervals                                Axis  Rate:       85                           P:          61  NY:         153                          QRS:        63  QRSD:       106                          T:          25  QT:         413  QTc:        492    Interpretive Statements  Sinus rhythm  Probable left atrial enlargement  Borderline prolonged QT interval  Compared to ECG 2024 17:09:50  No significant changes  Electronically Signed On " 2024 00:56:04 PDT by ILDEFONSO ARNOLD DO     EKG   Result Value Ref Range    Report       [facility]    Test Date:  2024  Pt Name:    FAIZAN GARDNER             Department: 61  MRN:        [id number]                      Room:       [facility]  Gender:     Male                         Technician: [id number]  :        1960                   Requested By:ANICETO AMBRIZ  Order #:    [id number]                    Reading MD: Lucio Guzman MD    Measurements  Intervals                                Axis  Rate:       82                           P:          67  DE:         176                          QRS:        55  QRSD:       93                           T:          35  QT:         355  QTc:        415    Interpretive Statements  Sinus rhythm  Consider right atrial enlargement  Compared to ECG 2024 19:51:19  No significant changes  Electronically Signed On 2024 06:48:42 PDT by Lucio Guzman MD        Brain Imaging: No new brain imaging  EEG:  None     Labs personally reviewed:   Recent Results (from the past 24 hour(s))   OSMOLALITY URINE    Collection Time: 24  3:00 PM   Result Value Ref Range    Osmolality Urine 163 (L) 300 - 900 mOsm/kg H2O   URINE SODIUM RANDOM    Collection Time: 24  3:00 PM   Result Value Ref Range    Sodium, Urine -per volume 34 mmol/L   URINE CREATININE RANDOM    Collection Time: 24  3:00 PM   Result Value Ref Range    Creatinine, Random Urine 17.40 mg/dL   CBC WITHOUT DIFFERENTIAL    Collection Time: 24  7:40 AM   Result Value Ref Range    WBC 8.3 4.8 - 10.8 K/uL    RBC 4.26 (L) 4.70 - 6.10 M/uL    Hemoglobin 13.1 (L) 14.0 - 18.0 g/dL    Hematocrit 37.3 (L) 42.0 - 52.0 %    MCV 87.6 81.4 - 97.8 fL    MCH 30.8 27.0 - 33.0 pg    MCHC 35.1 32.3 - 36.5 g/dL    RDW 44.4 35.9 - 50.0 fL    Platelet Count 450 (H) 164 - 446 K/uL    MPV 8.6 (L) 9.0 - 12.9 fL     Lab Results   Component Value Date/Time    SODIUM 133 (L) 2024 07:39 AM     POTASSIUM 3.6 08/05/2024 07:39 AM    CHLORIDE 99 08/05/2024 07:39 AM    CO2 19 (L) 08/05/2024 07:39 AM    GLUCOSE 114 (H) 08/05/2024 07:39 AM    BUN 10 08/05/2024 07:39 AM    CREATININE 0.65 08/05/2024 07:39 AM    BUNCREATRAT 16 05/15/2023 11:24 AM          Assessment:  Terrell Hensley is a 63 y.o. male admitted 7/23/2024 with altered mental status, found down after intentional OD of his home medications amlodipine in order to kill himself.      Today pt's presentation has slightly improved. He is better able to engage in the interview. After obtaining more psych history his current presentation is more likely related to severe OCD than catatonia as there are some motor symptom overlap. Additionally pt did demonstrate improved engagement in interview after the dosage increase of Olanzapine. Pt reports some anticholinergic side effects on Olanzapine. Will continue to titrate up Effexor for pt's obsessive thoughts and related anxiety. Add on PRN 50mg Trazodone to further improve sleep. Psych will follow.      Dx:  #MDD with Psychosis  R/O Schizoaffective disorder  R/O Catatonia  #OCD  #Suicidal Ideation  #Drug Overdose    Medical :  Per primary team      Plan:  Legal hold: Extended due to continued psychotic symptoms  Track BM and I/O  Psychotropic medications:  Continue olanzapine to 10mg po qHS as adjunct of treatment for depression and insomnia  Increase Effexor to 150mg po qD for depression  Change trazodone 100mg to scheduled qHS for insomnia  Add on Trazodone 50mg po qHS PRN for insomnia if scheduled Trazodone is not effective.  Please transfer pt to inpatient psychiatric hospital when medically cleared and bed is available  Labs reviewed  EKG reviewed, QTc within normal limits   Collateral: Updated brother Jean Paul today.   Discussed with Dr. Guzman  Psychiatry will follow up       Thank you for the consult.      Sitter: 1:1  Phone: Yes room phone  Visitors: Yes, Jean Paul (brother), Cortes (brother), YOSELIN (son),  Tressa (daughter)   Personal belongings:  NO

## 2024-08-06 NOTE — PROGRESS NOTES
Received bedside report from previous RN. Patient is alert and oriented x4. On RA  Fall precautions in place and call light within reach. 1:1 sitter at bedside for safety. All other needs met at this time.

## 2024-08-06 NOTE — PROGRESS NOTES
Hospital Medicine Daily Progress Note    Date of Service  8/5/2024    Chief Complaint  Terrell Hensley is a 63 y.o. male admitted 7/23/2024 with altered mental status, found down after intentional OD of his home medications.    Hospital Course  Terrell Hensley is a 63 year-old male who presented 7/23/2024 after suicidal attempt drug overdose.  Patient was brought in by ambulance from [place] after overdosing on amlodipine, chlorthalidone, and other medications as a suicide attempt.  He was noted to have slurred speech and encephalopathic.  Blood pressure initially 60/40, which responded to IV fluids.    Patient was admitted for toxic encephalopathy due to intentional drug overdose.  He was placed on a legal hold.  Psychiatry was consulted and extended a legal hold.  He developed acute anxiety and psychosis with addition of medications.  Patient developed left buttock cellulitis and a CT scan of the pelvis with contrast showed low-density enlargement of the distal left psoas muscle and iliac us muscle extending to the anterior left thigh, suggestive of myositis versus early forming infectious process.  No definitive enhancing fluid collection suggestive of abscess.  He developed rhabdomyolysis and acute renal failure due to acute tubular necrosis with CPK elevated 16,000, which trended down with IV fluids.        Interval Problem Update  7/26:  transferred out of ICU to medical unit, remains confused.  States he has left hip pain, xray ordered.  PT/OT ordered, off strict bedrest.  CPK remain 4, 523, cut back  to 125/hr. Started lovenox for DVT ppx.  Head CT on admission negative.  7/27: Patient appeared quite anxious nervous today.  Psychiatry has recommended Seroquel 50 mg nightly.  QTc on EKG today 492.  Also restart Zoloft tomorrow morning 50 mg in AM.  I have added Klonopin 0.5 mg p.o. twice daily with as needed and Ativan for MRI brain rule out anoxic event.  Apparently patient attempted to  get out of bed and his legs collapsed.  PT OT pending.  7/28:  patient improved anxiety and acute psychosis with addition of medications.  He asks if he will be wheelchair bound.  C/o left knee pain.  Ordered xray left femur. No obvious deformity noted on exam. Blake discontinued today.  7/29: Patient still complains of left hip pain.  Finding of left buttock erythema, swelling, appearance of cellulitis.  I have ordered a CT pelvis to rule out abscess.  Start Ancef 2 g IV every 8 x 7 days.  Patient complains of numbness in his left lower leg.  He is difficult historian since he states yes to almost all questions asked.  He does complain of cervical neck pain.  When asked if he has any numbness or pain in his arms he says yes.  I have added MRI cervical spine to MRI brain without contrast.  As needed pain medications added.  PT lucilleal recommending postacute placement for physical therapy.  They did get patient up to bathroom with assistance.    Above per previous hospitalist.    7/30/2024  Continues on legal hold.  Potassium of 3.7 being replaced.  Discussed with psychiatry.  Changing Lexapro to Wellbutrin  Increasing trazodone to 100 mg nightly.    7/31/2024  Continues on legal hold  Tolerating medication changes.  Complaining of right upper extremity pain for past several days.  States that he is feeling down today.  Denies any suicidal or homicidal ideation.  Continue on IV cefazolin.  Right upper extremity ultrasound ordered due to pain    8/1/2024  Right upper extremity pain improving  Ultrasound with no DVT but does have occlusive superficial thrombosis in the cephalic vein.  Warm compresses ordered  Continue on cefazolin    8/2/2024  Complaining of cloudy eyes this morning.  Continuing on cefazolin.  Ambulating with walker.  PT and OT will reevaluate.    8/3/2024  Patient continued to ambulate with four-wheel walker.  Left buttock pain and left leg weakness improving.    Patient is medically clear for discharge  to inpatient psychiatry facility.  I have signed the papers.    8/4/2024  Patient with no new complaints today.  Discussed patient's brother.  Patient is making good progress with his buttock infection.    Blood pressure improving to 147/75.  Awaiting acceptance to inpatient psychiatric facility.    8/5/2024  Continued to have left leg weakness and pain.  Per PT continued to require walker for ambulation, which is a barrier for inpatient psychiatric hospitalization.  Discussed with Dr. Damico of behavioral health.  I have ordered MRI of the lumbar spine with and without contrast.  Completing cefazolin today.  Discussed with psychiatry resident Dr. Resendiz.    Psychiatry team concerned that patient does not have decision-making capacity due to ongoing psychosis.  Increased olanzapine to 10 mg at nighttime.    Trazodone as needed for insomnia to avoid oversedation with increasing olanzapine.    I have discussed this patient's plan of care and discharge plan at IDT rounds today with Case Management, Nursing, Nursing leadership, and other members of the IDT team.    Consultants/Specialty  psychiatry    Code Status  Full Code    Disposition  The patient is not medically cleared for discharge to home or a post-acute facility.  Anticipate discharge to: a psychiatric hospital    I have placed the appropriate orders for post-discharge needs.    Review of Systems  Review of Systems   Constitutional:  Negative for chills and fever.   Eyes:  Negative for blurred vision.   Respiratory:  Negative for cough and shortness of breath.    Cardiovascular:  Negative for chest pain and palpitations.   Gastrointestinal:  Negative for abdominal pain, nausea and vomiting.   Musculoskeletal:  Negative for joint pain and myalgias.   Neurological:  Positive for weakness. Negative for dizziness and headaches.   Psychiatric/Behavioral:  Positive for depression. Negative for suicidal ideas.         Physical Exam  Temp:  [36.9 °C (98.4 °F)-37.6 °C  (99.6 °F)] 36.9 °C (98.4 °F)  Pulse:  [] 78  Resp:  [15-20] 16  BP: (150-174)/(75-84) 150/75  SpO2:  [98 %] 98 %    Physical Exam  Vitals and nursing note reviewed. Exam conducted with a chaperone present.   Constitutional:       General: He is not in acute distress.     Appearance: Normal appearance. He is not ill-appearing.   HENT:      Head: Normocephalic and atraumatic.      Mouth/Throat:      Mouth: Mucous membranes are moist.      Pharynx: Oropharynx is clear. No oropharyngeal exudate.   Eyes:      General: No scleral icterus.        Right eye: No discharge.         Left eye: No discharge.      Conjunctiva/sclera: Conjunctivae normal.   Cardiovascular:      Rate and Rhythm: Normal rate and regular rhythm.      Pulses: Normal pulses.      Heart sounds: Normal heart sounds. No murmur heard.  Pulmonary:      Effort: Pulmonary effort is normal. No respiratory distress.      Breath sounds: Normal breath sounds.   Abdominal:      General: Abdomen is flat. Bowel sounds are normal. There is no distension.      Palpations: Abdomen is soft.   Musculoskeletal:         General: No swelling or tenderness.      Cervical back: Neck supple. No tenderness.      Right lower leg: No edema.      Left lower leg: No edema.      Comments: Swelling and tenderness along the right bicep   Skin:     General: Skin is warm and dry.      Coloration: Skin is not pale.   Neurological:      Mental Status: He is alert and oriented to person, place, and time. Mental status is at baseline.      Motor: Weakness (Left lower extremity) present.   Psychiatric:         Thought Content: Thought content normal.         Judgment: Judgment normal.         Fluids    Intake/Output Summary (Last 24 hours) at 8/5/2024 2025  Last data filed at 8/4/2024 2035  Gross per 24 hour   Intake 100 ml   Output --   Net 100 ml       Laboratory  Recent Labs     08/05/24  0739   WBC 11.1*   RBC 4.30*   HEMOGLOBIN 12.9*   HEMATOCRIT 37.7*   MCV 87.7   MCH 30.0   MCHC  34.2   RDW 43.8   PLATELETCT 431   MPV 8.8*       Recent Labs     08/05/24  0739   SODIUM 133*   POTASSIUM 3.6   CHLORIDE 99   CO2 19*   GLUCOSE 114*   BUN 10   CREATININE 0.65   CALCIUM 8.7                   Imaging  US-EXTREMITY VENOUS UPPER UNILAT RIGHT   Final Result      CT-PELVIS WITH   Final Result         1.  Low-density enlargement of the distal left psoas muscle and iliacus muscle extending to the anterior left thigh. Could represent changes of myositis or possibly early forming infectious process. No definite enhancing fluid collection identified to    indicate abscess at this time.   2.  Distended bladder with nondependent air, consider history of recent instrumentation or changes of urinary tract infection, correlate with urinalysis as clinically appropriate.   3.  Bilateral fat-containing inguinal hernias   4.  Diverticulosis      MR-CERVICAL SPINE-W/O   Final Result      1.  Degenerative disease in the segment cervical spine as described above.   2.  Left paracentral/foraminal disc protrusion at C6-7 causing severe left C7 neural foraminal stenosis.      MR-BRAIN-W/O   Final Result      1.  No acute abnormality.   2.  A few punctate nonspecific supratentorial T2 hyperintensities likely representing nonspecific foci of gliosis/chronic ischemia.   3.  Mild cerebral volume loss.      DX-FEMUR-2+ LEFT   Final Result      Unremarkable LEFT femur.      DX-HIP-COMPLETE - UNILATERAL 2+ LEFT   Final Result      No radiographic evidence of acute traumatic injury.      CT-HEAD W/O   Final Result         1.  No acute intracranial abnormality.   2.  Atherosclerosis.               MR-LUMBAR SPINE-WITH & W/O    (Results Pending)        Assessment/Plan  * Drug overdose of undetermined intent, initial encounter- (present on admission)  Assessment & Plan  Apparent suicide attempt  Polypharmacy: some amlodipine but other ingestions unclear  Cont supportive care as clinically improving  On Psych hold  Psychiatry team  following    7/31/2024  Continues on a legal hold    8/1/2024  Continues on legal hold.  Unable to go to inpatient psych facility due to weakness.    8/2/2024  Continues on legal hold.  Unable to go to inpatient psych facility due to use of walker.  Repeating PT and OT evaluation.    8/3/2024  Patient is ambulating with a four-wheel walker.  He is medically cleared to go to inpatient psychiatric facility.    8/4/2024  Awaiting acceptance to inpatient psychiatric facility    8/5/2024  Increased olanzapine to 10 mg at nighttime.    Trazodone as needed for insomnia to avoid oversedation with increasing olanzapine.  Discussed with psychiatry resident Dr. Resendiz.    Patient is not medically cleared to go to inpatient psychiatry facility today due to need for evaluation of ongoing left leg weakness and pain.  I have ordered MRI of the lumbar spine with and without contrast.    Left leg weakness- (present on admission)  Assessment & Plan  8/5/2024  Concern for possible myositis on CT scan.  Myositis possibly related to buttock cellulitis, which appears to be improving with cefazolin.  Patient continues to have persistent left leg weakness and pain.  I have ordered MRI of the lumbar spine with and without contrast.    Psychosis (HCC)  Assessment & Plan  8/5/2024  Per discussion with the psychiatry team, concerned that the patient does not have decision-making capacity due to ongoing psychosis.    Thrombophlebitis of cephalic vein- (present on admission)  Assessment & Plan  8/1/2024  Right upper extremity pain improving  Ultrasound with no DVT but does have occlusive superficial thrombosis in the cephalic vein.  Warm compresses ordered    Pain and swelling of right upper extremity- (present on admission)  Assessment & Plan  7/31/2024  Have ordered ultrasound of the right upper extremity to assess for DVT    8/2/2024  Continue warm compresses    Cellulitis of buttock  Assessment & Plan  Patient with erythema, warmth, pain left  buttock s/p found down at home.  CT pelvis with contrast ordered to evaluate for abscess.  Started ancef 2gm IV q 8 x 7 days.  Wound care  Off loading, out of bed, increase activity.    8/2/2024  Continue IV cefazolin    8/3/2024  Continue IV cefazolin until tomorrow.    8/5/2024  Completing cefazolin today.    I have ordered MRI of the lumbar spine with and without contrast due to persistent left leg pain and weakness.    Suicidal behavior with attempted self-injury (HCC)- (present on admission)  Assessment & Plan  Repeat suicidal attempt by patient  -psych consult  -sitter    7/30/2024  Continue one-on-one sitter  Continues to be on legal hold.  Psychiatry recommending psychiatric hospitalization  Changing sertraline to Wellbutrin    8/2/2024  Off of Wellbutrin  Continue venlafaxine, olanzapine at bedtime    Left leg pain- (present on admission)  Assessment & Plan  7/30/2024  Suspect related to left distal psoas muscle enlargement concerning for myositis versus infection.  Continue cefazolin    7/31/2024  Continue cefazolin    Neck pain, acute- (present on admission)  Assessment & Plan  Patient complaining of LLE numbness, neck pain and bilateral arm numbness.  Head injury 4/2024.  Ordered MRI c spine with head MRI.  Oxycodone prn pain.    Anxiety- (present on admission)  Assessment & Plan  Increased anxiety noted on exam.  Psychiatry added seroquel 50mg qhs, zoloft resumed for a.m.   Ativan prn  Klonopin bid.    Anemia- (present on admission)  Assessment & Plan  Mild   Cont to monitor    Leukocytosis- (present on admission)  Assessment & Plan  False in light of dehydration, likely aspiration pneumonitis with some neutrophil shift. May consider further intervention once patient stabilizes and is more alert, since unable to assess and suspect it is best to minimize medications at this time owing to unclear clinical picture from overdose.  -CTM    Urinary incontinence- (present on admission)  Assessment &  Plan  -oxybutynin held due to drug overdose    Rhabdomyolysis- (present on admission)  Assessment & Plan  CK elevated to 16k, suspected 2/2 being found down and drug overdose.  Cont agressive IVFs but change to LR from NS  Follow daily AST, ALT, Bun, Creat and K    7/25  CPK now 7k  AST/ALT coming down  Renal function improving  K WNL  Cont to monitor CPK, CMP daily until normalized  Cont IVFs until CPK<3k    7/30/2024  Resolved    Elevated liver enzymes- (present on admission)  Assessment & Plan  Transaminase elevation likely from Rhabdo and not acute liver injury though certainly could have a toxic component from OD  Coming down as expected with resolution of Rhabdo  Cont to monitor daily CMP  Consider further evaluation if it does not resolve as CPK drops and Rhabdo resolves    Hypokalemia- (present on admission)  Assessment & Plan  7/30/2024  Potassium of 3.7.  I will order replacement for goal 4.    Acute renal failure with tubular necrosis (HCC)- (present on admission)  Assessment & Plan  Likely 2/2 prerenal damage from hypotension 2/2 drug overdose and uremia related to rhabdomyolysis.  Creat imroving daily: 1.4 today  Good UOP  Cont LR until CPK<3k  Daily BMP  Renally dose medications as appropriate    7/30/2024  Resolved    High anion gap metabolic acidosis- (present on admission)  Assessment & Plan  Likely 2/2 prerenal damage from hypotension 2/2 drug overdose and uremia related to rhabdomyolysis.  -fluid resuscitation with NS 200cc/hr  -IV potassium 10meq x4      7/30/2024  Resolved    Mitral regurgitation- (present on admission)  Assessment & Plan  Suspected murmur, though difficult to auscultate. Unclear if preexisting or related to overdose.   -consider TTE once more clinically stable.    8/4/2024  Does not appear to have any clinically significant heart failure symptoms    Acute encephalopathy- (present on admission)  Assessment & Plan  Acute metabolic/toxic encephalopathy  CT head neg  Mental status  improving daily though still not completely back to normal  Cont to monitor clinically  Repeat lab in am  Minimize sedating medications  7/27:  increased anxiety/confusion, query anoxic brain injury, ordered MRI brain w/o contrast.    7/31/2024  Resolved. MRI of the brain without acute changes.       Hypertension- (present on admission)  Assessment & Plan  Hx of HTN, holding home meds in light of hypotension  Holding BP meds for obvious reasons    8/3/2024  Blood pressure is increasing into the 160s.  Starting losartan 50 mg daily  Restarting amlodipine 5 mg daily    8/4/2024  Improving.    Increase amlodipine to 10 mg daily.  Continue losartan 50 mg daily.    8/5/2024  Blood pressure still elevated.  Increase losartan to 100 mg daily  Continue amlodipine 10 mg daily    Hyponatremia- (present on admission)  Assessment & Plan  Likely hypovolemic hypoNa  Has resolved with IVF resuscitation  Cont to monitor         VTE prophylaxis:    Xarelto 10mg daily as prophylaxis      I have performed a physical exam and reviewed and updated ROS and Plan today (8/5/2024). In review of yesterday's note (8/4/2024), there are no changes except as documented above.

## 2024-08-06 NOTE — CARE PLAN
The patient is Stable - Low risk of patient condition declining or worsening    Shift Goals  Clinical Goals: safety  Patient Goals: rest  Family Goals: ansley    Progress made toward(s) clinical / shift goals:  updated pt on POC and verbalized understanding. Medicated per MAR and tolerated well. Hourly rounds performed. Needs attended.    Patient is not progressing towards the following goals:    Problem: Knowledge Deficit - Standard  Goal: Patient and family/care givers will demonstrate understanding of plan of care, disease process/condition, diagnostic tests and medications  Description: Target End Date:  1-3 days or as soon as patient condition allows    Document in Patient Education    1.  Patient and family/caregiver oriented to unit, equipment, visitation policy and means for communicating concern  2.  Complete/review Learning Assessment  3.  Assess knowledge level of disease process/condition, treatment plan, diagnostic tests and medications  4.  Explain disease process/condition, treatment plan, diagnostic tests and medications  Outcome: Not Progressing

## 2024-08-07 LAB
ALBUMIN SERPL BCP-MCNC: 3.5 G/DL (ref 3.2–4.9)
ALBUMIN/GLOB SERPL: 1.2 G/DL
ALP SERPL-CCNC: 88 U/L (ref 30–99)
ALT SERPL-CCNC: 21 U/L (ref 2–50)
ANION GAP SERPL CALC-SCNC: 16 MMOL/L (ref 7–16)
AST SERPL-CCNC: 15 U/L (ref 12–45)
BILIRUB SERPL-MCNC: 0.5 MG/DL (ref 0.1–1.5)
BUN SERPL-MCNC: 11 MG/DL (ref 8–22)
CALCIUM ALBUM COR SERPL-MCNC: 9.4 MG/DL (ref 8.5–10.5)
CALCIUM SERPL-MCNC: 9 MG/DL (ref 8.5–10.5)
CHLORIDE SERPL-SCNC: 103 MMOL/L (ref 96–112)
CO2 SERPL-SCNC: 18 MMOL/L (ref 20–33)
CREAT SERPL-MCNC: 0.68 MG/DL (ref 0.5–1.4)
ERYTHROCYTE [DISTWIDTH] IN BLOOD BY AUTOMATED COUNT: 44.9 FL (ref 35.9–50)
GFR SERPLBLD CREATININE-BSD FMLA CKD-EPI: 103 ML/MIN/1.73 M 2
GLOBULIN SER CALC-MCNC: 2.9 G/DL (ref 1.9–3.5)
GLUCOSE SERPL-MCNC: 109 MG/DL (ref 65–99)
HCT VFR BLD AUTO: 37.6 % (ref 42–52)
HGB BLD-MCNC: 12.5 G/DL (ref 14–18)
INR PPP: 1.08 (ref 0.87–1.13)
MCH RBC QN AUTO: 29.6 PG (ref 27–33)
MCHC RBC AUTO-ENTMCNC: 33.2 G/DL (ref 32.3–36.5)
MCV RBC AUTO: 88.9 FL (ref 81.4–97.8)
PLATELET # BLD AUTO: 511 K/UL (ref 164–446)
PMV BLD AUTO: 8.4 FL (ref 9–12.9)
POTASSIUM SERPL-SCNC: 3.8 MMOL/L (ref 3.6–5.5)
PROT SERPL-MCNC: 6.4 G/DL (ref 6–8.2)
PROTHROMBIN TIME: 14.1 SEC (ref 12–14.6)
RBC # BLD AUTO: 4.23 M/UL (ref 4.7–6.1)
SODIUM SERPL-SCNC: 137 MMOL/L (ref 135–145)
WBC # BLD AUTO: 7.5 K/UL (ref 4.8–10.8)

## 2024-08-07 PROCEDURE — 700105 HCHG RX REV CODE 258: Performed by: GENERAL PRACTICE

## 2024-08-07 PROCEDURE — 700111 HCHG RX REV CODE 636 W/ 250 OVERRIDE (IP): Mod: JZ | Performed by: GENERAL PRACTICE

## 2024-08-07 PROCEDURE — 770001 HCHG ROOM/CARE - MED/SURG/GYN PRIV*

## 2024-08-07 PROCEDURE — 80053 COMPREHEN METABOLIC PANEL: CPT

## 2024-08-07 PROCEDURE — A9270 NON-COVERED ITEM OR SERVICE: HCPCS | Performed by: STUDENT IN AN ORGANIZED HEALTH CARE EDUCATION/TRAINING PROGRAM

## 2024-08-07 PROCEDURE — 700102 HCHG RX REV CODE 250 W/ 637 OVERRIDE(OP): Performed by: STUDENT IN AN ORGANIZED HEALTH CARE EDUCATION/TRAINING PROGRAM

## 2024-08-07 PROCEDURE — A9270 NON-COVERED ITEM OR SERVICE: HCPCS | Performed by: GENERAL PRACTICE

## 2024-08-07 PROCEDURE — 99232 SBSQ HOSP IP/OBS MODERATE 35: CPT | Performed by: STUDENT IN AN ORGANIZED HEALTH CARE EDUCATION/TRAINING PROGRAM

## 2024-08-07 PROCEDURE — 85610 PROTHROMBIN TIME: CPT

## 2024-08-07 PROCEDURE — 700102 HCHG RX REV CODE 250 W/ 637 OVERRIDE(OP): Performed by: GENERAL PRACTICE

## 2024-08-07 PROCEDURE — 700111 HCHG RX REV CODE 636 W/ 250 OVERRIDE (IP)

## 2024-08-07 PROCEDURE — 85027 COMPLETE CBC AUTOMATED: CPT

## 2024-08-07 PROCEDURE — 99233 SBSQ HOSP IP/OBS HIGH 50: CPT | Performed by: GENERAL PRACTICE

## 2024-08-07 PROCEDURE — 36415 COLL VENOUS BLD VENIPUNCTURE: CPT

## 2024-08-07 RX ORDER — LORAZEPAM 0.5 MG/1
0.5 TABLET ORAL 3 TIMES DAILY
Status: DISCONTINUED | OUTPATIENT
Start: 2024-08-07 | End: 2024-08-12

## 2024-08-07 RX ORDER — ARIPIPRAZOLE 10 MG/1
5 TABLET ORAL DAILY
Status: DISCONTINUED | OUTPATIENT
Start: 2024-08-07 | End: 2024-08-10

## 2024-08-07 RX ORDER — AMOXICILLIN 250 MG
2 CAPSULE ORAL 2 TIMES DAILY
Status: DISCONTINUED | OUTPATIENT
Start: 2024-08-07 | End: 2024-08-08

## 2024-08-07 RX ORDER — CARVEDILOL 6.25 MG/1
6.25 TABLET ORAL 2 TIMES DAILY WITH MEALS
Status: DISCONTINUED | OUTPATIENT
Start: 2024-08-07 | End: 2024-08-18

## 2024-08-07 RX ORDER — LORAZEPAM 2 MG/ML
1 INJECTION INTRAMUSCULAR ONCE
Status: COMPLETED | OUTPATIENT
Start: 2024-08-07 | End: 2024-08-07

## 2024-08-07 RX ORDER — POLYETHYLENE GLYCOL 3350 17 G/17G
1 POWDER, FOR SOLUTION ORAL
Status: DISCONTINUED | OUTPATIENT
Start: 2024-08-07 | End: 2024-08-08

## 2024-08-07 RX ADMIN — ARIPIPRAZOLE 5 MG: 10 TABLET ORAL at 12:50

## 2024-08-07 RX ADMIN — LORAZEPAM 0.5 MG: 0.5 TABLET ORAL at 17:45

## 2024-08-07 RX ADMIN — SENNOSIDES AND DOCUSATE SODIUM 2 TABLET: 50; 8.6 TABLET ORAL at 17:46

## 2024-08-07 RX ADMIN — VENLAFAXINE HYDROCHLORIDE 150 MG: 75 CAPSULE, EXTENDED RELEASE ORAL at 10:00

## 2024-08-07 RX ADMIN — CARVEDILOL 6.25 MG: 6.25 TABLET, FILM COATED ORAL at 17:46

## 2024-08-07 RX ADMIN — CARVEDILOL 6.25 MG: 6.25 TABLET, FILM COATED ORAL at 10:00

## 2024-08-07 RX ADMIN — LOSARTAN POTASSIUM 100 MG: 50 TABLET, FILM COATED ORAL at 05:12

## 2024-08-07 RX ADMIN — LORAZEPAM 1 MG: 2 INJECTION INTRAMUSCULAR; INTRAVENOUS at 10:01

## 2024-08-07 RX ADMIN — PIPERACILLIN AND TAZOBACTAM 4.5 G: 4; .5 INJECTION, POWDER, FOR SOLUTION INTRAVENOUS at 12:52

## 2024-08-07 RX ADMIN — PIPERACILLIN AND TAZOBACTAM 4.5 G: 4; .5 INJECTION, POWDER, FOR SOLUTION INTRAVENOUS at 21:26

## 2024-08-07 RX ADMIN — LORAZEPAM 0.5 MG: 0.5 TABLET ORAL at 12:49

## 2024-08-07 RX ADMIN — PIPERACILLIN AND TAZOBACTAM 4.5 G: 4; .5 INJECTION, POWDER, FOR SOLUTION INTRAVENOUS at 05:16

## 2024-08-07 RX ADMIN — TRAZODONE HYDROCHLORIDE 100 MG: 50 TABLET ORAL at 21:22

## 2024-08-07 RX ADMIN — SENNOSIDES AND DOCUSATE SODIUM 2 TABLET: 50; 8.6 TABLET ORAL at 10:00

## 2024-08-07 RX ADMIN — AMLODIPINE BESYLATE 10 MG: 10 TABLET ORAL at 05:13

## 2024-08-07 ASSESSMENT — PAIN DESCRIPTION - PAIN TYPE
TYPE: ACUTE PAIN
TYPE: ACUTE PAIN

## 2024-08-07 NOTE — DISCHARGE PLANNING
"Case Management Discharge Planning    Admission Date: 7/23/2024  GMLOS: 3.9  ALOS: 14    6-Clicks ADL Score: 19  6-Clicks Mobility Score: 19      Anticipated Discharge Dispo: Discharge Disposition: D/T to psych hosp or distinct part unit (64)    LSW met with patient's brother Jean Paul Hensley who reports he would \"rather have his brother discharge to his home rather than [facility], where he can monitor and care for him directly\" Mr. Hensley reports he is retired and the Pt would have his own room and he could get him connected with outpatient psychiatry services, , and therapy. Mr. Hensley's cell number is [phone].         "

## 2024-08-07 NOTE — PROGRESS NOTES
Message left for IR. Waiting for call back. Pt is not currently appearing as scheduled on RN side.

## 2024-08-07 NOTE — PROGRESS NOTES
Assumed care of Pt from NOC RN. Pt is sleeping but wakes easy at bedside report. Bed is locked and low position. Call light is within reach. All needs are met. Continue with 1:1 sitter on legal hold.

## 2024-08-07 NOTE — CARE PLAN
The patient is Stable - Low risk of patient condition declining or worsening    Shift Goals  Clinical Goals: ensure safety  Patient Goals: rest  Family Goals: ansley    Progress made toward(s) clinical / shift goals:   updated pt on POC and verbalized understanding. Medicated per MAR and tolerated well. NPO at MN. Hourly rounds performed. Needs attended     Patient is not progressing towards the following goals:    Problem: Knowledge Deficit - Standard  Goal: Patient and family/care givers will demonstrate understanding of plan of care, disease process/condition, diagnostic tests and medications  Description: Target End Date:  1-3 days or as soon as patient condition allows    Document in Patient Education    1.  Patient and family/caregiver oriented to unit, equipment, visitation policy and means for communicating concern  2.  Complete/review Learning Assessment  3.  Assess knowledge level of disease process/condition, treatment plan, diagnostic tests and medications  4.  Explain disease process/condition, treatment plan, diagnostic tests and medications  Outcome: Not Progressing

## 2024-08-07 NOTE — DISCHARGE PLANNING
Received Stipulation and Order from the court continuing pt's legal hold until 8/15, scanned copy into pt's chart.

## 2024-08-07 NOTE — DISCHARGE PLANNING
Per Dr. Guzman pt is no longer medically clear, pt removed from inpatient referral list for now.  Once medical clearance is obtained again, referrals will be resent.

## 2024-08-07 NOTE — CONSULTS
"PSYCHIATRIC FOLLOW-UP: (established)  Reason for admission:   Overdose/SA   Legal Hold Status:    Extended           Chart reviewed.         HPI:            Interval hx  Urine outpt good >1L in the past 24hrs. No BM. Pt put on NPO for potential iliac muscle abscess I&D.    Today pt was interviewed in his room. Pt appears anxious, restless and is constantly grimacing, murmuring to himself, he is intensely preoccupied. Per sitter, pt had a very restless night, he was tossing and turning, rubbing sheets, his IV access with his finger constantly.     Pt reports strong anxiety over the upcoming procedure, he said \"it would be painful\". His attention has worsened compared to yesterday and has a hard time verbalizing/answering check-in questions(AVH,SI/HI). His volume is lower than yesterday with more mutism. Also noted poor eye contact, worsened echolalia, stereotypy (rubbing face, iv access, sheets) and verbigeration. There is no cogwheel rigidity or tremor noted. No grasp reflex, gegenhalten, mitgehen. Some brief waxy flexibility. - High-Claudio Scale scored 15 Points.    Pt reports no BM since last visit and it is \"hard to pee\", he is still complaining of dry mouth.    He received one dose of Ativan 1mg IV for Ativan challenge. When rechecking pt after 1 hr, pt was resting calmly in bed appears  to be slightly somnolent. His volume becomes higher and he is able to respond to check-in questions with no speech latency and echolalia. His affect is more flexible. He said: \"I'm just happy to be here, thank you.\"  Smiled at the conclusion of interview.  He did appear to be less preoccupied by internal thoughts.      No additional concerns reported at this time.    Medical ROS (as pertinent):     ROS    Endorses constipation and some difficulty urinating.  Endorses dry mouth.  No chest pain or shortness of breath.  No tremors.  No dystonias or dyskinesias.    Psychiatric Examination:  Vitals:   Vitals:    08/07/24 0737   BP: " "(!) 140/63   Pulse: 76   Resp: 18   Temp: 36.8 °C (98.2 °F)   SpO2: 95%        General Appearance: Appears state age, appropriate grooming & hygiene, highly anxious, preoccupied restless -> then calm in no acute distress after Ativan challenge..  Behavior:    -Stereotypy of rubbing different things, poor eye contact constant grimacing--> improved after Ativan challenge  Neuro:   -No tremors noted, no tics, tremors or dyskinesias.  No dystonias   -Psychomotor agitation noted, restless, less send after administration of Ativan   -No posture or gait abnormalities appreciated, patient did not want to walk today  Speech: Mutism. Low quantity, verbigeration, progressive low volume with great latency. Whispering.-->  Speech more articulate and clear after administration of Ativan  Language: English  Thought processes: Incoherent, illogical, disorganized. Loose association present.-->  Logical, linear and more goal-directed after administration of Ativan  Thought content:  SI/HI/AVH Not elicited. Preoccupation of procedure, poverty of thought.  Mood: \"The procedure\" as stated by patient  Affect: Flat, appropriately reactive to conversation. No evidence of lability, ernie, or agitation  Judgement and Insight: Limited/limited  Cognition:    -Alert & oriented x 3 (Person, place and time)   -Attention & concentration poor due to preoccupation.   -Immediate/delayed memory not formally tested but grossly intact   -Age-appropriate fund of knowledge      New PAST MEDICAL/PSYCH/FAMILY/SOCIAL(as reported by patient):       None      EKG:   Results for orders placed or performed during the hospital encounter of 24   EKG   Result Value Ref Range    Report       [facility] Emergency Dept.    Test Date:  2024  Pt Name:    FAIZAN GARDNER             Department: ER  MRN:        [id number]                      Room:       RD 12  Gender:     Male                         Technician: [id number]  :        1960 "                   Requested By:ER TRIAGE PROTOCOL  Order #:    [id number]                    Reading MD: ILDEFONSO ARNOLD DO    Measurements  Intervals                                Axis  Rate:       85                           P:          61  IN:         153                          QRS:        63  QRSD:       106                          T:          25  QT:         413  QTc:        492    Interpretive Statements  Sinus rhythm  Probable left atrial enlargement  Borderline prolonged QT interval  Compared to ECG 2024 17:09:50  No significant changes  Electronically Signed On 2024 00:56:04 PDT by ILDEFONSO ARNOLD DO     EKG   Result Value Ref Range    Report       [facility]    Test Date:  2024  Pt Name:    FAIZAN GARDNER             Department: 61  MRN:        [id number]                      Room:       [facility]  Gender:     Male                         Technician: [id number]  :        1960                   Requested By:ANICETO AMBRIZ  Order #:    [id number]                    Reading MD: Lucio Guzman MD    Measurements  Intervals                                Axis  Rate:       82                           P:          67  IN:         176                          QRS:        55  QRSD:       93                           T:          35  QT:         355  QTc:        415    Interpretive Statements  Sinus rhythm  Consider right atrial enlargement  Compared to ECG 2024 19:51:19  No significant changes  Electronically Signed On 2024 06:48:42 PDT by Lucio Guzman MD        Brain Imaging: No new brain imaging  EEG:  No new EEG     Labs personally reviewed:   Recent Results (from the past 24 hour(s))   BLOOD CULTURE    Collection Time: 24  2:52 PM    Specimen: Peripheral; Blood   Result Value Ref Range    Significant Indicator NEG     Source BLD     Site PERIPHERAL     Culture Result       No Growth  Note: Blood cultures are incubated for 5 days and  are monitored  continuously.Positive blood cultures  are called to the RN and reported as soon as  they are identified.     BLOOD CULTURE    Collection Time: 08/06/24  2:52 PM    Specimen: Peripheral; Blood   Result Value Ref Range    Significant Indicator NEG     Source BLD     Site PERIPHERAL     Culture Result       No Growth  Note: Blood cultures are incubated for 5 days and  are monitored continuously.Positive blood cultures  are called to the RN and reported as soon as  they are identified.     MRSA By PCR (Amp)    Collection Time: 08/06/24  4:00 PM    Specimen: Nares; Respirate   Result Value Ref Range    MRSA by PCR Negative Negative   Prothrombin Time    Collection Time: 08/07/24  7:07 AM   Result Value Ref Range    PT 14.1 12.0 - 14.6 sec    INR 1.08 0.87 - 1.13   CBC WITHOUT DIFFERENTIAL    Collection Time: 08/07/24  7:07 AM   Result Value Ref Range    WBC 7.5 4.8 - 10.8 K/uL    RBC 4.23 (L) 4.70 - 6.10 M/uL    Hemoglobin 12.5 (L) 14.0 - 18.0 g/dL    Hematocrit 37.6 (L) 42.0 - 52.0 %    MCV 88.9 81.4 - 97.8 fL    MCH 29.6 27.0 - 33.0 pg    MCHC 33.2 32.3 - 36.5 g/dL    RDW 44.9 35.9 - 50.0 fL    Platelet Count 511 (H) 164 - 446 K/uL    MPV 8.4 (L) 9.0 - 12.9 fL     Lab Results   Component Value Date/Time    SODIUM 137 08/07/2024 07:07 AM    POTASSIUM 3.8 08/07/2024 07:07 AM    CHLORIDE 103 08/07/2024 07:07 AM    CO2 18 (L) 08/07/2024 07:07 AM    GLUCOSE 109 (H) 08/07/2024 07:07 AM    BUN 11 08/07/2024 07:07 AM    CREATININE 0.68 08/07/2024 07:07 AM    BUNCREATRAT 16 05/15/2023 11:24 AM      Recent Labs     08/02/24  0752 08/05/24  0739 08/07/24  0707   ASTSGOT 23 16 15   ALTSGPT 33 21 21   TBILIRUBIN 0.3 0.4 0.5   GLOBULIN 2.3 2.7 2.9   INR  --   --  1.08         Assessment:  Terrell Hensley is a 63 y.o. male admitted 7/23/2024 with altered mental status, found down after intentional OD of his home medications amlodipine in order to kill himself.      Today pt's presentation has worsened. Pt initially was not able  to participate in the interview in a meaningful manner and seems to be more preoccupied today. With the sitter report of him being restless with stereotypy all night. There is worsening mutism, echolalia, verbigeration, grimacing and staring, also some waxy flexibility noted. High Claudio score increased from yesterday's 10 to today's 15. Immediate motor symptoms improvement following Ativan confirmed excited catatonia. Recalling the hx provided by his brother and the condition he came to the hospital with (pressure injury in lower back) pt likely has been experiencing prodrome of catatonia for a while prior to admission. Will initiate scheduled Ativan dosing for treatment. Will discontinue Olanzapine for it's high anticholinergic burden. Will start Abilify for OCD/mood and psychotic symptoms treatment as it has a more favorable D-2 partial agonist profile to prevent precipitation of worsened catatonia symptoms.     Need to closely monitor for catatonia symptom changes, rock signs of malignant catatonia such as: autonomic dysfunction (tachycardia, hypertension), rigidity hyperreflexia and fever.      Dx:  #Catatonia, excited   #MDD with Psychosis  R/O Schizoaffective disorder    #OCD  #Suicidal Ideation  #Drug Overdose    Medical :  Per primary team      Plan:  Legal hold: Extended  Psychotropic medications  Discontinue olanzapine 10mg po qHS   Start Abilify 5mg po daily for OCD and psychotic symptoms.  Start Ativan 0.5mg po TID for catatonia  Continue Effexor to 150mg po qD for depression  Continue Trazodone 100mg to scheduled qHS for insomnia  Continue Trazodone 50mg po qHS PRN for insomnia if scheduled Trazodone is not effective.  Please transfer pt to inpatient psychiatric hospital when medically cleared and bed is available  Labs reviewed  EKG reviewed  Old records reviewed/summarized  Collateral obtained: Called Brothjed Reaves to update.  Safety plan completed, copy in chart  Discussed the case with:   Megan  Psychiatry will follow up     Thank you for the consult.     Sitter: 1:1  Phone: Yes room phone  Visitors: Yes, Jean Paul (brother), Cortes (brother), YOSELIN (son), Tressa (daughter)   Personal belongings:  NO

## 2024-08-07 NOTE — CARE PLAN
The patient is Stable - Low risk of patient condition declining or worsening    Shift Goals  Clinical Goals: safety, 1:1 precautions  Patient Goals: comfort  Family Goals: ansley    Progress made toward(s) clinical / shift goals:    Problem: Pain - Standard  Goal: Alleviation of pain or a reduction in pain to the patient’s comfort goal  Outcome: Not Progressing  Note: Pt continues to need his walker to ambulate d/t pain.      Problem: Knowledge Deficit - Standard  Goal: Patient and family/care givers will demonstrate understanding of plan of care, disease process/condition, diagnostic tests and medications  Outcome: Progressing     Problem: Provide Safe Environment  Goal: Suicide environmental safety, protocols, policies, and practices will be implemented  Outcome: Progressing       Patient is not progressing towards the following goals:      Problem: Pain - Standard  Goal: Alleviation of pain or a reduction in pain to the patient’s comfort goal  Outcome: Not Progressing  Note: Pt continues to need his walker to ambulate d/t pain.

## 2024-08-07 NOTE — PROGRESS NOTES
Received bedside report from previous RN. Patient is alert and oriented x4. On RA. Updated on POC and verbalized understanding, NPO at MN. Fall precautions in place and call light within reach. 1:1 sitter at bedside for safety. All other needs met at this time.

## 2024-08-07 NOTE — CARE PLAN
The patient is Stable - Low risk of patient condition declining or worsening    Shift Goals  Clinical Goals: Safety, Comfort, Reduce Anxiety  Patient Goals: Rest  Family Goals: FELICITAS    Progress made toward(s) clinical / shift goals:    Problem: Pain - Standard  Goal: Alleviation of pain or a reduction in pain to the patient’s comfort goal  Outcome: Progressing     Problem: Knowledge Deficit - Standard  Goal: Patient and family/care givers will demonstrate understanding of plan of care, disease process/condition, diagnostic tests and medications  Outcome: Progressing     Problem: Provide Safe Environment  Goal: Suicide environmental safety, protocols, policies, and practices will be implemented  Outcome: Progressing       Patient is not progressing towards the following goals:

## 2024-08-07 NOTE — PROGRESS NOTES
Hospital Medicine Daily Progress Note    Date of Service  8/7/2024    Chief Complaint  Terrell Hensley is a 64 y.o. male admitted 7/23/2024 with SI attempt    Hospital Course  This is a 64-year-old male with past medical history of hypertension, mood disorder, who was admitted on 7/23/2024 after SI attempt.    Patient with encephalopathy, WILMAR, transaminitis, rhabdomyolysis, and high anion gap metabolic acidosis on admission. Patient had gonzalez in place and completed aggressive IVF.    Patient noted to have buttock cellulitis, CT imaging negative for abscess.  Patient continued with lower extremity weakness, MRI imaging was ordered revealed iliac abscess, IR consulted for possible drainage if amendable.  MRSA swab negative, patient on Zosyn.    Psychiatry was consulted, patient to discharge to inpatient psychiatry facility.    Interval Problem Update  Case discussed today with psychiatry, medications changed to Abilify and Ativan. Legal hold extended.    Patient recently completed course of Ancef for buttock cellulitis.      MRI imaging of the lumbar spine was performed, noted iliac abscess 3.5 cm. Case discussed with IR, abscess is amendable to drainage, will plan for drain placement 8/8. Will then need repeat CT 5 days from placement to determine if drain can be removed or will need to stay in place.    Blood cultures drawn, MRSA swab negative, patient started on Zosyn.    I have discussed this patient's plan of care and discharge plan at IDT rounds today with Case Management, Nursing, Nursing leadership, and other members of the IDT team.    Consultants/Specialty  psychiatry    Code Status  Full Code    Disposition  Patient is not medically cleared to discharge to inpatient psych facility.    I have placed the appropriate orders for post-discharge needs.    Review of Systems  Review of Systems   All other systems reviewed and are negative.       Physical Exam  Temp:  [35.2 °C (95.4 °F)-36.8 °C (98.2 °F)] 36.8  °C (98.2 °F)  Pulse:  [] 76  Resp:  [18] 18  BP: (112-165)/(54-95) 140/63  SpO2:  [95 %-97 %] 95 %    Physical Exam  Vitals and nursing note reviewed.   Constitutional:       General: He is not in acute distress.     Appearance: Normal appearance.   HENT:      Head: Normocephalic and atraumatic.   Eyes:      Extraocular Movements: Extraocular movements intact.      Conjunctiva/sclera: Conjunctivae normal.      Pupils: Pupils are equal, round, and reactive to light.   Cardiovascular:      Rate and Rhythm: Normal rate and regular rhythm.      Pulses: Normal pulses.      Heart sounds: No murmur heard.     No friction rub. No gallop.   Pulmonary:      Effort: Pulmonary effort is normal. No respiratory distress.      Breath sounds: Normal breath sounds. No wheezing, rhonchi or rales.   Abdominal:      General: Bowel sounds are normal. There is no distension.      Palpations: Abdomen is soft.      Tenderness: There is no abdominal tenderness.   Musculoskeletal:         General: No swelling or tenderness. Normal range of motion.      Cervical back: Normal range of motion and neck supple. No muscular tenderness.      Right lower leg: No edema.      Left lower leg: No edema.   Skin:     General: Skin is warm and dry.      Capillary Refill: Capillary refill takes less than 2 seconds.      Findings: No bruising, erythema or rash.   Neurological:      General: No focal deficit present.      Mental Status: He is alert and oriented to person, place, and time.         Fluids    Intake/Output Summary (Last 24 hours) at 8/7/2024 1154  Last data filed at 8/7/2024 0215  Gross per 24 hour   Intake 560 ml   Output 2250 ml   Net -1690 ml       Laboratory  Recent Labs     08/05/24  0739 08/06/24  0740 08/07/24  0707   WBC 11.1* 8.3 7.5   RBC 4.30* 4.26* 4.23*   HEMOGLOBIN 12.9* 13.1* 12.5*   HEMATOCRIT 37.7* 37.3* 37.6*   MCV 87.7 87.6 88.9   MCH 30.0 30.8 29.6   MCHC 34.2 35.1 33.2   RDW 43.8 44.4 44.9   PLATELETCT 431 450* 511*    MPV 8.8* 8.6* 8.4*     Recent Labs     08/05/24  0739 08/07/24  0707   SODIUM 133* 137   POTASSIUM 3.6 3.8   CHLORIDE 99 103   CO2 19* 18*   GLUCOSE 114* 109*   BUN 10 11   CREATININE 0.65 0.68   CALCIUM 8.7 9.0     Recent Labs     08/07/24  0707   INR 1.08               Imaging  MR-LUMBAR SPINE-WITH & W/O   Final Result      1.  There are multiloculated fluid collection noted involving left iliacus muscle likely representing abscess. It measures an approximately 3.5 cm in the transverse dimension.   2.  There is no evidence of osteomyelitis, discitis or epidural abscess.   3.  Degenerative disease as described above.      US-EXTREMITY VENOUS UPPER UNILAT RIGHT   Final Result      CT-PELVIS WITH   Final Result         1.  Low-density enlargement of the distal left psoas muscle and iliacus muscle extending to the anterior left thigh. Could represent changes of myositis or possibly early forming infectious process. No definite enhancing fluid collection identified to    indicate abscess at this time.   2.  Distended bladder with nondependent air, consider history of recent instrumentation or changes of urinary tract infection, correlate with urinalysis as clinically appropriate.   3.  Bilateral fat-containing inguinal hernias   4.  Diverticulosis      MR-CERVICAL SPINE-W/O   Final Result      1.  Degenerative disease in the segment cervical spine as described above.   2.  Left paracentral/foraminal disc protrusion at C6-7 causing severe left C7 neural foraminal stenosis.      MR-BRAIN-W/O   Final Result      1.  No acute abnormality.   2.  A few punctate nonspecific supratentorial T2 hyperintensities likely representing nonspecific foci of gliosis/chronic ischemia.   3.  Mild cerebral volume loss.      DX-FEMUR-2+ LEFT   Final Result      Unremarkable LEFT femur.      DX-HIP-COMPLETE - UNILATERAL 2+ LEFT   Final Result      No radiographic evidence of acute traumatic injury.      CT-HEAD W/O   Final Result          1.  No acute intracranial abnormality.   2.  Atherosclerosis.               IR-CONSULT AND TREAT    (Results Pending)        Assessment/Plan  * Drug overdose of undetermined intent, initial encounter- (present on admission)  Assessment & Plan  Apparent suicide attempt  Polypharmacy: some amlodipine but other ingestions unclear  Cont supportive care as clinically improving  On legal hold  Psychiatry team following  Case discussed today with psychiatry, medications changed to Abilify and Ativan. Legal hold extended.    Abscess of iliac fossa  Assessment & Plan  CONCERN FOR ON MRI, completed course of ancef for buttock cellulitis    MRI imaging of the lumbar spine was performed, noted iliac abscess 3.5 cm. Case discussed with IR, abscess is amendable to drainage, will plan for drain placement 8/8. Will then need repeat CT 5 days from placement to determine if drain can be removed or will need to stay in place.    Blood cultures drawn, MRSA swab negative, patient started on Zosyn.    Cellulitis of buttock  Assessment & Plan  Patient with erythema, warmth, pain left buttock s/p found down at home.  CT pelvis with contrast- negative for abscess  Completed ancef 2gm IV q 8 x 7 days.  Wound care  Off loading, out of bed, increase activity.    Acute renal failure with tubular necrosis (HCC)- (present on admission)  Assessment & Plan  Likely 2/2 prerenal damage from hypotension 2/2 drug overdose and uremia related to rhabdomyolysis.  Creat imroving daily: 1.4 today  Good UOP  Cont LR until CPK<3k  Daily BMP  Renally dose medications as appropriate  Resolved    Acute encephalopathy- (present on admission)  Assessment & Plan  Acute metabolic/toxic encephalopathy  CT head neg  Mental status improving daily though still not completely back to normal  Cont to monitor clinically  Repeat lab in am  Minimize sedating medications  Resolved. MRI of the brain without acute changes.       Hypertension- (present on  admission)  Assessment & Plan  Hx of HTN, holding home meds in light of hypotension  Blood pressure still elevated.  Increase losartan to 100 mg daily  Continue amlodipine 10 mg daily  Added coreg 6.25mg BID  Monitor BP    Thrombophlebitis of cephalic vein- (present on admission)  Assessment & Plan  Right upper extremity pain improving  Ultrasound with no DVT but does have occlusive superficial thrombosis in the cephalic vein.  Warm compresses ordered    Neck pain, acute- (present on admission)  Assessment & Plan  Patient complaining of LLE numbness, neck pain and bilateral arm numbness.  Head injury 4/2024.  Ordered MRI c spine with head MRI - negative  Oxycodone prn pain.    Anxiety- (present on admission)  Assessment & Plan  Increased anxiety noted on exam.  Psychiatry added seroquel 50mg qhs, zoloft resumed for a.m.   Ativan prn  Klonopin bid.    Anemia- (present on admission)  Assessment & Plan  Mild   Cont to monitor    Urinary incontinence- (present on admission)  Assessment & Plan  -oxybutynin held due to drug overdose    Rhabdomyolysis- (present on admission)  Assessment & Plan  CK elevated to 16k, suspected 2/2 being found down and drug overdose.  Cont agressive IVFs but change to LR from NS  Follow daily AST, ALT, Bun, Creat and K  Resolved    Elevated liver enzymes- (present on admission)  Assessment & Plan  Transaminase elevation likely from Rhabdo and not acute liver injury though certainly could have a toxic component from OD  Coming down as expected with resolution of Rhabdo  Cont to monitor daily CMP  Consider further evaluation if it does not resolve as CPK drops and Rhabdo resolves    High anion gap metabolic acidosis- (present on admission)  Assessment & Plan  Likely 2/2 prerenal damage from hypotension 2/2 drug overdose and uremia related to rhabdomyolysis.  -fluid resuscitation with NS 200cc/hr  -IV potassium 10meq x4  Resolved    Mitral regurgitation- (present on admission)  Assessment &  Plan  Suspected murmur, though difficult to auscultate. Unclear if preexisting or related to overdose.   Does not appear to have any clinically significant heart failure symptoms    Hyponatremia- (present on admission)  Assessment & Plan  Likely hypovolemic hypoNa  Has resolved with IVF resuscitation  Cont to monitor         VTE prophylaxis: Xarelto ppx    I have performed a physical exam and reviewed and updated ROS and Plan today (8/7/2024). In review of yesterday's note (8/6/2024), there are no changes except as documented above.    Greater than 55 minutes spent prepping to see patient (e.g. reviewing EMR) obtaining and/or reviewing separately obtained history. Performing a medically appropriate examination and evaluation. Independently interpreting results and communicating results to patient/family/caregiver. Counseling and educating the patient/family/caregiver. Ordering medications, tests, and/or procedures. Referring and communicating with other health care professionals.  Documenting clinical information in EPIC. Care coordination.

## 2024-08-08 PROCEDURE — A9270 NON-COVERED ITEM OR SERVICE: HCPCS | Performed by: GENERAL PRACTICE

## 2024-08-08 PROCEDURE — 700111 HCHG RX REV CODE 636 W/ 250 OVERRIDE (IP): Mod: JZ | Performed by: GENERAL PRACTICE

## 2024-08-08 PROCEDURE — 99232 SBSQ HOSP IP/OBS MODERATE 35: CPT | Performed by: GENERAL PRACTICE

## 2024-08-08 PROCEDURE — A9270 NON-COVERED ITEM OR SERVICE: HCPCS | Performed by: STUDENT IN AN ORGANIZED HEALTH CARE EDUCATION/TRAINING PROGRAM

## 2024-08-08 PROCEDURE — 97530 THERAPEUTIC ACTIVITIES: CPT | Mod: CQ

## 2024-08-08 PROCEDURE — 700102 HCHG RX REV CODE 250 W/ 637 OVERRIDE(OP): Performed by: STUDENT IN AN ORGANIZED HEALTH CARE EDUCATION/TRAINING PROGRAM

## 2024-08-08 PROCEDURE — 97116 GAIT TRAINING THERAPY: CPT | Mod: CQ

## 2024-08-08 PROCEDURE — 700102 HCHG RX REV CODE 250 W/ 637 OVERRIDE(OP): Performed by: GENERAL PRACTICE

## 2024-08-08 PROCEDURE — 770001 HCHG ROOM/CARE - MED/SURG/GYN PRIV*

## 2024-08-08 PROCEDURE — 700105 HCHG RX REV CODE 258: Performed by: GENERAL PRACTICE

## 2024-08-08 RX ORDER — AMOXICILLIN 250 MG
2 CAPSULE ORAL EVERY EVENING
Status: DISCONTINUED | OUTPATIENT
Start: 2024-08-08 | End: 2024-08-22 | Stop reason: HOSPADM

## 2024-08-08 RX ORDER — POLYETHYLENE GLYCOL 3350 17 G/17G
1 POWDER, FOR SOLUTION ORAL
Status: DISCONTINUED | OUTPATIENT
Start: 2024-08-08 | End: 2024-08-22 | Stop reason: HOSPADM

## 2024-08-08 RX ADMIN — LORAZEPAM 0.5 MG: 0.5 TABLET ORAL at 04:37

## 2024-08-08 RX ADMIN — VENLAFAXINE HYDROCHLORIDE 150 MG: 75 CAPSULE, EXTENDED RELEASE ORAL at 08:12

## 2024-08-08 RX ADMIN — AMLODIPINE BESYLATE 10 MG: 10 TABLET ORAL at 04:37

## 2024-08-08 RX ADMIN — LORAZEPAM 0.5 MG: 0.5 TABLET ORAL at 12:10

## 2024-08-08 RX ADMIN — ARIPIPRAZOLE 5 MG: 10 TABLET ORAL at 04:37

## 2024-08-08 RX ADMIN — PIPERACILLIN AND TAZOBACTAM 4.5 G: 4; .5 INJECTION, POWDER, FOR SOLUTION INTRAVENOUS at 04:40

## 2024-08-08 RX ADMIN — PIPERACILLIN AND TAZOBACTAM 4.5 G: 4; .5 INJECTION, POWDER, FOR SOLUTION INTRAVENOUS at 20:40

## 2024-08-08 RX ADMIN — CARVEDILOL 6.25 MG: 6.25 TABLET, FILM COATED ORAL at 17:15

## 2024-08-08 RX ADMIN — PIPERACILLIN AND TAZOBACTAM 4.5 G: 4; .5 INJECTION, POWDER, FOR SOLUTION INTRAVENOUS at 12:11

## 2024-08-08 RX ADMIN — SENNOSIDES AND DOCUSATE SODIUM 2 TABLET: 50; 8.6 TABLET ORAL at 17:14

## 2024-08-08 RX ADMIN — LOSARTAN POTASSIUM 100 MG: 50 TABLET, FILM COATED ORAL at 04:37

## 2024-08-08 RX ADMIN — SENNOSIDES AND DOCUSATE SODIUM 2 TABLET: 50; 8.6 TABLET ORAL at 04:37

## 2024-08-08 RX ADMIN — TRAZODONE HYDROCHLORIDE 100 MG: 50 TABLET ORAL at 20:37

## 2024-08-08 RX ADMIN — CARVEDILOL 6.25 MG: 6.25 TABLET, FILM COATED ORAL at 08:12

## 2024-08-08 RX ADMIN — LORAZEPAM 0.5 MG: 0.5 TABLET ORAL at 17:15

## 2024-08-08 ASSESSMENT — COGNITIVE AND FUNCTIONAL STATUS - GENERAL
CLIMB 3 TO 5 STEPS WITH RAILING: A LITTLE
MOBILITY SCORE: 20
SUGGESTED CMS G CODE MODIFIER MOBILITY: CJ
MOVING TO AND FROM BED TO CHAIR: A LITTLE
WALKING IN HOSPITAL ROOM: A LITTLE
STANDING UP FROM CHAIR USING ARMS: A LITTLE

## 2024-08-08 ASSESSMENT — PAIN DESCRIPTION - PAIN TYPE
TYPE: ACUTE PAIN;CHRONIC PAIN
TYPE: ACUTE PAIN

## 2024-08-08 ASSESSMENT — GAIT ASSESSMENTS
ASSISTIVE DEVICE: FRONT WHEEL WALKER
DEVIATION: ANTALGIC
GAIT LEVEL OF ASSIST: SUPERVISED

## 2024-08-08 NOTE — THERAPY
Physical Therapy   Discharge     Patient Name: Terrell Hensley  Age:  64 y.o., Sex:  male  Medical Record #: [id number]  Today's Date: 8/8/2024     Precautions  Precautions: (P) Fall Risk  Comments: (P) + legal hold (suicide attempt)    Assessment    The pt remains on legal hold, due to have drain placed for Lft Iliac abscess. Functionally the pt is now @ sup->indep level w/bed mobility, STS/transfers using the FWW, and hallway amb w/FWW distances > 250'. The pt conts to require FWW support, unable to take full wt through Lft LE in order to initiate swing of his Rt LE.   If d/c plan is to a inpt psych facililty and FWW not allowed, the pt could d/c @ w/c level for mobilization. If the plan is to d/c home w/brother who will be able to provide 24/7 supervision, recommend FWW. Do not anticipate the pt will need any post acute therapy needs.   The pt has no skilled PT needs, dc'd from PT services. Recommend the pt cont to amb w/nrsg around nrsg unit.     Plan    Reason for Discharge From Therapy:      DC Equipment Recommendations: (P) Front-Wheel Walker  Discharge Recommendations: (P) Anticipate that the patient will have no further physical therapy needs after discharge from the hospital    Objective       08/08/24 1421   Time In/Time Out   Therapy Start Time 1355   Therapy End Time 1421   Total Therapy Time 26   Charge Group   PT Gait Training (Units) 1   PT Therapeutic Activities (Units) 1   Total Time Spent   PT Gait Training Time Spent (Mins) 10   PT Therapeutic Activities Time Spent (Mins) 16   PT Total Time Spent (Calculated) 26   Precautions   Precautions Fall Risk   Comments + legal hold (suicide attempt)   Pain 0 - 10 Group   Location Hip;Knee   Location Orientation Left   Pain Rating Scale (NPRS) 3   Therapist Pain Assessment Post Activity Pain Same as Prior to Activity   Other Treatments   Other Treatments Provided Attempted to see if pt could take full wt through Lft LE in order to initiate amb wo/AD.  The pt conts to have Lft hip/knee pain and unable to fully wt shift.   Balance   Sitting Balance (Static) Good   Sitting Balance (Dynamic) Good   Standing Balance (Static) Fair +   Standing Balance (Dynamic) Fair   Weight Shift Sitting Good   Weight Shift Standing Fair   Skilled Intervention Verbal Cuing   Comments standing w/FWW   Bed Mobility    Supine to Sit Supervised   Sit to Supine Supervised   Scooting Supervised   Rolling Supervised   Comments HOB flat and no railing. The pt has the strength to manage bed mobility indep, supervision due to legal hold.   Gait Analysis   Gait Level Of Assist Supervised   Assistive Device Front Wheel Walker   Distance (Feet)   (hallway amb around nrsg unit, > 250')   # of Times Distance was Traveled 1   Deviation Antalgic   Skilled Intervention Verbal Cuing   Comments The pt was able to manage the same distance from previous PT session. The pt stated he is amb w/nrsg supervision 2x's/day, cont to need the FWW for support.   Functional Mobility   Sit to Stand Supervised   Bed, Chair, Wheelchair Transfer Supervised   Skilled Intervention Verbal Cuing   Comments The pt remains @ supervision level for STS and transfers w/FWW. The pt has the strength to manage the skills mod indep, supervision due to legal hold.   6 Clicks Assessment - How much HELP from from another person do you currently need... (If the patient hasn't done an activity recently, how much help from another person do you think he/she would need if he/she tried?)   Turning from your back to your side while in a flat bed without using bedrails? 4   Moving from lying on your back to sitting on the side of a flat bed without using bedrails? 4   Moving to and from a bed to a chair (including a wheelchair)? 3   Standing up from a chair using your arms (e.g., wheelchair, or bedside chair)? 3   Walking in hospital room? 3   Climbing 3-5 steps with a railing? 3   6 clicks Mobility Score 20   Short Term Goals    Short Term  Goal # 3 Pt will demo ability to ascend/descend 2 stairs w/ UE support SPV in 6 visits for access to his home environment.   Goal Outcome # 3 Other (see comments)  (the pt demo's the strength to manage 2 steps w/railing support.)   Education Group   Role of Physical Therapist Patient Response Patient;Acceptance;Explanation;Action Demonstration   Physical Therapy Treatment Plan   Physical Therapy Treatment Plan Continue Current Treatment Plan   Anticipated Discharge Equipment and Recommendations   DC Equipment Recommendations Front-Wheel Walker   Discharge Recommendations Anticipate that the patient will have no further physical therapy needs after discharge from the hospital   Interdisciplinary Plan of Care Collaboration   IDT Collaboration with  Nursing   Patient Position at End of Therapy In Bed   Collaboration Comments Family and sitter BS   Session Information   Date / Session Number  Dc'd--8/8   Supervising Physical Therapist (PTA Treatments Only)   Supervising Physical Therapist Catina Guardado

## 2024-08-08 NOTE — PROGRESS NOTES
Pt A/O x4. Pt reported an increase in left hip pain when walking. Pt denied wanting pain medication and said that he would just go to sleep. Pt resting in bed with bed in lowest and locked position and call light within reach. All needs met at this time.

## 2024-08-08 NOTE — CARE PLAN
The patient is Stable - Low risk of patient condition declining or worsening    Shift Goals  Clinical Goals: Patient will have no falls this shift  Patient Goals: sleep and pain  Family Goals: For pt to continue to rest    Progress made toward(s) clinical / shift goals:    Problem: Fall Risk  Goal: Patient will remain free from falls  Description: Target End Date:  Prior to discharge or change in level of care    Document interventions on the Laura Parkinson Fall Risk Assessment    1.  Assess for fall risk factors  2.  Implement fall precautions  Outcome: Progressing  Note: No falls this shift, bed in lowest position and locked, non slip socks on and bed alarm on.          Patient is not progressing towards the following goals:

## 2024-08-08 NOTE — CARE PLAN
The patient is Stable - Low risk of patient condition declining or worsening    Shift Goals  Clinical Goals: Pt to rest and to become NPO at midnight  Patient Goals: pt to sleep more.  Family Goals: For pt to continue to rest    Progress made toward(s) clinical / shift goals:      Problem: Knowledge Deficit - Standard  Goal: Patient and family/care givers will demonstrate understanding of plan of care, disease process/condition, diagnostic tests and medications  Outcome: Progressing     Pt is aware of drain placement to be placed today. Pt is NPO and is aware of it.        Patient is not progressing towards the following goals:      Problem: Pain - Standard  Goal: Alleviation of pain or a reduction in pain to the patient’s comfort goal  Outcome: Not Progressing     Pt reported pain increasing in hip when walking. Pt denied pain medication.

## 2024-08-08 NOTE — PROGRESS NOTES
Hospital Medicine Daily Progress Note    Date of Service  8/8/2024    Chief Complaint  Terrell Hensley is a 64 y.o. male admitted 7/23/2024 with SI attempt    Hospital Course  This is a 64-year-old male with past medical history of hypertension, mood disorder, who was admitted on 7/23/2024 after SI attempt.    Patient with encephalopathy, WILMAR, transaminitis, rhabdomyolysis, and high anion gap metabolic acidosis on admission. Patient had gonzalez in place and completed aggressive IVF.    Patient noted to have buttock cellulitis, CT imaging negative for abscess.  Patient continued with lower extremity weakness, MRI imaging was ordered revealed iliac abscess, IR consulted for possible drainage if amendable.  MRSA swab negative, patient on Zosyn.    Psychiatry was consulted, patient to discharge to inpatient psychiatry facility.    Interval Problem Update  Psychiatry, medications changed to Abilify and Ativan. Legal hold extended.    Patient recently completed course of Ancef for buttock cellulitis.      MRI imaging of the lumbar spine was performed, noted iliac abscess 3.5 cm. Case discussed with IR, abscess is amendable to drainage, will plan for drain placement today. Will then need repeat CT 5 days from placement 8/13 to determine if drain can be removed or will need to stay in place. Will follow cultures.    MRSA swab negative, continue Zosyn.    I have discussed this patient's plan of care and discharge plan at IDT rounds today with Case Management, Nursing, Nursing leadership, and other members of the IDT team.    Consultants/Specialty  psychiatry    Code Status  Full Code    Disposition  Patient is not medically cleared to discharge to inpatient psych facility.    I have placed the appropriate orders for post-discharge needs.    Review of Systems  Review of Systems   All other systems reviewed and are negative.       Physical Exam  Temp:  [36.6 °C (97.8 °F)-37.2 °C (99 °F)] 36.9 °C (98.4 °F)  Pulse:  [72-87]  72  Resp:  [16-18] 18  BP: (115-136)/(67-86) 127/82  SpO2:  [96 %-98 %] 96 %    Physical Exam  Vitals and nursing note reviewed.   Constitutional:       General: He is not in acute distress.     Appearance: Normal appearance.   HENT:      Head: Normocephalic and atraumatic.   Eyes:      Extraocular Movements: Extraocular movements intact.      Conjunctiva/sclera: Conjunctivae normal.      Pupils: Pupils are equal, round, and reactive to light.   Cardiovascular:      Rate and Rhythm: Normal rate and regular rhythm.      Pulses: Normal pulses.      Heart sounds: No murmur heard.     No friction rub. No gallop.   Pulmonary:      Effort: Pulmonary effort is normal. No respiratory distress.      Breath sounds: Normal breath sounds. No wheezing, rhonchi or rales.   Abdominal:      General: Bowel sounds are normal. There is no distension.      Palpations: Abdomen is soft.      Tenderness: There is no abdominal tenderness.   Musculoskeletal:         General: No swelling or tenderness. Normal range of motion.      Cervical back: Normal range of motion and neck supple. No muscular tenderness.      Right lower leg: No edema.      Left lower leg: No edema.   Skin:     General: Skin is warm and dry.      Capillary Refill: Capillary refill takes less than 2 seconds.      Findings: No bruising, erythema or rash.   Neurological:      General: No focal deficit present.      Mental Status: He is alert and oriented to person, place, and time.         Fluids    Intake/Output Summary (Last 24 hours) at 8/8/2024 1214  Last data filed at 8/8/2024 0727  Gross per 24 hour   Intake 0 ml   Output 0 ml   Net 0 ml       Laboratory  Recent Labs     08/06/24  0740 08/07/24  0707   WBC 8.3 7.5   RBC 4.26* 4.23*   HEMOGLOBIN 13.1* 12.5*   HEMATOCRIT 37.3* 37.6*   MCV 87.6 88.9   MCH 30.8 29.6   MCHC 35.1 33.2   RDW 44.4 44.9   PLATELETCT 450* 511*   MPV 8.6* 8.4*     Recent Labs     08/07/24  0707   SODIUM 137   POTASSIUM 3.8   CHLORIDE 103   CO2  18*   GLUCOSE 109*   BUN 11   CREATININE 0.68   CALCIUM 9.0     Recent Labs     08/07/24  0707   INR 1.08               Imaging  MR-LUMBAR SPINE-WITH & W/O   Final Result      1.  There are multiloculated fluid collection noted involving left iliacus muscle likely representing abscess. It measures an approximately 3.5 cm in the transverse dimension.   2.  There is no evidence of osteomyelitis, discitis or epidural abscess.   3.  Degenerative disease as described above.      US-EXTREMITY VENOUS UPPER UNILAT RIGHT   Final Result      CT-PELVIS WITH   Final Result         1.  Low-density enlargement of the distal left psoas muscle and iliacus muscle extending to the anterior left thigh. Could represent changes of myositis or possibly early forming infectious process. No definite enhancing fluid collection identified to    indicate abscess at this time.   2.  Distended bladder with nondependent air, consider history of recent instrumentation or changes of urinary tract infection, correlate with urinalysis as clinically appropriate.   3.  Bilateral fat-containing inguinal hernias   4.  Diverticulosis      MR-CERVICAL SPINE-W/O   Final Result      1.  Degenerative disease in the segment cervical spine as described above.   2.  Left paracentral/foraminal disc protrusion at C6-7 causing severe left C7 neural foraminal stenosis.      MR-BRAIN-W/O   Final Result      1.  No acute abnormality.   2.  A few punctate nonspecific supratentorial T2 hyperintensities likely representing nonspecific foci of gliosis/chronic ischemia.   3.  Mild cerebral volume loss.      DX-FEMUR-2+ LEFT   Final Result      Unremarkable LEFT femur.      DX-HIP-COMPLETE - UNILATERAL 2+ LEFT   Final Result      No radiographic evidence of acute traumatic injury.      CT-HEAD W/O   Final Result         1.  No acute intracranial abnormality.   2.  Atherosclerosis.               CT-IMAGE-GUIDED DRAIN PERITONEAL    (Results Pending)        Assessment/Plan  *  Drug overdose of undetermined intent, initial encounter- (present on admission)  Assessment & Plan  Apparent suicide attempt  Polypharmacy: some amlodipine but other ingestions unclear  Cont supportive care as clinically improving  On legal hold  Psychiatry team following  Case discussed today with psychiatry, medications changed to Abilify and Ativan. Legal hold extended.    Abscess of iliac fossa  Assessment & Plan  CONCERN FOR ON MRI, completed course of ancef for buttock cellulitis    MRI imaging of the lumbar spine was performed, noted iliac abscess 3.5 cm. Case discussed with IR, abscess is amendable to drainage, will plan for drain placement 8/8. Will then need repeat CT 5 days 8/13 from placement to determine if drain can be removed or will need to stay in place.    Blood cultures drawn, MRSA swab negative, patient started on Zosyn.    Cellulitis of buttock  Assessment & Plan  Patient with erythema, warmth, pain left buttock s/p found down at home.  CT pelvis with contrast- negative for abscess  Completed ancef 2gm IV q 8 x 7 days.  Wound care  Off loading, out of bed, increase activity.    Acute renal failure with tubular necrosis (HCC)- (present on admission)  Assessment & Plan  Likely 2/2 prerenal damage from hypotension 2/2 drug overdose and uremia related to rhabdomyolysis.  Creat imroving daily: 1.4 today  Good UOP  Cont LR until CPK<3k  Daily BMP  Renally dose medications as appropriate  Resolved    Acute encephalopathy- (present on admission)  Assessment & Plan  Acute metabolic/toxic encephalopathy  CT head neg  Mental status improving daily though still not completely back to normal  Cont to monitor clinically  Repeat lab in am  Minimize sedating medications  Resolved. MRI of the brain without acute changes.       Hypertension- (present on admission)  Assessment & Plan  Hx of HTN, holding home meds in light of hypotension  Blood pressure still elevated.  Increase losartan to 100 mg daily  Continue  amlodipine 10 mg daily  Added coreg 6.25mg BID  Monitor BP    Thrombophlebitis of cephalic vein- (present on admission)  Assessment & Plan  Right upper extremity pain improving  Ultrasound with no DVT but does have occlusive superficial thrombosis in the cephalic vein.  Warm compresses ordered    Neck pain, acute- (present on admission)  Assessment & Plan  Patient complaining of LLE numbness, neck pain and bilateral arm numbness.  Head injury 4/2024.  Ordered MRI c spine with head MRI - negative  Oxycodone prn pain.    Anxiety- (present on admission)  Assessment & Plan  Increased anxiety noted on exam.  Psychiatry added seroquel 50mg qhs, zoloft resumed for a.m.   Ativan prn  Klonopin bid.    Anemia- (present on admission)  Assessment & Plan  Mild   Cont to monitor    Urinary incontinence- (present on admission)  Assessment & Plan  -oxybutynin held due to drug overdose    Rhabdomyolysis- (present on admission)  Assessment & Plan  CK elevated to 16k, suspected 2/2 being found down and drug overdose.  Cont agressive IVFs but change to LR from NS  Follow daily AST, ALT, Bun, Creat and K  Resolved    Elevated liver enzymes- (present on admission)  Assessment & Plan  Transaminase elevation likely from Rhabdo and not acute liver injury though certainly could have a toxic component from OD  Coming down as expected with resolution of Rhabdo  Cont to monitor daily CMP  Consider further evaluation if it does not resolve as CPK drops and Rhabdo resolves    High anion gap metabolic acidosis- (present on admission)  Assessment & Plan  Likely 2/2 prerenal damage from hypotension 2/2 drug overdose and uremia related to rhabdomyolysis.  -fluid resuscitation with NS 200cc/hr  -IV potassium 10meq x4  Resolved    Mitral regurgitation- (present on admission)  Assessment & Plan  Suspected murmur, though difficult to auscultate. Unclear if preexisting or related to overdose.   Does not appear to have any clinically significant heart  failure symptoms    Hyponatremia- (present on admission)  Assessment & Plan  Likely hypovolemic hypoNa  Has resolved with IVF resuscitation  Cont to monitor         VTE prophylaxis: Xarelto ppx    I have performed a physical exam and reviewed and updated ROS and Plan today (8/8/2024). In review of yesterday's note (8/7/2024), there are no changes except as documented above.     Detail Level: Simple Render Risk Assessment In Note?: no Additional Notes: Discussed applying moisturizer daily to help reduce dryness causing irritation

## 2024-08-09 ENCOUNTER — APPOINTMENT (OUTPATIENT)
Dept: RADIOLOGY | Facility: MEDICAL CENTER | Age: 64
DRG: 907 | End: 2024-08-09
Attending: GENERAL PRACTICE
Payer: MEDICARE

## 2024-08-09 LAB
GRAM STN SPEC: NORMAL
SIGNIFICANT IND 70042: NORMAL
SITE SITE: NORMAL
SOURCE SOURCE: NORMAL

## 2024-08-09 PROCEDURE — A9270 NON-COVERED ITEM OR SERVICE: HCPCS | Performed by: STUDENT IN AN ORGANIZED HEALTH CARE EDUCATION/TRAINING PROGRAM

## 2024-08-09 PROCEDURE — 99232 SBSQ HOSP IP/OBS MODERATE 35: CPT | Performed by: GENERAL PRACTICE

## 2024-08-09 PROCEDURE — 87205 SMEAR GRAM STAIN: CPT

## 2024-08-09 PROCEDURE — 700117 HCHG RX CONTRAST REV CODE 255: Performed by: GENERAL PRACTICE

## 2024-08-09 PROCEDURE — 770001 HCHG ROOM/CARE - MED/SURG/GYN PRIV*

## 2024-08-09 PROCEDURE — 99152 MOD SED SAME PHYS/QHP 5/>YRS: CPT

## 2024-08-09 PROCEDURE — 99232 SBSQ HOSP IP/OBS MODERATE 35: CPT | Mod: GC | Performed by: STUDENT IN AN ORGANIZED HEALTH CARE EDUCATION/TRAINING PROGRAM

## 2024-08-09 PROCEDURE — 90832 PSYTX W PT 30 MINUTES: CPT | Performed by: SOCIAL WORKER

## 2024-08-09 PROCEDURE — 700111 HCHG RX REV CODE 636 W/ 250 OVERRIDE (IP): Mod: JZ

## 2024-08-09 PROCEDURE — 700102 HCHG RX REV CODE 250 W/ 637 OVERRIDE(OP): Performed by: STUDENT IN AN ORGANIZED HEALTH CARE EDUCATION/TRAINING PROGRAM

## 2024-08-09 PROCEDURE — 87070 CULTURE OTHR SPECIMN AEROBIC: CPT

## 2024-08-09 PROCEDURE — 700111 HCHG RX REV CODE 636 W/ 250 OVERRIDE (IP): Mod: JZ | Performed by: GENERAL PRACTICE

## 2024-08-09 PROCEDURE — 700102 HCHG RX REV CODE 250 W/ 637 OVERRIDE(OP): Performed by: GENERAL PRACTICE

## 2024-08-09 PROCEDURE — 0W9H3ZX DRAINAGE OF RETROPERITONEUM, PERCUTANEOUS APPROACH, DIAGNOSTIC: ICD-10-PCS | Performed by: RADIOLOGY

## 2024-08-09 PROCEDURE — A9270 NON-COVERED ITEM OR SERVICE: HCPCS | Performed by: GENERAL PRACTICE

## 2024-08-09 PROCEDURE — 700105 HCHG RX REV CODE 258: Performed by: GENERAL PRACTICE

## 2024-08-09 RX ORDER — MIDAZOLAM HYDROCHLORIDE 1 MG/ML
INJECTION INTRAMUSCULAR; INTRAVENOUS
Status: COMPLETED
Start: 2024-08-09 | End: 2024-08-09

## 2024-08-09 RX ORDER — ONDANSETRON 2 MG/ML
4 INJECTION INTRAMUSCULAR; INTRAVENOUS EVERY 6 HOURS PRN
Status: ACTIVE | OUTPATIENT
Start: 2024-08-09 | End: 2024-08-09

## 2024-08-09 RX ORDER — SODIUM CHLORIDE 9 MG/ML
500 INJECTION, SOLUTION INTRAVENOUS
Status: ACTIVE | OUTPATIENT
Start: 2024-08-09 | End: 2024-08-09

## 2024-08-09 RX ORDER — MIDAZOLAM HYDROCHLORIDE 1 MG/ML
.5-2 INJECTION INTRAMUSCULAR; INTRAVENOUS PRN
Status: ACTIVE | OUTPATIENT
Start: 2024-08-09 | End: 2024-08-09

## 2024-08-09 RX ADMIN — PIPERACILLIN AND TAZOBACTAM 4.5 G: 4; .5 INJECTION, POWDER, FOR SOLUTION INTRAVENOUS at 04:29

## 2024-08-09 RX ADMIN — IOHEXOL 95 ML: 350 INJECTION, SOLUTION INTRAVENOUS at 10:10

## 2024-08-09 RX ADMIN — ARIPIPRAZOLE 5 MG: 10 TABLET ORAL at 04:26

## 2024-08-09 RX ADMIN — RIVAROXABAN 10 MG: 10 TABLET, FILM COATED ORAL at 18:06

## 2024-08-09 RX ADMIN — LOSARTAN POTASSIUM 100 MG: 50 TABLET, FILM COATED ORAL at 04:26

## 2024-08-09 RX ADMIN — SENNOSIDES AND DOCUSATE SODIUM 2 TABLET: 50; 8.6 TABLET ORAL at 18:05

## 2024-08-09 RX ADMIN — MIDAZOLAM HYDROCHLORIDE 1 MG: 1 INJECTION, SOLUTION INTRAMUSCULAR; INTRAVENOUS at 09:58

## 2024-08-09 RX ADMIN — FENTANYL CITRATE 50 MCG: 50 INJECTION, SOLUTION INTRAMUSCULAR; INTRAVENOUS at 09:58

## 2024-08-09 RX ADMIN — LORAZEPAM 0.5 MG: 0.5 TABLET ORAL at 18:06

## 2024-08-09 RX ADMIN — AMLODIPINE BESYLATE 10 MG: 10 TABLET ORAL at 04:26

## 2024-08-09 RX ADMIN — CARVEDILOL 6.25 MG: 6.25 TABLET, FILM COATED ORAL at 08:47

## 2024-08-09 RX ADMIN — MIDAZOLAM HYDROCHLORIDE 1 MG: 1 INJECTION INTRAMUSCULAR; INTRAVENOUS at 09:58

## 2024-08-09 RX ADMIN — LORAZEPAM 0.5 MG: 0.5 TABLET ORAL at 11:33

## 2024-08-09 RX ADMIN — PIPERACILLIN AND TAZOBACTAM 4.5 G: 4; .5 INJECTION, POWDER, FOR SOLUTION INTRAVENOUS at 12:59

## 2024-08-09 RX ADMIN — LORAZEPAM 0.5 MG: 0.5 TABLET ORAL at 04:26

## 2024-08-09 RX ADMIN — PIPERACILLIN AND TAZOBACTAM 4.5 G: 4; .5 INJECTION, POWDER, FOR SOLUTION INTRAVENOUS at 21:22

## 2024-08-09 RX ADMIN — VENLAFAXINE HYDROCHLORIDE 150 MG: 75 CAPSULE, EXTENDED RELEASE ORAL at 08:47

## 2024-08-09 RX ADMIN — TRAZODONE HYDROCHLORIDE 100 MG: 50 TABLET ORAL at 21:19

## 2024-08-09 RX ADMIN — CARVEDILOL 6.25 MG: 6.25 TABLET, FILM COATED ORAL at 18:06

## 2024-08-09 ASSESSMENT — PAIN DESCRIPTION - PAIN TYPE
TYPE: ACUTE PAIN;CHRONIC PAIN
TYPE: ACUTE PAIN;CHRONIC PAIN

## 2024-08-09 ASSESSMENT — ENCOUNTER SYMPTOMS
HALLUCINATIONS: 1
DEPRESSION: 1
INSOMNIA: 1
NERVOUS/ANXIOUS: 1

## 2024-08-09 ASSESSMENT — LIFESTYLE VARIABLES: SUBSTANCE_ABUSE: 0

## 2024-08-09 NOTE — CARE PLAN
The patient is Watcher - Medium risk of patient condition declining or worsening    Shift Goals  Clinical Goals: Pt will tolerate drain placement by the end of shift.  Patient Goals: Go home  Family Goals: FELICITAS  Pt did not require drain placement. Pt down to IR for abscess aspiration. Pt able to tolerate procedure, post op vitals in place.    Progress made toward(s) clinical / shift goals:    Problem: Pain - Standard  Goal: Alleviation of pain or a reduction in pain to the patient’s comfort goal  Outcome: Progressing  Pts pain remained tolerable with use of ice packs throughout shift.      Problem: Fall Risk  Goal: Patient will remain free from falls  Outcome: Progressing   Pt remained free from falls this shift with treaded socks in place, 1:1 sitter, and hourly rounding.     Patient is not progressing towards the following goals:NA

## 2024-08-09 NOTE — PSYCHIATRY
"PSYCHIATRIC FOLLOW-UP: (established)  Reason for admission: Overdose, presumed suicide attempt  Legal Hold Status: Active, extended  Chart reviewed.       Pt ambulating with four-point walker. Adherent on current medications.    HPI:      Pt interviewed in room, lying in bed in reclined position. Pt tolerating Abilify and scheduled Ativan for catatonia, feels both medications are helping clear thinking and with abnormal movements. Mood is \"occupied by pain, not very good.\" Sleep chronically poor, says he's had trouble getting to sleep and staying asleep for the last 6 years. Has tried Seroquel and Klonopin in the past with no good effect. Appetite remains unchanged, eating most meals. Does feel he continues to have significant anxiety, thinking about \"deeper things like eternal life,\" feeling generally overwhelmed and stuck in his thoughts. At times feels recurring thoughts of \"you're not going to make it\" replay in his head. However he tries to distract himself, and he believes the Abilify in particular has helped .He does still have passive SI that comes and goes but denies active plans and intent, citing his children as protective factors and, while not as strongly as before, his Scientologist (Protest[employer]). Regarding VH, he did have a moment yesterday when he closed his eyes and saw the wall in front of him move toward him, but this image disappeared when he opened his eyes. He denies discrete AH. Does feel safe in the hospital -- \"too safe.\"     Medical ROS (as pertinent):     Review of Systems   Psychiatric/Behavioral:  Positive for depression, hallucinations and suicidal ideas. Negative for substance abuse. The patient is nervous/anxious and has insomnia.       Psychiatric Examination:  Vitals:   Vitals:    08/09/24 0800   BP:    Pulse:    Resp: 18   Temp:    SpO2:    /76, P-81     General Appearance: Appears state age, appropriate grooming & hygiene, not acutely distraught but somber and appears dazed, " "loosely deep in thought, sometimes grimacing when deep in thought  Behavior:    -Slowed movements with far-off stare, cooperative   -No tremors noted, no tics, dyskinesias or dystonias. NO cogwheeling or rigidity   -Generalized psychomotor slowing   -No posture or gait abnormalities appreciated  Speech: Slowed with significant latency and some word-finding difficulty. At times mumbling and talking as though to himself  Language: English  Thought processes: Loosely linear and logical though overall very ruminative and circular/repetitive thoughts.   Thought content: No evidence of SI/HI/AVH during this assessment. Not observed responding to internal stimuli. No evidence of delusions or paranoia. Thoughts largely self-directed, autobiographical and negative  Mood: \"Occupied by pain, not very good.\"  Affect: Congruent with stated mood. Restricted range, blunted and depressed, loosely though mostly appropriately reactive to conversation. No evidence of lability, ernie, or agitation.  Judgement and Insight: Fair/poor  Cognition:    -Alert & oriented x 3 (Person, place and time)   -Attention & concentration grossly intact though strained   -Immediate/delayed memory not formally tested but grossly intact   -Age-appropriate fund of knowledge      New PAST MEDICAL/PSYCH/FAMILY/SOCIAL(as reported by patient):       None      EKG:   Results for orders placed or performed during the hospital encounter of 24   EKG   Result Value Ref Range    Report       [facility] Emergency Dept.    Test Date:  2024  Pt Name:    FAIZAN GARDNER             Department: ER  MRN:        [id number]                      Room:        12  Gender:     Male                         Technician: [id number]  :        1960                   Requested By:ER TRIAGE PROTOCOL  Order #:    [id number]                    Sari MD: ILDEFONSO ARNOLD, DO    Measurements  Intervals                                Axis  Rate:       85 "                           P:          61  OR:         153                          QRS:        63  QRSD:       106                          T:          25  QT:         413  QTc:        492    Interpretive Statements  Sinus rhythm  Probable left atrial enlargement  Borderline prolonged QT interval  Compared to ECG 2024 17:09:50  No significant changes  Electronically Signed On 2024 00:56:04 PDT by ILDEFONSO ARNOLD DO     EKG   Result Value Ref Range    Report       [facility]    Test Date:  2024  Pt Name:    TERRELL HENSLEY             Department: 61  MRN:        [id number]                      Room:       [facility]  Gender:     Male                         Technician: ALANNA  :        1960                   Requested By:ANICETO AMBRIZ  Order #:    [id number]                    Reading MD: Lucio Guzman MD    Measurements  Intervals                                Axis  Rate:       82                           P:          67  OR:         176                          QRS:        55  QRSD:       93                           T:          35  QT:         355  QTc:        415    Interpretive Statements  Sinus rhythm  Consider right atrial enlargement  Compared to ECG 2024 19:51:19  No significant changes  Electronically Signed On 2024 06:48:42 PDT by Lucio Guzman MD        Brain Imaging: No new imaging  EEG: No new EEG     Labs personally reviewed:   No results found for this or any previous visit (from the past 24 hour(s)).      Assessment:  Terrell Hensley is a 63 y.o. male admitted 2024 with altered mental status, found down after intentional OD of his home medications amlodipine in order to kill himself.      Presentation following Ativan challenge and subsequent schedule Ativan shows significant improvement in catatonia with no residual purposeless movements or waxy stiffness. Pt is more organized compared to last assessment, though thought processes remain  ruminative on negative thoughts with significantly latent/delayed speech. However given some concerns for significant generalized anxiety with prior concerns for OCD-like symptoms (repetitive/obsessive thoughts leading to impairing anxiety), along with the MDD with psychotic features, recommend the following:    Increase Abilify from 5mg to 10mg daily to target MDD with psychotic features and, at this time, unspecified anxiety disorder, OCD vs CYNTHIA vs panic d/o. Recommend continuing scheduled Ativan through the weekend, will reassess at the start of this coming week and determine if reasonable to begin slow taper off of Ativan depending on response to increased Abilify.      Dx:  #Catatonia, excited   #MDD with Psychosis  R/O Schizoaffective disorder     #OCD  Rule out CYNTHIA  Rule out panic disorder  #Suicidal Ideation  #Drug Overdose     Medical :  Per primary team        Plan:  Legal hold: Extended, due to continued psychosis causing inability to care for self  Psychotropic medications  INCREASE Abilify to 10mg po daily for anxiety disorder and psychotic symptoms (starting 8/10/24)  Continue Ativan 0.5mg po TID for catatonia  Continue Effexor to 150mg po qD for depression  Continue Trazodone 100mg to scheduled qHS for insomnia  Continue Trazodone 50mg po qHS PRN for insomnia if scheduled Trazodone is not effective.  Please transfer pt to inpatient psychiatric hospital when medically cleared and bed is available  Labs reviewed  EKG reviewed  Old records reviewed/summarized  Safety plan completed, copy in chart  Discussed the case with: Dr. Guzman  Psychiatry will follow up      Thank you for the consult.      Sitter: 1:1  Phone: Yes room phone  Visitors: Yes, Jean Paul (brother), Cortes (brother), YOSELIN (son), Tressa (daughter)   Personal belongings:  NO

## 2024-08-09 NOTE — PROGRESS NOTES
[facility] Medicine Daily Progress Note    Date of Service  8/9/2024    Chief Complaint  Terrell Hensley is a 64 y.o. male admitted 7/23/2024 with SI attempt    Hospital Course  This is a 64-year-old male with past medical history of hypertension, mood disorder, who was admitted on 7/23/2024 after SI attempt.    Patient with encephalopathy, WILMAR, transaminitis, rhabdomyolysis, and high anion gap metabolic acidosis on admission. Patient had gonzalez in place and completed aggressive IVF.    Patient noted to have buttock cellulitis, CT imaging negative for abscess.  Patient continued with lower extremity weakness, MRI imaging was ordered revealed iliac abscess, IR consulted for possible drainage if amendable.  MRSA swab negative, patient on Zosyn.    Psychiatry was consulted, patient to discharge to inpatient psychiatry facility.    Interval Problem Update  Psychiatry, medications changed to Abilify and Ativan. Legal hold extended.    Patient recently completed course of Ancef for buttock cellulitis.  MRI imaging of the lumbar spine was performed, noted iliac abscess 3.5 cm. Original plan was to have IR drain placed however fluid collection significantly reduced in size, aspiration was performed.  Will follow cultures.MRSA swab negative, continue Zosyn.    I have discussed this patient's plan of care and discharge plan at IDT rounds today with Case Management, Nursing, Nursing leadership, and other members of the IDT team.    Consultants/Specialty  psychiatry    Code Status  Full Code    Disposition  Patient is not medically cleared to discharge to inpatient psych facility.    I have placed the appropriate orders for post-discharge needs.    Review of Systems  Review of Systems   All other systems reviewed and are negative.       Physical Exam  Temp:  [36.5 °C (97.7 °F)-37.3 °C (99.1 °F)] 36.8 °C (98.2 °F)  Pulse:  [68-91] 68  Resp:  [16-27] 16  BP: (107-131)/(59-81) 116/67  SpO2:  [93 %-98 %] 94 %    Physical  Exam  Vitals and nursing note reviewed.   Constitutional:       General: He is not in acute distress.     Appearance: Normal appearance.   HENT:      Head: Normocephalic and atraumatic.   Eyes:      Extraocular Movements: Extraocular movements intact.      Conjunctiva/sclera: Conjunctivae normal.      Pupils: Pupils are equal, round, and reactive to light.   Cardiovascular:      Rate and Rhythm: Normal rate and regular rhythm.      Pulses: Normal pulses.      Heart sounds: No murmur heard.     No friction rub. No gallop.   Pulmonary:      Effort: Pulmonary effort is normal. No respiratory distress.      Breath sounds: Normal breath sounds. No wheezing, rhonchi or rales.   Abdominal:      General: Bowel sounds are normal. There is no distension.      Palpations: Abdomen is soft.      Tenderness: There is no abdominal tenderness.   Musculoskeletal:         General: No swelling or tenderness. Normal range of motion.      Cervical back: Normal range of motion and neck supple. No muscular tenderness.      Right lower leg: No edema.      Left lower leg: No edema.   Skin:     General: Skin is warm and dry.      Capillary Refill: Capillary refill takes less than 2 seconds.      Findings: No bruising, erythema or rash.   Neurological:      General: No focal deficit present.      Mental Status: He is alert and oriented to person, place, and time.         Fluids    Intake/Output Summary (Last 24 hours) at 8/9/2024 1321  Last data filed at 8/9/2024 0331  Gross per 24 hour   Intake 0 ml   Output 400 ml   Net -400 ml       Laboratory  Recent Labs     08/07/24  0707   WBC 7.5   RBC 4.23*   HEMOGLOBIN 12.5*   HEMATOCRIT 37.6*   MCV 88.9   MCH 29.6   MCHC 33.2   RDW 44.9   PLATELETCT 511*   MPV 8.4*     Recent Labs     08/07/24  0707   SODIUM 137   POTASSIUM 3.8   CHLORIDE 103   CO2 18*   GLUCOSE 109*   BUN 11   CREATININE 0.68   CALCIUM 9.0     Recent Labs     08/07/24  0707   INR 1.08               Imaging  CT-IMAGE-GUIDED DRAIN  PERITONEAL   Final Result      Successful LEFT iliacus CT-guided fluid aspiration.               MR-LUMBAR SPINE-WITH & W/O   Final Result      1.  There are multiloculated fluid collection noted involving left iliacus muscle likely representing abscess. It measures an approximately 3.5 cm in the transverse dimension.   2.  There is no evidence of osteomyelitis, discitis or epidural abscess.   3.  Degenerative disease as described above.      US-EXTREMITY VENOUS UPPER UNILAT RIGHT   Final Result      CT-PELVIS WITH   Final Result         1.  Low-density enlargement of the distal left psoas muscle and iliacus muscle extending to the anterior left thigh. Could represent changes of myositis or possibly early forming infectious process. No definite enhancing fluid collection identified to    indicate abscess at this time.   2.  Distended bladder with nondependent air, consider history of recent instrumentation or changes of urinary tract infection, correlate with urinalysis as clinically appropriate.   3.  Bilateral fat-containing inguinal hernias   4.  Diverticulosis      MR-CERVICAL SPINE-W/O   Final Result      1.  Degenerative disease in the segment cervical spine as described above.   2.  Left paracentral/foraminal disc protrusion at C6-7 causing severe left C7 neural foraminal stenosis.      MR-BRAIN-W/O   Final Result      1.  No acute abnormality.   2.  A few punctate nonspecific supratentorial T2 hyperintensities likely representing nonspecific foci of gliosis/chronic ischemia.   3.  Mild cerebral volume loss.      DX-FEMUR-2+ LEFT   Final Result      Unremarkable LEFT femur.      DX-HIP-COMPLETE - UNILATERAL 2+ LEFT   Final Result      No radiographic evidence of acute traumatic injury.      CT-HEAD W/O   Final Result         1.  No acute intracranial abnormality.   2.  Atherosclerosis.                    Assessment/Plan  * Drug overdose of undetermined intent, initial encounter- (present on  admission)  Assessment & Plan  Apparent suicide attempt  Polypharmacy: some amlodipine but other ingestions unclear  Cont supportive care as clinically improving  On legal hold  Psychiatry team following  Case discussed today with psychiatry, medications changed to Abilify and Ativan. Legal hold extended.    Abscess of iliac fossa  Assessment & Plan  Completed course of ancef for buttock cellulitis    Patient recently completed course of Ancef for buttock cellulitis.  MRI imaging of the lumbar spine was performed, noted iliac abscess 3.5 cm. Original plan was to have IR drain placed however fluid collection significantly reduced in size, aspiration was performed.  Will follow cultures.MRSA swab negative, continue Zosyn.    Cellulitis of buttock  Assessment & Plan  Patient with erythema, warmth, pain left buttock s/p found down at home.  CT pelvis with contrast- negative for abscess  Completed ancef 2gm IV q 8 x 7 days.  Wound care  Off loading, out of bed, increase activity.    Acute renal failure with tubular necrosis (HCC)- (present on admission)  Assessment & Plan  Likely 2/2 prerenal damage from hypotension 2/2 drug overdose and uremia related to rhabdomyolysis.  Creat imroving daily: 1.4 today  Good UOP  Cont LR until CPK<3k  Daily BMP  Renally dose medications as appropriate  Resolved    Acute encephalopathy- (present on admission)  Assessment & Plan  Acute metabolic/toxic encephalopathy  CT head neg  Mental status improving daily though still not completely back to normal  Cont to monitor clinically  Repeat lab in am  Minimize sedating medications  Resolved. MRI of the brain without acute changes.       Hypertension- (present on admission)  Assessment & Plan  Hx of HTN, holding home meds in light of hypotension  Blood pressure still elevated.  Increase losartan to 100 mg daily  Continue amlodipine 10 mg daily  Added coreg 6.25mg BID  Monitor BP    Thrombophlebitis of cephalic vein- (present on  admission)  Assessment & Plan  Right upper extremity pain improving  Ultrasound with no DVT but does have occlusive superficial thrombosis in the cephalic vein.  Warm compresses ordered    Neck pain, acute- (present on admission)  Assessment & Plan  Patient complaining of LLE numbness, neck pain and bilateral arm numbness.  Head injury 4/2024.  Ordered MRI c spine with head MRI - negative  Oxycodone prn pain.    Anxiety- (present on admission)  Assessment & Plan  Increased anxiety noted on exam.  Psychiatry added seroquel 50mg qhs, zoloft resumed for a.m.   Ativan prn  Klonopin bid.    Anemia- (present on admission)  Assessment & Plan  Mild   Cont to monitor    Urinary incontinence- (present on admission)  Assessment & Plan  -oxybutynin held due to drug overdose    Rhabdomyolysis- (present on admission)  Assessment & Plan  CK elevated to 16k, suspected 2/2 being found down and drug overdose.  Cont agressive IVFs but change to LR from NS  Follow daily AST, ALT, Bun, Creat and K  Resolved    Elevated liver enzymes- (present on admission)  Assessment & Plan  Transaminase elevation likely from Rhabdo and not acute liver injury though certainly could have a toxic component from OD  Coming down as expected with resolution of Rhabdo  Cont to monitor daily CMP  Consider further evaluation if it does not resolve as CPK drops and Rhabdo resolves    High anion gap metabolic acidosis- (present on admission)  Assessment & Plan  Likely 2/2 prerenal damage from hypotension 2/2 drug overdose and uremia related to rhabdomyolysis.  -fluid resuscitation with NS 200cc/hr  -IV potassium 10meq x4  Resolved    Mitral regurgitation- (present on admission)  Assessment & Plan  Suspected murmur, though difficult to auscultate. Unclear if preexisting or related to overdose.   Does not appear to have any clinically significant heart failure symptoms    Hyponatremia- (present on admission)  Assessment & Plan  Likely hypovolemic hypoNa  Has  resolved with IVF resuscitation  Cont to monitor         VTE prophylaxis: Xarelto ppx    I have performed a physical exam and reviewed and updated ROS and Plan today (8/9/2024). In review of yesterday's note (8/8/2024), there are no changes except as documented above.

## 2024-08-09 NOTE — OR SURGEON
Immediate Post- Operative Note        Findings: L iliacus fluid collection small      Procedure(s): CT L iliacus aspiration, 2 mL old blood sent to lab no drainable collection      Estimated Blood Loss: Less than 5 ml        Complications: None            8/9/2024     10:13 AM     Evelio Bowers M.D.

## 2024-08-09 NOTE — PROGRESS NOTES
Pt presents to CT4. Pt was consented by MD at bedside, confirmed by this RN and consent at bedside. Pt transferred to CT4 table in Supine position. Patient underwent a CT guided left iliac abscess drain placement by Dr. Bowers. Procedure site was marked by MD and verified using imaging guidance. Pt placed on monitor, prepped and draped in a sterile fashion. Vitals were taken every 5 minutes and remained stable during procedure (see doc flow sheet for results). CO2 waveform capnography was monitored and remained WNL throughout procedure. Report called to Arlen GORDILLO. Pt transported by stretcher with RN to S605.     Specimen: 34cc in syrringe hand delivered to lab.    Novopyxis  125 placed at   REF: [id number]  LOT: [id number]  EXP: 02/21/2027

## 2024-08-09 NOTE — CARE PLAN
The patient is Stable - Low risk of patient condition declining or worsening    Shift Goals  Clinical Goals: Pt to get some rest, have snacks and become NPO at midnight  Patient Goals: Pt to sleep  Family Goals: ansley    Progress made toward(s) clinical / shift goals:    Problem: Knowledge Deficit - Standard  Goal: Patient and family/care givers will demonstrate understanding of plan of care, disease process/condition, diagnostic tests and medications  Outcome: Progressing     Problem: Psychosocial  Goal: Patient's ability to identify and utilize available support systems will improve  Outcome: Progressing     Pt reports hearing voices still but is able to distract himself from the voices with the TV. Pt is aware of drain placement being moved to today (8/9) and being NPO at midnight.     Patient is not progressing towards the following goals:      Problem: Pain - Standard  Goal: Alleviation of pain or a reduction in pain to the patient’s comfort goal  Outcome: Not Progressing     Pt reports pain increasing but denies wanting pain medication. Pt was given ice pack and was able to go back to sleep but reports pain staying the same when he wakes up.

## 2024-08-09 NOTE — PROGRESS NOTES
Pt A/Ox4. Pt reports overall numbness. Pt denies headache, has no slurred speech and reports the numbness equally overall. Pt states that he has felt like this since yesterday morning and it has not gotten better or worse since he first noticed it.

## 2024-08-09 NOTE — PROGRESS NOTES
Pt returned to unit via hospital bed escorted by IR RN and 1:1 sitter. Post op vitals initiated per protocol. Belongings within reach, bed in low locked position, bed alarm on. No needs at this time.

## 2024-08-10 LAB
ALBUMIN SERPL BCP-MCNC: 3.4 G/DL (ref 3.2–4.9)
ALBUMIN/GLOB SERPL: 1.1 G/DL
ALP SERPL-CCNC: 92 U/L (ref 30–99)
ALT SERPL-CCNC: 22 U/L (ref 2–50)
ANION GAP SERPL CALC-SCNC: 13 MMOL/L (ref 7–16)
AST SERPL-CCNC: 13 U/L (ref 12–45)
BILIRUB SERPL-MCNC: 0.3 MG/DL (ref 0.1–1.5)
BUN SERPL-MCNC: 13 MG/DL (ref 8–22)
CALCIUM ALBUM COR SERPL-MCNC: 9.5 MG/DL (ref 8.5–10.5)
CALCIUM SERPL-MCNC: 9 MG/DL (ref 8.5–10.5)
CHLORIDE SERPL-SCNC: 92 MMOL/L (ref 96–112)
CO2 SERPL-SCNC: 24 MMOL/L (ref 20–33)
CREAT SERPL-MCNC: 0.67 MG/DL (ref 0.5–1.4)
ERYTHROCYTE [DISTWIDTH] IN BLOOD BY AUTOMATED COUNT: 43.5 FL (ref 35.9–50)
GFR SERPLBLD CREATININE-BSD FMLA CKD-EPI: 104 ML/MIN/1.73 M 2
GLOBULIN SER CALC-MCNC: 3.1 G/DL (ref 1.9–3.5)
GLUCOSE SERPL-MCNC: 126 MG/DL (ref 65–99)
HCT VFR BLD AUTO: 36.2 % (ref 42–52)
HGB BLD-MCNC: 12.1 G/DL (ref 14–18)
MAGNESIUM SERPL-MCNC: 2 MG/DL (ref 1.5–2.5)
MCH RBC QN AUTO: 29.5 PG (ref 27–33)
MCHC RBC AUTO-ENTMCNC: 33.4 G/DL (ref 32.3–36.5)
MCV RBC AUTO: 88.3 FL (ref 81.4–97.8)
PHOSPHATE SERPL-MCNC: 2.7 MG/DL (ref 2.5–4.5)
PLATELET # BLD AUTO: 448 K/UL (ref 164–446)
PMV BLD AUTO: 8.2 FL (ref 9–12.9)
POTASSIUM SERPL-SCNC: 3.9 MMOL/L (ref 3.6–5.5)
PROT SERPL-MCNC: 6.5 G/DL (ref 6–8.2)
RBC # BLD AUTO: 4.1 M/UL (ref 4.7–6.1)
SODIUM SERPL-SCNC: 129 MMOL/L (ref 135–145)
WBC # BLD AUTO: 7.1 K/UL (ref 4.8–10.8)

## 2024-08-10 PROCEDURE — A9270 NON-COVERED ITEM OR SERVICE: HCPCS | Performed by: STUDENT IN AN ORGANIZED HEALTH CARE EDUCATION/TRAINING PROGRAM

## 2024-08-10 PROCEDURE — 770001 HCHG ROOM/CARE - MED/SURG/GYN PRIV*

## 2024-08-10 PROCEDURE — 83735 ASSAY OF MAGNESIUM: CPT

## 2024-08-10 PROCEDURE — 700102 HCHG RX REV CODE 250 W/ 637 OVERRIDE(OP): Performed by: HOSPITALIST

## 2024-08-10 PROCEDURE — 84100 ASSAY OF PHOSPHORUS: CPT

## 2024-08-10 PROCEDURE — 700111 HCHG RX REV CODE 636 W/ 250 OVERRIDE (IP): Mod: JZ | Performed by: GENERAL PRACTICE

## 2024-08-10 PROCEDURE — 36415 COLL VENOUS BLD VENIPUNCTURE: CPT

## 2024-08-10 PROCEDURE — A9270 NON-COVERED ITEM OR SERVICE: HCPCS | Performed by: GENERAL PRACTICE

## 2024-08-10 PROCEDURE — 80053 COMPREHEN METABOLIC PANEL: CPT

## 2024-08-10 PROCEDURE — 85027 COMPLETE CBC AUTOMATED: CPT

## 2024-08-10 PROCEDURE — 700105 HCHG RX REV CODE 258: Performed by: GENERAL PRACTICE

## 2024-08-10 PROCEDURE — 700102 HCHG RX REV CODE 250 W/ 637 OVERRIDE(OP): Performed by: STUDENT IN AN ORGANIZED HEALTH CARE EDUCATION/TRAINING PROGRAM

## 2024-08-10 PROCEDURE — A9270 NON-COVERED ITEM OR SERVICE: HCPCS | Performed by: HOSPITALIST

## 2024-08-10 PROCEDURE — 700102 HCHG RX REV CODE 250 W/ 637 OVERRIDE(OP): Performed by: GENERAL PRACTICE

## 2024-08-10 PROCEDURE — 99232 SBSQ HOSP IP/OBS MODERATE 35: CPT | Performed by: GENERAL PRACTICE

## 2024-08-10 RX ORDER — ARIPIPRAZOLE 10 MG/1
5 TABLET ORAL ONCE
Status: COMPLETED | OUTPATIENT
Start: 2024-08-10 | End: 2024-08-10

## 2024-08-10 RX ORDER — ARIPIPRAZOLE 10 MG/1
10 TABLET ORAL DAILY
Status: DISCONTINUED | OUTPATIENT
Start: 2024-08-11 | End: 2024-08-15

## 2024-08-10 RX ADMIN — VENLAFAXINE HYDROCHLORIDE 150 MG: 75 CAPSULE, EXTENDED RELEASE ORAL at 07:30

## 2024-08-10 RX ADMIN — SENNOSIDES AND DOCUSATE SODIUM 2 TABLET: 50; 8.6 TABLET ORAL at 18:04

## 2024-08-10 RX ADMIN — AMLODIPINE BESYLATE 10 MG: 10 TABLET ORAL at 04:57

## 2024-08-10 RX ADMIN — CARVEDILOL 6.25 MG: 6.25 TABLET, FILM COATED ORAL at 07:31

## 2024-08-10 RX ADMIN — TRAZODONE HYDROCHLORIDE 100 MG: 50 TABLET ORAL at 21:26

## 2024-08-10 RX ADMIN — CARVEDILOL 6.25 MG: 6.25 TABLET, FILM COATED ORAL at 18:04

## 2024-08-10 RX ADMIN — LORAZEPAM 0.5 MG: 0.5 TABLET ORAL at 04:57

## 2024-08-10 RX ADMIN — ARIPIPRAZOLE 5 MG: 10 TABLET ORAL at 07:30

## 2024-08-10 RX ADMIN — PIPERACILLIN AND TAZOBACTAM 4.5 G: 4; .5 INJECTION, POWDER, FOR SOLUTION INTRAVENOUS at 12:29

## 2024-08-10 RX ADMIN — RIVAROXABAN 10 MG: 10 TABLET, FILM COATED ORAL at 18:05

## 2024-08-10 RX ADMIN — PIPERACILLIN AND TAZOBACTAM 4.5 G: 4; .5 INJECTION, POWDER, FOR SOLUTION INTRAVENOUS at 05:00

## 2024-08-10 RX ADMIN — OXYCODONE HYDROCHLORIDE 5 MG: 5 TABLET ORAL at 14:55

## 2024-08-10 RX ADMIN — LORAZEPAM 0.5 MG: 0.5 TABLET ORAL at 12:26

## 2024-08-10 RX ADMIN — PIPERACILLIN AND TAZOBACTAM 4.5 G: 4; .5 INJECTION, POWDER, FOR SOLUTION INTRAVENOUS at 21:26

## 2024-08-10 RX ADMIN — ARIPIPRAZOLE 5 MG: 10 TABLET ORAL at 04:57

## 2024-08-10 RX ADMIN — LOSARTAN POTASSIUM 100 MG: 50 TABLET, FILM COATED ORAL at 04:57

## 2024-08-10 RX ADMIN — LORAZEPAM 0.5 MG: 0.5 TABLET ORAL at 18:05

## 2024-08-10 ASSESSMENT — PAIN DESCRIPTION - PAIN TYPE
TYPE: ACUTE PAIN;CHRONIC PAIN
TYPE: CHRONIC PAIN
TYPE: ACUTE PAIN;CHRONIC PAIN

## 2024-08-10 NOTE — CARE PLAN
The patient is Stable - Low risk of patient condition declining or worsening    Shift Goals  Clinical Goals: Pt will remain free from falls throughout shift.  Patient Goals: Rest  Family Goals: FELICITAS  Pt remained free from falls throughout shift with hourly rounding, use of call light, and 1:1 sitter in place.     Progress made toward(s) clinical / shift goals:    Problem: Pain - Standard  Goal: Alleviation of pain or a reduction in pain to the patient’s comfort goal  Outcome: Progressing  Pts pain was maintained throughout shift with use of ice packs.     Problem: Provide Safe Environment  Goal: Suicide environmental safety, protocols, policies, and practices will be implemented  Outcome: Progressing   SI/Legal hold precautions remained in affect throughout shift with no personal belongings, disposable scrubs instead of gown, and 1:1 human sitter.     Patient is not progressing towards the following goals:NA

## 2024-08-10 NOTE — CARE PLAN
The patient is Stable - Low risk of patient condition declining or worsening    Shift Goals  Clinical Goals: Pt will continue to work on ambulating without FWW.  Patient Goals: Pt will get some sleep tonight  Family Goals: ansley    Progress made toward(s) clinical / shift goals:    Problem: Pain - Standard  Goal: Alleviation of pain or a reduction in pain to the patient’s comfort goal  Outcome: Progressing     Problem: Knowledge Deficit - Standard  Goal: Patient and family/care givers will demonstrate understanding of plan of care, disease process/condition, diagnostic tests and medications  Outcome: Progressing     Problem: Depression  Goal: Patient and family/caregiver will verbalize accurate information about at least two of the possible causes of depression, three-four of the signs and symptoms of depression  Outcome: Progressing     Pt reports voices being better and them not being as present throughout the day. Pt reports left pain being better but right foot still in plain. Ice pack does help with the right foot.     Patient is not progressing towards the following goals:

## 2024-08-10 NOTE — CONSULTS
"[facility]    INPATIENT ASSESSMENT    Name: Terrell Hensley  MRN: [id number]  : 1960  Age: 64 y.o.  Date of assessment: 2024  PCP: Neelima Rivera M.D.  Persons in attendance: Patient and patient's daughter    HPI: Per Medical Record: \"Terrell Hensley is a 63 year-old male who presented 2024 after suicidal attempt drug overdose.\" [facility] for a psychotherapy session.     CHIEF COMPLAINT/PRESENTING ISSUE (as stated by Patient and daughter): Terrell Hensley is a 64 year old male seen lying on bed, alert, oriented to self, still pre-occupied with internal stimuli however able to be brought back into the conversation with some prompting. Patient has an increased ability to track the conversation once he is re-engaged. Patients speech was mixed with complete sentences and mumbling with unclear language. Patient continues to speak of demonic influences in his life and hearing demonic voices saying to him, \"I'm going to get you this time\", \"you can't escape from me\". Patient reports feeling less overwhelmed with demonic forces although they are still present.  Patients daughter is at bedside, reporting she has noticed patient is now able to put together a sentence, which he was unable to do last week due to the level of disorganization he was experiencing at that time.       Chief Complaint   Patient presents with    Drug Overdose    Suicidal Ideation    Suicide Attempt      CURRENT LIVING SITUATION/SOCIAL SUPPORT: Patient resides in a mobile home on his younger brothers land. Patient was concerned because of bed bugs in the mobile home. Patient was also hungry and could not buy food but felt he could not tell anyone he was hungry. Patients cognitive distortions and delusional thought patterns highly influence patients decision making. Patient has two adult children and siblings who are available for support . 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     BEHAVIORAL HEALTH TREATMENT HISTORY  Does patient/parent report a history of prior behavioral health treatment for patient?   Patient reports a previous overdose. Patient is a poor historian and cannot recall if he was transferred to an inpatient hospital. Patient reports he started intensive outpatient but felt he did not need to attend. Patient has historically been noncompliant with treatment recommendations.     SAFETY ASSESSMENT - SELF  Does patient acknowledge current or past symptoms of dangerousness to self? yes  Does parent/significant other report patient has current or past symptoms of dangerousness to self? yes  Does presenting problem suggest symptoms of dangerousness to self? Yes:      Past Current    Suicidal Thoughts: [x]  [x]    Suicidal Plans: [x]  [x]    Suicidal Intent: [x]  [x]    Suicide Attempts: [x]  [x]    Self-Injury []  []       For any boxes checked above, provide detail: At least two intentional ingestion of medications with the goal of committing suicide.     History of suicide by family member: no  History of suicide by friend/significant other: no  Recent change in frequency/specificity/intensity of suicidal thoughts or self-harm behavior?   Current access to firearms, medications, or other identified means of suicide/self-harm? no  If yes, willing to restrict access to means of suicide/self-harm? yes -   Protective factors present:   none reported     SAFETY ASSESSMENT - OTHERS  Does patient acknowledge current or past symptoms of aggressive behavior or risk to others? no  Does parent/significant other report patient has current or past symptoms of aggressive behavior or risk to others?  no  Does presenting problem suggest symptoms of dangerousness to others? No     Crisis Safety Plan completed and copy given to patient? no     ABUSE/NEGLECT SCREENING  Does patient report feeling “unsafe” in his/her home, or afraid of anyone?  no  Does patient  "report any history of physical, sexual, or emotional abuse?  no  Does parent or significant other report any of the above? no  Is there evidence of neglect by self?  yes  Is there evidence of neglect by a caregiver? no  Does the patient/parent report any history of CPS/APS/police involvement related to suspected abuse/neglect or domestic violence? no  Based on the information provided during the current assessment, is a mandated report of suspected abuse/neglect being made?  No     SUBSTANCE USE SCREENING  Yes:  Omar all substances used in the past 30 days:         Last Use Amount   []   Alcohol       []   Marijuana       []   Heroin       []   Prescription Opioids  (used without prescription, for    recreation, or in excess of prescribed amount)       []   Other Prescription  (used without prescription, for    recreation, or in excess of prescribed amount)       []   Cocaine       []   Methamphetamine       []   \"\" drugs (ectasy, MDMA)       []   Other substances                     UDS results: none reported  Breathalyzer results: none reported     What consequences does the patient associate with any of the above substance use and or addictive behaviors? None     Risk factors for detox (check all that apply):  []   Seizures   []   Diaphoretic (sweating)   []   Tremors   []   Hallucinations   []   Increased blood pressure   []   Decreased blood pressure   []   Other   []   None       [] Patient education on risk factors for detoxification and instructed to return to ER as needed.        MENTAL STATUS              Participation: Limited verbal participation  Grooming: Neat  Orientation: Alert-  Behavior: Tense  Eye contact: Limited  Mood: Anxious  Affect: Blunted  Thought process: Loose associations but improved  Thought content: Evidence of delusion, preocuppation  Speech: Latency of response  Perception: Evidence of auditory hallucination  Memory:  Poor memory for chronology of events  Insight: " Poor  Judgment:  Poor  Other:     Collateral information:   Source:   Significant other present in person: daughter   Significant other by telephone   [facility]   [facility] Record-reviewed   Other: consulted with psychiatry      Unable to complete full assessment due to:   Acute intoxication   Patient declined to participate/engage   Patient verbally unresponsive   Significant cognitive deficits   Significant perceptual distortions or behavioral disorganization   Other:              CLINICAL IMPRESSIONS:  Primary:  R/O Major Depressive disorder with psychotic features  Secondary:                                          IDENTIFIED NEEDS/PLAN:  [Trigger DISPOSITION list for any items marked]     x  Imminent safety risk - self   Imminent safety risk - others     Acute substance withdrawal   Psychosis/Impaired reality testing   x  Mood/anxiety   Substance use/Addictive behavior    x Maladaptive behavior   Parent/child conflict     Family/Couples conflict   Biomedical     Housing   Financial      Legal   Occupational/Educational     Domestic violence   Other:      Recommendations and Observation Level:  Sitter: one to one  Phone: yes  Visitors: yes  Personal belongings: no        Legal Hold: extended        Please transfer to an inpatient psychiatric facility                   Nichole Damico, Ph.D., [facility]  8/9/24  Length of intervention: 30 minutes

## 2024-08-10 NOTE — PROGRESS NOTES
[facility] Medicine Daily Progress Note    Date of Service  8/10/2024    Chief Complaint  Terrell Hensley is a 64 y.o. male admitted 7/23/2024 with SI attempt    Hospital Course  This is a 64-year-old male with past medical history of hypertension, mood disorder, who was admitted on 7/23/2024 after SI attempt.    Patient with encephalopathy, WILMAR, transaminitis, rhabdomyolysis, and high anion gap metabolic acidosis on admission. Patient had gonzalez in place and completed aggressive IVF.    Patient noted to have buttock cellulitis, CT imaging negative for abscess.  Patient continued with lower extremity weakness, MRI imaging was ordered revealed iliac abscess, IR consulted for possible drainage if amendable.  MRSA swab negative, patient on Zosyn.    Psychiatry was consulted, patient to discharge to inpatient psychiatry facility.    Interval Problem Update  Psychiatry, medications changed to Abilify and Ativan. Legal hold extended.    Patient recently completed course of Ancef for buttock cellulitis.  MRI imaging of the lumbar spine was performed, noted iliac abscess 3.5 cm. Original plan was to have IR drain placed however fluid collection significantly reduced in size on day of drain placement, aspiration was performed.      Will follow cultures. MRSA swab negative, continue Zosyn.    I have discussed this patient's plan of care and discharge plan at IDT rounds today with Case Management, Nursing, Nursing leadership, and other members of the IDT team.    Consultants/Specialty  psychiatry    Code Status  Full Code    Disposition  Patient is not medically cleared to discharge to inpatient psych facility.    I have placed the appropriate orders for post-discharge needs.    Review of Systems  Review of Systems   All other systems reviewed and are negative.       Physical Exam  Temp:  [35.7 °C (96.2 °F)-36.9 °C (98.5 °F)] 36.5 °C (97.7 °F)  Pulse:  [68-91] 75  Resp:  [16-20] 16  BP: (114-143)/(62-82) 131/78  SpO2:  [93  %-96 %] 96 %    Physical Exam  Vitals and nursing note reviewed.   Constitutional:       General: He is not in acute distress.     Appearance: Normal appearance.   HENT:      Head: Normocephalic and atraumatic.   Eyes:      Extraocular Movements: Extraocular movements intact.      Conjunctiva/sclera: Conjunctivae normal.      Pupils: Pupils are equal, round, and reactive to light.   Cardiovascular:      Rate and Rhythm: Normal rate and regular rhythm.      Pulses: Normal pulses.      Heart sounds: No murmur heard.     No friction rub. No gallop.   Pulmonary:      Effort: Pulmonary effort is normal. No respiratory distress.      Breath sounds: Normal breath sounds. No wheezing, rhonchi or rales.   Abdominal:      General: Bowel sounds are normal. There is no distension.      Palpations: Abdomen is soft.      Tenderness: There is no abdominal tenderness.   Musculoskeletal:         General: No swelling or tenderness. Normal range of motion.      Cervical back: Normal range of motion and neck supple. No muscular tenderness.      Right lower leg: No edema.      Left lower leg: No edema.   Skin:     General: Skin is warm and dry.      Capillary Refill: Capillary refill takes less than 2 seconds.      Findings: No bruising, erythema or rash.   Neurological:      General: No focal deficit present.      Mental Status: He is alert and oriented to person, place, and time.         Fluids    Intake/Output Summary (Last 24 hours) at 8/10/2024 1039  Last data filed at 8/9/2024 1905  Gross per 24 hour   Intake 240 ml   Output --   Net 240 ml       Laboratory                              Imaging  CT-IMAGE-GUIDED DRAIN PERITONEAL   Final Result      Successful LEFT iliacus CT-guided fluid aspiration.               MR-LUMBAR SPINE-WITH & W/O   Final Result      1.  There are multiloculated fluid collection noted involving left iliacus muscle likely representing abscess. It measures an approximately 3.5 cm in the transverse dimension.    2.  There is no evidence of osteomyelitis, discitis or epidural abscess.   3.  Degenerative disease as described above.      US-EXTREMITY VENOUS UPPER UNILAT RIGHT   Final Result      CT-PELVIS WITH   Final Result         1.  Low-density enlargement of the distal left psoas muscle and iliacus muscle extending to the anterior left thigh. Could represent changes of myositis or possibly early forming infectious process. No definite enhancing fluid collection identified to    indicate abscess at this time.   2.  Distended bladder with nondependent air, consider history of recent instrumentation or changes of urinary tract infection, correlate with urinalysis as clinically appropriate.   3.  Bilateral fat-containing inguinal hernias   4.  Diverticulosis      MR-CERVICAL SPINE-W/O   Final Result      1.  Degenerative disease in the segment cervical spine as described above.   2.  Left paracentral/foraminal disc protrusion at C6-7 causing severe left C7 neural foraminal stenosis.      MR-BRAIN-W/O   Final Result      1.  No acute abnormality.   2.  A few punctate nonspecific supratentorial T2 hyperintensities likely representing nonspecific foci of gliosis/chronic ischemia.   3.  Mild cerebral volume loss.      DX-FEMUR-2+ LEFT   Final Result      Unremarkable LEFT femur.      DX-HIP-COMPLETE - UNILATERAL 2+ LEFT   Final Result      No radiographic evidence of acute traumatic injury.      CT-HEAD W/O   Final Result         1.  No acute intracranial abnormality.   2.  Atherosclerosis.                    Assessment/Plan  * Drug overdose of undetermined intent, initial encounter- (present on admission)  Assessment & Plan  Apparent suicide attempt  Polypharmacy: some amlodipine but other ingestions unclear  Cont supportive care as clinically improving  On legal hold  Psychiatry team following  Case discussed today with psychiatry, medications changed to Abilify and Ativan. Legal hold extended.    Abscess of iliac  fossa  Assessment & Plan  Completed course of ancef for buttock cellulitis    Patient recently completed course of Ancef for buttock cellulitis.  MRI imaging of the lumbar spine was performed, noted iliac abscess 3.5 cm. Original plan was to have IR drain placed however fluid collection significantly reduced in size, aspiration was performed.  Will follow cultures.MRSA swab negative, continue Zosyn.    Cellulitis of buttock  Assessment & Plan  Patient with erythema, warmth, pain left buttock s/p found down at home.  CT pelvis with contrast- negative for abscess  Completed ancef 2gm IV q 8 x 7 days.  Wound care  Off loading, out of bed, increase activity.    Acute renal failure with tubular necrosis (HCC)- (present on admission)  Assessment & Plan  Likely 2/2 prerenal damage from hypotension 2/2 drug overdose and uremia related to rhabdomyolysis.  Creat imroving daily: 1.4 today  Good UOP  Cont LR until CPK<3k  Daily BMP  Renally dose medications as appropriate  Resolved    Acute encephalopathy- (present on admission)  Assessment & Plan  Acute metabolic/toxic encephalopathy  CT head neg  Mental status improving daily though still not completely back to normal  Cont to monitor clinically  Repeat lab in am  Minimize sedating medications  Resolved. MRI of the brain without acute changes.       Hypertension- (present on admission)  Assessment & Plan  Hx of HTN, holding home meds in light of hypotension  Blood pressure still elevated.  Increase losartan to 100 mg daily  Continue amlodipine 10 mg daily  Added coreg 6.25mg BID  Monitor BP    Thrombophlebitis of cephalic vein- (present on admission)  Assessment & Plan  Right upper extremity pain improving  Ultrasound with no DVT but does have occlusive superficial thrombosis in the cephalic vein.  Warm compresses ordered    Neck pain, acute- (present on admission)  Assessment & Plan  Patient complaining of LLE numbness, neck pain and bilateral arm numbness.  Head injury  4/2024.  Ordered MRI c spine with head MRI - negative  Oxycodone prn pain.    Anxiety- (present on admission)  Assessment & Plan  Increased anxiety noted on exam.  Psychiatry added seroquel 50mg qhs, zoloft resumed for a.m.   Ativan prn  Klonopin bid.    Anemia- (present on admission)  Assessment & Plan  Mild   Cont to monitor    Urinary incontinence- (present on admission)  Assessment & Plan  -oxybutynin held due to drug overdose    Rhabdomyolysis- (present on admission)  Assessment & Plan  CK elevated to 16k, suspected 2/2 being found down and drug overdose.  Cont agressive IVFs but change to LR from NS  Follow daily AST, ALT, Bun, Creat and K  Resolved    Elevated liver enzymes- (present on admission)  Assessment & Plan  Transaminase elevation likely from Rhabdo and not acute liver injury though certainly could have a toxic component from OD  Coming down as expected with resolution of Rhabdo  Cont to monitor daily CMP  Consider further evaluation if it does not resolve as CPK drops and Rhabdo resolves    High anion gap metabolic acidosis- (present on admission)  Assessment & Plan  Likely 2/2 prerenal damage from hypotension 2/2 drug overdose and uremia related to rhabdomyolysis.  -fluid resuscitation with NS 200cc/hr  -IV potassium 10meq x4  Resolved    Mitral regurgitation- (present on admission)  Assessment & Plan  Suspected murmur, though difficult to auscultate. Unclear if preexisting or related to overdose.   Does not appear to have any clinically significant heart failure symptoms    Hyponatremia- (present on admission)  Assessment & Plan  Likely hypovolemic hypoNa  Has resolved with IVF resuscitation  Cont to monitor         VTE prophylaxis: Xarelrichard stormx    I have performed a physical exam and reviewed and updated ROS and Plan today (8/10/2024). In review of yesterday's note (8/9/2024), there are no changes except as documented above.

## 2024-08-11 LAB
BACTERIA BLD CULT: NORMAL
BACTERIA BLD CULT: NORMAL
SIGNIFICANT IND 70042: NORMAL
SIGNIFICANT IND 70042: NORMAL
SITE SITE: NORMAL
SITE SITE: NORMAL
SOURCE SOURCE: NORMAL
SOURCE SOURCE: NORMAL

## 2024-08-11 PROCEDURE — 99232 SBSQ HOSP IP/OBS MODERATE 35: CPT | Performed by: GENERAL PRACTICE

## 2024-08-11 PROCEDURE — A9270 NON-COVERED ITEM OR SERVICE: HCPCS | Performed by: GENERAL PRACTICE

## 2024-08-11 PROCEDURE — 700111 HCHG RX REV CODE 636 W/ 250 OVERRIDE (IP): Mod: JZ | Performed by: GENERAL PRACTICE

## 2024-08-11 PROCEDURE — 700102 HCHG RX REV CODE 250 W/ 637 OVERRIDE(OP): Performed by: STUDENT IN AN ORGANIZED HEALTH CARE EDUCATION/TRAINING PROGRAM

## 2024-08-11 PROCEDURE — 770001 HCHG ROOM/CARE - MED/SURG/GYN PRIV*

## 2024-08-11 PROCEDURE — 700102 HCHG RX REV CODE 250 W/ 637 OVERRIDE(OP): Performed by: GENERAL PRACTICE

## 2024-08-11 PROCEDURE — A9270 NON-COVERED ITEM OR SERVICE: HCPCS | Performed by: STUDENT IN AN ORGANIZED HEALTH CARE EDUCATION/TRAINING PROGRAM

## 2024-08-11 PROCEDURE — 700105 HCHG RX REV CODE 258: Performed by: GENERAL PRACTICE

## 2024-08-11 RX ADMIN — PIPERACILLIN AND TAZOBACTAM 4.5 G: 4; .5 INJECTION, POWDER, FOR SOLUTION INTRAVENOUS at 21:04

## 2024-08-11 RX ADMIN — PIPERACILLIN AND TAZOBACTAM 4.5 G: 4; .5 INJECTION, POWDER, FOR SOLUTION INTRAVENOUS at 13:03

## 2024-08-11 RX ADMIN — TRAZODONE HYDROCHLORIDE 100 MG: 50 TABLET ORAL at 21:02

## 2024-08-11 RX ADMIN — LORAZEPAM 0.5 MG: 0.5 TABLET ORAL at 13:01

## 2024-08-11 RX ADMIN — SENNOSIDES AND DOCUSATE SODIUM 2 TABLET: 50; 8.6 TABLET ORAL at 17:28

## 2024-08-11 RX ADMIN — AMLODIPINE BESYLATE 10 MG: 10 TABLET ORAL at 05:43

## 2024-08-11 RX ADMIN — LOSARTAN POTASSIUM 100 MG: 50 TABLET, FILM COATED ORAL at 05:43

## 2024-08-11 RX ADMIN — CARVEDILOL 6.25 MG: 6.25 TABLET, FILM COATED ORAL at 17:28

## 2024-08-11 RX ADMIN — LORAZEPAM 0.5 MG: 0.5 TABLET ORAL at 17:28

## 2024-08-11 RX ADMIN — LORAZEPAM 0.5 MG: 0.5 TABLET ORAL at 05:43

## 2024-08-11 RX ADMIN — RIVAROXABAN 10 MG: 10 TABLET, FILM COATED ORAL at 17:28

## 2024-08-11 RX ADMIN — CARVEDILOL 6.25 MG: 6.25 TABLET, FILM COATED ORAL at 08:45

## 2024-08-11 RX ADMIN — PIPERACILLIN AND TAZOBACTAM 4.5 G: 4; .5 INJECTION, POWDER, FOR SOLUTION INTRAVENOUS at 05:42

## 2024-08-11 RX ADMIN — ARIPIPRAZOLE 10 MG: 10 TABLET ORAL at 05:43

## 2024-08-11 RX ADMIN — VENLAFAXINE HYDROCHLORIDE 150 MG: 75 CAPSULE, EXTENDED RELEASE ORAL at 08:44

## 2024-08-11 ASSESSMENT — PAIN DESCRIPTION - PAIN TYPE
TYPE: CHRONIC PAIN
TYPE: CHRONIC PAIN

## 2024-08-11 NOTE — PROGRESS NOTES
Hospital Medicine Daily Progress Note    Date of Service  8/11/2024    Chief Complaint  Terrell Hensley is a 64 y.o. male admitted 7/23/2024 with SI attempt    Hospital Course  This is a 64-year-old male with past medical history of hypertension, mood disorder, who was admitted on 7/23/2024 after SI attempt.    Patient with encephalopathy, WILMAR, transaminitis, rhabdomyolysis, and high anion gap metabolic acidosis on admission. Patient had gonzalez in place and completed aggressive IVF.    Patient noted to have buttock cellulitis, CT imaging negative for abscess.  Patient continued with lower extremity weakness, MRI imaging was ordered revealed iliac abscess, IR consulted for possible drainage if amendable.  MRSA swab negative, patient on Zosyn.    Psychiatry was consulted, patient to discharge to inpatient psychiatry facility.    Interval Problem Update  Psychiatry, medications changed to Abilify and Ativan. Legal hold extended.    Patient recently completed course of Ancef for buttock cellulitis.  MRI imaging of the lumbar spine was performed, noted iliac abscess 3.5 cm. Original plan was to have IR drain placed however fluid collection significantly reduced in size on day of drain placement, aspiration was performed.      Patient will be medically clear Tuesday 8/13/24 3pm.    I have discussed this patient's plan of care and discharge plan at IDT rounds today with Case Management, Nursing, Nursing leadership, and other members of the IDT team.    Consultants/Specialty  psychiatry    Code Status  Full Code    Disposition  Patient will be medically clear Tuesday 8/13/24 3pm to discharge to inpatient psych facility.    I have placed the appropriate orders for post-discharge needs.    Review of Systems  Review of Systems   All other systems reviewed and are negative.       Physical Exam  Temp:  [36.6 °C (97.9 °F)-37.1 °C (98.7 °F)] 36.6 °C (97.9 °F)  Pulse:  [62-69] 62  Resp:  [17-18] 18  BP: (117-142)/(57-78)  117/57  SpO2:  [95 %-97 %] 97 %    Physical Exam  Vitals and nursing note reviewed.   Constitutional:       General: He is not in acute distress.     Appearance: Normal appearance.   HENT:      Head: Normocephalic and atraumatic.   Eyes:      Extraocular Movements: Extraocular movements intact.      Conjunctiva/sclera: Conjunctivae normal.      Pupils: Pupils are equal, round, and reactive to light.   Cardiovascular:      Rate and Rhythm: Normal rate and regular rhythm.      Pulses: Normal pulses.      Heart sounds: No murmur heard.     No friction rub. No gallop.   Pulmonary:      Effort: Pulmonary effort is normal. No respiratory distress.      Breath sounds: Normal breath sounds. No wheezing, rhonchi or rales.   Abdominal:      General: Bowel sounds are normal. There is no distension.      Palpations: Abdomen is soft.      Tenderness: There is no abdominal tenderness.   Musculoskeletal:         General: No swelling or tenderness. Normal range of motion.      Cervical back: Normal range of motion and neck supple. No muscular tenderness.      Right lower leg: No edema.      Left lower leg: No edema.   Skin:     General: Skin is warm and dry.      Capillary Refill: Capillary refill takes less than 2 seconds.      Findings: No bruising, erythema or rash.   Neurological:      General: No focal deficit present.      Mental Status: He is alert and oriented to person, place, and time.         Fluids    Intake/Output Summary (Last 24 hours) at 8/11/2024 1135  Last data filed at 8/11/2024 0304  Gross per 24 hour   Intake 200 ml   Output 1500 ml   Net -1300 ml       Laboratory  Recent Labs     08/10/24  1221   WBC 7.1   RBC 4.10*   HEMOGLOBIN 12.1*   HEMATOCRIT 36.2*   MCV 88.3   MCH 29.5   MCHC 33.4   RDW 43.5   PLATELETCT 448*   MPV 8.2*       Recent Labs     08/10/24  1221   SODIUM 129*   POTASSIUM 3.9   CHLORIDE 92*   CO2 24   GLUCOSE 126*   BUN 13   CREATININE 0.67   CALCIUM 9.0                        Imaging  CT-IMAGE-GUIDED DRAIN PERITONEAL   Final Result      Successful LEFT iliacus CT-guided fluid aspiration.               MR-LUMBAR SPINE-WITH & W/O   Final Result      1.  There are multiloculated fluid collection noted involving left iliacus muscle likely representing abscess. It measures an approximately 3.5 cm in the transverse dimension.   2.  There is no evidence of osteomyelitis, discitis or epidural abscess.   3.  Degenerative disease as described above.      US-EXTREMITY VENOUS UPPER UNILAT RIGHT   Final Result      CT-PELVIS WITH   Final Result         1.  Low-density enlargement of the distal left psoas muscle and iliacus muscle extending to the anterior left thigh. Could represent changes of myositis or possibly early forming infectious process. No definite enhancing fluid collection identified to    indicate abscess at this time.   2.  Distended bladder with nondependent air, consider history of recent instrumentation or changes of urinary tract infection, correlate with urinalysis as clinically appropriate.   3.  Bilateral fat-containing inguinal hernias   4.  Diverticulosis      MR-CERVICAL SPINE-W/O   Final Result      1.  Degenerative disease in the segment cervical spine as described above.   2.  Left paracentral/foraminal disc protrusion at C6-7 causing severe left C7 neural foraminal stenosis.      MR-BRAIN-W/O   Final Result      1.  No acute abnormality.   2.  A few punctate nonspecific supratentorial T2 hyperintensities likely representing nonspecific foci of gliosis/chronic ischemia.   3.  Mild cerebral volume loss.      DX-FEMUR-2+ LEFT   Final Result      Unremarkable LEFT femur.      DX-HIP-COMPLETE - UNILATERAL 2+ LEFT   Final Result      No radiographic evidence of acute traumatic injury.      CT-HEAD W/O   Final Result         1.  No acute intracranial abnormality.   2.  Atherosclerosis.                    Assessment/Plan  * Drug overdose of undetermined intent, initial  encounter- (present on admission)  Assessment & Plan  Apparent suicide attempt  Polypharmacy: some amlodipine but other ingestions unclear  Cont supportive care as clinically improving  On legal hold  Psychiatry team following  Case discussed today with psychiatry, medications changed to Abilify and Ativan. Legal hold extended.    Abscess of iliac fossa  Assessment & Plan  Completed course of ancef for buttock cellulitis    Patient recently completed course of Ancef for buttock cellulitis.  MRI imaging of the lumbar spine was performed, noted iliac abscess 3.5 cm. Original plan was to have IR drain placed however fluid collection significantly reduced in size, aspiration was performed.  Will follow cultures.MRSA swab negative, continue Zosyn.    Cellulitis of buttock  Assessment & Plan  Patient with erythema, warmth, pain left buttock s/p found down at home.  CT pelvis with contrast- negative for abscess  Completed ancef 2gm IV q 8 x 7 days.  Wound care  Off loading, out of bed, increase activity.    Acute renal failure with tubular necrosis (HCC)- (present on admission)  Assessment & Plan  Likely 2/2 prerenal damage from hypotension 2/2 drug overdose and uremia related to rhabdomyolysis.  Creat imroving daily: 1.4 today  Good UOP  Cont LR until CPK<3k  Daily BMP  Renally dose medications as appropriate  Resolved    Acute encephalopathy- (present on admission)  Assessment & Plan  Acute metabolic/toxic encephalopathy  CT head neg  Mental status improving daily though still not completely back to normal  Cont to monitor clinically  Repeat lab in am  Minimize sedating medications  Resolved. MRI of the brain without acute changes.       Hypertension- (present on admission)  Assessment & Plan  Hx of HTN, holding home meds in light of hypotension  Blood pressure still elevated.  Increase losartan to 100 mg daily  Continue amlodipine 10 mg daily  Added coreg 6.25mg BID  Monitor BP    Thrombophlebitis of cephalic vein-  (present on admission)  Assessment & Plan  Right upper extremity pain improving  Ultrasound with no DVT but does have occlusive superficial thrombosis in the cephalic vein.  Warm compresses ordered    Neck pain, acute- (present on admission)  Assessment & Plan  Patient complaining of LLE numbness, neck pain and bilateral arm numbness.  Head injury 4/2024.  Ordered MRI c spine with head MRI - negative  Oxycodone prn pain.    Anxiety- (present on admission)  Assessment & Plan  Increased anxiety noted on exam.  Psychiatry added seroquel 50mg qhs, zoloft resumed for a.m.   Ativan prn  Klonopin bid.    Anemia- (present on admission)  Assessment & Plan  Mild   Cont to monitor    Urinary incontinence- (present on admission)  Assessment & Plan  -oxybutynin held due to drug overdose    Rhabdomyolysis- (present on admission)  Assessment & Plan  CK elevated to 16k, suspected 2/2 being found down and drug overdose.  Cont agressive IVFs but change to LR from NS  Follow daily AST, ALT, Bun, Creat and K  Resolved    Elevated liver enzymes- (present on admission)  Assessment & Plan  Transaminase elevation likely from Rhabdo and not acute liver injury though certainly could have a toxic component from OD  Coming down as expected with resolution of Rhabdo  Cont to monitor daily CMP  Consider further evaluation if it does not resolve as CPK drops and Rhabdo resolves    High anion gap metabolic acidosis- (present on admission)  Assessment & Plan  Likely 2/2 prerenal damage from hypotension 2/2 drug overdose and uremia related to rhabdomyolysis.  -fluid resuscitation with NS 200cc/hr  -IV potassium 10meq x4  Resolved    Mitral regurgitation- (present on admission)  Assessment & Plan  Suspected murmur, though difficult to auscultate. Unclear if preexisting or related to overdose.   Does not appear to have any clinically significant heart failure symptoms    Hyponatremia- (present on admission)  Assessment & Plan  Likely hypovolemic  hypoNa  Has resolved with IVF resuscitation  Cont to monitor         VTE prophylaxis: Xarelto ppx    I have performed a physical exam and reviewed and updated ROS and Plan today (8/11/2024). In review of yesterday's note (8/10/2024), there are no changes except as documented above.

## 2024-08-11 NOTE — CARE PLAN
The patient is Stable - Low risk of patient condition declining or worsening    Shift Goals  Clinical Goals: Pt will ambulate without walker throughout shift.  Patient Goals: Comfort  Family Goals: FELICITAS  Pt able to walk without walker this shift twice with 1:1 sitter as standby.    Progress made toward(s) clinical / shift goals:    Problem: Pain - Standard  Goal: Alleviation of pain or a reduction in pain to the patient’s comfort goal  Outcome: Progressing  Pts pain remained stable this shift with use of ice packs. Pt complained of chronic L knee pain.      Problem: Fall Risk  Goal: Patient will remain free from falls  Outcome: Progressing   Pt remained free from falls throughout shift with 1:1 sitter, hourly rounding, and bed alarm in place.     Patient is not progressing towards the following goals:NA

## 2024-08-11 NOTE — CARE PLAN
The patient is Stable - Low risk of patient condition declining or worsening    Shift Goals  Clinical Goals: Pain management, IV abx, safety  Patient Goals: Rest  Family Goals: FELICITAS    Progress made toward(s) clinical / shift goals:  Patient alert and oriented. Patient c/o pain, provided with cold pack. Patient OOB ambulating hallway with FWW. Patient continues IV abx. Patient with Legal Hold in place, 1:1 sitter at bedside for safety. Patient resting comfortably, bed in lowest position, call light within reach.      Problem: Pain - Standard  Goal: Alleviation of pain or a reduction in pain to the patient’s comfort goal  Outcome: Progressing     Problem: Knowledge Deficit - Standard  Goal: Patient and family/care givers will demonstrate understanding of plan of care, disease process/condition, diagnostic tests and medications  Outcome: Progressing     Problem: Provide Safe Environment  Goal: Suicide environmental safety, protocols, policies, and practices will be implemented  Outcome: Progressing       Patient is not progressing towards the following goals:

## 2024-08-12 LAB
ANION GAP SERPL CALC-SCNC: 12 MMOL/L (ref 7–16)
BACTERIA WND AEROBE CULT: NORMAL
BUN SERPL-MCNC: 10 MG/DL (ref 8–22)
CALCIUM SERPL-MCNC: 9 MG/DL (ref 8.5–10.5)
CHLORIDE SERPL-SCNC: 94 MMOL/L (ref 96–112)
CO2 SERPL-SCNC: 23 MMOL/L (ref 20–33)
CREAT SERPL-MCNC: 0.67 MG/DL (ref 0.5–1.4)
ERYTHROCYTE [DISTWIDTH] IN BLOOD BY AUTOMATED COUNT: 42.1 FL (ref 35.9–50)
GFR SERPLBLD CREATININE-BSD FMLA CKD-EPI: 104 ML/MIN/1.73 M 2
GLUCOSE SERPL-MCNC: 139 MG/DL (ref 65–99)
GRAM STN SPEC: NORMAL
HCT VFR BLD AUTO: 36.2 % (ref 42–52)
HGB BLD-MCNC: 12.3 G/DL (ref 14–18)
MCH RBC QN AUTO: 29.9 PG (ref 27–33)
MCHC RBC AUTO-ENTMCNC: 34 G/DL (ref 32.3–36.5)
MCV RBC AUTO: 88.1 FL (ref 81.4–97.8)
PLATELET # BLD AUTO: 444 K/UL (ref 164–446)
PMV BLD AUTO: 8.3 FL (ref 9–12.9)
POTASSIUM SERPL-SCNC: 4 MMOL/L (ref 3.6–5.5)
RBC # BLD AUTO: 4.11 M/UL (ref 4.7–6.1)
SIGNIFICANT IND 70042: NORMAL
SITE SITE: NORMAL
SODIUM SERPL-SCNC: 129 MMOL/L (ref 135–145)
SOURCE SOURCE: NORMAL
WBC # BLD AUTO: 7 K/UL (ref 4.8–10.8)

## 2024-08-12 PROCEDURE — 770001 HCHG ROOM/CARE - MED/SURG/GYN PRIV*

## 2024-08-12 PROCEDURE — 700105 HCHG RX REV CODE 258: Performed by: GENERAL PRACTICE

## 2024-08-12 PROCEDURE — 700102 HCHG RX REV CODE 250 W/ 637 OVERRIDE(OP): Performed by: HOSPITALIST

## 2024-08-12 PROCEDURE — A9270 NON-COVERED ITEM OR SERVICE: HCPCS | Performed by: GENERAL PRACTICE

## 2024-08-12 PROCEDURE — 700111 HCHG RX REV CODE 636 W/ 250 OVERRIDE (IP): Mod: JZ | Performed by: GENERAL PRACTICE

## 2024-08-12 PROCEDURE — A9270 NON-COVERED ITEM OR SERVICE: HCPCS | Performed by: HOSPITALIST

## 2024-08-12 PROCEDURE — 90834 PSYTX W PT 45 MINUTES: CPT | Performed by: SOCIAL WORKER

## 2024-08-12 PROCEDURE — 85027 COMPLETE CBC AUTOMATED: CPT

## 2024-08-12 PROCEDURE — 99232 SBSQ HOSP IP/OBS MODERATE 35: CPT | Performed by: GENERAL PRACTICE

## 2024-08-12 PROCEDURE — 36415 COLL VENOUS BLD VENIPUNCTURE: CPT

## 2024-08-12 PROCEDURE — A9270 NON-COVERED ITEM OR SERVICE: HCPCS | Performed by: STUDENT IN AN ORGANIZED HEALTH CARE EDUCATION/TRAINING PROGRAM

## 2024-08-12 PROCEDURE — 700102 HCHG RX REV CODE 250 W/ 637 OVERRIDE(OP): Performed by: GENERAL PRACTICE

## 2024-08-12 PROCEDURE — 99231 SBSQ HOSP IP/OBS SF/LOW 25: CPT | Performed by: STUDENT IN AN ORGANIZED HEALTH CARE EDUCATION/TRAINING PROGRAM

## 2024-08-12 PROCEDURE — 80048 BASIC METABOLIC PNL TOTAL CA: CPT

## 2024-08-12 PROCEDURE — 700102 HCHG RX REV CODE 250 W/ 637 OVERRIDE(OP): Performed by: STUDENT IN AN ORGANIZED HEALTH CARE EDUCATION/TRAINING PROGRAM

## 2024-08-12 RX ORDER — LORAZEPAM 0.5 MG/1
0.5 TABLET ORAL 2 TIMES DAILY
Status: DISCONTINUED | OUTPATIENT
Start: 2024-08-12 | End: 2024-08-20

## 2024-08-12 RX ADMIN — SENNOSIDES AND DOCUSATE SODIUM 2 TABLET: 50; 8.6 TABLET ORAL at 17:07

## 2024-08-12 RX ADMIN — AMLODIPINE BESYLATE 10 MG: 10 TABLET ORAL at 05:01

## 2024-08-12 RX ADMIN — PIPERACILLIN AND TAZOBACTAM 4.5 G: 4; .5 INJECTION, POWDER, FOR SOLUTION INTRAVENOUS at 05:03

## 2024-08-12 RX ADMIN — PIPERACILLIN AND TAZOBACTAM 4.5 G: 4; .5 INJECTION, POWDER, FOR SOLUTION INTRAVENOUS at 12:46

## 2024-08-12 RX ADMIN — CARVEDILOL 6.25 MG: 6.25 TABLET, FILM COATED ORAL at 08:15

## 2024-08-12 RX ADMIN — OXYCODONE HYDROCHLORIDE 5 MG: 5 TABLET ORAL at 20:07

## 2024-08-12 RX ADMIN — TRAZODONE HYDROCHLORIDE 100 MG: 50 TABLET ORAL at 21:27

## 2024-08-12 RX ADMIN — LORAZEPAM 0.5 MG: 0.5 TABLET ORAL at 17:07

## 2024-08-12 RX ADMIN — LORAZEPAM 0.5 MG: 0.5 TABLET ORAL at 05:01

## 2024-08-12 RX ADMIN — CARVEDILOL 6.25 MG: 6.25 TABLET, FILM COATED ORAL at 17:07

## 2024-08-12 RX ADMIN — VENLAFAXINE HYDROCHLORIDE 150 MG: 75 CAPSULE, EXTENDED RELEASE ORAL at 08:15

## 2024-08-12 RX ADMIN — LOSARTAN POTASSIUM 100 MG: 50 TABLET, FILM COATED ORAL at 05:01

## 2024-08-12 RX ADMIN — OXYCODONE HYDROCHLORIDE 5 MG: 5 TABLET ORAL at 10:11

## 2024-08-12 RX ADMIN — ARIPIPRAZOLE 10 MG: 10 TABLET ORAL at 05:01

## 2024-08-12 RX ADMIN — RIVAROXABAN 10 MG: 10 TABLET, FILM COATED ORAL at 17:07

## 2024-08-12 RX ADMIN — PIPERACILLIN AND TAZOBACTAM 4.5 G: 4; .5 INJECTION, POWDER, FOR SOLUTION INTRAVENOUS at 21:28

## 2024-08-12 ASSESSMENT — PAIN DESCRIPTION - PAIN TYPE
TYPE: ACUTE PAIN

## 2024-08-12 NOTE — CARE PLAN
The patient is Stable - Low risk of patient condition declining or worsening    Shift Goals  Clinical Goals: Ambulate without walker, IV abx, safety  Patient Goals: Rest, comfort  Family Goals: FELICITAS    Progress made toward(s) clinical / shift goals:  Pt A&Ox4. Pt with pain to leg, applied cold pack. Pt continues IV abx. PT OOB ambulating hallway without FWW, hand-held assist. Pt continues legal hold, with 1:1 sitter at bedside for safety. Pt resting comfortably, bed in lowest position, call light within reach.      Problem: Pain - Standard  Goal: Alleviation of pain or a reduction in pain to the patient’s comfort goal  Outcome: Progressing     Problem: Knowledge Deficit - Standard  Goal: Patient and family/care givers will demonstrate understanding of plan of care, disease process/condition, diagnostic tests and medications  Outcome: Progressing     Problem: Provide Safe Environment  Goal: Suicide environmental safety, protocols, policies, and practices will be implemented  Outcome: Progressing     Problem: Fall Risk  Goal: Patient will remain free from falls  Outcome: Progressing       Patient is not progressing towards the following goals:

## 2024-08-12 NOTE — CARE PLAN
Problem: Pain - Standard  Goal: Alleviation of pain or a reduction in pain to the patient’s comfort goal  Description: Target End Date:  Prior to discharge or change in level of care    Document on Vitals flowsheet    1.  Document pain using the appropriate pain scale per order or unit policy  2.  Educate and implement non-pharmacologic comfort measures (i.e. relaxation, distraction, massage, cold/heat therapy, etc.)  3.  Pain management medications as ordered  4.  Reassess pain after pain med administration per policy  5.  If opiods administered assess patient's response to pain medication is appropriate per POSS sedation scale  6.  Follow pain management plan developed in collaboration with patient and interdisciplinary team (including palliative care or pain specialists if applicable)  Outcome: Progressing  Flowsheets (Taken 8/12/2024 1203)  OB Pain Intervention:   Medication - See MAR   Relaxation technique   [place]   Breathing technique   Rest   The patient is Stable - Low risk of patient condition declining or worsening    Shift Goals  Clinical Goals: antibiotic treatment;fall safety  Patient Goals: fall safety;rest  Family Goals: ansley    Progress made toward(s) clinical / shift goals:  education provided regarding pain management including non pharmacological measures such as rest and relaxation. He is alert and oriented x 4 on room air with no acute distress noted. He has a 1:1 sitter, encouraged ambulation without front wheel walker, unsteady gait    Patient is not progressing towards the following goals:

## 2024-08-12 NOTE — CONSULTS
"[facility]    INPATIENT ASSESSMENT    Name: Terrell Hensley  MRN: [id number]  : 1960  Age: 64 y.o.  Date of assessment: 2024  PCP: Neelima Rivera M.D.  Persons in attendance: Patient    HPI: Per Medical Record: \"Terrell Hensley is a 63 year-old male who presented 2024 after suicidal attempt drug overdose.\" Patient was referred to [facility] for a psychotherapy session.     CHIEF COMPLAINT/PRESENTING ISSUE (as stated by Patient): Terrell Hensley is a 64 year old male seen lying on bed, alert, oriented to self and location, demonstrating significantly less distraction with internal stimuli. Patient was able to engage in the conversation and respond to questions appropriately. There was still some latency in response but less than previously. Patient reports he is still experiencing command voices, however believes he has been able to re-direct his thoughts and work to control the voices. Patient states, \"I have a thought that says, \"jump out the window\", and  I can tell myself, \"no that's stupid\", \"and not obey the voice\". Patient states he is still experiencing sleep disruption due to racing and intrusive thoughts. He is struggling to control these thoughts in the evenings, impacting his ability to sleep. This is a significant problem to address, as sleep deprivation can negatively impact psychiatric stabilization.    Patient is interested in going home. He was able to demonstrate future focus by discussing plans to finalize paperwork to apply for FMLA at his employer. He also hopes to live with his brother upon discharge, to have support and help in his recovery. Patient states, \"I realize I am behind in so many things at home, I need to get caught up\".     Clinician called patients brother, with patients permission. Discussed with brother Jean Paul, the plan to house patient in his home. Brother reports his family engage in a safety planning meeting with  " Nicole, psychiatrist, and feel they are ready to take on full time care of patient upon discharge. Clinician discussed at length the  details of a discharge plan to include medication management, psychotherapy support, nutrition and sleep hygiene. Patients brother and patient both committed to the plan with Dr. Rivera, and believe patient will continue to improve once discharged.    Brother was hoping the legal hold will be discontinued, stating his patient was at [facility] in the past and is concerned about returning to the facility. Clinician provided encouragement to both patient and brother validating their concern and confirming patient is moving in the right direction .    Chief Complaint   Patient presents with    Drug Overdose    Suicidal Ideation    Suicide Attempt        CURRENT LIVING SITUATION/SOCIAL SUPPORT: Patient's brother Jean Paul reports he will house patient upon discharge.      BEHAVIORAL HEALTH TREATMENT HISTORY  Does patient/parent report a history of prior behavioral health treatment for patient?   Patient reports a previous overdose. Brother Jean Paul reports patient was admitted to [facility] in the past.     SAFETY ASSESSMENT - SELF  Does patient acknowledge current or past symptoms of dangerousness to self? yes  Does parent/significant other report patient has current or past symptoms of dangerousness to self? yes  Does presenting problem suggest symptoms of dangerousness to self? Yes:      Past Current    Suicidal Thoughts: [x]  [x]    Suicidal Plans: [x]  [x]    Suicidal Intent: [x]  [x]    Suicide Attempts: [x]  [x]    Self-Injury []  []       For any boxes checked above, provide detail: At least two intentional ingestion of medications with the goal of committing suicide.     History of suicide by family member: no  History of suicide by friend/significant other: no  Recent change in frequency/specificity/intensity of suicidal thoughts or self-harm behavior?   Current access to  "firearms, medications, or other identified means of suicide/self-harm? no  If yes, willing to restrict access to means of suicide/self-harm? yes -   Protective factors present:   none reported     SAFETY ASSESSMENT - OTHERS  Does patient acknowledge current or past symptoms of aggressive behavior or risk to others? no  Does parent/significant other report patient has current or past symptoms of aggressive behavior or risk to others?  no  Does presenting problem suggest symptoms of dangerousness to others? No     Crisis Safety Plan completed and copy given to patient? no     ABUSE/NEGLECT SCREENING  Does patient report feeling “unsafe” in his/her home, or afraid of anyone?  no  Does patient report any history of physical, sexual, or emotional abuse?  no  Does parent or significant other report any of the above? no  Is there evidence of neglect by self?  yes  Is there evidence of neglect by a caregiver? no  Does the patient/parent report any history of CPS/APS/police involvement related to suspected abuse/neglect or domestic violence? no  Based on the information provided during the current assessment, is a mandated report of suspected abuse/neglect being made?  No     SUBSTANCE USE SCREENING  Yes:  Omar all substances used in the past 30 days:         Last Use Amount   []   Alcohol       []   Marijuana       []   Heroin       []   Prescription Opioids  (used without prescription, for    recreation, or in excess of prescribed amount)       []   Other Prescription  (used without prescription, for    recreation, or in excess of prescribed amount)       []   Cocaine       []   Methamphetamine       []   \"\" drugs (ectasy, MDMA)       []   Other substances                     UDS results: none reported  Breathalyzer results: none reported     What consequences does the patient associate with any of the above substance use and or addictive behaviors? None     Risk factors for detox (check all that apply):  []   " Seizures   []   Diaphoretic (sweating)   []   Tremors   []   Hallucinations   []   Increased blood pressure   []   Decreased blood pressure   []   Other   []   None       [] Patient education on risk factors for detoxification and instructed to return to ER as needed.        MENTAL STATUS              Participation: Limited verbal participation  Grooming: Neat  Orientation: Alert-  Behavior: Tense  Eye contact: Limited  Mood: Anxious  Affect: Blunted  Thought process: Loose associations but improved  Thought content: Evidence of delusion, preocuppation  Speech: Latency of response  Perception: Evidence of auditory hallucination  Memory:  Poor memory for chronology of events  Insight: Poor  Judgment:  Poor  Other:     Collateral information:   Source:   Significant other present in person:    Significant other by telephone-brother   [facility]   [facility] Record-reviewed   Other: consulted with psychiatry      Unable to complete full assessment due to:   Acute intoxication   Patient declined to participate/engage   Patient verbally unresponsive   Significant cognitive deficits   Significant perceptual distortions or behavioral disorganization   Other:              CLINICAL IMPRESSIONS:  Primary:  R/O Major Depressive disorder with psychotic features  Secondary:                                          IDENTIFIED NEEDS/PLAN:  [Trigger DISPOSITION list for any items marked]       Imminent safety risk - self   Imminent safety risk - others     Acute substance withdrawal   Psychosis/Impaired reality testing   x  Mood/anxiety   Substance use/Addictive behavior    x Maladaptive behavior   Parent/child conflict     Family/Couples conflict   Biomedical     Housing   Financial      Legal   Occupational/Educational     Domestic violence   Other:      Recommendations and Observation Level:  Sitter: one to one  Phone: yes  Visitors: yes  Personal belongings: no        Legal Hold: extended         Please transfer to an inpatient psychiatric facility    Nichole Damico, Ph.D., LCSW  8/12/2024   Length of intervention: 45 minutes

## 2024-08-12 NOTE — PROGRESS NOTES
Hospital Medicine Daily Progress Note    Date of Service  8/12/2024    Chief Complaint  Terrell Hensley is a 64 y.o. male admitted 7/23/2024 with SI attempt    Hospital Course  This is a 64-year-old male with past medical history of hypertension, mood disorder, who was admitted on 7/23/2024 after SI attempt.    Patient with encephalopathy, WILMAR, transaminitis, rhabdomyolysis, and high anion gap metabolic acidosis on admission. Patient had gonzalez in place and completed aggressive IVF.    Patient recently completed course of Ancef for buttock cellulitis.  MRI imaging of the lumbar spine was performed, noted iliac abscess 3.5 cm. Original plan was to have IR drain placed however fluid collection significantly reduced in size on day of drain placement, aspiration was performed, NGTD. Completed antiobiotic course 8/13.    Psychiatry was consulted, patient to discharge to inpatient psychiatry facility.    Interval Problem Update  Psychiatry, medications changed to Abilify and tapering off Ativan. Legal hold extended.     Patient recently completed course of Ancef for buttock cellulitis.  MRI imaging of the lumbar spine was performed, noted iliac abscess 3.5 cm. Original plan was to have IR drain placed however fluid collection significantly reduced in size on day of drain placement, aspiration was performed, NGTD.      Patient will be medically clear Tuesday 8/13/24 3pm.    I have discussed this patient's plan of care and discharge plan at IDT rounds today with Case Management, Nursing, Nursing leadership, and other members of the IDT team.    Consultants/Specialty  psychiatry    Code Status  Full Code    Disposition  Patient will be medically clear Tuesday 8/13/24 3pm to discharge to inpatient psych facility.    I have placed the appropriate orders for post-discharge needs.    Review of Systems  Review of Systems   All other systems reviewed and are negative.       Physical Exam  Temp:  [36.4 °C (97.5 °F)-36.8 °C  (98.2 °F)] 36.8 °C (98.2 °F)  Pulse:  [72-77] 75  Resp:  [16-18] 16  BP: (120-137)/(69-81) 137/74  SpO2:  [95 %-97 %] 96 %    Physical Exam  Vitals and nursing note reviewed.   Constitutional:       General: He is not in acute distress.     Appearance: Normal appearance.   HENT:      Head: Normocephalic and atraumatic.   Eyes:      Extraocular Movements: Extraocular movements intact.      Conjunctiva/sclera: Conjunctivae normal.      Pupils: Pupils are equal, round, and reactive to light.   Cardiovascular:      Rate and Rhythm: Normal rate and regular rhythm.      Pulses: Normal pulses.      Heart sounds: No murmur heard.     No friction rub. No gallop.   Pulmonary:      Effort: Pulmonary effort is normal. No respiratory distress.      Breath sounds: Normal breath sounds. No wheezing, rhonchi or rales.   Abdominal:      General: Bowel sounds are normal. There is no distension.      Palpations: Abdomen is soft.      Tenderness: There is no abdominal tenderness.   Musculoskeletal:         General: No swelling or tenderness. Normal range of motion.      Cervical back: Normal range of motion and neck supple. No muscular tenderness.      Right lower leg: No edema.      Left lower leg: No edema.   Skin:     General: Skin is warm and dry.      Capillary Refill: Capillary refill takes less than 2 seconds.      Findings: No bruising, erythema or rash.   Neurological:      General: No focal deficit present.      Mental Status: He is alert and oriented to person, place, and time.         Fluids    Intake/Output Summary (Last 24 hours) at 8/12/2024 1313  Last data filed at 8/12/2024 0504  Gross per 24 hour   Intake 120 ml   Output 3000 ml   Net -2880 ml       Laboratory  Recent Labs     08/10/24  1221 08/12/24  1145   WBC 7.1 7.0   RBC 4.10* 4.11*   HEMOGLOBIN 12.1* 12.3*   HEMATOCRIT 36.2* 36.2*   MCV 88.3 88.1   MCH 29.5 29.9   MCHC 33.4 34.0   RDW 43.5 42.1   PLATELETCT 448* 444   MPV 8.2* 8.3*       Recent Labs      08/10/24  1221 08/12/24  1145   SODIUM 129* 129*   POTASSIUM 3.9 4.0   CHLORIDE 92* 94*   CO2 24 23   GLUCOSE 126* 139*   BUN 13 10   CREATININE 0.67 0.67   CALCIUM 9.0 9.0                       Imaging  CT-IMAGE-GUIDED DRAIN PERITONEAL   Final Result      Successful LEFT iliacus CT-guided fluid aspiration.               MR-LUMBAR SPINE-WITH & W/O   Final Result      1.  There are multiloculated fluid collection noted involving left iliacus muscle likely representing abscess. It measures an approximately 3.5 cm in the transverse dimension.   2.  There is no evidence of osteomyelitis, discitis or epidural abscess.   3.  Degenerative disease as described above.      US-EXTREMITY VENOUS UPPER UNILAT RIGHT   Final Result      CT-PELVIS WITH   Final Result         1.  Low-density enlargement of the distal left psoas muscle and iliacus muscle extending to the anterior left thigh. Could represent changes of myositis or possibly early forming infectious process. No definite enhancing fluid collection identified to    indicate abscess at this time.   2.  Distended bladder with nondependent air, consider history of recent instrumentation or changes of urinary tract infection, correlate with urinalysis as clinically appropriate.   3.  Bilateral fat-containing inguinal hernias   4.  Diverticulosis      MR-CERVICAL SPINE-W/O   Final Result      1.  Degenerative disease in the segment cervical spine as described above.   2.  Left paracentral/foraminal disc protrusion at C6-7 causing severe left C7 neural foraminal stenosis.      MR-BRAIN-W/O   Final Result      1.  No acute abnormality.   2.  A few punctate nonspecific supratentorial T2 hyperintensities likely representing nonspecific foci of gliosis/chronic ischemia.   3.  Mild cerebral volume loss.      DX-FEMUR-2+ LEFT   Final Result      Unremarkable LEFT femur.      DX-HIP-COMPLETE - UNILATERAL 2+ LEFT   Final Result      No radiographic evidence of acute traumatic injury.       CT-HEAD W/O   Final Result         1.  No acute intracranial abnormality.   2.  Atherosclerosis.                    Assessment/Plan  * Drug overdose of undetermined intent, initial encounter- (present on admission)  Assessment & Plan  Apparent suicide attempt  Polypharmacy: some amlodipine but other ingestions unclear  Cont supportive care as clinically improving  On legal hold  Psychiatry team following  Case discussed today with psychiatry, medications changed to Abilify and Ativan. Legal hold extended.    Abscess of iliac fossa  Assessment & Plan  Completed course of ancef for buttock cellulitis    Patient recently completed course of Ancef for buttock cellulitis.  MRI imaging of the lumbar spine was performed, noted iliac abscess 3.5 cm. Original plan was to have IR drain placed however fluid collection significantly reduced in size, aspiration was performed.  Will follow cultures.MRSA swab negative, continue Zosyn.    Cellulitis of buttock  Assessment & Plan  Patient with erythema, warmth, pain left buttock s/p found down at home.  CT pelvis with contrast- negative for abscess  Completed ancef 2gm IV q 8 x 7 days.  Wound care  Off loading, out of bed, increase activity.    Acute renal failure with tubular necrosis (HCC)- (present on admission)  Assessment & Plan  Likely 2/2 prerenal damage from hypotension 2/2 drug overdose and uremia related to rhabdomyolysis.  Creat imroving daily: 1.4 today  Good UOP  Cont LR until CPK<3k  Daily BMP  Renally dose medications as appropriate  Resolved    Acute encephalopathy- (present on admission)  Assessment & Plan  Acute metabolic/toxic encephalopathy  CT head neg  Mental status improving daily though still not completely back to normal  Cont to monitor clinically  Repeat lab in am  Minimize sedating medications  Resolved. MRI of the brain without acute changes.       Hypertension- (present on admission)  Assessment & Plan  Hx of HTN, holding home meds in light of  hypotension  Blood pressure still elevated.  Increase losartan to 100 mg daily  Continue amlodipine 10 mg daily  Added coreg 6.25mg BID  Monitor BP    Thrombophlebitis of cephalic vein- (present on admission)  Assessment & Plan  Right upper extremity pain improving  Ultrasound with no DVT but does have occlusive superficial thrombosis in the cephalic vein.  Warm compresses ordered    Neck pain, acute- (present on admission)  Assessment & Plan  Patient complaining of LLE numbness, neck pain and bilateral arm numbness.  Head injury 4/2024.  Ordered MRI c spine with head MRI - negative  Oxycodone prn pain.    Anxiety- (present on admission)  Assessment & Plan  Increased anxiety noted on exam.  Psychiatry added seroquel 50mg qhs, zoloft resumed for a.m.   Ativan prn  Klonopin bid.    Anemia- (present on admission)  Assessment & Plan  Mild   Cont to monitor    Urinary incontinence- (present on admission)  Assessment & Plan  -oxybutynin held due to drug overdose    Rhabdomyolysis- (present on admission)  Assessment & Plan  CK elevated to 16k, suspected 2/2 being found down and drug overdose.  Cont agressive IVFs but change to LR from NS  Follow daily AST, ALT, Bun, Creat and K  Resolved    Elevated liver enzymes- (present on admission)  Assessment & Plan  Transaminase elevation likely from Rhabdo and not acute liver injury though certainly could have a toxic component from OD  Coming down as expected with resolution of Rhabdo  Cont to monitor daily CMP  Consider further evaluation if it does not resolve as CPK drops and Rhabdo resolves    High anion gap metabolic acidosis- (present on admission)  Assessment & Plan  Likely 2/2 prerenal damage from hypotension 2/2 drug overdose and uremia related to rhabdomyolysis.  -fluid resuscitation with NS 200cc/hr  -IV potassium 10meq x4  Resolved    Mitral regurgitation- (present on admission)  Assessment & Plan  Suspected murmur, though difficult to auscultate. Unclear if  preexisting or related to overdose.   Does not appear to have any clinically significant heart failure symptoms    Hyponatremia- (present on admission)  Assessment & Plan  Likely hypovolemic hypoNa  Has resolved with IVF resuscitation  Cont to monitor         VTE prophylaxis: Xarelto ppx    I have performed a physical exam and reviewed and updated ROS and Plan today (8/12/2024). In review of yesterday's note (8/11/2024), there are no changes except as documented above.

## 2024-08-13 LAB
25(OH)D3 SERPL-MCNC: 29 NG/ML (ref 30–100)
ANION GAP SERPL CALC-SCNC: 12 MMOL/L (ref 7–16)
APPEARANCE UR: CLEAR
BILIRUB UR QL STRIP.AUTO: NEGATIVE
BUN SERPL-MCNC: 11 MG/DL (ref 8–22)
CALCIUM SERPL-MCNC: 8.8 MG/DL (ref 8.5–10.5)
CHLORIDE SERPL-SCNC: 93 MMOL/L (ref 96–112)
CO2 SERPL-SCNC: 22 MMOL/L (ref 20–33)
COLOR UR: YELLOW
CREAT SERPL-MCNC: 0.72 MG/DL (ref 0.5–1.4)
CREAT UR-MCNC: 21.21 MG/DL
GFR SERPLBLD CREATININE-BSD FMLA CKD-EPI: 101 ML/MIN/1.73 M 2
GLUCOSE SERPL-MCNC: 145 MG/DL (ref 65–99)
GLUCOSE UR STRIP.AUTO-MCNC: NEGATIVE MG/DL
KETONES UR STRIP.AUTO-MCNC: NEGATIVE MG/DL
LEUKOCYTE ESTERASE UR QL STRIP.AUTO: NEGATIVE
MAGNESIUM SERPL-MCNC: 1.9 MG/DL (ref 1.5–2.5)
MICRO URNS: NORMAL
NITRITE UR QL STRIP.AUTO: NEGATIVE
OSMOLALITY UR: 160 MOSM/KG H2O (ref 300–900)
PH UR STRIP.AUTO: 7 [PH] (ref 5–8)
PHOSPHATE SERPL-MCNC: 2.7 MG/DL (ref 2.5–4.5)
POTASSIUM SERPL-SCNC: 4 MMOL/L (ref 3.6–5.5)
PROT UR QL STRIP: NEGATIVE MG/DL
RBC UR QL AUTO: NEGATIVE
SODIUM SERPL-SCNC: 127 MMOL/L (ref 135–145)
SODIUM UR-SCNC: 27 MMOL/L
SP GR UR STRIP.AUTO: 1.01
TSH SERPL DL<=0.005 MIU/L-ACNC: 2.42 UIU/ML (ref 0.38–5.33)
UROBILINOGEN UR STRIP.AUTO-MCNC: 0.2 MG/DL
VIT B12 SERPL-MCNC: 527 PG/ML (ref 211–911)

## 2024-08-13 PROCEDURE — 82306 VITAMIN D 25 HYDROXY: CPT

## 2024-08-13 PROCEDURE — 700102 HCHG RX REV CODE 250 W/ 637 OVERRIDE(OP): Performed by: INTERNAL MEDICINE

## 2024-08-13 PROCEDURE — 36415 COLL VENOUS BLD VENIPUNCTURE: CPT

## 2024-08-13 PROCEDURE — 84300 ASSAY OF URINE SODIUM: CPT

## 2024-08-13 PROCEDURE — 83735 ASSAY OF MAGNESIUM: CPT

## 2024-08-13 PROCEDURE — A9270 NON-COVERED ITEM OR SERVICE: HCPCS | Performed by: GENERAL PRACTICE

## 2024-08-13 PROCEDURE — A9270 NON-COVERED ITEM OR SERVICE: HCPCS | Performed by: STUDENT IN AN ORGANIZED HEALTH CARE EDUCATION/TRAINING PROGRAM

## 2024-08-13 PROCEDURE — 84100 ASSAY OF PHOSPHORUS: CPT

## 2024-08-13 PROCEDURE — 700102 HCHG RX REV CODE 250 W/ 637 OVERRIDE(OP): Performed by: GENERAL PRACTICE

## 2024-08-13 PROCEDURE — 99232 SBSQ HOSP IP/OBS MODERATE 35: CPT | Mod: GC | Performed by: STUDENT IN AN ORGANIZED HEALTH CARE EDUCATION/TRAINING PROGRAM

## 2024-08-13 PROCEDURE — 700102 HCHG RX REV CODE 250 W/ 637 OVERRIDE(OP): Performed by: STUDENT IN AN ORGANIZED HEALTH CARE EDUCATION/TRAINING PROGRAM

## 2024-08-13 PROCEDURE — 80048 BASIC METABOLIC PNL TOTAL CA: CPT

## 2024-08-13 PROCEDURE — 99233 SBSQ HOSP IP/OBS HIGH 50: CPT | Performed by: INTERNAL MEDICINE

## 2024-08-13 PROCEDURE — 82607 VITAMIN B-12: CPT

## 2024-08-13 PROCEDURE — 82570 ASSAY OF URINE CREATININE: CPT

## 2024-08-13 PROCEDURE — 770001 HCHG ROOM/CARE - MED/SURG/GYN PRIV*

## 2024-08-13 PROCEDURE — 700105 HCHG RX REV CODE 258: Performed by: GENERAL PRACTICE

## 2024-08-13 PROCEDURE — A9270 NON-COVERED ITEM OR SERVICE: HCPCS | Performed by: INTERNAL MEDICINE

## 2024-08-13 PROCEDURE — 84443 ASSAY THYROID STIM HORMONE: CPT

## 2024-08-13 PROCEDURE — 700111 HCHG RX REV CODE 636 W/ 250 OVERRIDE (IP): Mod: JZ | Performed by: GENERAL PRACTICE

## 2024-08-13 PROCEDURE — 81003 URINALYSIS AUTO W/O SCOPE: CPT

## 2024-08-13 PROCEDURE — 83935 ASSAY OF URINE OSMOLALITY: CPT

## 2024-08-13 RX ORDER — ERGOCALCIFEROL 1.25 MG/1
50000 CAPSULE, LIQUID FILLED ORAL
Status: DISCONTINUED | OUTPATIENT
Start: 2024-08-13 | End: 2024-08-22 | Stop reason: HOSPADM

## 2024-08-13 RX ORDER — LANOLIN ALCOHOL/MO/W.PET/CERES
400 CREAM (GRAM) TOPICAL 2 TIMES DAILY
Status: COMPLETED | OUTPATIENT
Start: 2024-08-13 | End: 2024-08-13

## 2024-08-13 RX ORDER — FINASTERIDE 5 MG/1
5 TABLET, FILM COATED ORAL DAILY
Status: DISCONTINUED | OUTPATIENT
Start: 2024-08-13 | End: 2024-08-22 | Stop reason: HOSPADM

## 2024-08-13 RX ADMIN — LORAZEPAM 0.5 MG: 0.5 TABLET ORAL at 06:07

## 2024-08-13 RX ADMIN — AMLODIPINE BESYLATE 10 MG: 10 TABLET ORAL at 06:07

## 2024-08-13 RX ADMIN — CARVEDILOL 6.25 MG: 6.25 TABLET, FILM COATED ORAL at 16:36

## 2024-08-13 RX ADMIN — RIVAROXABAN 10 MG: 10 TABLET, FILM COATED ORAL at 16:36

## 2024-08-13 RX ADMIN — SENNOSIDES AND DOCUSATE SODIUM 2 TABLET: 50; 8.6 TABLET ORAL at 16:36

## 2024-08-13 RX ADMIN — ARIPIPRAZOLE 10 MG: 10 TABLET ORAL at 06:07

## 2024-08-13 RX ADMIN — PIPERACILLIN AND TAZOBACTAM 4.5 G: 4; .5 INJECTION, POWDER, FOR SOLUTION INTRAVENOUS at 11:43

## 2024-08-13 RX ADMIN — ERGOCALCIFEROL 50000 UNITS: 1.25 CAPSULE ORAL at 11:40

## 2024-08-13 RX ADMIN — VENLAFAXINE HYDROCHLORIDE 150 MG: 75 CAPSULE, EXTENDED RELEASE ORAL at 08:21

## 2024-08-13 RX ADMIN — LOSARTAN POTASSIUM 100 MG: 50 TABLET, FILM COATED ORAL at 06:07

## 2024-08-13 RX ADMIN — Medication 400 MG: at 11:40

## 2024-08-13 RX ADMIN — Medication 400 MG: at 16:36

## 2024-08-13 RX ADMIN — PIPERACILLIN AND TAZOBACTAM 4.5 G: 4; .5 INJECTION, POWDER, FOR SOLUTION INTRAVENOUS at 06:06

## 2024-08-13 RX ADMIN — TRAZODONE HYDROCHLORIDE 100 MG: 50 TABLET ORAL at 21:40

## 2024-08-13 RX ADMIN — LORAZEPAM 0.5 MG: 0.5 TABLET ORAL at 16:36

## 2024-08-13 RX ADMIN — CARVEDILOL 6.25 MG: 6.25 TABLET, FILM COATED ORAL at 08:21

## 2024-08-13 RX ADMIN — FINASTERIDE 5 MG: 5 TABLET, FILM COATED ORAL at 10:15

## 2024-08-13 RX ADMIN — DIBASIC SODIUM PHOSPHATE, MONOBASIC POTASSIUM PHOSPHATE AND MONOBASIC SODIUM PHOSPHATE 500 MG: 852; 155; 130 TABLET ORAL at 11:40

## 2024-08-13 ASSESSMENT — ENCOUNTER SYMPTOMS
NERVOUS/ANXIOUS: 1
INSOMNIA: 1
HALLUCINATIONS: 0

## 2024-08-13 ASSESSMENT — LIFESTYLE VARIABLES: SUBSTANCE_ABUSE: 0

## 2024-08-13 ASSESSMENT — PAIN DESCRIPTION - PAIN TYPE: TYPE: ACUTE PAIN

## 2024-08-13 NOTE — PROGRESS NOTES
[facility] Medicine Daily Progress Note    Date of Service  8/13/2024    Chief Complaint  Terrell Hensley is a 64 y.o. male admitted 7/23/2024 with SI attempt    Hospital Course  This is a 64-year-old male with past medical history of hypertension, mood disorder, who was admitted on 7/23/2024 after SI attempt.    Patient with encephalopathy, WILAMR, transaminitis, rhabdomyolysis, and high anion gap metabolic acidosis on admission. Patient had gonzalez in place and completed aggressive IVF.    Patient recently completed course of Ancef for buttock cellulitis.  MRI imaging of the lumbar spine was performed, noted iliac abscess 3.5 cm. Original plan was to have IR drain placed however fluid collection significantly reduced in size on day of drain placement, aspiration was performed, NGTD. Completed antiobiotic course 8/13.    Psychiatry was consulted, patient to discharge to inpatient psychiatry facility.    Interval Problem Update  Psychiatry, medications changed to Abilify and tapering off Ativan. Legal hold extended.     Patient recently completed course of Ancef for buttock cellulitis.  MRI imaging of the lumbar spine was performed, noted iliac abscess 3.5 cm. Original plan was to have IR drain placed however fluid collection significantly reduced in size on day of drain placement, aspiration was performed, NGTD.      Patient will be medically clear Tuesday 8/13/24 3pm.    Patient was seen and examined at bedside.  I have personally reviewed and interpreted vitals, labs, and imaging.    8/13.  Afebrile.  Stable vitals.  On room air.  Denies fevers, chills, chest pains, shortness of breath.  Denies fever, chills, chest pains, shortness of breath.  He does complain his abdominal bloating.  Completed course of Zosyn today for iliac abscess.  Discussed with psychiatry.  There is no concerns for hyponatremia possibly secondary to SIADH.  Increase activity mobilization  Na 129 > 127    I have discussed this patient's plan  of care and discharge plan at IDT rounds today with Case Management, Nursing, Nursing leadership, and other members of the IDT team.    Consultants/Specialty  psychiatry    Code Status  Full Code    Disposition  Pending inpatient psych.    I have placed the appropriate orders for post-discharge needs.    Review of Systems  Review of Systems   All other systems reviewed and are negative.       Physical Exam  Temp:  [36.7 °C (98 °F)-36.8 °C (98.2 °F)] 36.7 °C (98 °F)  Pulse:  [71-75] 71  Resp:  [16-18] 18  BP: (131-137)/(74-80) 131/80  SpO2:  [96 %-97 %] 97 %    Physical Exam  Vitals and nursing note reviewed.   Constitutional:       General: He is not in acute distress.     Appearance: Normal appearance.   HENT:      Head: Normocephalic and atraumatic.   Eyes:      Extraocular Movements: Extraocular movements intact.      Conjunctiva/sclera: Conjunctivae normal.      Pupils: Pupils are equal, round, and reactive to light.   Cardiovascular:      Rate and Rhythm: Normal rate and regular rhythm.      Pulses: Normal pulses.      Heart sounds: No murmur heard.     No friction rub. No gallop.   Pulmonary:      Effort: Pulmonary effort is normal. No respiratory distress.      Breath sounds: Normal breath sounds. No wheezing, rhonchi or rales.   Abdominal:      General: Bowel sounds are normal. There is no distension.      Palpations: Abdomen is soft.      Tenderness: There is no abdominal tenderness.   Musculoskeletal:         General: No swelling or tenderness. Normal range of motion.      Cervical back: Normal range of motion and neck supple. No muscular tenderness.      Right lower leg: No edema.      Left lower leg: No edema.   Skin:     General: Skin is warm and dry.      Capillary Refill: Capillary refill takes less than 2 seconds.      Findings: No bruising, erythema or rash.   Neurological:      General: No focal deficit present.      Mental Status: He is alert and oriented to person, place, and time.          Fluids    Intake/Output Summary (Last 24 hours) at 8/13/2024 0552  Last data filed at 8/12/2024 2151  Gross per 24 hour   Intake --   Output 700 ml   Net -700 ml       Laboratory  Recent Labs     08/10/24  1221 08/12/24  1145   WBC 7.1 7.0   RBC 4.10* 4.11*   HEMOGLOBIN 12.1* 12.3*   HEMATOCRIT 36.2* 36.2*   MCV 88.3 88.1   MCH 29.5 29.9   MCHC 33.4 34.0   RDW 43.5 42.1   PLATELETCT 448* 444   MPV 8.2* 8.3*       Recent Labs     08/10/24  1221 08/12/24  1145   SODIUM 129* 129*   POTASSIUM 3.9 4.0   CHLORIDE 92* 94*   CO2 24 23   GLUCOSE 126* 139*   BUN 13 10   CREATININE 0.67 0.67   CALCIUM 9.0 9.0                       Imaging  CT-IMAGE-GUIDED DRAIN PERITONEAL   Final Result      Successful LEFT iliacus CT-guided fluid aspiration.               MR-LUMBAR SPINE-WITH & W/O   Final Result      1.  There are multiloculated fluid collection noted involving left iliacus muscle likely representing abscess. It measures an approximately 3.5 cm in the transverse dimension.   2.  There is no evidence of osteomyelitis, discitis or epidural abscess.   3.  Degenerative disease as described above.      US-EXTREMITY VENOUS UPPER UNILAT RIGHT   Final Result      CT-PELVIS WITH   Final Result         1.  Low-density enlargement of the distal left psoas muscle and iliacus muscle extending to the anterior left thigh. Could represent changes of myositis or possibly early forming infectious process. No definite enhancing fluid collection identified to    indicate abscess at this time.   2.  Distended bladder with nondependent air, consider history of recent instrumentation or changes of urinary tract infection, correlate with urinalysis as clinically appropriate.   3.  Bilateral fat-containing inguinal hernias   4.  Diverticulosis      MR-CERVICAL SPINE-W/O   Final Result      1.  Degenerative disease in the segment cervical spine as described above.   2.  Left paracentral/foraminal disc protrusion at C6-7 causing severe left C7  neural foraminal stenosis.      MR-BRAIN-W/O   Final Result      1.  No acute abnormality.   2.  A few punctate nonspecific supratentorial T2 hyperintensities likely representing nonspecific foci of gliosis/chronic ischemia.   3.  Mild cerebral volume loss.      DX-FEMUR-2+ LEFT   Final Result      Unremarkable LEFT femur.      DX-HIP-COMPLETE - UNILATERAL 2+ LEFT   Final Result      No radiographic evidence of acute traumatic injury.      CT-HEAD W/O   Final Result         1.  No acute intracranial abnormality.   2.  Atherosclerosis.                    Assessment/Plan  * Drug overdose of undetermined intent, initial encounter- (present on admission)  Assessment & Plan  8/13/2024  Apparent suicide attempt  Polypharmacy: some amlodipine but other ingestions unclear  Cont supportive care as clinically improving  On legal hold  Psychiatry team following  Case discussed today with psychiatry, medications changed to Abilify and Ativan. Legal hold extended.    Abscess of iliac fossa  Assessment & Plan  8/13/2024  Completed course of ancef for buttock cellulitis    Patient recently completed course of Ancef for buttock cellulitis.  MRI imaging of the lumbar spine was performed, noted iliac abscess 3.5 cm. Original plan was to have IR drain placed however fluid collection significantly reduced in size, aspiration was performed.  Will follow cultures.MRSA swab negative, continue Zosyn.    Thrombophlebitis of cephalic vein- (present on admission)  Assessment & Plan  8/13/2024  Right upper extremity pain improving  Ultrasound with no DVT but does have occlusive superficial thrombosis in the cephalic vein.  Warm compresses ordered    Neck pain, acute- (present on admission)  Assessment & Plan  8/13/2024  Patient complaining of LLE numbness, neck pain and bilateral arm numbness.  Head injury 4/2024.  Ordered MRI c spine with head MRI - negative  Oxycodone prn pain.     Cellulitis of buttock  Assessment & Plan  8/13/2024  Patient  with erythema, warmth, pain left buttock s/p found down at home.  CT pelvis with contrast- negative for abscess  Completed ancef 2gm IV q 8 x 7 days.  Wound care  Off loading, out of bed, increase activity.    Anxiety- (present on admission)  Assessment & Plan  8/13/2024  Increased anxiety noted on exam.  Psychiatry added seroquel 50mg qhs, zoloft resumed for a.m.   Ativan prn  Klonopin bid.    Anemia- (present on admission)  Assessment & Plan  8/13/2024  Mild   Cont to monitor    Urinary incontinence- (present on admission)  Assessment & Plan  8/13/2024  -oxybutynin held due to drug overdose    Rhabdomyolysis- (present on admission)  Assessment & Plan  8/13/2024  CK elevated to 16k, suspected 2/2 being found down and drug overdose.  Cont agressive IVFs but change to LR from NS  Follow daily AST, ALT, Bun, Creat and K  Resolved    Elevated liver enzymes- (present on admission)  Assessment & Plan  8/13/2024  Transaminase elevation likely from Rhabdo and not acute liver injury though certainly could have a toxic component from OD  Coming down as expected with resolution of Rhabdo  Cont to monitor daily CMP  Consider further evaluation if it does not resolve as CPK drops and Rhabdo resolves    Acute renal failure with tubular necrosis (HCC)- (present on admission)  Assessment & Plan  8/13/2024  Likely 2/2 prerenal damage from hypotension 2/2 drug overdose and uremia related to rhabdomyolysis.  Creat imroving daily: 1.4 today  Good UOP  Cont LR until CPK<3k  Daily BMP  Renally dose medications as appropriate  Resolved    High anion gap metabolic acidosis- (present on admission)  Assessment & Plan  8/13/2024  Likely 2/2 prerenal damage from hypotension 2/2 drug overdose and uremia related to rhabdomyolysis.  -fluid resuscitation with NS 200cc/hr  -IV potassium 10meq x4  Resolved    Mitral regurgitation- (present on admission)  Assessment & Plan  8/13/2024  Suspected murmur, though difficult to auscultate. Unclear if  preexisting or related to overdose.   Does not appear to have any clinically significant heart failure symptoms    Acute encephalopathy- (present on admission)  Assessment & Plan  8/13/2024  Acute metabolic/toxic encephalopathy  CT head neg  Mental status improving daily though still not completely back to normal  Cont to monitor clinically  Repeat lab in am  Minimize sedating medications  Resolved. MRI of the brain without acute changes.     Hypertension- (present on admission)  Assessment & Plan  8/13/2024  Hx of HTN, holding home meds in light of hypotension  Blood pressure still elevated.  Increase losartan to 100 mg daily  Continue amlodipine 10 mg daily  Added coreg 6.25mg BID  Monitor BP    Hyponatremia- (present on admission)  Assessment & Plan  8/13/2024  Likely hypovolemic hypoNa  Has resolved with IVF resuscitation  Cont to monitor         VTE prophylaxis: Xarelto ppx    I have performed a physical exam and reviewed and updated ROS and Plan today (8/13/2024). In review of yesterday's note (8/12/2024), there are no changes except as documented above.    Greater than 51 minutes spent prepping to see patient (e.g. review of tests) obtaining and/or reviewing separately obtained history. Performing a medically appropriate examination and/ evaluation.  Counseling and educating the patient/family/caregiver.  Ordering medications, tests, or procedures.  Referring and communicating with other health care professionals.  Documenting clinical information in EPIC.  Independently interpreting results and communicating results to patient/family/caregiver.  Care coordination.

## 2024-08-13 NOTE — CARE PLAN
Problem: Pain - Standard  Goal: Alleviation of pain or a reduction in pain to the patient’s comfort goal  Description: Target End Date:  Prior to discharge or change in level of care    Document on Vitals flowsheet    1.  Document pain using the appropriate pain scale per order or unit policy  2.  Educate and implement non-pharmacologic comfort measures (i.e. relaxation, distraction, massage, cold/heat therapy, etc.)  3.  Pain management medications as ordered  4.  Reassess pain after pain med administration per policy  5.  If opiods administered assess patient's response to pain medication is appropriate per POSS sedation scale  6.  Follow pain management plan developed in collaboration with patient and interdisciplinary team (including palliative care or pain specialists if applicable)  Outcome: Progressing  Flowsheets (Taken 8/13/2024 1028)  OB Pain Intervention:   Medication - See MAR   Rest   Distraction   Breathing technique   Relaxation technique   The patient is Stable - Low risk of patient condition declining or worsening    Shift Goals  Clinical Goals: antibiotic treatment  Patient Goals: fall safety;rest  Family Goals: ansley    Progress made toward(s) clinical / shift goals:  Patient received in bed he is alert and oriented x 4 on room air with no acute distress noted. Ambulates with front wheel walker. Education provided regarding pain management including non pharmacological measures such as rest and relaxation. Urine sample collected and submitted to lab as ordered by MD     Patient is not progressing towards the following goals:

## 2024-08-13 NOTE — CONSULTS
"PSYCHIATRIC FOLLOW-UP: (established)  Reason for admission: Overdose, presumed suicide attempt  Legal Hold Status: Active, extended  Chart reviewed.       Pt ambulating with four-wheel walker. Adherent on current medications.     HPI:      The patient reports that his mood is\" okay.\"  Much of his anxiety centers around returning to work because he still needs to use a four-wheel walker to get around.  This is secondary to hip pain and knee pain.  He reports even prior to admission to the hospital he was having hip and knee pain.  He is worrying about returning to work and also checking his mail which has not been checked in almost a month.  He does report that his auditory hallucinations are becoming less intense, rates it as a 2 out of 10 with 10 being the worst.  He denies having any visual hallucinations.  He does not express paranoid delusions during our interview.  He reports his appetite is okay.  Denies any side effects from the recent increase in Abilify.  He does state he has been feeling sleepy and so we did discuss decreasing Ativan.  He was agreeable to this.  He denies any thoughts of harming himself because\" look what happened when I did that, I just hit myself further back.\"  He denies any thoughts of harming others.    Hyponatremia still noted today on labs, spoke with internal medicine physician about the possibility of the patient having adrenal insufficiency given hyponatremia, hyperpigmentation and arthralgias.  She reports she will look into this diagnosis.    Reviewed nursing notes.  No behavioral problems noted over the weekend.  Patient has been compliant with medications.  Patient has not received any as needed psychiatric medications     Medical ROS (as pertinent):     Review of Systems   The patient denies any chest pain or shortness of breath  The patient denies any nausea, vomiting, abdominal pain, diarrhea or constipation  The patient does endorse joint pain and occasional myalgias  The " "patient denies any dizziness, syncope, falls, tremors or dyskinesias     Psychiatric Examination:  Vitals:   Vitals:    08/12/24 2005   BP: 131/80   Pulse: 71   Resp: 18   Temp: 36.7 °C (98 °F)   SpO2: 97%          General Appearance: Appears state age, appropriate grooming & hygiene, in no acute distress  Behavior:               -Slowed movements with far-off stare, cooperative              -No tremors noted, no tics, dyskinesias or dystonias. NO cogwheeling or rigidity              -Less psychomotor slowing              -No posture or gait abnormalities appreciated  Speech: Slightly slowed rate though improved from previous, no latency.  Normal volume   Language: English  Thought processes: Linear, logical and goal-directed  Thought content: No evidence of SI/HI/AVH during this assessment. Not observed responding to internal stimuli. No evidence of delusions or paranoia.  He reports that auditory hallucinations have decreased in intensity  Mood: \"Okay, worried about going back to work\"  Affect: Congruent with stated mood.  Did have a social smile today   judgement and Insight: Fair/fair  Cognition:               -Alert & oriented x 4 (Person, place and time and situation)              -Attention & concentration grossly intact though strained              -Immediate/delayed memory not formally tested but grossly intact              -Age-appropriate fund of knowledge        New PAST MEDICAL/PSYCH/FAMILY/SOCIAL(as reported by patient):                None      Current Facility-Administered Medications:     LORazepam (Ativan) tablet 0.5 mg, 0.5 mg, Oral, BID, Tahira Fabara, D.O., 0.5 mg at 08/12/24 1707    ARIPiprazole (Abilify) tablet 10 mg, 10 mg, Oral, DAILY, Tahira Fabara, D.O., 10 mg at 08/12/24 0501    rivaroxaban (Xarelto) tablet 10 mg, 10 mg, Oral, DAILY AT 1800, Tahira Fabara, D.O., 10 mg at 08/12/24 1707    senna-docusate (Pericolace Or Senokot S) 8.6-50 MG per tablet 2 Tablet, 2 Tablet, Oral, Q EVENING, " 2 Tablet at 24 1707 **AND** polyethylene glycol/lytes (Miralax) Packet 1 Packet, 1 Packet, Oral, QDAY PRN, ELLEN LyOJeremy    carvedilol (Coreg) tablet 6.25 mg, 6.25 mg, Oral, BID WITH MEALS, ELLEN LyO., 6.25 mg at 24 1707    [COMPLETED] piperacillin-tazobactam (Zosyn) 4.5 g in  mL IVPB, 4.5 g, Intravenous, Once, Stopped at 24 1624 **AND** piperacillin-tazobactam (Zosyn) 4.5 g in  mL IVPB, 4.5 g, Intravenous, Q8HRS, RYAN Ly.O., Stopped at 24 1646    traZODone (Desyrel) tablet 100 mg, 100 mg, Oral, QHS, Tahira Guzman D.O., 100 mg at 24 2102    venlafaxine XR (Effexor XR) capsule 150 mg, 150 mg, Oral, QDAY with Breakfast, Tahira Guzman D.O., 150 mg at 24 0815    losartan (Cozaar) tablet 100 mg, 100 mg, Oral, Q DAY, Roman Valle M.D., 100 mg at 24 0501    amLODIPine (Norvasc) tablet 10 mg, 10 mg, Oral, Q DAY, Roman Valle M.D., 10 mg at 24 0501    oxyCODONE immediate-release (Roxicodone) tablet 5 mg, 5 mg, Oral, Q4HRS PRN, Jaimie Church M.D., 5 mg at 24 2007    magnesium hydroxide (Milk Of Magnesia) suspension 30 mL, 30 mL, Oral, QDAY PRN, PALMA CotePJeremyRJeremyN., 30 mL at 24 0438    acetaminophen (Tylenol) tablet 650 mg, 650 mg, Oral, Q4HRS PRN, ELLEN AguirreOJeremy, 650 mg at 24 0922    ondansetron (Zofran) syringe/vial injection 4 mg, 4 mg, Intravenous, Q4HRS PRN, Cristofer Tejeda D.O.    Allergies   Allergen Reactions    Sulfa Drugs Hives and Rash                                 EKG:          Results for orders placed or performed during the hospital encounter of 24   EKG   Result Value Ref Range     Report           [facility]     Test Date:  2024  Pt Name:    FAIZAN GARDNER             Department: ER  MRN:        [id number]                      Room:        12  Gender:     Male                         Technician: [id number]  :         1960                   Requested By:ER TRIAGE PROTOCOL  Order #:    [id number]                    Reading MD: ILDEFONSO ARNOLD DO     Measurements  Intervals                                Axis  Rate:       85                           P:          61  WY:         153                          QRS:        63  QRSD:       106                          T:          25  QT:         413  QTc:        492     Interpretive Statements  Sinus rhythm  Probable left atrial enlargement  Borderline prolonged QT interval  Compared to ECG 2024 17:09:50  No significant changes  Electronically Signed On 2024 00:56:04 PDT by ILDEFONSO ARNOLD DO      EKG   Result Value Ref Range     Report           [facility]     Test Date:  2024  Pt Name:    FAIZAN GARDNER             Department: 61  MRN:        [id number]                      Room:       [facility]  Gender:     Male                         Technician: ALANNA  :        1960                   Requested By:ANICETO AMBRIZ  Order #:    [id number]                    Reading MD: Lucio Guzman MD     Measurements  Intervals                                Axis  Rate:       82                           P:          67  WY:         176                          QRS:        55  QRSD:       93                           T:          35  QT:         355  QTc:        415     Interpretive Statements  Sinus rhythm  Consider right atrial enlargement  Compared to ECG 2024 19:51:19  No significant changes  Electronically Signed On 2024 06:48:42 PDT by Lucio Guzman MD         Brain Imaging: No new imaging  EEG: No new EEG     Labs personally reviewed:   Lab Results   Component Value Date/Time    SODIUM 129 (L) 2024 11:45 AM    POTASSIUM 4.0 2024 11:45 AM    CHLORIDE 94 (L) 2024 11:45 AM    CO2 23 2024 11:45 AM    GLUCOSE 139 (H) 2024 11:45 AM    BUN 10 2024 11:45 AM    CREATININE 0.67 2024 11:45 AM    BUNCREATRAT  "16 05/15/2023 11:24 AM      Recent Labs     08/07/24  0707 08/10/24  1221   ASTSGOT 15 13   ALTSGPT 21 22   TBILIRUBIN 0.5 0.3   GLOBULIN 2.9 3.1   INR 1.08  --      Lab Results   Component Value Date/Time    WBC 7.0 08/12/2024 11:45 AM    RBC 4.11 (L) 08/12/2024 11:45 AM    HEMOGLOBIN 12.3 (L) 08/12/2024 11:45 AM    HEMATOCRIT 36.2 (L) 08/12/2024 11:45 AM    MCV 88.1 08/12/2024 11:45 AM    MCH 29.9 08/12/2024 11:45 AM    MCHC 34.0 08/12/2024 11:45 AM    MPV 8.3 (L) 08/12/2024 11:45 AM    NEUTSPOLYS 77.20 (H) 08/05/2024 07:39 AM    LYMPHOCYTES 13.30 (L) 08/05/2024 07:39 AM    MONOCYTES 7.90 08/05/2024 07:39 AM    EOSINOPHILS 0.70 08/05/2024 07:39 AM    BASOPHILS 0.40 08/05/2024 07:39 AM         Brain Imaging:   CT head 7/23/24  FINDINGS:     The brain appears normal in volume and morphology. The ventricles are normal in caliber and configuration. No space occupying lesions or areas of acute vascular territory infarctions are identified. There are no abnormal extra axial fluid collections or   extra axial hemorrhage identified.     The visualized paranasal sinuses and mastoid air cells are well aerated bilaterally. No depressed calvarial fractures are identified. The visualized globes and retrobulbar soft tissues appear within normal limits.  Atherosclerotic intracranial   calcifications are seen.     IMPRESSION:        1.  No acute intracranial abnormality.  2.  Atherosclerosis.     EEG:  None    Tsh 3/26/24-0.890     Assessment:  Terrell Hensley is a 63 y.o. male admitted 7/23/2024 with altered mental status, found down after intentional OD of his home medications amlodipine in order to kill himself.      Today, the patient is endorsing improvement in auditory hallucinations.  Mood is \"okay.\"  He is endorsing anxiety regarding returning to work but today's does not express any paranoid or bizarre delusions.  He denies any thoughts of harming self or others.  He appears to be tolerating Effexor and Abilify " well.  He is reporting some sleepiness and does agree to a trial decrease of Ativan    Dx:  #Catatonia, excited -resolved  #MDD with Psychosis  R/O Schizoaffective disorder     #OCD  Rule out CYNTHIA  Rule out panic disorder  #Suicidal Ideation  #Drug Overdose     Medical :  Per primary team        Plan:  Legal hold: Extended, due to continued psychosis causing inability to care for self  Psychotropic medications  Continue Abilify 10 mg daily  Decrease Ativan to 0.5mg po BID for catatonia  Continue Effexor to 150mg po qD for depression  Continue Trazodone 100mg qHS for insomnia    Please transfer pt to inpatient psychiatric hospital when medically cleared and bed is available  Labs reviewed: Recommend ordering a repeat TSH, B12 and vitamin D level tomorrow a.m. to complete workup for depression  EKG reviewed  Old records reviewed/summarized  Safety plan completed, copy in chart  Discussed the case with: Dr. Guzman  Psychiatry will follow up      Thank you for the consult.      Sitter: 1:1  Phone: Yes room phone  Visitors: Yes, Jean Paul (brother), Cortes (brother), YOSELIN (son), Tressa (daughter)   Personal belongings:  NO

## 2024-08-13 NOTE — DISCHARGE PLANNING
Legal Hold     Referral: Legal Hold Court     Intervention: Pt presented for legal hold meeting with  via phone call to discuss legal options of contesting hold and meeting with the court doctors tomorrow afternoon, or not contesting legal hold and continuing the hold for one week.  advised that pt's legal hold will be be continued for one week (8/22).       Plan: Pt's legal hold has been continued for one week. Pt awaiting transfer to an in patient psych facility.
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
## 2024-08-13 NOTE — CARE PLAN
Problem: Provide Safe Environment  Goal: Suicide environmental safety, protocols, policies, and practices will be implemented  Outcome: Progressing     The patient is Stable - Low risk of patient condition declining or worsening    Shift Goals  Clinical Goals: antibiotic treatment  Patient Goals: fall safety;rest  Family Goals: ansley    Progress made toward(s) clinical / shift goals:  patient tolerating IV antibiotic therapy, continue on Legal hold with safety sitter at bedside.     Patient is not progressing towards the following goals:

## 2024-08-14 LAB
ALBUMIN SERPL BCP-MCNC: 3.7 G/DL (ref 3.2–4.9)
ANION GAP SERPL CALC-SCNC: 14 MMOL/L (ref 7–16)
BUN SERPL-MCNC: 12 MG/DL (ref 8–22)
CALCIUM ALBUM COR SERPL-MCNC: 9.5 MG/DL (ref 8.5–10.5)
CALCIUM SERPL-MCNC: 9.3 MG/DL (ref 8.5–10.5)
CHLORIDE SERPL-SCNC: 93 MMOL/L (ref 96–112)
CO2 SERPL-SCNC: 23 MMOL/L (ref 20–33)
CORTIS SERPL-MCNC: 14.2 UG/DL (ref 0–23)
CREAT SERPL-MCNC: 0.73 MG/DL (ref 0.5–1.4)
ERYTHROCYTE [DISTWIDTH] IN BLOOD BY AUTOMATED COUNT: 41.7 FL (ref 35.9–50)
GFR SERPLBLD CREATININE-BSD FMLA CKD-EPI: 101 ML/MIN/1.73 M 2
GLUCOSE SERPL-MCNC: 106 MG/DL (ref 65–99)
HCT VFR BLD AUTO: 37.6 % (ref 42–52)
HGB BLD-MCNC: 13 G/DL (ref 14–18)
MAGNESIUM SERPL-MCNC: 2 MG/DL (ref 1.5–2.5)
MCH RBC QN AUTO: 30.4 PG (ref 27–33)
MCHC RBC AUTO-ENTMCNC: 34.6 G/DL (ref 32.3–36.5)
MCV RBC AUTO: 87.9 FL (ref 81.4–97.8)
PHOSPHATE SERPL-MCNC: 2.9 MG/DL (ref 2.5–4.5)
PLATELET # BLD AUTO: 489 K/UL (ref 164–446)
PMV BLD AUTO: 8.5 FL (ref 9–12.9)
POTASSIUM SERPL-SCNC: 3.8 MMOL/L (ref 3.6–5.5)
RBC # BLD AUTO: 4.28 M/UL (ref 4.7–6.1)
SODIUM SERPL-SCNC: 130 MMOL/L (ref 135–145)
WBC # BLD AUTO: 7 K/UL (ref 4.8–10.8)

## 2024-08-14 PROCEDURE — A9270 NON-COVERED ITEM OR SERVICE: HCPCS | Performed by: HOSPITALIST

## 2024-08-14 PROCEDURE — 700102 HCHG RX REV CODE 250 W/ 637 OVERRIDE(OP): Performed by: HOSPITALIST

## 2024-08-14 PROCEDURE — 80069 RENAL FUNCTION PANEL: CPT

## 2024-08-14 PROCEDURE — 82533 TOTAL CORTISOL: CPT

## 2024-08-14 PROCEDURE — 700102 HCHG RX REV CODE 250 W/ 637 OVERRIDE(OP): Performed by: GENERAL PRACTICE

## 2024-08-14 PROCEDURE — 83735 ASSAY OF MAGNESIUM: CPT

## 2024-08-14 PROCEDURE — 770001 HCHG ROOM/CARE - MED/SURG/GYN PRIV*

## 2024-08-14 PROCEDURE — 36415 COLL VENOUS BLD VENIPUNCTURE: CPT

## 2024-08-14 PROCEDURE — A9270 NON-COVERED ITEM OR SERVICE: HCPCS | Performed by: STUDENT IN AN ORGANIZED HEALTH CARE EDUCATION/TRAINING PROGRAM

## 2024-08-14 PROCEDURE — 99233 SBSQ HOSP IP/OBS HIGH 50: CPT | Performed by: INTERNAL MEDICINE

## 2024-08-14 PROCEDURE — A9270 NON-COVERED ITEM OR SERVICE: HCPCS | Mod: JZ | Performed by: INTERNAL MEDICINE

## 2024-08-14 PROCEDURE — 85027 COMPLETE CBC AUTOMATED: CPT

## 2024-08-14 PROCEDURE — 700102 HCHG RX REV CODE 250 W/ 637 OVERRIDE(OP): Performed by: STUDENT IN AN ORGANIZED HEALTH CARE EDUCATION/TRAINING PROGRAM

## 2024-08-14 PROCEDURE — A9270 NON-COVERED ITEM OR SERVICE: HCPCS | Performed by: GENERAL PRACTICE

## 2024-08-14 PROCEDURE — 700102 HCHG RX REV CODE 250 W/ 637 OVERRIDE(OP): Mod: JZ | Performed by: INTERNAL MEDICINE

## 2024-08-14 RX ORDER — POTASSIUM CHLORIDE 1500 MG/1
20 TABLET, EXTENDED RELEASE ORAL ONCE
Status: COMPLETED | OUTPATIENT
Start: 2024-08-14 | End: 2024-08-14

## 2024-08-14 RX ADMIN — AMLODIPINE BESYLATE 10 MG: 10 TABLET ORAL at 06:37

## 2024-08-14 RX ADMIN — CARVEDILOL 6.25 MG: 6.25 TABLET, FILM COATED ORAL at 18:04

## 2024-08-14 RX ADMIN — SENNOSIDES AND DOCUSATE SODIUM 2 TABLET: 50; 8.6 TABLET ORAL at 18:04

## 2024-08-14 RX ADMIN — DIBASIC SODIUM PHOSPHATE, MONOBASIC POTASSIUM PHOSPHATE AND MONOBASIC SODIUM PHOSPHATE 250 MG: 852; 155; 130 TABLET ORAL at 09:30

## 2024-08-14 RX ADMIN — LORAZEPAM 0.5 MG: 0.5 TABLET ORAL at 06:37

## 2024-08-14 RX ADMIN — LOSARTAN POTASSIUM 100 MG: 50 TABLET, FILM COATED ORAL at 06:37

## 2024-08-14 RX ADMIN — POTASSIUM CHLORIDE 20 MEQ: 1500 TABLET, EXTENDED RELEASE ORAL at 09:30

## 2024-08-14 RX ADMIN — CARVEDILOL 6.25 MG: 6.25 TABLET, FILM COATED ORAL at 09:30

## 2024-08-14 RX ADMIN — FINASTERIDE 5 MG: 5 TABLET, FILM COATED ORAL at 06:37

## 2024-08-14 RX ADMIN — VENLAFAXINE HYDROCHLORIDE 150 MG: 75 CAPSULE, EXTENDED RELEASE ORAL at 09:30

## 2024-08-14 RX ADMIN — TRAZODONE HYDROCHLORIDE 100 MG: 50 TABLET ORAL at 21:54

## 2024-08-14 RX ADMIN — OXYCODONE HYDROCHLORIDE 5 MG: 5 TABLET ORAL at 14:36

## 2024-08-14 RX ADMIN — OXYCODONE HYDROCHLORIDE 5 MG: 5 TABLET ORAL at 09:38

## 2024-08-14 RX ADMIN — ARIPIPRAZOLE 10 MG: 10 TABLET ORAL at 06:37

## 2024-08-14 RX ADMIN — LORAZEPAM 0.5 MG: 0.5 TABLET ORAL at 18:04

## 2024-08-14 RX ADMIN — RIVAROXABAN 10 MG: 10 TABLET, FILM COATED ORAL at 18:04

## 2024-08-14 RX ADMIN — ACETAMINOPHEN 650 MG: 325 TABLET ORAL at 21:58

## 2024-08-14 ASSESSMENT — PAIN DESCRIPTION - PAIN TYPE
TYPE: ACUTE PAIN
TYPE: ACUTE PAIN

## 2024-08-14 NOTE — PROGRESS NOTES
[facility] Medicine Daily Progress Note    Date of Service  8/14/2024    Chief Complaint  Terrell Hensley is a 64 y.o. male admitted 7/23/2024 with SI attempt    Hospital Course  This is a 64-year-old male with past medical history of hypertension, mood disorder, who was admitted on 7/23/2024 after SI attempt.    Patient with encephalopathy, WILMAR, transaminitis, rhabdomyolysis, and high anion gap metabolic acidosis on admission. Patient had gonzalez in place and completed aggressive IVF.    Patient recently completed course of Ancef for buttock cellulitis.  MRI imaging of the lumbar spine was performed, noted iliac abscess 3.5 cm. Original plan was to have IR drain placed however fluid collection significantly reduced in size on day of drain placement, aspiration was performed, NGTD. Completed antiobiotic course 8/13.    Psychiatry was consulted, patient to discharge to inpatient psychiatry facility.    Interval Problem Update  Psychiatry, medications changed to Abilify and tapering off Ativan. Legal hold extended.     Patient recently completed course of Ancef for buttock cellulitis.  MRI imaging of the lumbar spine was performed, noted iliac abscess 3.5 cm. Original plan was to have IR drain placed however fluid collection significantly reduced in size on day of drain placement, aspiration was performed, NGTD.        Patient was seen and examined at bedside.  I have personally reviewed and interpreted vitals, labs, and imaging.    8/13.  Afebrile.  Stable vitals.  On room air.  Denies fevers, chills, chest pains, shortness of breath.  Denies fever, chills, chest pains, shortness of breath.  He does complain his abdominal bloating.  Completed course of Zosyn today for iliac abscess.  Discussed with psychiatry.  There is no concerns for hyponatremia possibly secondary to SIADH.  Increase activity mobilization  8/14.  Afebrile.  Stable vitals.  On room air.  Denies fevers, chills, chest pains, shortness of breath.   Complains of left knee and hip pain since admission and being found down.  Pain follows on admission were negative.  Hyponatremia is improved.  A.m. cortisol normal.  Discussed with psychiatry.  Possible SIADH.  Titrating Abilify and patient is tolerating well with improvements in symptoms.  Legal goal extended.  Plan for placement to inpatient psych.  Continue to increase activity as tolerated.  Hgb 12.3 > 13  P 489  Na 129 > 127 > 130    I have discussed this patient's plan of care and discharge plan at IDT rounds today with Case Management, Nursing, Nursing leadership, and other members of the IDT team.    Consultants/Specialty  psychiatry    Code Status  Full Code    Disposition  Pending inpatient psych.    I have placed the appropriate orders for post-discharge needs.    Review of Systems  Review of Systems   All other systems reviewed and are negative.       Physical Exam  Temp:  [36.3 °C (97.3 °F)-37.1 °C (98.7 °F)] 36.3 °C (97.4 °F)  Pulse:  [74-83] 83  Resp:  [17-18] 18  BP: (124-138)/(71-81) 138/80  SpO2:  [97 %-98 %] 98 %    Physical Exam  Vitals and nursing note reviewed.   Constitutional:       General: He is not in acute distress.     Appearance: Normal appearance.   HENT:      Head: Normocephalic and atraumatic.   Eyes:      Extraocular Movements: Extraocular movements intact.      Conjunctiva/sclera: Conjunctivae normal.      Pupils: Pupils are equal, round, and reactive to light.   Cardiovascular:      Rate and Rhythm: Normal rate and regular rhythm.      Pulses: Normal pulses.      Heart sounds: No murmur heard.     No friction rub. No gallop.   Pulmonary:      Effort: Pulmonary effort is normal. No respiratory distress.      Breath sounds: Normal breath sounds. No wheezing, rhonchi or rales.   Abdominal:      General: Bowel sounds are normal. There is no distension.      Palpations: Abdomen is soft.      Tenderness: There is no abdominal tenderness.   Musculoskeletal:         General: No swelling  or tenderness. Normal range of motion.      Cervical back: Normal range of motion and neck supple. No muscular tenderness.      Right lower leg: No edema.      Left lower leg: No edema.   Skin:     General: Skin is warm and dry.      Capillary Refill: Capillary refill takes less than 2 seconds.      Findings: No bruising, erythema or rash.   Neurological:      General: No focal deficit present.      Mental Status: He is alert and oriented to person, place, and time.         Fluids    Intake/Output Summary (Last 24 hours) at 8/14/2024 0619  Last data filed at 8/14/2024 0320  Gross per 24 hour   Intake 360 ml   Output 1600 ml   Net -1240 ml       Laboratory  Recent Labs     08/12/24  1145   WBC 7.0   RBC 4.11*   HEMOGLOBIN 12.3*   HEMATOCRIT 36.2*   MCV 88.1   MCH 29.9   MCHC 34.0   RDW 42.1   PLATELETCT 444   MPV 8.3*       Recent Labs     08/12/24  1145 08/13/24  0854   SODIUM 129* 127*   POTASSIUM 4.0 4.0   CHLORIDE 94* 93*   CO2 23 22   GLUCOSE 139* 145*   BUN 10 11   CREATININE 0.67 0.72   CALCIUM 9.0 8.8                       Imaging  CT-IMAGE-GUIDED DRAIN PERITONEAL   Final Result      Successful LEFT iliacus CT-guided fluid aspiration.               MR-LUMBAR SPINE-WITH & W/O   Final Result      1.  There are multiloculated fluid collection noted involving left iliacus muscle likely representing abscess. It measures an approximately 3.5 cm in the transverse dimension.   2.  There is no evidence of osteomyelitis, discitis or epidural abscess.   3.  Degenerative disease as described above.      US-EXTREMITY VENOUS UPPER UNILAT RIGHT   Final Result      CT-PELVIS WITH   Final Result         1.  Low-density enlargement of the distal left psoas muscle and iliacus muscle extending to the anterior left thigh. Could represent changes of myositis or possibly early forming infectious process. No definite enhancing fluid collection identified to    indicate abscess at this time.   2.  Distended bladder with nondependent  air, consider history of recent instrumentation or changes of urinary tract infection, correlate with urinalysis as clinically appropriate.   3.  Bilateral fat-containing inguinal hernias   4.  Diverticulosis      MR-CERVICAL SPINE-W/O   Final Result      1.  Degenerative disease in the segment cervical spine as described above.   2.  Left paracentral/foraminal disc protrusion at C6-7 causing severe left C7 neural foraminal stenosis.      MR-BRAIN-W/O   Final Result      1.  No acute abnormality.   2.  A few punctate nonspecific supratentorial T2 hyperintensities likely representing nonspecific foci of gliosis/chronic ischemia.   3.  Mild cerebral volume loss.      DX-FEMUR-2+ LEFT   Final Result      Unremarkable LEFT femur.      DX-HIP-COMPLETE - UNILATERAL 2+ LEFT   Final Result      No radiographic evidence of acute traumatic injury.      CT-HEAD W/O   Final Result         1.  No acute intracranial abnormality.   2.  Atherosclerosis.                    Assessment/Plan  * Drug overdose of undetermined intent, initial encounter- (present on admission)  Assessment & Plan  8/14/2024  Apparent suicide attempt  Polypharmacy: some amlodipine but other ingestions unclear  Cont supportive care as clinically improving  On legal hold  Psychiatry team following  Case discussed today with psychiatry, medications changed to Abilify and Ativan. Legal hold extended.    Abscess of iliac fossa  Assessment & Plan  8/14/2024  Completed course of ancef for buttock cellulitis    Patient recently completed course of Ancef for buttock cellulitis.  MRI imaging of the lumbar spine was performed, noted iliac abscess 3.5 cm. Original plan was to have IR drain placed however fluid collection significantly reduced in size, aspiration was performed.  Will follow cultures.MRSA swab negative, continue Zosyn.    Abx completed    Thrombophlebitis of cephalic vein- (present on admission)  Assessment & Plan  8/14/2024  Right upper extremity pain  improving  Ultrasound with no DVT but does have occlusive superficial thrombosis in the cephalic vein.  Warm compresses ordered    Neck pain, acute- (present on admission)  Assessment & Plan  8/14/2024  Patient complaining of LLE numbness, neck pain and bilateral arm numbness.  Head injury 4/2024.  Ordered MRI c spine with head MRI - negative  Oxycodone prn pain.     Cellulitis of buttock  Assessment & Plan  8/14/2024  Patient with erythema, warmth, pain left buttock s/p found down at home.  CT pelvis with contrast- negative for abscess  Completed ancef 2gm IV q 8 x 7 days.  Wound care  Off loading, out of bed, increase activity.    Anxiety- (present on admission)  Assessment & Plan  8/14/2024  Increased anxiety noted on exam.  Psychiatry added seroquel 50mg qhs, zoloft resumed for a.m.   Ativan prn  Klonopin bid.    Anemia- (present on admission)  Assessment & Plan  8/14/2024  Mild   Cont to monitor    Urinary incontinence- (present on admission)  Assessment & Plan  8/14/2024  -oxybutynin held due to drug overdose    Rhabdomyolysis- (present on admission)  Assessment & Plan  8/14/2024  CK elevated to 16k, suspected 2/2 being found down and drug overdose.  Cont agressive IVFs but change to LR from NS  Follow daily AST, ALT, Bun, Creat and K  Resolved    Elevated liver enzymes- (present on admission)  Assessment & Plan  8/14/2024  Transaminase elevation likely from Rhabdo and not acute liver injury though certainly could have a toxic component from OD  Coming down as expected with resolution of Rhabdo  Cont to monitor daily CMP  Consider further evaluation if it does not resolve as CPK drops and Rhabdo resolves    Acute renal failure with tubular necrosis (HCC)- (present on admission)  Assessment & Plan  8/14/2024  Likely 2/2 prerenal damage from hypotension 2/2 drug overdose and uremia related to rhabdomyolysis.  Creat imroving daily: 1.4 today  Good UOP  Cont LR until CPK<3k  Daily BMP  Renally dose medications as  appropriate  Resolved    High anion gap metabolic acidosis- (present on admission)  Assessment & Plan  8/14/2024  Likely 2/2 prerenal damage from hypotension 2/2 drug overdose and uremia related to rhabdomyolysis.  -fluid resuscitation with NS 200cc/hr  -IV potassium 10meq x4  Resolved    Mitral regurgitation- (present on admission)  Assessment & Plan  8/14/2024  Suspected murmur, though difficult to auscultate. Unclear if preexisting or related to overdose.   Does not appear to have any clinically significant heart failure symptoms    Acute encephalopathy- (present on admission)  Assessment & Plan  8/14/2024  Acute metabolic/toxic encephalopathy  CT head neg  Mental status improving daily though still not completely back to normal  Cont to monitor clinically  Repeat lab in am  Minimize sedating medications  Resolved. MRI of the brain without acute changes.     Hypertension- (present on admission)  Assessment & Plan  8/14/2024  Hx of HTN, holding home meds in light of hypotension  Blood pressure still elevated.  Increase losartan to 100 mg daily  Continue amlodipine 10 mg daily  Added coreg 6.25mg BID  Monitor BP    Hyponatremia- (present on admission)  Assessment & Plan  8/14/2024  Has resolved with IVF resuscitation  Cont to monitor  Serum and urine studies consistent with SIADH.  Discussed with psychiatry.  Titrating psychiatric meds         VTE prophylaxis: Xarelto ppx    I have performed a physical exam and reviewed and updated ROS and Plan today (8/14/2024). In review of yesterday's note (8/13/2024), there are no changes except as documented above.    Greater than 50 minutes spent prepping to see patient (e.g. review of tests) obtaining and/or reviewing separately obtained history. Performing a medically appropriate examination and/ evaluation.  Counseling and educating the patient/family/caregiver.  Ordering medications, tests, or procedures.  Referring and communicating with other health care professionals.   Documenting clinical information in EPIC.  Independently interpreting results and communicating results to patient/family/caregiver.  Care coordination.

## 2024-08-14 NOTE — CARE PLAN
The patient is Stable - Low risk of patient condition declining or worsening    Shift Goals  Clinical Goals: pt will remain free from falls and injurieds throughout shift  Patient Goals: rest  Family Goals: ansley    Progress made toward(s) clinical / shift goals:  Pt alert and able to make needs known. Legal hold continued; 1:1 staff sitter in place for SI. Pt ambulated with FWW with generalized weakness. Attempted ambulation without FWW, pt very unsteady and went back to using FWW for safety. Pt bed locked in lowest position and pt educated on fall risk and calling before getting up. Pt verbalized understanding.     Problem: Pain - Standard  Goal: Alleviation of pain or a reduction in pain to the patient’s comfort goal  Outcome: Progressing     Problem: Knowledge Deficit - Standard  Goal: Patient and family/care givers will demonstrate understanding of plan of care, disease process/condition, diagnostic tests and medications  Outcome: Progressing     Problem: Provide Safe Environment  Goal: Suicide environmental safety, protocols, policies, and practices will be implemented  Outcome: Progressing     Problem: Fall Risk  Goal: Patient will remain free from falls  Outcome: Progressing     Problem: Depression  Goal: Patient and family/caregiver will verbalize accurate information about at least two of the possible causes of depression, three-four of the signs and symptoms of depression  Outcome: Progressing       Patient is not progressing towards the following goals:

## 2024-08-14 NOTE — DISCHARGE PLANNING
Alert Team Note:     Contacted Guadalupe at [facility], pt declined due to medical complexity.     Contacted Lalita at [facility], was advised referral was not received. Resent referral.

## 2024-08-14 NOTE — DISCHARGE PLANNING
Alert Team Note:     Herminia Celis at [facility], was advised that pt's referral has been received and under review.

## 2024-08-14 NOTE — DISCHARGE PLANNING
0007:  Blood glucose 52. Temp 97.9ax    0309:  Blood glucose 64. Temp 98.6ax. Shift reassessment completed. [facility] Fax Referral      Referral: Legal Hold    Intervention: Patient referral to community inpatient  facillity    Legal Hold Initiated: Date: 07/23/24 Time: 1948    Patient’s Insurance Listed on Face Sheet: [employer]    Referrals sent to: [facility] and [facility]    Referrals faxed by La Nena MUNOZ    This referral contains the following information:  Face sheet __x__  Page 1 and Page 2 of Legal Hold __x__  Alert Team Assessment/Psych Assessment __x__  Head to toe physical exam __x__  Urine Drug Screen __x__  Blood Alcohol __x__  Vital signs _x___  Pregnancy test when applicable ___  Medications list __x__  Covid screening ____    Plan: Patient will transfer to mental health facility once acceptance is obtained    For all referral information, please contact the  referral office daily between the hours of 7:00 a.m. to 6:00 p.m. at:   Phone [phone]   Fax [phone]    ED  Alert Team   Phone [phone] Fax [phone]    [facility] Emergency Department Contact numbers:  Green Pod [phone]   Blue Pod [phone]   Red Pod [phone]   Pediatrics [phone]

## 2024-08-14 NOTE — DISCHARGE PLANNING
Received Stipulation and Order from the court continuing pt's legal hold until 8/22, scanned copy into pt's chart.

## 2024-08-14 NOTE — CARE PLAN
Problem: Knowledge Deficit - Standard  Goal: Patient and family/care givers will demonstrate understanding of plan of care, disease process/condition, diagnostic tests and medications  Outcome: Progressing     Problem: Fall Risk  Goal: Patient will remain free from falls  Outcome: Progressing   The patient is Stable - Low risk of patient condition declining or worsening    Shift Goals  Clinical Goals: patient will remain free from injury this shift  Patient Goals: fall safety;rest  Family Goals: ansley    Progress made toward(s) clinical / shift goals:  patient denies pain, continue with safety sitter at bedside for SI.     Patient is not progressing towards the following goals:

## 2024-08-14 NOTE — CONSULTS
"PSYCHIATRIC FOLLOW-UP: (established)  Reason for admission:   Drug overdose, suicide attempt  Legal Hold Status:       Active, extended     Chart reviewed.       Patient has been appropriate, adherent on medications.     HPI:    The patient reports that he is doing\" okay\" today.  He did write down some of the negative thoughts and negative voices that he has been experiencing since I saw him yesterday afternoon.  He wrote down a total of 5 thoughts, 2 of them being songs about suicide and the other 3 referencing the patient's low self-esteem.  He reports that one of the statements regarding his inadequacies actually came from an external auditory hallucination and not an internal thought.  The other 4 thoughts he identifies as internal thoughts that just\" pop into my head and plain repeat.\"  When I ask him where he thinks this external voice comes from he reports he is not sure but still thinks it does not come from him.  He reports that this voice is still laughing at him and walking him and this causes him distress.  He reports it may have been a reason why he could not sleep as well last night.  He reports that his mood is\" so-so.\"  He still expresses a lot of disappointment in himself that he does not have a lot of finances and needs to rely on his brothers for help.  He reports he did have some passive suicidal thoughts this morning but no active intent or plan.  He also denies any thoughts of harming others.  He does not express delusions.  So far he is not endorsing any side effects from his current medications.          Reviewed nursing noted and spoke with nursing staff. The patient is compliant with medication. The patient has not been aggressive or agitated. The patient ate % of dinner as of flow sheet yesterday. The patient has not required psychiatric PRN medications in the last 24 hours.          Medical ROS (as pertinent):     Review of Systems   The patient denies chest pain or shortness of " "breath  The patient is denying any nausea, vomiting or abdominal pain  The patient is not experiencing any tremors, dystonias or dyskinesias.  He still endorses feeling weak while walking and having some intermittent numbness and tingling in his thumb, index and middle finger on his left side        Psychiatric Examination:  Vitals:   Vitals:    08/14/24 0823   BP: 139/73   Pulse: 87   Resp: 17   Temp: 36.3 °C (97.3 °F)   SpO2: 98%       General Appearance: Appears state age, appropriate grooming & hygiene, not acutely distraught   Behavior:               -Calm and cooperative  Neuro:              -No tremors noted, no tics, dyskinesias or dystonias. NO cogwheeling or rigidity              -Generalized psychomotor slowing              -No posture or gait abnormalities appreciated  Speech: Low volume but rate and rhythm are now normal.  Less latency noted.  Language: English  Thought processes: Logical, linear and goal-directed  Thought content: Appropriate, no delusions expressed.  He endorses having had passive suicidal thoughts this morning but no active intent or plan.  He denies any thoughts of harming others   mood: \"So so\"  Affect: Congruent with stated mood.  Dysphoric, but social smile seen  Judgement and Insight: Fair/fair  Cognition:               -Alert & oriented x 3 (Person, place and time)              -Attention & concentration grossly intact though strained              -Immediate/delayed memory not formally tested but grossly intact              -Age-appropriate fund of knowledge        New PAST MEDICAL/PSYCH/FAMILY/SOCIAL(as reported by patient):                None       Current Medications     Current Facility-Administered Medications:     finasteride (Proscar) tablet 5 mg, 5 mg, Oral, DAILY, Frantz Bell D.O., 5 mg at 08/14/24 0637    GI Cocktail (hyoscyamine-lidocaine-Maalox) oral susp cup 30 mL, 30 mL, Oral, Q6HRS PRN, Frantz Bell D.O.    vitamin D2 (Ergocalciferol) (Drisdol) capsule " 50,000 Units, 50,000 Units, Oral, Q7 DAYS, Frantz Bell D.O., 50,000 Units at 08/13/24 1140    LORazepam (Ativan) tablet 0.5 mg, 0.5 mg, Oral, BID, Tahira Guzman D.O., 0.5 mg at 08/14/24 0637    ARIPiprazole (Abilify) tablet 10 mg, 10 mg, Oral, DAILY, Tahira Guzman D.O., 10 mg at 08/14/24 0637    rivaroxaban (Xarelto) tablet 10 mg, 10 mg, Oral, DAILY AT 1800, Tahiraolivia Guzman, D.O., 10 mg at 08/13/24 1636    senna-docusate (Pericolace Or Senokot S) 8.6-50 MG per tablet 2 Tablet, 2 Tablet, Oral, Q EVENING, 2 Tablet at 08/13/24 1636 **AND** polyethylene glycol/lytes (Miralax) Packet 1 Packet, 1 Packet, Oral, QDAY PRN, Tahira Guzman D.O.    carvedilol (Coreg) tablet 6.25 mg, 6.25 mg, Oral, BID WITH MEALS, Tahira Guzman D.O., 6.25 mg at 08/14/24 0930    traZODone (Desyrel) tablet 100 mg, 100 mg, Oral, QHS, Tahiraolivia Guzman, D.O., 100 mg at 08/13/24 2140    venlafaxine XR (Effexor XR) capsule 150 mg, 150 mg, Oral, QDAY with Breakfast, Tahira Guzman D.O., 150 mg at 08/14/24 0930    losartan (Cozaar) tablet 100 mg, 100 mg, Oral, Q DAY, Roman Valle M.D., 100 mg at 08/14/24 0637    amLODIPine (Norvasc) tablet 10 mg, 10 mg, Oral, Q DAY, Roman Valle M.D., 10 mg at 08/14/24 0637    oxyCODONE immediate-release (Roxicodone) tablet 5 mg, 5 mg, Oral, Q4HRS PRN, Jaimie Church M.D., 5 mg at 08/14/24 0938    magnesium hydroxide (Milk Of Magnesia) suspension 30 mL, 30 mL, Oral, QDAY PRN, JUNE Cote.P.R.N., 30 mL at 07/27/24 0438    acetaminophen (Tylenol) tablet 650 mg, 650 mg, Oral, Q4HRS PRN, RYAN Aguirre.O., 650 mg at 08/05/24 0922    ondansetron (Zofran) syringe/vial injection 4 mg, 4 mg, Intravenous, Q4HRS PRN, Cristofer Tejeda D.O.          Allergies   Allergen Reactions    Sulfa Drugs Hives and Rash                                 EKG:          Results for orders placed or performed during the hospital encounter of 07/23/24   EKG   Result Value Ref Range     Report           [facility] Emergency Dept[facility]     Test Date:  2024  Pt Name:    FAIZAN GARDNER             Department: ER  MRN:        [id number]                      Room:       RD 12  Gender:     Male                         Technician: [id number]  :        1960                   Requested By:ER TRIAGE PROTOCOL  Order #:    [id number]                    Reading MD: ILDEFONSO ARNOLD DO     Measurements  Intervals                                Axis  Rate:       85                           P:          61  MS:         153                          QRS:        63  QRSD:       106                          T:          25  QT:         413  QTc:        492     Interpretive Statements  Sinus rhythm  Probable left atrial enlargement  Borderline prolonged QT interval  Compared to ECG 2024 17:09:50  No significant changes  Electronically Signed On 2024 00:56:04 PDT by ILDEFONSO ARNOLD DO      EKG   Result Value Ref Range     Report           [facility]     Test Date:  2024  Pt Name:    FAIZAN GARDNER             Department: 61  MRN:        [id number]                      Room:       S605  Gender:     Male                         Technician: [id number]  :        1960                   Requested By:ANICETO AMBRIZ  Order #:    [id number]                    Reading MD: Lucio Guzman MD     Measurements  Intervals                                Axis  Rate:       82                           P:          67  MS:         176                          QRS:        55  QRSD:       93                           T:          35  QT:         355  QTc:        415     Interpretive Statements  Sinus rhythm  Consider right atrial enlargement  Compared to ECG 2024 19:51:19  No significant changes  Electronically Signed On 2024 06:48:42 PDT by Lucio Guzman MD         Brain Imaging: No new imaging   EEG:  no new EEG     Labs personally reviewed:   Lab Results   Component Value Date/Time     SODIUM 130 (L) 08/14/2024 07:27 AM    POTASSIUM 3.8 08/14/2024 07:27 AM    CHLORIDE 93 (L) 08/14/2024 07:27 AM    CO2 23 08/14/2024 07:27 AM    GLUCOSE 106 (H) 08/14/2024 07:27 AM    BUN 12 08/14/2024 07:27 AM    CREATININE 0.73 08/14/2024 07:27 AM    BUNCREATRAT 16 05/15/2023 11:24 AM      Recent Labs     08/10/24  1221   ASTSGOT 13   ALTSGPT 22   TBILIRUBIN 0.3   GLOBULIN 3.1     Lab Results   Component Value Date/Time    WBC 7.0 08/14/2024 07:27 AM    RBC 4.28 (L) 08/14/2024 07:27 AM    HEMOGLOBIN 13.0 (L) 08/14/2024 07:27 AM    HEMATOCRIT 37.6 (L) 08/14/2024 07:27 AM    MCV 87.9 08/14/2024 07:27 AM    MCH 30.4 08/14/2024 07:27 AM    MCHC 34.6 08/14/2024 07:27 AM    MPV 8.5 (L) 08/14/2024 07:27 AM    NEUTSPOLYS 77.20 (H) 08/05/2024 07:39 AM    LYMPHOCYTES 13.30 (L) 08/05/2024 07:39 AM    MONOCYTES 7.90 08/05/2024 07:39 AM    EOSINOPHILS 0.70 08/05/2024 07:39 AM    BASOPHILS 0.40 08/05/2024 07:39 AM           Assessment:  Terrell Hensley is a 63 y.o. male admitted 7/23/2024 with altered mental status, found down after intentional OD of his home medications amlodipine in order to kill himself.      The patient reports his mood is so-so.  He states he is still having distressing auditory hallucinations that are laughing at him and mocking him.  He is able to differentiate between internal thoughts that are negative in nature and these auditory hallucinations.  We attempted to process today the possibility that these auditory hallucinations are just very loud internal thoughts but the patient states this could be true but he also feels that these voices may be coming from an external force because he does not understand why he would be laughing and mocking himself.  I encouraged the patient to continue journaling some of the negative thoughts and auditory hallucinations that come up during the day so that we can discuss them.  He did find it helpful to counteract these negative voices and thoughts through  journaling exercise yesterday.  As he is tolerating his medications well so far we discussed increasing Abilify to 15 mg daily to see if this will help with decreasing the intensity and frequency of his auditory hallucinations and he agrees with this.  Denies thoughts of harming others.  No delusions expressed.    Repeat sodium level today is 130, still low but no worse than the last 2 days.  Discussed with medical team who will be ordering a serum cortisol level to evaluate glucocorticoid deficiency versus SIADH.  If diagnosis is SIADH will need to carefully uptitrate any SSRIs/SNRIs and monitor serum sodium as this can worsen SIADH.    Dx:  #Catatonia, excited -resolved  #MDD with Psychosis        Medical :  Per primary team        Plan:  Legal hold: Extended, due to continued psychosis causing inability to care for self  Psychotropic medications  Increase Abilify to 10mg po daily for anxiety disorder and psychotic symptoms (first dose 8/15)  Continue Ativan 0.5mg po BID for catatonia  Continue Effexor to 150mg po qD for depression  Continue Trazodone 100mg to scheduled qHS for insomnia    Please transfer pt to inpatient psychiatric hospital when medically cleared and bed is available  Labs reviewed  EKG reviewed  Old records reviewed/summarized  Safety plan completed, copy in chart  Discussed the case with: Dr. Bell  Psychiatry will follow up   Family will be updated with treatment     Thank you for the consult.      Sitter: 1:1  Phone: Yes room phone; may have personal phone if family brings it  Visitors: Yes, Jean Paul (brother), Cortes (brother), YOSELIN (son), Tressa (daughter)   Personal belongings:  May have paper with crayons/markers; may be in milieu with sitter present

## 2024-08-14 NOTE — DISCHARGE PLANNING
Alert Team Note:     Contacted Lalita at [facility], was advised referral was not received. Faxed referral to fax # Lalita provided, [phone]

## 2024-08-15 ENCOUNTER — APPOINTMENT (OUTPATIENT)
Dept: RADIOLOGY | Facility: MEDICAL CENTER | Age: 64
DRG: 907 | End: 2024-08-15
Attending: STUDENT IN AN ORGANIZED HEALTH CARE EDUCATION/TRAINING PROGRAM
Payer: MEDICARE

## 2024-08-15 LAB
ALBUMIN SERPL BCP-MCNC: 3.6 G/DL (ref 3.2–4.9)
BUN SERPL-MCNC: 10 MG/DL (ref 8–22)
CALCIUM ALBUM COR SERPL-MCNC: 9.4 MG/DL (ref 8.5–10.5)
CALCIUM SERPL-MCNC: 9.1 MG/DL (ref 8.5–10.5)
CHLORIDE SERPL-SCNC: 95 MMOL/L (ref 96–112)
CO2 SERPL-SCNC: 21 MMOL/L (ref 20–33)
CREAT SERPL-MCNC: 0.63 MG/DL (ref 0.5–1.4)
ERYTHROCYTE [DISTWIDTH] IN BLOOD BY AUTOMATED COUNT: 42.1 FL (ref 35.9–50)
GFR SERPLBLD CREATININE-BSD FMLA CKD-EPI: 106 ML/MIN/1.73 M 2
GLUCOSE SERPL-MCNC: 105 MG/DL (ref 65–99)
HCT VFR BLD AUTO: 38.5 % (ref 42–52)
HGB BLD-MCNC: 13.1 G/DL (ref 14–18)
MAGNESIUM SERPL-MCNC: 1.9 MG/DL (ref 1.5–2.5)
MCH RBC QN AUTO: 29.8 PG (ref 27–33)
MCHC RBC AUTO-ENTMCNC: 34 G/DL (ref 32.3–36.5)
MCV RBC AUTO: 87.5 FL (ref 81.4–97.8)
PHOSPHATE SERPL-MCNC: 3.1 MG/DL (ref 2.5–4.5)
PLATELET # BLD AUTO: 452 K/UL (ref 164–446)
PMV BLD AUTO: 8.3 FL (ref 9–12.9)
POTASSIUM SERPL-SCNC: 4 MMOL/L (ref 3.6–5.5)
RBC # BLD AUTO: 4.4 M/UL (ref 4.7–6.1)
SODIUM SERPL-SCNC: 126 MMOL/L (ref 135–145)
WBC # BLD AUTO: 6.9 K/UL (ref 4.8–10.8)

## 2024-08-15 PROCEDURE — 80069 RENAL FUNCTION PANEL: CPT

## 2024-08-15 PROCEDURE — 700102 HCHG RX REV CODE 250 W/ 637 OVERRIDE(OP): Performed by: HOSPITALIST

## 2024-08-15 PROCEDURE — 770001 HCHG ROOM/CARE - MED/SURG/GYN PRIV*

## 2024-08-15 PROCEDURE — A9270 NON-COVERED ITEM OR SERVICE: HCPCS | Performed by: STUDENT IN AN ORGANIZED HEALTH CARE EDUCATION/TRAINING PROGRAM

## 2024-08-15 PROCEDURE — A9270 NON-COVERED ITEM OR SERVICE: HCPCS | Performed by: GENERAL PRACTICE

## 2024-08-15 PROCEDURE — 36415 COLL VENOUS BLD VENIPUNCTURE: CPT

## 2024-08-15 PROCEDURE — 85027 COMPLETE CBC AUTOMATED: CPT

## 2024-08-15 PROCEDURE — 700102 HCHG RX REV CODE 250 W/ 637 OVERRIDE(OP): Performed by: STUDENT IN AN ORGANIZED HEALTH CARE EDUCATION/TRAINING PROGRAM

## 2024-08-15 PROCEDURE — 73560 X-RAY EXAM OF KNEE 1 OR 2: CPT | Mod: LT

## 2024-08-15 PROCEDURE — A9270 NON-COVERED ITEM OR SERVICE: HCPCS | Performed by: HOSPITALIST

## 2024-08-15 PROCEDURE — 83735 ASSAY OF MAGNESIUM: CPT

## 2024-08-15 PROCEDURE — 99233 SBSQ HOSP IP/OBS HIGH 50: CPT | Performed by: STUDENT IN AN ORGANIZED HEALTH CARE EDUCATION/TRAINING PROGRAM

## 2024-08-15 PROCEDURE — A9270 NON-COVERED ITEM OR SERVICE: HCPCS | Performed by: INTERNAL MEDICINE

## 2024-08-15 PROCEDURE — 700102 HCHG RX REV CODE 250 W/ 637 OVERRIDE(OP): Performed by: GENERAL PRACTICE

## 2024-08-15 PROCEDURE — 700102 HCHG RX REV CODE 250 W/ 637 OVERRIDE(OP): Performed by: INTERNAL MEDICINE

## 2024-08-15 RX ORDER — ARIPIPRAZOLE 15 MG/1
15 TABLET ORAL DAILY
Status: DISCONTINUED | OUTPATIENT
Start: 2024-08-16 | End: 2024-08-16

## 2024-08-15 RX ORDER — KETOROLAC TROMETHAMINE 15 MG/ML
15 INJECTION, SOLUTION INTRAMUSCULAR; INTRAVENOUS EVERY 6 HOURS PRN
Status: ACTIVE | OUTPATIENT
Start: 2024-08-15 | End: 2024-08-17

## 2024-08-15 RX ADMIN — CARVEDILOL 6.25 MG: 6.25 TABLET, FILM COATED ORAL at 16:53

## 2024-08-15 RX ADMIN — LOSARTAN POTASSIUM 100 MG: 50 TABLET, FILM COATED ORAL at 06:54

## 2024-08-15 RX ADMIN — RIVAROXABAN 10 MG: 10 TABLET, FILM COATED ORAL at 16:53

## 2024-08-15 RX ADMIN — FINASTERIDE 5 MG: 5 TABLET, FILM COATED ORAL at 06:54

## 2024-08-15 RX ADMIN — OXYCODONE HYDROCHLORIDE 5 MG: 5 TABLET ORAL at 15:44

## 2024-08-15 RX ADMIN — ARIPIPRAZOLE 10 MG: 10 TABLET ORAL at 06:54

## 2024-08-15 RX ADMIN — SENNOSIDES AND DOCUSATE SODIUM 2 TABLET: 50; 8.6 TABLET ORAL at 16:53

## 2024-08-15 RX ADMIN — AMLODIPINE BESYLATE 10 MG: 10 TABLET ORAL at 06:54

## 2024-08-15 RX ADMIN — OXYCODONE HYDROCHLORIDE 5 MG: 5 TABLET ORAL at 11:14

## 2024-08-15 RX ADMIN — TRAZODONE HYDROCHLORIDE 100 MG: 50 TABLET ORAL at 20:30

## 2024-08-15 RX ADMIN — VENLAFAXINE HYDROCHLORIDE 150 MG: 75 CAPSULE, EXTENDED RELEASE ORAL at 08:35

## 2024-08-15 RX ADMIN — LORAZEPAM 0.5 MG: 0.5 TABLET ORAL at 06:54

## 2024-08-15 RX ADMIN — CARVEDILOL 6.25 MG: 6.25 TABLET, FILM COATED ORAL at 08:35

## 2024-08-15 RX ADMIN — LORAZEPAM 0.5 MG: 0.5 TABLET ORAL at 16:53

## 2024-08-15 ASSESSMENT — PAIN DESCRIPTION - PAIN TYPE
TYPE: ACUTE PAIN

## 2024-08-15 NOTE — CARE PLAN
Problem: Pain - Standard  Goal: Alleviation of pain or a reduction in pain to the patient’s comfort goal  Description: Target End Date:  Prior to discharge or change in level of care    Document on Vitals flowsheet    1.  Document pain using the appropriate pain scale per order or unit policy  2.  Educate and implement non-pharmacologic comfort measures (i.e. relaxation, distraction, massage, cold/heat therapy, etc.)  3.  Pain management medications as ordered  4.  Reassess pain after pain med administration per policy  5.  If opiods administered assess patient's response to pain medication is appropriate per POSS sedation scale  6.  Follow pain management plan developed in collaboration with patient and interdisciplinary team (including palliative care or pain specialists if applicable)  Outcome: Progressing  Flowsheets (Taken 8/15/2024 1214)  OB Pain Intervention:   Distraction   Breathing technique   [place]   Relaxation technique   Rest   The patient is Stable - Low risk of patient condition declining or worsening    Shift Goals  Clinical Goals: fall safety  Patient Goals: rest  Family Goals: ansley    Progress made toward(s) clinical / shift goals:  complaining oif left knee numbness, MD notified with order for left knee xray, pending result    Patient is not progressing towards the following goals:

## 2024-08-15 NOTE — CARE PLAN
Problem: Pain - Standard  Goal: Alleviation of pain or a reduction in pain to the patient’s comfort goal  Outcome: Progressing     Problem: Fall Risk  Goal: Patient will remain free from falls  Outcome: Progressing     The patient is Stable - Low risk of patient condition declining or worsening    Shift Goals  Clinical Goals: patient will remain free from injury this shift  Patient Goals: rest  Family Goals: ansley    Progress made toward(s) clinical / shift goals:  fall preventive measures in place. Continue with safety sitter at bedside.     Patient is not progressing towards the following goals:

## 2024-08-15 NOTE — DISCHARGE PLANNING
Alert Team Note:    Contacted Arslan at [facility], was advised that there are no accepting MD's at  this time. Will be notified when there is availability.

## 2024-08-15 NOTE — DISCHARGE PLANNING
Alert Team Note:     Contacted Yolande at [facility] regarding pt's referral. Was advised that intake does not arrive until 0900. No new referral updates. Will follow up after 0900.     @0926 Spoke to Ashley at [facility] confirming pt was still pending placement. Advised placement is still needed. Was advised referral will be reviewed by nurse.

## 2024-08-16 ENCOUNTER — APPOINTMENT (OUTPATIENT)
Dept: RADIOLOGY | Facility: MEDICAL CENTER | Age: 64
DRG: 907 | End: 2024-08-16
Attending: INTERNAL MEDICINE
Payer: MEDICARE

## 2024-08-16 LAB
ALBUMIN SERPL BCP-MCNC: 3.7 G/DL (ref 3.2–4.9)
ANION GAP SERPL CALC-SCNC: 13 MMOL/L (ref 7–16)
BUN SERPL-MCNC: 12 MG/DL (ref 8–22)
CALCIUM ALBUM COR SERPL-MCNC: 9.5 MG/DL (ref 8.5–10.5)
CALCIUM SERPL-MCNC: 9.3 MG/DL (ref 8.5–10.5)
CHLORIDE SERPL-SCNC: 94 MMOL/L (ref 96–112)
CO2 SERPL-SCNC: 21 MMOL/L (ref 20–33)
CREAT SERPL-MCNC: 0.58 MG/DL (ref 0.5–1.4)
ERYTHROCYTE [DISTWIDTH] IN BLOOD BY AUTOMATED COUNT: 40.9 FL (ref 35.9–50)
GFR SERPLBLD CREATININE-BSD FMLA CKD-EPI: 108 ML/MIN/1.73 M 2
GLUCOSE SERPL-MCNC: 114 MG/DL (ref 65–99)
HCT VFR BLD AUTO: 39.2 % (ref 42–52)
HGB BLD-MCNC: 13.6 G/DL (ref 14–18)
MAGNESIUM SERPL-MCNC: 1.9 MG/DL (ref 1.5–2.5)
MCH RBC QN AUTO: 29.9 PG (ref 27–33)
MCHC RBC AUTO-ENTMCNC: 34.7 G/DL (ref 32.3–36.5)
MCV RBC AUTO: 86.2 FL (ref 81.4–97.8)
PHOSPHATE SERPL-MCNC: 3.2 MG/DL (ref 2.5–4.5)
PLATELET # BLD AUTO: 429 K/UL (ref 164–446)
PMV BLD AUTO: 8.3 FL (ref 9–12.9)
POTASSIUM SERPL-SCNC: 4.1 MMOL/L (ref 3.6–5.5)
RBC # BLD AUTO: 4.55 M/UL (ref 4.7–6.1)
SODIUM SERPL-SCNC: 128 MMOL/L (ref 135–145)
WBC # BLD AUTO: 8.4 K/UL (ref 4.8–10.8)

## 2024-08-16 PROCEDURE — A9270 NON-COVERED ITEM OR SERVICE: HCPCS | Performed by: HOSPITALIST

## 2024-08-16 PROCEDURE — 83735 ASSAY OF MAGNESIUM: CPT

## 2024-08-16 PROCEDURE — 700117 HCHG RX CONTRAST REV CODE 255: Performed by: INTERNAL MEDICINE

## 2024-08-16 PROCEDURE — 99233 SBSQ HOSP IP/OBS HIGH 50: CPT | Performed by: INTERNAL MEDICINE

## 2024-08-16 PROCEDURE — 700102 HCHG RX REV CODE 250 W/ 637 OVERRIDE(OP): Performed by: STUDENT IN AN ORGANIZED HEALTH CARE EDUCATION/TRAINING PROGRAM

## 2024-08-16 PROCEDURE — 700102 HCHG RX REV CODE 250 W/ 637 OVERRIDE(OP): Performed by: GENERAL PRACTICE

## 2024-08-16 PROCEDURE — 99232 SBSQ HOSP IP/OBS MODERATE 35: CPT | Mod: GC | Performed by: STUDENT IN AN ORGANIZED HEALTH CARE EDUCATION/TRAINING PROGRAM

## 2024-08-16 PROCEDURE — 85027 COMPLETE CBC AUTOMATED: CPT

## 2024-08-16 PROCEDURE — 700102 HCHG RX REV CODE 250 W/ 637 OVERRIDE(OP): Performed by: INTERNAL MEDICINE

## 2024-08-16 PROCEDURE — A9270 NON-COVERED ITEM OR SERVICE: HCPCS | Performed by: INTERNAL MEDICINE

## 2024-08-16 PROCEDURE — 73701 CT LOWER EXTREMITY W/DYE: CPT | Mod: LT

## 2024-08-16 PROCEDURE — A9270 NON-COVERED ITEM OR SERVICE: HCPCS | Performed by: STUDENT IN AN ORGANIZED HEALTH CARE EDUCATION/TRAINING PROGRAM

## 2024-08-16 PROCEDURE — 700102 HCHG RX REV CODE 250 W/ 637 OVERRIDE(OP): Performed by: HOSPITALIST

## 2024-08-16 PROCEDURE — A9270 NON-COVERED ITEM OR SERVICE: HCPCS | Performed by: GENERAL PRACTICE

## 2024-08-16 PROCEDURE — 700101 HCHG RX REV CODE 250: Performed by: INTERNAL MEDICINE

## 2024-08-16 PROCEDURE — 80069 RENAL FUNCTION PANEL: CPT

## 2024-08-16 PROCEDURE — 770001 HCHG ROOM/CARE - MED/SURG/GYN PRIV*

## 2024-08-16 PROCEDURE — 36415 COLL VENOUS BLD VENIPUNCTURE: CPT

## 2024-08-16 RX ORDER — VENLAFAXINE HYDROCHLORIDE 75 MG/1
75 CAPSULE, EXTENDED RELEASE ORAL
Status: DISCONTINUED | OUTPATIENT
Start: 2024-08-16 | End: 2024-08-19

## 2024-08-16 RX ORDER — ARIPIPRAZOLE 10 MG/1
10 TABLET ORAL DAILY
Status: DISCONTINUED | OUTPATIENT
Start: 2024-08-16 | End: 2024-08-19

## 2024-08-16 RX ORDER — LANOLIN ALCOHOL/MO/W.PET/CERES
400 CREAM (GRAM) TOPICAL 2 TIMES DAILY
Status: COMPLETED | OUTPATIENT
Start: 2024-08-16 | End: 2024-08-16

## 2024-08-16 RX ORDER — SODIUM CHLORIDE 1 G/1
1 TABLET ORAL
Status: DISCONTINUED | OUTPATIENT
Start: 2024-08-16 | End: 2024-08-22 | Stop reason: HOSPADM

## 2024-08-16 RX ORDER — TRAZODONE HYDROCHLORIDE 50 MG/1
50 TABLET, FILM COATED ORAL
Status: DISCONTINUED | OUTPATIENT
Start: 2024-08-16 | End: 2024-08-22 | Stop reason: HOSPADM

## 2024-08-16 RX ORDER — BUPROPION HYDROCHLORIDE 150 MG/1
150 TABLET, EXTENDED RELEASE ORAL DAILY
Status: DISCONTINUED | OUTPATIENT
Start: 2024-08-16 | End: 2024-08-22 | Stop reason: HOSPADM

## 2024-08-16 RX ADMIN — SODIUM CHLORIDE 1 G: 1 TABLET ORAL at 17:56

## 2024-08-16 RX ADMIN — IOHEXOL 80 ML: 350 INJECTION, SOLUTION INTRAVENOUS at 19:00

## 2024-08-16 RX ADMIN — CARVEDILOL 6.25 MG: 6.25 TABLET, FILM COATED ORAL at 09:26

## 2024-08-16 RX ADMIN — AMLODIPINE BESYLATE 10 MG: 10 TABLET ORAL at 05:53

## 2024-08-16 RX ADMIN — Medication 400 MG: at 17:56

## 2024-08-16 RX ADMIN — DICLOFENAC SODIUM 2 G: 10 GEL TOPICAL at 16:01

## 2024-08-16 RX ADMIN — SODIUM CHLORIDE 1 G: 1 TABLET ORAL at 09:26

## 2024-08-16 RX ADMIN — ARIPIPRAZOLE 10 MG: 10 TABLET ORAL at 11:30

## 2024-08-16 RX ADMIN — DICLOFENAC SODIUM 2 G: 10 GEL TOPICAL at 20:58

## 2024-08-16 RX ADMIN — SODIUM CHLORIDE 1 G: 1 TABLET ORAL at 13:39

## 2024-08-16 RX ADMIN — Medication 400 MG: at 11:30

## 2024-08-16 RX ADMIN — FINASTERIDE 5 MG: 5 TABLET, FILM COATED ORAL at 05:53

## 2024-08-16 RX ADMIN — TRAZODONE HYDROCHLORIDE 100 MG: 50 TABLET ORAL at 20:57

## 2024-08-16 RX ADMIN — LOSARTAN POTASSIUM 100 MG: 50 TABLET, FILM COATED ORAL at 05:53

## 2024-08-16 RX ADMIN — SENNOSIDES AND DOCUSATE SODIUM 2 TABLET: 50; 8.6 TABLET ORAL at 17:57

## 2024-08-16 RX ADMIN — RIVAROXABAN 10 MG: 10 TABLET, FILM COATED ORAL at 17:57

## 2024-08-16 RX ADMIN — CARVEDILOL 6.25 MG: 6.25 TABLET, FILM COATED ORAL at 17:56

## 2024-08-16 RX ADMIN — DICLOFENAC SODIUM 2 G: 10 GEL TOPICAL at 09:26

## 2024-08-16 RX ADMIN — LORAZEPAM 0.5 MG: 0.5 TABLET ORAL at 05:53

## 2024-08-16 RX ADMIN — BUPROPION HYDROCHLORIDE 150 MG: 150 TABLET, FILM COATED, EXTENDED RELEASE ORAL at 11:30

## 2024-08-16 RX ADMIN — OXYCODONE HYDROCHLORIDE 5 MG: 5 TABLET ORAL at 02:13

## 2024-08-16 RX ADMIN — VENLAFAXINE HYDROCHLORIDE 75 MG: 75 CAPSULE, EXTENDED RELEASE ORAL at 11:33

## 2024-08-16 RX ADMIN — LORAZEPAM 0.5 MG: 0.5 TABLET ORAL at 17:56

## 2024-08-16 ASSESSMENT — PAIN DESCRIPTION - PAIN TYPE
TYPE: ACUTE PAIN

## 2024-08-16 NOTE — CARE PLAN
The patient is Stable - Low risk of patient condition declining or worsening    Shift Goals  Clinical Goals: safety  Patient Goals: Rest  Family Goals: FELICITAS    Progress made toward(s) clinical / shift goals:  Patient A&0x4, calm, pleasant and cooperative. Reports pain on Left knee, prn medication given with good relief. Patient placed on 1:1 sitter, bed locked to lowest position, and reinforce the use of call light. All need attended at this time.     Problem: Pain - Standard  Goal: Alleviation of pain or a reduction in pain to the patient’s comfort goal  Outcome: Progressing     Problem: Provide Safe Environment  Goal: Suicide environmental safety, protocols, policies, and practices will be implemented  Outcome: Progressing     Problem: Fall Risk  Goal: Patient will remain free from falls  Outcome: Progressing     Problem: Skin Integrity  Goal: Skin integrity is maintained or improved  Outcome: Progressing       Patient is not progressing towards the following goals:

## 2024-08-16 NOTE — CARE PLAN
The patient is Stable - Low risk of patient condition declining or worsening    Shift Goals  Clinical Goals: pt will remain free from falls and injuries throughout shift  Patient Goals: rest; pain control  Family Goals: ansley    Progress made toward(s) clinical / shift goals:  Pt alert and able to make needs known. 1:1 sitter continued for legal hold for SI. Bed locked in lowest position. Pt given medications as ordered. Hourly rounding in place.   Problem: Pain - Standard  Goal: Alleviation of pain or a reduction in pain to the patient’s comfort goal  Outcome: Progressing     Problem: Knowledge Deficit - Standard  Goal: Patient and family/care givers will demonstrate understanding of plan of care, disease process/condition, diagnostic tests and medications  Outcome: Progressing     Problem: Psychosocial  Goal: Patient's ability to identify and develop effective coping behaviors will improve  Outcome: Progressing     Problem: Fall Risk  Goal: Patient will remain free from falls  Outcome: Progressing       Patient is not progressing towards the following goals:

## 2024-08-16 NOTE — CONSULTS
"PSYCHIATRIC FOLLOW-UP: (established)  Reason for admission:   Drug overdose, suicide attempt  Legal Hold Status:       Active, extended     Chart reviewed.       Patient has been appropriate, adherent on medications.     HPI:    The patient reports he still feels depressed today.  Rates mood as a 2 out of 10 with 10 being happiest.  He states he is very worried about selling his home.  Informed him that his brothers are here to support him and their primary concern is that he is in a more stable psychiatric state.  He recorded some of the things he is still hearing the auditory hallucinations today.  They have been saying negative things like\" she cannot get rid of us\" and have been playing songs about suicide.  They are also telling him that this is the end.  He states they are bothersome and last night he could not sleep due to anxiety and also hearing these voices.  He states he does not recognize these voices, sound like random male voices with a deep tone.  He states sometimes they just chatter constantly.  He still does feel hopeless and helpless but has no active thoughts of harming himself in the hospital.  No thoughts of harming others.  He does not express delusions today.    He is so far not endorsing any tremors, stomach upset, chest pain or other movement related side effects from his current medications.  However, I discussed with him that after workup from the medical team there is a concern that he has SIADH and this could be causing his chronic hyponatremia.  Sodium levels have remained  130 or below persistently the last several days.  I informed him that there is an increased risk of worsening SIADH from SNRI such as venlafaxine.  In order to prevent worsening of SIADH it would be prudent to taper off venlafaxine and trial a different antidepressant.  Discussed retrying Wellbutrin as this medication will not affect sodium levels.  Informed him trazodone has a low risk of worsening hyponatremia so " we can continue this medication.  Antipsychotics do have a risk of worsening SIADH but not as prominently as SNRIs so we can continue Abilify.  He agrees with this plan.  As Wellbutrin can increase serum concentrations of Abilify we will hold off on increasing Abilify further until we can assess for tolerability of Wellbutrin.          Reviewed nursing noted and spoke with nursing staff. The patient is compliant with medication. The patient has not been aggressive or agitated.  The patient has not required psychiatric PRN medications in the last 24 hours.      Discussed hyponatremia with IM attending.  He restarted salt tablets and also will implement fluid restriction.     Medical ROS (as pertinent):     Review of Systems   The patient denies chest pain or shortness of breath  The patient is denying any nausea, vomiting or abdominal pain.  He reports he is feeling very hungry this morning and is waiting for breakfast  The patient is not experiencing any tremors, dystonias or dyskinesias.  He still endorses feeling weak while walking because he is still not able to bear much weight on his left leg due to pain from his abscess.        Psychiatric Examination:  Vitals:   Vitals:    08/16/24 0739   BP: (!) 159/84   Pulse: 86   Resp: 17   Temp: 36.3 °C (97.3 °F)   SpO2: 97%          General Appearance: Appears state age, appropriate grooming & hygiene, not acutely distraught   Behavior:               -Calm and cooperative  Neuro:              -No tremors noted, no tics, dyskinesias or dystonias. NO cogwheeling or rigidity              -Generalized psychomotor slowing              -No posture or gait abnormalities appreciated, patient does bear weight on right leg due to pain in his left leg but is able to walk with the walker.  No ataxia noted with walking with walker.  Speech: Low volume but rate and rhythm are now normal.    Language: English  Thought processes: Logical, linear and goal-directed  Thought content:  "Appropriate, no delusions expressed.  No thoughts of harming self though voices are often commenting about suicide..  He denies any thoughts of harming others  Perceptions: The patient continues to endorse auditory hallucinations that are bothersome and disruptive.  He reports he did not sleep well last night due to auditory hallucinations.  He has not been experiencing visual hallucinations   mood: \"Still depressed\"  Affect: Congruent with stated mood.  Dysphoric, but social smile seen  Judgement and Insight: Fair/fair  Cognition:               -Alert & oriented x 4 (Person, place, situation and time)              -Attention & concentration grossly intact though strained              -Immediate/delayed memory not formally tested but grossly intact              -Age-appropriate fund of knowledge        New PAST MEDICAL/PSYCH/FAMILY/SOCIAL(as reported by patient):                None     Medications    Current Facility-Administered Medications:     diclofenac sodium (Voltaren) 1 % gel 2 g, 2 g, Topical, TID, Frantz Bell D.O., 2 g at 08/16/24 0926    sodium chloride (Salt) tablet 1 g, 1 g, Oral, TID WITH MEALS, ELLEN VanOJeremy, 1 g at 08/16/24 0926    ARIPiprazole (Abilify) tablet 10 mg, 10 mg, Oral, DAILY, Amara Mann D.O.    venlafaxine XR (Effexor XR) capsule 75 mg, 75 mg, Oral, QDAY with Breakfast, Amara Mann D.O.    buPROPion SR (Wellbutrin-SR) tablet 150 mg, 150 mg, Oral, DAILY, Amara Mann D.O.    traZODone (Desyrel) tablet 50 mg, 50 mg, Oral, QHS PRN, Amara Mann D.O.    ketorolac (Toradol) 15 MG/ML injection 15 mg, 15 mg, Intravenous, Q6HRS PRN, Michele Chau M.D.    finasteride (Proscar) tablet 5 mg, 5 mg, Oral, DAILY, ELLEN VanOJeremy, 5 mg at 08/16/24 0553    GI Cocktail (hyoscyamine-lidocaine-Maalox) oral susp cup 30 mL, 30 mL, Oral, Q6HRS PRN, Frantz Bell D.O.    vitamin D2 (Ergocalciferol) (Drisdol) capsule 50,000 Units, 50,000 " Units, Oral, Q7 DAYS, Frantz Bell D.O., 50,000 Units at 08/13/24 1140    LORazepam (Ativan) tablet 0.5 mg, 0.5 mg, Oral, BID, Taihra Guzman D.O., 0.5 mg at 08/16/24 0553    rivaroxaban (Xarelto) tablet 10 mg, 10 mg, Oral, DAILY AT 1800, Tahira Guzman D.O., 10 mg at 08/15/24 1653    senna-docusate (Pericolace Or Senokot S) 8.6-50 MG per tablet 2 Tablet, 2 Tablet, Oral, Q EVENING, 2 Tablet at 08/15/24 1653 **AND** polyethylene glycol/lytes (Miralax) Packet 1 Packet, 1 Packet, Oral, QDAY PRN, RYAN Ly.O.    carvedilol (Coreg) tablet 6.25 mg, 6.25 mg, Oral, BID WITH MEALS, Tahira Guzman D.O., 6.25 mg at 08/16/24 0926    traZODone (Desyrel) tablet 100 mg, 100 mg, Oral, QHS, Tahira Guzman D.O., 100 mg at 08/15/24 2030    losartan (Cozaar) tablet 100 mg, 100 mg, Oral, Q DAY, Roman Valle M.D., 100 mg at 08/16/24 0553    amLODIPine (Norvasc) tablet 10 mg, 10 mg, Oral, Q DAY, Roman Valle M.D., 10 mg at 08/16/24 0553    oxyCODONE immediate-release (Roxicodone) tablet 5 mg, 5 mg, Oral, Q4HRS PRN, Jaimie Church M.D., 5 mg at 08/16/24 0213    magnesium hydroxide (Milk Of Magnesia) suspension 30 mL, 30 mL, Oral, QDAY PRN, TERE CoteRJeremyN., 30 mL at 07/27/24 0438    acetaminophen (Tylenol) tablet 650 mg, 650 mg, Oral, Q4HRS PRN, Cristofer Tejeda D.O., 650 mg at 08/14/24 2158    ondansetron (Zofran) syringe/vial injection 4 mg, 4 mg, Intravenous, Q4HRS PRN, Cristofer Tejeda D.O.       Allergies  Allergies   Allergen Reactions    Sulfa Drugs Hives and Rash                                    EKG:               Results for orders placed or performed during the hospital encounter of 07/23/24   EKG   Result Value Ref Range     Report           [facility] Emergency Dept.     Test Date:  2024-07-23  Pt Name:    FAIZAN GARDNER             Department: ER  MRN:        [id number]                      Room:        12  Gender:     Male                         Technician:  [id number]  :        1960                   Requested By:ER TRIAGE PROTOCOL  Order #:    [id number]                    Reading MD: ILDEFONSO ARNOLD DO     Measurements  Intervals                                Axis  Rate:       85                           P:          61  AL:         153                          QRS:        63  QRSD:       106                          T:          25  QT:         413  QTc:        492     Interpretive Statements  Sinus rhythm  Probable left atrial enlargement  Borderline prolonged QT interval  Compared to ECG 2024 17:09:50  No significant changes  Electronically Signed On 2024 00:56:04 PDT by ILDEFONSO ARNOLD DO      EKG   Result Value Ref Range     Report           [facility]     Test Date:  2024  Pt Name:    FAIZAN GARDNER             Department: 61  MRN:        [id number]                      Room:       [facility]  Gender:     Male                         Technician: ALANNA  :        1960                   Requested By:ANICETO AMBRIZ  Order #:    [id number]                    Reading MD: Lucio Guzman MD     Measurements  Intervals                                Axis  Rate:       82                           P:          67  AL:         176                          QRS:        55  QRSD:       93                           T:          35  QT:         355  QTc:        415     Interpretive Statements  Sinus rhythm  Consider right atrial enlargement  Compared to ECG 2024 19:51:19  No significant changes  Electronically Signed On 2024 06:48:42 PDT by Lucio Guzman MD         Brain Imaging: No new imaging   EEG:  no new EEG     Labs personally reviewed:   Lab Results   Component Value Date/Time    SODIUM 128 (L) 2024 05:50 AM    POTASSIUM 4.1 2024 05:50 AM    CHLORIDE 94 (L) 2024 05:50 AM    CO2 21 2024 05:50 AM    GLUCOSE 114 (H) 2024 05:50 AM    BUN 12 2024 05:50 AM    CREATININE 0.58 2024  05:50 AM    BUNCREATRAT 16 05/15/2023 11:24 AM      Recent Labs     08/10/24  1221   ASTSGOT 13   ALTSGPT 22   TBILIRUBIN 0.3   GLOBULIN 3.1             Lab Results   Component Value Date/Time     WBC 7.0 08/14/2024 07:27 AM     RBC 4.28 (L) 08/14/2024 07:27 AM     HEMOGLOBIN 13.0 (L) 08/14/2024 07:27 AM     HEMATOCRIT 37.6 (L) 08/14/2024 07:27 AM     MCV 87.9 08/14/2024 07:27 AM     MCH 30.4 08/14/2024 07:27 AM     MCHC 34.6 08/14/2024 07:27 AM     MPV 8.5 (L) 08/14/2024 07:27 AM     NEUTSPOLYS 77.20 (H) 08/05/2024 07:39 AM     LYMPHOCYTES 13.30 (L) 08/05/2024 07:39 AM     MONOCYTES 7.90 08/05/2024 07:39 AM     EOSINOPHILS 0.70 08/05/2024 07:39 AM     BASOPHILS 0.40 08/05/2024 07:39 AM       TSH 8/13/24 2.420  B12 8/13/24 527     MRI brain 7/29/24  FINDINGS:  There is no restricted diffusion, acute hemorrhage, hydrocephalus, intracranial space-occupying lesion, abnormal volume loss, vasogenic edema, mass effect or abnormal CSF signal.     The gray matter structures are unremarkable. There are a few nonspecific T2 hyperintensities in the supratentorial white matter. The ventricles are unremarkable. The subarachnoid spaces are unremarkable. The brain coverings are unremarkable.     The ventricles and subarachnoid spaces are unremarkable. The visualized flow voids of the cerebral vasculature are unremarkable. The visualized portions of the cranial nerves are unremarkable. The visualized bones, glands, muscles and the adipose tissues   are unremarkable. There is mild mucosal thickening in the right maxillary sinus.     IMPRESSION:     1.  No acute abnormality.  2.  A few punctate nonspecific supratentorial T2 hyperintensities likely representing nonspecific foci of gliosis/chronic ischemia.  3.  Mild cerebral volume loss.      Assessment:  Terrell Hensley is a 63 y.o. male admitted 7/23/2024 with altered mental status, found down after intentional OD of his home medications amlodipine in order to kill himself.      The patient continues to endorse depressed mood and he continues to have auditory hallucinations.  No improvement over the last several days.  He is so far tolerating his current medications without any side effects but hyponatremia is not improving.  After workup has been completed there is a strong suspicion he has SIADH.  As SNRIs can worsen SIADH we discussed tapering off Effexor and restarting Wellbutrin as this has less of a risk.  Patient is agreeable to this plan.  Patient was provided with a journaling exercise to monitor auditory hallucinations over the weekend.  He is to write down what the auditory hallucinations are saying, how severe they are on a scale of 1-10 and what coping skills he employed over the weekend to help with these auditory hallucinations.  We will follow-up with patient over the weekend to see how he is progressing.  He currently denies any active thoughts of harming self or others.  He does not express delusions.  Abilify will need to be cautiously uptitrated as it does have a drug drug interaction with Wellbutrin.  Will need to carefully monitor for any increase EPS or tremors as Wellbutrin is initiated before further increases in Abilify are considered     Continue legal hold is warranted due to continued psychosis causing distress and also need for continued monitoring as we are making changes to his antidepressant regimen due to adverse effects from his medications    Dx:  #Catatonia, excited -resolved  #MDD with Psychosis        Medical :  Per primary team        Plan:  Legal hold: Extended, due to continued psychosis causing inability to care for self  Psychotropic medications  Continue Abilify 10 mg daily  Continue Ativan 0.5mg po BID for catatonia  Decrease Effexor to 75 mg daily for 5 days then may discontinue this medication  Start Wellbutrin  mg daily  Continue Trazodone 100mg to scheduled qHS for insomnia, added trazodone 50 mg as needed for insomnia     Please  transfer pt to inpatient psychiatric hospital when medically cleared and bed is available  Labs reviewed  EKG reviewed  Old records reviewed/summarized  Safety plan completed, copy in chart  Discussed the case with: Dr. Bell  Psychiatry will follow up   Family (brother Jean Paul) was updated with treatment today    I have tentatively scheduled the patient for an intensive outpatient intake through [facility] for Thursday 8/22 at 2 PM in the event that patient no longer meets criteria for legal hold and needs outpatient follow-up.     Thank you for the consult. , Will continue to follow, for any emergent needs over the weekend please contact Omar BOONE via voalte     Sitter: 1:1  Phone: Yes room phone; may have personal phone if family brings it  Visitors: Yes, Jean Paul (brother), Cortes (brother), YOSELIN (son), Tressa (daughter)   Personal belongings:  May have paper with crayons/markers; may be in milieu with sitter present

## 2024-08-16 NOTE — DISCHARGE PLANNING
Case Management Discharge Planning    Admission Date: 7/23/2024  GMLOS: 8.2  ALOS: 23    6-Clicks ADL Score: 19  6-Clicks Mobility Score: 20      Anticipated Discharge Dispo: Discharge Disposition: D/T to psych hosp or distinct part unit (65)    Action(s) Taken: OTHER:  LSW received disability forms from Pts brother Jean Paul who is requesting their completion so Pt can receive disability benefits. After further research the forms cannot be completed per [facility] Legal dept. Writer called Pt's brother with this explanation and referred him to contact Pt's primary care provider for assistance. Form given to nurses station Jean Paul will pick it up within the next couple of days.    Escalations Completed: None    Medically Clear: No    Barriers to Discharge: Pending Placement    Is the patient up for discharge tomorrow: No    Addendum 08/16    LSW called Pt's brother Jean Paul to inform him Pt FMLA/disability forms were partially completed and left at the nurse's station for Pts brother to .

## 2024-08-16 NOTE — PROGRESS NOTES
[facility] Medicine Daily Progress Note    Date of Service  8/16/2024    Chief Complaint  Terrell Hensley is a 64 y.o. male admitted 7/23/2024 with SI attempt    Hospital Course  This is a 64-year-old male with past medical history of hypertension, mood disorder, who was admitted on 7/23/2024 after SI attempt.    Patient with encephalopathy, WILMAR, transaminitis, rhabdomyolysis, and high anion gap metabolic acidosis on admission. Patient had gonzalez in place and completed aggressive IVF.    Patient recently completed course of Ancef for buttock cellulitis.  MRI imaging of the lumbar spine was performed, noted iliac abscess 3.5 cm. Original plan was to have IR drain placed however fluid collection significantly reduced in size on day of drain placement, aspiration was performed, NGTD. Completed antiobiotic course 8/13.    Psychiatry was consulted, patient to discharge to inpatient psychiatry facility.    Interval Problem Update  Psychiatry, medications changed to Abilify and tapering off Ativan. Legal hold extended.     Patient recently completed course of Ancef for buttock cellulitis.  MRI imaging of the lumbar spine was performed, noted iliac abscess 3.5 cm. Original plan was to have IR drain placed however fluid collection significantly reduced in size on day of drain placement, aspiration was performed, NGTD.        Patient was seen and examined at bedside.  I have personally reviewed and interpreted vitals, labs, and imaging.    8/13.  Afebrile.  Stable vitals.  On room air.  Denies fevers, chills, chest pains, shortness of breath.  Denies fever, chills, chest pains, shortness of breath.  He does complain his abdominal bloating.  Completed course of Zosyn today for iliac abscess.  Discussed with psychiatry.  There is no concerns for hyponatremia possibly secondary to SIADH.  Increase activity mobilization  8/14.  Afebrile.  Stable vitals.  On room air.  Denies fevers, chills, chest pains, shortness of breath.   Complains of left knee and hip pain since admission and being found down.  Pain follows on admission were negative.  Hyponatremia is improved.  A.m. cortisol normal.  Discussed with psychiatry.  Possible SIADH.  Titrating Abilify and patient is tolerating well with improvements in symptoms.  Legal goal extended.  Plan for placement to inpatient psych.  Continue to increase activity as tolerated.  8/16.  Afebrile.  Intermittent hypertension.  On room air.  Denies fevers, chills or chest pains, shortness of breath.  Discussed with psychiatry about hyponatremia.  Adjusted psych meds.  Patient still having difficulty getting around without a walker.  His left knee pain is persistent.  X-rays have been normal.  Ordered CT.  Encouraged mobilization and activity  Hgb 12.3 > 13 > 13.1 > 13.6  P 489 > 452 > 429  Na 129 > 127 > 130 > 126 > 128    I have discussed this patient's plan of care and discharge plan at IDT rounds today with Case Management, Nursing, Nursing leadership, and other members of the IDT team.    Consultants/Specialty  psychiatry    Code Status  Full Code    Disposition  Pending inpatient psych.    I have placed the appropriate orders for post-discharge needs.    Review of Systems  Review of Systems   All other systems reviewed and are negative.       Physical Exam  Temp:  [36.1 °C (96.9 °F)-37.3 °C (99.2 °F)] 36.1 °C (96.9 °F)  Pulse:  [74-97] 74  Resp:  [17-20] 17  BP: (138-150)/(64-89) 150/84  SpO2:  [77 %-97 %] 97 %    Physical Exam  Vitals and nursing note reviewed.   Constitutional:       General: He is not in acute distress.     Appearance: Normal appearance.   HENT:      Head: Normocephalic and atraumatic.   Eyes:      Extraocular Movements: Extraocular movements intact.      Conjunctiva/sclera: Conjunctivae normal.      Pupils: Pupils are equal, round, and reactive to light.   Cardiovascular:      Rate and Rhythm: Normal rate and regular rhythm.      Pulses: Normal pulses.      Heart sounds: No  murmur heard.     No friction rub. No gallop.   Pulmonary:      Effort: Pulmonary effort is normal. No respiratory distress.      Breath sounds: Normal breath sounds. No wheezing, rhonchi or rales.   Abdominal:      General: Bowel sounds are normal. There is no distension.      Palpations: Abdomen is soft.      Tenderness: There is no abdominal tenderness.   Musculoskeletal:         General: No swelling or tenderness. Normal range of motion.      Cervical back: Normal range of motion and neck supple. No muscular tenderness.      Right lower leg: No edema.      Left lower leg: No edema.   Skin:     General: Skin is warm and dry.      Capillary Refill: Capillary refill takes less than 2 seconds.      Findings: No bruising, erythema or rash.   Neurological:      General: No focal deficit present.      Mental Status: He is alert and oriented to person, place, and time.         Fluids    Intake/Output Summary (Last 24 hours) at 8/16/2024 0527  Last data filed at 8/15/2024 2030  Gross per 24 hour   Intake 940 ml   Output 200 ml   Net 740 ml       Laboratory  Recent Labs     08/14/24  0727 08/15/24  0745   WBC 7.0 6.9   RBC 4.28* 4.40*   HEMOGLOBIN 13.0* 13.1*   HEMATOCRIT 37.6* 38.5*   MCV 87.9 87.5   MCH 30.4 29.8   MCHC 34.6 34.0   RDW 41.7 42.1   PLATELETCT 489* 452*   MPV 8.5* 8.3*       Recent Labs     08/13/24  0854 08/14/24  0727 08/15/24  0745   SODIUM 127* 130* 126*   POTASSIUM 4.0 3.8 4.0   CHLORIDE 93* 93* 95*   CO2 22 23 21   GLUCOSE 145* 106* 105*   BUN 11 12 10   CREATININE 0.72 0.73 0.63   CALCIUM 8.8 9.3 9.1                       Imaging  DX-KNEE 2- LEFT   Final Result      1. No left knee joint effusion.   2. No acute osseous or joint abnormality.   3. Atherosclerotic calcifications.      CT-IMAGE-GUIDED DRAIN PERITONEAL   Final Result      Successful LEFT iliacus CT-guided fluid aspiration.               MR-LUMBAR SPINE-WITH & W/O   Final Result      1.  There are multiloculated fluid collection noted  involving left iliacus muscle likely representing abscess. It measures an approximately 3.5 cm in the transverse dimension.   2.  There is no evidence of osteomyelitis, discitis or epidural abscess.   3.  Degenerative disease as described above.      US-EXTREMITY VENOUS UPPER UNILAT RIGHT   Final Result      CT-PELVIS WITH   Final Result         1.  Low-density enlargement of the distal left psoas muscle and iliacus muscle extending to the anterior left thigh. Could represent changes of myositis or possibly early forming infectious process. No definite enhancing fluid collection identified to    indicate abscess at this time.   2.  Distended bladder with nondependent air, consider history of recent instrumentation or changes of urinary tract infection, correlate with urinalysis as clinically appropriate.   3.  Bilateral fat-containing inguinal hernias   4.  Diverticulosis      MR-CERVICAL SPINE-W/O   Final Result      1.  Degenerative disease in the segment cervical spine as described above.   2.  Left paracentral/foraminal disc protrusion at C6-7 causing severe left C7 neural foraminal stenosis.      MR-BRAIN-W/O   Final Result      1.  No acute abnormality.   2.  A few punctate nonspecific supratentorial T2 hyperintensities likely representing nonspecific foci of gliosis/chronic ischemia.   3.  Mild cerebral volume loss.      DX-FEMUR-2+ LEFT   Final Result      Unremarkable LEFT femur.      DX-HIP-COMPLETE - UNILATERAL 2+ LEFT   Final Result      No radiographic evidence of acute traumatic injury.      CT-HEAD W/O   Final Result         1.  No acute intracranial abnormality.   2.  Atherosclerosis.                    Assessment/Plan  * Drug overdose of undetermined intent, initial encounter- (present on admission)  Assessment & Plan  8/16/2024  Apparent suicide attempt  Polypharmacy: some amlodipine but other ingestions unclear  Cont supportive care as clinically improving  On legal hold  Psychiatry team  following  Case discussed today with psychiatry, medications changed to Abilify and Ativan. Legal hold extended.    Abscess of iliac fossa  Assessment & Plan  8/16/2024  Completed course of ancef for buttock cellulitis    Patient recently completed course of Ancef for buttock cellulitis.  MRI imaging of the lumbar spine was performed, noted iliac abscess 3.5 cm. Original plan was to have IR drain placed however fluid collection significantly reduced in size, aspiration was performed.  Will follow cultures.MRSA swab negative, continue Zosyn.    Abx completed    Thrombophlebitis of cephalic vein- (present on admission)  Assessment & Plan  8/16/2024  Right upper extremity pain improving  Ultrasound with no DVT but does have occlusive superficial thrombosis in the cephalic vein.  Warm compresses ordered    Neck pain, acute- (present on admission)  Assessment & Plan  8/16/2024  Patient complaining of LLE numbness, neck pain and bilateral arm numbness.  Head injury 4/2024.  Ordered MRI c spine with head MRI - negative  Oxycodone prn pain.     Cellulitis of buttock  Assessment & Plan  8/16/2024  Patient with erythema, warmth, pain left buttock s/p found down at home.  CT pelvis with contrast- negative for abscess  Completed ancef 2gm IV q 8 x 7 days.  Wound care  Off loading, out of bed, increase activity.    Anxiety- (present on admission)  Assessment & Plan  8/16/2024  Increased anxiety noted on exam.  Psychiatry added seroquel 50mg qhs, zoloft resumed for a.m.   Ativan prn  Klonopin bid.    Anemia- (present on admission)  Assessment & Plan  8/16/2024  Mild   Cont to monitor    Urinary incontinence- (present on admission)  Assessment & Plan  8/16/2024  -oxybutynin held due to drug overdose    Rhabdomyolysis- (present on admission)  Assessment & Plan  8/16/2024  CK elevated to 16k, suspected 2/2 being found down and drug overdose.  Cont agressive IVFs but change to LR from NS  Follow daily AST, ALT, Bun, Creat and  K  Resolved    Elevated liver enzymes- (present on admission)  Assessment & Plan  8/16/2024  Transaminase elevation likely from Rhabdo and not acute liver injury though certainly could have a toxic component from OD  Coming down as expected with resolution of Rhabdo  Cont to monitor daily CMP  Consider further evaluation if it does not resolve as CPK drops and Rhabdo resolves    Acute renal failure with tubular necrosis (HCC)- (present on admission)  Assessment & Plan  8/16/2024  Likely 2/2 prerenal damage from hypotension 2/2 drug overdose and uremia related to rhabdomyolysis.  Creat imroving daily: 1.4 today  Good UOP  Cont LR until CPK<3k  Daily BMP  Renally dose medications as appropriate  Resolved    High anion gap metabolic acidosis- (present on admission)  Assessment & Plan  8/16/2024  Likely 2/2 prerenal damage from hypotension 2/2 drug overdose and uremia related to rhabdomyolysis.  -fluid resuscitation with NS 200cc/hr  -IV potassium 10meq x4  Resolved    Mitral regurgitation- (present on admission)  Assessment & Plan  8/16/2024  Suspected murmur, though difficult to auscultate. Unclear if preexisting or related to overdose.   Does not appear to have any clinically significant heart failure symptoms    Acute encephalopathy- (present on admission)  Assessment & Plan  8/16/2024  Acute metabolic/toxic encephalopathy  CT head neg  Mental status improving daily though still not completely back to normal  Cont to monitor clinically  Repeat lab in am  Minimize sedating medications  Resolved. MRI of the brain without acute changes.     Hypertension- (present on admission)  Assessment & Plan  8/16/2024  Hx of HTN, holding home meds in light of hypotension  Blood pressure still elevated.  Increase losartan to 100 mg daily  Continue amlodipine 10 mg daily  Added coreg 6.25mg BID  Monitor BP    Hyponatremia- (present on admission)  Assessment & Plan  8/16/2024  Has resolved with IVF resuscitation  Cont to  monitor  Serum and urine studies consistent with SIADH.  Discussed with psychiatry.  Titrating psychiatric meds         VTE prophylaxis: Xarelto ppx    I have performed a physical exam and reviewed and updated ROS and Plan today (8/16/2024). In review of yesterday's note (8/15/2024), there are no changes except as documented above.    Greater than 51 minutes spent prepping to see patient (e.g. review of tests) obtaining and/or reviewing separately obtained history. Performing a medically appropriate examination and/ evaluation.  Counseling and educating the patient/family/caregiver.  Ordering medications, tests, or procedures.  Referring and communicating with other health care professionals.  Documenting clinical information in EPIC.  Independently interpreting results and communicating results to patient/family/caregiver.  Care coordination.

## 2024-08-16 NOTE — PROGRESS NOTES
[facility] Medicine Daily Progress Note    Date of Service  8/15/2024    Chief Complaint  Terrell Hensley is a 64 y.o. male admitted 7/23/2024 with SI attempt    Hospital Course  This is a 64-year-old male with past medical history of hypertension, mood disorder, who was admitted on 7/23/2024 after SI attempt.    Patient with encephalopathy, WILMAR, transaminitis, rhabdomyolysis, and high anion gap metabolic acidosis on admission. Patient had gonzalez in place and completed aggressive IVF.    Patient recently completed course of Ancef for buttock cellulitis.  MRI imaging of the lumbar spine was performed, noted iliac abscess 3.5 cm. Original plan was to have IR drain placed however fluid collection significantly reduced in size on day of drain placement, aspiration was performed, NGTD. Completed antiobiotic course 8/13.    Psychiatry was consulted, patient to discharge to inpatient psychiatry facility.    Interval Problem Update  Psychiatry, medications changed to Abilify and tapering off Ativan. Legal hold extended.     Patient recently completed course of Ancef for buttock cellulitis.  MRI imaging of the lumbar spine was performed, noted iliac abscess 3.5 cm. Original plan was to have IR drain placed however fluid collection significantly reduced in size on day of drain placement, aspiration was performed, NGTD.        Patient was seen and examined at bedside.  I have personally reviewed and interpreted vitals, labs, and imaging.    8/13.  Afebrile.  Stable vitals.  On room air.  Denies fevers, chills, chest pains, shortness of breath.  Denies fever, chills, chest pains, shortness of breath.  He does complain his abdominal bloating.  Completed course of Zosyn today for iliac abscess.  Discussed with psychiatry.  There is no concerns for hyponatremia possibly secondary to SIADH.  Increase activity mobilization  8/14.  Afebrile.  Stable vitals.  On room air.  Denies fevers, chills, chest pains, shortness of breath.   Complains of left knee and hip pain since admission and being found down.  Pain follows on admission were negative.  Hyponatremia is improved.  A.m. cortisol normal.  Discussed with psychiatry.  Possible SIADH.  Titrating Abilify and patient is tolerating well with improvements in symptoms.  Legal goal extended.  Plan for placement to inpatient psych.  Continue to increase activity as tolerated.  Hgb 12.3 > 13  P 489  Na 129 > 127 > 130    8/15  Afebrile with normal HR/RR room air O2 sats, a little hypertensive -150. Na 126 today, will have to watch trend, this may just be a one off dip as opposed to trend, will need to see on repeat. No symptoms, feeling improved, just focused on his left knee pain. Ordered XR to see if there was a large effusion or fluid collection to tap and no large jopint effusion on XR, difficult to bear weight, may need further imaging to look for occult fracture or other soft tissue injury prior to going to inpatient psych. Can see how he does with PT.Tolerating pO intake.    I have discussed this patient's plan of care and discharge plan at IDT rounds today with Case Management, Nursing, Nursing leadership, and other members of the IDT team.    Consultants/Specialty  psychiatry    Code Status  Full Code    Disposition  Pending inpatient psych.    I have placed the appropriate orders for post-discharge needs.    Review of Systems  Review of Systems   All other systems reviewed and are negative.       Physical Exam  Temp:  [36.1 °C (96.9 °F)-37.3 °C (99.2 °F)] 36.3 °C (97.4 °F)  Pulse:  [74-97] 88  Resp:  [17-20] 18  BP: (137-150)/(64-89) 137/88  SpO2:  [77 %-97 %] 97 %    Physical Exam  Vitals and nursing note reviewed.   Constitutional:       General: He is not in acute distress.     Appearance: Normal appearance.   HENT:      Head: Normocephalic and atraumatic.   Eyes:      Extraocular Movements: Extraocular movements intact.      Conjunctiva/sclera: Conjunctivae normal.       Pupils: Pupils are equal, round, and reactive to light.   Cardiovascular:      Rate and Rhythm: Normal rate and regular rhythm.      Pulses: Normal pulses.      Heart sounds: No murmur heard.     No friction rub. No gallop.   Pulmonary:      Effort: Pulmonary effort is normal. No respiratory distress.      Breath sounds: Normal breath sounds. No wheezing, rhonchi or rales.   Abdominal:      General: Bowel sounds are normal. There is no distension.      Palpations: Abdomen is soft.      Tenderness: There is no abdominal tenderness.   Musculoskeletal:         General: Swelling and tenderness present. Normal range of motion.      Cervical back: Normal range of motion and neck supple. No muscular tenderness.      Right lower leg: No edema.      Left lower leg: No edema.      Comments: Left knee with tenderness, mild swelling   Skin:     General: Skin is warm and dry.      Capillary Refill: Capillary refill takes less than 2 seconds.      Findings: No bruising, erythema or rash.   Neurological:      General: No focal deficit present.      Mental Status: He is alert and oriented to person, place, and time.         Fluids    Intake/Output Summary (Last 24 hours) at 8/16/2024 0554  Last data filed at 8/15/2024 2030  Gross per 24 hour   Intake 940 ml   Output --   Net 940 ml       Laboratory  Recent Labs     08/14/24  0727 08/15/24  0745   WBC 7.0 6.9   RBC 4.28* 4.40*   HEMOGLOBIN 13.0* 13.1*   HEMATOCRIT 37.6* 38.5*   MCV 87.9 87.5   MCH 30.4 29.8   MCHC 34.6 34.0   RDW 41.7 42.1   PLATELETCT 489* 452*   MPV 8.5* 8.3*       Recent Labs     08/13/24  0854 08/14/24  0727 08/15/24  0745   SODIUM 127* 130* 126*   POTASSIUM 4.0 3.8 4.0   CHLORIDE 93* 93* 95*   CO2 22 23 21   GLUCOSE 145* 106* 105*   BUN 11 12 10   CREATININE 0.72 0.73 0.63   CALCIUM 8.8 9.3 9.1                       Imaging  DX-KNEE 2- LEFT   Final Result      1. No left knee joint effusion.   2. No acute osseous or joint abnormality.   3. Atherosclerotic  calcifications.      CT-IMAGE-GUIDED DRAIN PERITONEAL   Final Result      Successful LEFT iliacus CT-guided fluid aspiration.               MR-LUMBAR SPINE-WITH & W/O   Final Result      1.  There are multiloculated fluid collection noted involving left iliacus muscle likely representing abscess. It measures an approximately 3.5 cm in the transverse dimension.   2.  There is no evidence of osteomyelitis, discitis or epidural abscess.   3.  Degenerative disease as described above.      US-EXTREMITY VENOUS UPPER UNILAT RIGHT   Final Result      CT-PELVIS WITH   Final Result         1.  Low-density enlargement of the distal left psoas muscle and iliacus muscle extending to the anterior left thigh. Could represent changes of myositis or possibly early forming infectious process. No definite enhancing fluid collection identified to    indicate abscess at this time.   2.  Distended bladder with nondependent air, consider history of recent instrumentation or changes of urinary tract infection, correlate with urinalysis as clinically appropriate.   3.  Bilateral fat-containing inguinal hernias   4.  Diverticulosis      MR-CERVICAL SPINE-W/O   Final Result      1.  Degenerative disease in the segment cervical spine as described above.   2.  Left paracentral/foraminal disc protrusion at C6-7 causing severe left C7 neural foraminal stenosis.      MR-BRAIN-W/O   Final Result      1.  No acute abnormality.   2.  A few punctate nonspecific supratentorial T2 hyperintensities likely representing nonspecific foci of gliosis/chronic ischemia.   3.  Mild cerebral volume loss.      DX-FEMUR-2+ LEFT   Final Result      Unremarkable LEFT femur.      DX-HIP-COMPLETE - UNILATERAL 2+ LEFT   Final Result      No radiographic evidence of acute traumatic injury.      CT-HEAD W/O   Final Result         1.  No acute intracranial abnormality.   2.  Atherosclerosis.                    Assessment/Plan  * Drug overdose of undetermined intent,  initial encounter- (present on admission)  Assessment & Plan  8/14/2024  Apparent suicide attempt  Polypharmacy: some amlodipine but other ingestions unclear  Cont supportive care as clinically improving  On legal hold  Psychiatry team following  Case discussed today with psychiatry, medications changed to Abilify and Ativan. Legal hold extended.    Abscess of iliac fossa  Assessment & Plan  8/14/2024  Completed course of ancef for buttock cellulitis    Patient recently completed course of Ancef for buttock cellulitis.  MRI imaging of the lumbar spine was performed, noted iliac abscess 3.5 cm. Original plan was to have IR drain placed however fluid collection significantly reduced in size, aspiration was performed.  Will follow cultures.MRSA swab negative, continue Zosyn.    Abx completed    Thrombophlebitis of cephalic vein- (present on admission)  Assessment & Plan  8/14/2024  Right upper extremity pain improving  Ultrasound with no DVT but does have occlusive superficial thrombosis in the cephalic vein.  Warm compresses ordered    Neck pain, acute- (present on admission)  Assessment & Plan  8/14/2024  Patient complaining of LLE numbness, neck pain and bilateral arm numbness.  Head injury 4/2024.  Ordered MRI c spine with head MRI - negative  Oxycodone prn pain.     Cellulitis of buttock  Assessment & Plan  8/14/2024  Patient with erythema, warmth, pain left buttock s/p found down at home.  CT pelvis with contrast- negative for abscess  Completed ancef 2gm IV q 8 x 7 days.  Wound care  Off loading, out of bed, increase activity.    Anxiety- (present on admission)  Assessment & Plan  8/14/2024  Increased anxiety noted on exam.  Psychiatry added seroquel 50mg qhs, zoloft resumed for a.m.   Ativan prn  Klonopin bid.    Anemia- (present on admission)  Assessment & Plan  8/14/2024  Mild   Cont to monitor    Urinary incontinence- (present on admission)  Assessment & Plan  8/14/2024  -oxybutynin held due to drug  overdose    Rhabdomyolysis- (present on admission)  Assessment & Plan  8/14/2024  CK elevated to 16k, suspected 2/2 being found down and drug overdose.  Cont agressive IVFs but change to LR from NS  Follow daily AST, ALT, Bun, Creat and K  Resolved    Elevated liver enzymes- (present on admission)  Assessment & Plan  8/14/2024  Transaminase elevation likely from Rhabdo and not acute liver injury though certainly could have a toxic component from OD  Coming down as expected with resolution of Rhabdo  Cont to monitor daily CMP  Consider further evaluation if it does not resolve as CPK drops and Rhabdo resolves    Acute renal failure with tubular necrosis (HCC)- (present on admission)  Assessment & Plan  8/14/2024  Likely 2/2 prerenal damage from hypotension 2/2 drug overdose and uremia related to rhabdomyolysis.  Creat imroving daily: 1.4 today  Good UOP  Cont LR until CPK<3k  Daily BMP  Renally dose medications as appropriate  Resolved    High anion gap metabolic acidosis- (present on admission)  Assessment & Plan  8/14/2024  Likely 2/2 prerenal damage from hypotension 2/2 drug overdose and uremia related to rhabdomyolysis.  -fluid resuscitation with NS 200cc/hr  -IV potassium 10meq x4  Resolved    Mitral regurgitation- (present on admission)  Assessment & Plan  8/14/2024  Suspected murmur, though difficult to auscultate. Unclear if preexisting or related to overdose.   Does not appear to have any clinically significant heart failure symptoms    Acute encephalopathy- (present on admission)  Assessment & Plan  8/14/2024  Acute metabolic/toxic encephalopathy  CT head neg  Mental status improving daily though still not completely back to normal  Cont to monitor clinically  Repeat lab in am  Minimize sedating medications  Resolved. MRI of the brain without acute changes.     Hypertension- (present on admission)  Assessment & Plan  8/14/2024  Hx of HTN, holding home meds in light of hypotension  Blood pressure still  elevated.  Increase losartan to 100 mg daily  Continue amlodipine 10 mg daily  Added coreg 6.25mg BID  Monitor BP    Hyponatremia- (present on admission)  Assessment & Plan  8/14/2024  Has resolved with IVF resuscitation  Cont to monitor  Serum and urine studies consistent with SIADH.  Discussed with psychiatry.  Titrating psychiatric meds         VTE prophylaxis: Xarelto ppx    I have performed a physical exam and reviewed and updated ROS and Plan today (8/15/2024). In review of yesterday's note (8/14/2024), there are no changes except as documented above.    Greater than 55 minutes spent prepping to see patient (e.g. review of tests) obtaining and/or reviewing separately obtained history. Performing a medically appropriate examination and/ evaluation.  Counseling and educating the patient/family/caregiver.  Ordering medications, tests, or procedures.  Referring and communicating with other health care professionals.  Documenting clinical information in EPIC.  Independently interpreting results and communicating results to patient/family/caregiver.  Care coordination.

## 2024-08-16 NOTE — DISCHARGE PLANNING
Alert Team/Behavioral Health   Note:      - Chavo Gomez BH: @1621 Talked to [facility]. They will call back as they are about to start their daily morning meeting.  @2720: Talked to [facility]. No beds currently available and short staffing.

## 2024-08-17 PROBLEM — F32.3 MAJOR DEPRESSIVE DISORDER, SINGLE EPISODE, SEVERE, WITH PSYCHOSIS (HCC): Status: ACTIVE | Noted: 2024-08-17

## 2024-08-17 LAB
ALBUMIN SERPL BCP-MCNC: 3.6 G/DL (ref 3.2–4.9)
BUN SERPL-MCNC: 10 MG/DL (ref 8–22)
CALCIUM ALBUM COR SERPL-MCNC: 9.4 MG/DL (ref 8.5–10.5)
CALCIUM SERPL-MCNC: 9.1 MG/DL (ref 8.5–10.5)
CHLORIDE SERPL-SCNC: 95 MMOL/L (ref 96–112)
CO2 SERPL-SCNC: 21 MMOL/L (ref 20–33)
CREAT SERPL-MCNC: 0.7 MG/DL (ref 0.5–1.4)
ERYTHROCYTE [DISTWIDTH] IN BLOOD BY AUTOMATED COUNT: 40.9 FL (ref 35.9–50)
GFR SERPLBLD CREATININE-BSD FMLA CKD-EPI: 102 ML/MIN/1.73 M 2
GLUCOSE SERPL-MCNC: 109 MG/DL (ref 65–99)
HCT VFR BLD AUTO: 39.6 % (ref 42–52)
HGB BLD-MCNC: 13.8 G/DL (ref 14–18)
MAGNESIUM SERPL-MCNC: 1.9 MG/DL (ref 1.5–2.5)
MCH RBC QN AUTO: 30.1 PG (ref 27–33)
MCHC RBC AUTO-ENTMCNC: 34.8 G/DL (ref 32.3–36.5)
MCV RBC AUTO: 86.5 FL (ref 81.4–97.8)
PHOSPHATE SERPL-MCNC: 3.2 MG/DL (ref 2.5–4.5)
PLATELET # BLD AUTO: 437 K/UL (ref 164–446)
PMV BLD AUTO: 8.5 FL (ref 9–12.9)
POTASSIUM SERPL-SCNC: 4.1 MMOL/L (ref 3.6–5.5)
RBC # BLD AUTO: 4.58 M/UL (ref 4.7–6.1)
SODIUM SERPL-SCNC: 128 MMOL/L (ref 135–145)
WBC # BLD AUTO: 6.7 K/UL (ref 4.8–10.8)

## 2024-08-17 PROCEDURE — 700102 HCHG RX REV CODE 250 W/ 637 OVERRIDE(OP): Performed by: GENERAL PRACTICE

## 2024-08-17 PROCEDURE — 83735 ASSAY OF MAGNESIUM: CPT

## 2024-08-17 PROCEDURE — 99233 SBSQ HOSP IP/OBS HIGH 50: CPT | Performed by: INTERNAL MEDICINE

## 2024-08-17 PROCEDURE — 770001 HCHG ROOM/CARE - MED/SURG/GYN PRIV*

## 2024-08-17 PROCEDURE — A9270 NON-COVERED ITEM OR SERVICE: HCPCS | Performed by: INTERNAL MEDICINE

## 2024-08-17 PROCEDURE — 700102 HCHG RX REV CODE 250 W/ 637 OVERRIDE(OP): Performed by: STUDENT IN AN ORGANIZED HEALTH CARE EDUCATION/TRAINING PROGRAM

## 2024-08-17 PROCEDURE — 36415 COLL VENOUS BLD VENIPUNCTURE: CPT

## 2024-08-17 PROCEDURE — A9270 NON-COVERED ITEM OR SERVICE: HCPCS | Performed by: GENERAL PRACTICE

## 2024-08-17 PROCEDURE — 700102 HCHG RX REV CODE 250 W/ 637 OVERRIDE(OP): Performed by: INTERNAL MEDICINE

## 2024-08-17 PROCEDURE — A9270 NON-COVERED ITEM OR SERVICE: HCPCS | Performed by: STUDENT IN AN ORGANIZED HEALTH CARE EDUCATION/TRAINING PROGRAM

## 2024-08-17 PROCEDURE — 85027 COMPLETE CBC AUTOMATED: CPT

## 2024-08-17 PROCEDURE — 80069 RENAL FUNCTION PANEL: CPT

## 2024-08-17 RX ORDER — LANOLIN ALCOHOL/MO/W.PET/CERES
400 CREAM (GRAM) TOPICAL 2 TIMES DAILY
Status: COMPLETED | OUTPATIENT
Start: 2024-08-17 | End: 2024-08-18

## 2024-08-17 RX ADMIN — FINASTERIDE 5 MG: 5 TABLET, FILM COATED ORAL at 05:39

## 2024-08-17 RX ADMIN — SODIUM CHLORIDE 1 G: 1 TABLET ORAL at 07:34

## 2024-08-17 RX ADMIN — ARIPIPRAZOLE 10 MG: 10 TABLET ORAL at 05:39

## 2024-08-17 RX ADMIN — SENNOSIDES AND DOCUSATE SODIUM 2 TABLET: 50; 8.6 TABLET ORAL at 17:42

## 2024-08-17 RX ADMIN — SODIUM CHLORIDE 1 G: 1 TABLET ORAL at 13:21

## 2024-08-17 RX ADMIN — TRAZODONE HYDROCHLORIDE 100 MG: 50 TABLET ORAL at 19:51

## 2024-08-17 RX ADMIN — CARVEDILOL 6.25 MG: 6.25 TABLET, FILM COATED ORAL at 07:34

## 2024-08-17 RX ADMIN — VENLAFAXINE HYDROCHLORIDE 75 MG: 75 CAPSULE, EXTENDED RELEASE ORAL at 07:33

## 2024-08-17 RX ADMIN — LORAZEPAM 0.5 MG: 0.5 TABLET ORAL at 05:40

## 2024-08-17 RX ADMIN — DICLOFENAC SODIUM 2 G: 10 GEL TOPICAL at 19:50

## 2024-08-17 RX ADMIN — CARVEDILOL 6.25 MG: 6.25 TABLET, FILM COATED ORAL at 17:43

## 2024-08-17 RX ADMIN — LOSARTAN POTASSIUM 100 MG: 50 TABLET, FILM COATED ORAL at 05:40

## 2024-08-17 RX ADMIN — DICLOFENAC SODIUM 2 G: 10 GEL TOPICAL at 16:59

## 2024-08-17 RX ADMIN — BUPROPION HYDROCHLORIDE 150 MG: 150 TABLET, FILM COATED, EXTENDED RELEASE ORAL at 05:39

## 2024-08-17 RX ADMIN — LORAZEPAM 0.5 MG: 0.5 TABLET ORAL at 17:43

## 2024-08-17 RX ADMIN — SODIUM CHLORIDE 1 G: 1 TABLET ORAL at 17:43

## 2024-08-17 RX ADMIN — Medication 400 MG: at 17:42

## 2024-08-17 RX ADMIN — RIVAROXABAN 10 MG: 10 TABLET, FILM COATED ORAL at 17:43

## 2024-08-17 RX ADMIN — DICLOFENAC SODIUM 2 G: 10 GEL TOPICAL at 09:43

## 2024-08-17 RX ADMIN — AMLODIPINE BESYLATE 10 MG: 10 TABLET ORAL at 05:40

## 2024-08-17 ASSESSMENT — ENCOUNTER SYMPTOMS
HALLUCINATIONS: 1
INSOMNIA: 1
MYALGIAS: 1
NEUROLOGICAL NEGATIVE: 1
CONSTIPATION: 1
NERVOUS/ANXIOUS: 1
DEPRESSION: 1

## 2024-08-17 ASSESSMENT — PAIN DESCRIPTION - PAIN TYPE
TYPE: ACUTE PAIN
TYPE: ACUTE PAIN

## 2024-08-17 NOTE — CONSULTS
"PSYCHIATRIC FOLLOW-UP: (established)  Reason for Admission: Suicide attempt via medication overdose    Legal Hold Status: on legal hold, extended  Chart reviewed.         HPI:   Patient observed in bed reporting decreased sleep during HS, denies nightmares, admits daytime napping, encouraged to reduce amount of time napping to promote restful sleep; vocalizes insight about this environment not being able to support uninterrupted sleep. Observed with adequate energy, reports adequate appetite, states eating most of breakfast, denies nausea. Does report constipation, small BM reported today. Overall improved mood compared to previous encounter, rating mood at 3/10, expressing he is more able to vocalize his thoughts today, vocalizing benefit from journaling. Continues to endorse SI without planning, admits AH, negative in nature telling him, \"this is a terrible place to be (in life),\"  denies HI/VH. Patient has knowledge of medications adjustments and reasoning, has not noticed any benefit or side effective from medication titration. Has been accepting oral medications, and appears to be tolerating medications, no change indicated and will continue current plan to titrate off Effexor in a few days.     Patient continues to perseverate on certain situations including selling his house, able to rationalize the situation and not thinking about it constantly but states, it keeps coming up and is hard to remove from his head for prolonged periods. Has been utilizing offered coping skills including journaling, which he states has had periods of benefit but \"short.\" Continues to vocalize hopelessness based on his situation, does look forward to selective activities including PT/OT work to promote increased strength and mobility. Somewhat focused on his fluid restriction expressing increased dry mouth, which may be associated with medication adjustment. Patient condition appears to be stabilizing, patient does continue to " "have suicidal thinking and hallucinations; continues to experience insomnia 2/2 Sx of psychosis, and environmental factors.    Medical ROS (as pertinent):     Review of Systems   Gastrointestinal:  Positive for constipation.   Genitourinary: Negative.    Musculoskeletal:  Positive for myalgias.   Neurological: Negative.    Psychiatric/Behavioral:  Positive for depression, hallucinations and suicidal ideas. The patient is nervous/anxious and has insomnia.        Psychiatric Examination:  Vitals: BP (!) 149/85   Pulse 75   Temp 36.6 °C (97.8 °F) (Temporal)   Resp 17   Ht 1.803 m (5' 11\")   Wt 85.3 kg (188 lb 0.8 oz)   SpO2 95%     General Appearance: appears intermittent eye contact cooperative  Abnormal Movements: none, no PMA/PMR or tremor observed.  Gait/Posture: not observed  Speech: normal rate, normal rhythm, and quiet  Thought Process: Perseverative  Associations: None  Abnormal/Psychotic Thoughts: Admits AH, denies CAH/VH.   Judgement/Insight: improved/improved  Orientation: alert, oriented to person, place and time  Recent/Remote Memory: No gross evidence of memory deficits  Attention/Concentration: normal  Language: spontaneous, comprehends spoken commands, and fluent   Fund of Knowledge: Average  Mood/Affect: \"blue\" Mood/Congruent   SI/HI: Admits SI. Denies HI    EKG:   Results for orders placed or performed during the hospital encounter of 24   EKG   Result Value Ref Range    Report       [facility] Emergency Dept.    Test Date:  2024  Pt Name:    FAIZAN GARDNER             Department: ER  MRN:        [id number]                      Room:        12  Gender:     Male                         Technician: [id number]  :        1960                   Requested By:ER TRIAGE PROTOCOL  Order #:    [id number]                    Sari MD: ILDEFONSO ARNOLD, DO    Measurements  Intervals                                Axis  Rate:       85                           P:          " 61  MD:         153                          QRS:        63  QRSD:       106                          T:          25  QT:         413  QTc:        492    Interpretive Statements  Sinus rhythm  Probable left atrial enlargement  Borderline prolonged QT interval  Compared to ECG 2024 17:09:50  No significant changes  Electronically Signed On 2024 00:56:04 PDT by ILDEFONSO ARNOLD DO     EKG   Result Value Ref Range    Report       [facility]    Test Date:  2024  Pt Name:    FAIZAN GARDNER             Department: 61  MRN:        [id number]                      Room:       [facility]  Gender:     Male                         Technician: ALANNA  :        1960                   Requested By:ANICETO AMBRIZ  Order #:    [id number]                    Reading MD: Lucio Guzman MD    Measurements  Intervals                                Axis  Rate:       82                           P:          67  MD:         176                          QRS:        55  QRSD:       93                           T:          35  QT:         355  QTc:        415    Interpretive Statements  Sinus rhythm  Consider right atrial enlargement  Compared to ECG 2024 19:51:19  No significant changes  Electronically Signed On 2024 06:48:42 PDT by Lucio Guzman MD        Imaging: no new results  EEG:  no new results     Labs personally reviewed:   Recent Results (from the past 24 hour(s))   CBC WITHOUT DIFFERENTIAL    Collection Time: 24  7:40 AM   Result Value Ref Range    WBC 6.7 4.8 - 10.8 K/uL    RBC 4.58 (L) 4.70 - 6.10 M/uL    Hemoglobin 13.8 (L) 14.0 - 18.0 g/dL    Hematocrit 39.6 (L) 42.0 - 52.0 %    MCV 86.5 81.4 - 97.8 fL    MCH 30.1 27.0 - 33.0 pg    MCHC 34.8 32.3 - 36.5 g/dL    RDW 40.9 35.9 - 50.0 fL    Platelet Count 437 164 - 446 K/uL    MPV 8.5 (L) 9.0 - 12.9 fL   MAGNESIUM    Collection Time: 24  7:40 AM   Result Value Ref Range    Magnesium 1.9 1.5 - 2.5 mg/dL   Renal Function  Panel    Collection Time: 08/17/24  7:40 AM   Result Value Ref Range    Sodium 128 (L) 135 - 145 mmol/L    Potassium 4.1 3.6 - 5.5 mmol/L    Chloride 95 (L) 96 - 112 mmol/L    Co2 21 20 - 33 mmol/L    Glucose 109 (H) 65 - 99 mg/dL    Creatinine 0.70 0.50 - 1.40 mg/dL    Bun 10 8 - 22 mg/dL    Calcium 9.1 8.5 - 10.5 mg/dL    Correct Calcium 9.4 8.5 - 10.5 mg/dL    Phosphorus 3.2 2.5 - 4.5 mg/dL    Albumin 3.6 3.2 - 4.9 g/dL   ESTIMATED GFR    Collection Time: 08/17/24  7:40 AM   Result Value Ref Range    GFR (CKD-EPI) 102 >60 mL/min/1.73 m 2       Current medications:  Scheduled Medications   Medication Dose Frequency    diclofenac sodium  2 g TID    sodium chloride  1 g TID WITH MEALS    ARIPiprazole  10 mg DAILY    venlafaxine XR  75 mg QDAY with Breakfast    buPROPion SR  150 mg DAILY    finasteride  5 mg DAILY    vitamin D2 (Ergocalciferol)  50,000 Units Q7 DAYS    LORazepam  0.5 mg BID    rivaroxaban  10 mg DAILY AT 1800    senna-docusate  2 Tablet Q EVENING    carvedilol  6.25 mg BID WITH MEALS    traZODone  100 mg QHS    losartan  100 mg Q DAY    amLODIPine  10 mg Q DAY       Assessment:  Patient is a 63 year-old male who presented 7/23/2024 after suicidal attempt drug overdose.     Patient continues to express depressed mood with limited improvement from previous encounter, continues to endorse AH, but not command in nature. Patient is engaging in activities including journaling to reduce negative Sx and document occurrence and severity for follow up. Has awareness of current medication and POC. Appears to be tolerating current regimen and reduced Effexor and starting Wellbutrin. Patient continues to deny HI/VH, denies the urge to attempt self harming behaviors or attempt suicide while hospitalized.     Continues to present an acute danger to self 2/2 SI with psychosis. Plan to transition to [facility] facility once medically cleared.     Dx:  Depressive disorder with psychosis  Catatonia - resolved    Medical: as  noted by the medical treatment team.     Plan:  Legal hold: Yes, extended  Psychotropic medications:   Continue Ability 10mg daily  Continue Wellbutrin SR 150mg daily  Continue Effexor XR 75mg daily, with plan to discontinue  Continue Trazodone 100mg QHS  Continue Ativan 0.5mg po BID for catatonia  Please transfer pt to inpatient psychiatric hospital when medically cleared and bed is available  Brief supportive psychotherapy provided (15 minutes)  Labs reviewed  EKG reviewed  Old records ordered/reviewed/summarized  Safety plan completed, copy in chart  Discussed the case with: RYAN Bell DO  Psychiatry will follow up.    Thank you for the consult.     Sitter: Yes 1:1 Sitter  Phone: Yes room phone okay  Visitors: Yes, Jean Paul (brother), Cortes (brother), YOSELIN (son), Tressa (daughter)   Personal belongings: Yes,  May have paper with crayons/markers; may be in milieu with sitter present    This note was created using voice recognition software (Dragon). The accuracy of the dictation is limited by the abilities of the software. I have reviewed the note prior to signing. However, error related to voice recognition software and /or scribes may still exist and should be interpreted within the appropriate context.

## 2024-08-17 NOTE — DISCHARGE PLANNING
Alert Team/Behavioral Health   Note:        - Chavo YOUNG: Talked to Lisa. Referral is on pending list. No beds currently available.

## 2024-08-17 NOTE — PROGRESS NOTES
[facility] Medicine Daily Progress Note    Date of Service  8/17/2024    Chief Complaint  Terrell Hensley is a 64 y.o. male admitted 7/23/2024 with SI attempt    Hospital Course  This is a 64-year-old male with past medical history of hypertension, mood disorder, who was admitted on 7/23/2024 after SI attempt.    Patient with encephalopathy, WILMAR, transaminitis, rhabdomyolysis, and high anion gap metabolic acidosis on admission. Patient had gonzalez in place and completed aggressive IVF.    Patient recently completed course of Ancef for buttock cellulitis.  MRI imaging of the lumbar spine was performed, noted iliac abscess 3.5 cm. Original plan was to have IR drain placed however fluid collection significantly reduced in size on day of drain placement, aspiration was performed, NGTD. Completed antiobiotic course 8/13.    Psychiatry was consulted, patient to discharge to inpatient psychiatry facility.    Interval Problem Update  Patient was seen and examined at bedside.  I have personally reviewed and interpreted vitals, labs, and imaging.    8/13.  Afebrile.  Stable vitals.  On room air.  Denies fevers, chills, chest pains, shortness of breath.  Denies fever, chills, chest pains, shortness of breath.  He does complain his abdominal bloating.  Completed course of Zosyn today for iliac abscess.  Discussed with psychiatry.  There is no concerns for hyponatremia possibly secondary to SIADH.  Increase activity mobilization  8/14.  Afebrile.  Stable vitals.  On room air.  Denies fevers, chills, chest pains, shortness of breath.  Complains of left knee and hip pain since admission and being found down.  Pain follows on admission were negative.  Hyponatremia is improved.  A.m. cortisol normal.  Discussed with psychiatry.  Possible SIADH.  Titrating Abilify and patient is tolerating well with improvements in symptoms.  Legal goal extended.  Plan for placement to inpatient psych.  Continue to increase activity as  tolerated.  8/16.  Afebrile.  Intermittent hypertension.  On room air.  Denies fevers, chills or chest pains, shortness of breath.  Discussed with psychiatry about hyponatremia.  Adjusted psych meds.  Patient still having difficulty getting around without a walker.  His left knee pain is persistent.  X-rays have been normal.  Ordered CT.  Encouraged mobilization and activity  8/17.  Afebrile.  Hypertensive.  On room air.  Denies fevers, chills, chest pain, shortness of breath.  Persistent left knee pain, decreased range of motion.  Will start some muscle relaxants.  Reconsult PT/OT.  Counseled about increased activity.  Along with stable sodium patient must be able to walk without a walker prior to going to inpatient behavioral health  Hgb 12.3 > 13 > 13.1 > 13.6  P 489 > 452 > 429  Na 129 > 127 > 130 > 126 > 128    I have discussed this patient's plan of care and discharge plan at IDT rounds today with Case Management, Nursing, Nursing leadership, and other members of the IDT team.    Consultants/Specialty  psychiatry    Code Status  Full Code    Disposition  Pending inpatient psych.    I have placed the appropriate orders for post-discharge needs.    Review of Systems  Review of Systems   All other systems reviewed and are negative.       Physical Exam  Temp:  [36.3 °C (97.3 °F)-36.9 °C (98.5 °F)] 36.9 °C (98.5 °F)  Pulse:  [74-88] 84  Resp:  [16-18] 16  BP: (131-162)/(66-88) 162/86  SpO2:  [92 %-97 %] 93 %    Physical Exam  Vitals and nursing note reviewed.   Constitutional:       General: He is not in acute distress.     Appearance: Normal appearance.   HENT:      Head: Normocephalic and atraumatic.   Eyes:      Extraocular Movements: Extraocular movements intact.      Conjunctiva/sclera: Conjunctivae normal.      Pupils: Pupils are equal, round, and reactive to light.   Cardiovascular:      Rate and Rhythm: Normal rate and regular rhythm.      Pulses: Normal pulses.      Heart sounds: No murmur heard.     No  friction rub. No gallop.   Pulmonary:      Effort: Pulmonary effort is normal. No respiratory distress.      Breath sounds: Normal breath sounds. No wheezing, rhonchi or rales.   Abdominal:      General: Bowel sounds are normal. There is no distension.      Palpations: Abdomen is soft.      Tenderness: There is no abdominal tenderness.   Musculoskeletal:         General: No swelling or tenderness. Normal range of motion.      Cervical back: Normal range of motion and neck supple. No muscular tenderness.      Right lower leg: No edema.      Left lower leg: No edema.   Skin:     General: Skin is warm and dry.      Capillary Refill: Capillary refill takes less than 2 seconds.      Findings: No bruising, erythema or rash.   Neurological:      General: No focal deficit present.      Mental Status: He is alert and oriented to person, place, and time.         Fluids    Intake/Output Summary (Last 24 hours) at 8/17/2024 0512  Last data filed at 8/17/2024 0500  Gross per 24 hour   Intake 790 ml   Output --   Net 790 ml       Laboratory  Recent Labs     08/14/24  0727 08/15/24  0745 08/16/24  0550   WBC 7.0 6.9 8.4   RBC 4.28* 4.40* 4.55*   HEMOGLOBIN 13.0* 13.1* 13.6*   HEMATOCRIT 37.6* 38.5* 39.2*   MCV 87.9 87.5 86.2   MCH 30.4 29.8 29.9   MCHC 34.6 34.0 34.7   RDW 41.7 42.1 40.9   PLATELETCT 489* 452* 429   MPV 8.5* 8.3* 8.3*       Recent Labs     08/14/24  0727 08/15/24  0745 08/16/24  0550   SODIUM 130* 126* 128*   POTASSIUM 3.8 4.0 4.1   CHLORIDE 93* 95* 94*   CO2 23 21 21   GLUCOSE 106* 105* 114*   BUN 12 10 12   CREATININE 0.73 0.63 0.58   CALCIUM 9.3 9.1 9.3                       Imaging  CT-KNEE WITH PLUS RECONS LEFT   Final Result      1.  Small joint effusion and moderate popliteal cyst. Minor degenerative changes.   2.  Atherosclerotic vascular calcifications.      DX-KNEE 2- LEFT   Final Result      1. No left knee joint effusion.   2. No acute osseous or joint abnormality.   3. Atherosclerotic calcifications.       CT-IMAGE-GUIDED DRAIN PERITONEAL   Final Result      Successful LEFT iliacus CT-guided fluid aspiration.               MR-LUMBAR SPINE-WITH & W/O   Final Result      1.  There are multiloculated fluid collection noted involving left iliacus muscle likely representing abscess. It measures an approximately 3.5 cm in the transverse dimension.   2.  There is no evidence of osteomyelitis, discitis or epidural abscess.   3.  Degenerative disease as described above.      US-EXTREMITY VENOUS UPPER UNILAT RIGHT   Final Result      CT-PELVIS WITH   Final Result         1.  Low-density enlargement of the distal left psoas muscle and iliacus muscle extending to the anterior left thigh. Could represent changes of myositis or possibly early forming infectious process. No definite enhancing fluid collection identified to    indicate abscess at this time.   2.  Distended bladder with nondependent air, consider history of recent instrumentation or changes of urinary tract infection, correlate with urinalysis as clinically appropriate.   3.  Bilateral fat-containing inguinal hernias   4.  Diverticulosis      MR-CERVICAL SPINE-W/O   Final Result      1.  Degenerative disease in the segment cervical spine as described above.   2.  Left paracentral/foraminal disc protrusion at C6-7 causing severe left C7 neural foraminal stenosis.      MR-BRAIN-W/O   Final Result      1.  No acute abnormality.   2.  A few punctate nonspecific supratentorial T2 hyperintensities likely representing nonspecific foci of gliosis/chronic ischemia.   3.  Mild cerebral volume loss.      DX-FEMUR-2+ LEFT   Final Result      Unremarkable LEFT femur.      DX-HIP-COMPLETE - UNILATERAL 2+ LEFT   Final Result      No radiographic evidence of acute traumatic injury.      CT-HEAD W/O   Final Result         1.  No acute intracranial abnormality.   2.  Atherosclerosis.                    Assessment/Plan  * Drug overdose of undetermined intent, initial encounter-  (present on admission)  Assessment & Plan  8/17/2024  Apparent suicide attempt  Polypharmacy: some amlodipine but other ingestions unclear  Cont supportive care as clinically improving  On legal hold  Psychiatry team following  Case discussed today with psychiatry, medications changed to Abilify and Ativan. Legal hold extended.    Major depressive disorder, single episode, severe, with psychosis (HCC)  Assessment & Plan  8/17/2024  Increased anxiety noted on exam.  Psychiatry following.  Wean Effexor.  Start Wellbutrin  Ativan prn  Continue Trazodone, Abilify    Abscess of iliac fossa  Assessment & Plan  8/17/2024  Completed course of ancef for buttock cellulitis    Patient recently completed course of Ancef for buttock cellulitis.  MRI imaging of the lumbar spine was performed, noted iliac abscess 3.5 cm. Original plan was to have IR drain placed however fluid collection significantly reduced in size, aspiration was performed.  Will follow cultures.MRSA swab negative, continue Zosyn.    Abx completed    Thrombophlebitis of cephalic vein- (present on admission)  Assessment & Plan  8/17/2024  Right upper extremity pain improving  Ultrasound with no DVT but does have occlusive superficial thrombosis in the cephalic vein.  Warm compresses ordered    Neck pain, acute- (present on admission)  Assessment & Plan  8/17/2024  Patient complaining of LLE numbness, neck pain and bilateral arm numbness.  Head injury 4/2024.  Ordered MRI c spine with head MRI - negative  Oxycodone prn pain.     Cellulitis of buttock  Assessment & Plan  8/17/2024  Patient with erythema, warmth, pain left buttock s/p found down at home.  CT pelvis with contrast- negative for abscess  Completed ancef 2gm IV q 8 x 7 days.  Wound care  Off loading, out of bed, increase activity.    Anemia- (present on admission)  Assessment & Plan  8/17/2024  Mild   Cont to monitor    Rhabdomyolysis- (present on admission)  Assessment & Plan  8/17/2024  CK elevated to  16k, suspected 2/2 being found down and drug overdose.  Cont agressive IVFs but change to LR from NS  Follow daily AST, ALT, Bun, Creat and K  Resolved    Elevated liver enzymes- (present on admission)  Assessment & Plan  8/17/2024  Transaminase elevation likely from Rhabdo and not acute liver injury though certainly could have a toxic component from OD  Coming down as expected with resolution of Rhabdo  Cont to monitor daily CMP  Consider further evaluation if it does not resolve as CPK drops and Rhabdo resolves    Acute renal failure with tubular necrosis (HCC)- (present on admission)  Assessment & Plan  8/17/2024  Likely 2/2 prerenal damage from hypotension 2/2 drug overdose and uremia related to rhabdomyolysis.  Creat imroving daily: 1.4 today  Good UOP  Cont LR until CPK<3k  Daily BMP  Renally dose medications as appropriate  Resolved    High anion gap metabolic acidosis- (present on admission)  Assessment & Plan  8/17/2024  Likely 2/2 prerenal damage from hypotension 2/2 drug overdose and uremia related to rhabdomyolysis.  -fluid resuscitation with NS 200cc/hr  -IV potassium 10meq x4  Resolved    Mitral regurgitation- (present on admission)  Assessment & Plan  8/17/2024  Suspected murmur, though difficult to auscultate. Unclear if preexisting or related to overdose.   Does not appear to have any clinically significant heart failure symptoms    Acute encephalopathy- (present on admission)  Assessment & Plan  8/17/2024  Acute metabolic/toxic encephalopathy  CT head neg  Mental status improving daily though still not completely back to normal  Cont to monitor clinically  Repeat lab in am  Minimize sedating medications  Resolved. MRI of the brain without acute changes.     Hypertension- (present on admission)  Assessment & Plan  8/17/2024  Hx of HTN, holding home meds in light of hypotension  Blood pressure still elevated.  Increase losartan to 100 mg daily  Continue amlodipine 10 mg daily  Added coreg 6.25mg  BID  Monitor BP    Hyponatremia- (present on admission)  Assessment & Plan  8/17/2024  Has resolved with IVF resuscitation  Cont to monitor  Serum and urine studies consistent with SIADH.  Discussed with psychiatry.  Titrating psychiatric meds         VTE prophylaxis: Xarelto ppx    I have performed a physical exam and reviewed and updated ROS and Plan today (8/17/2024). In review of yesterday's note (8/16/2024), there are no changes except as documented above.    Greater than 50 minutes spent prepping to see patient (e.g. review of tests) obtaining and/or reviewing separately obtained history. Performing a medically appropriate examination and/ evaluation.  Counseling and educating the patient/family/caregiver.  Ordering medications, tests, or procedures.  Referring and communicating with other health care professionals.  Documenting clinical information in EPIC.  Independently interpreting results and communicating results to patient/family/caregiver.  Care coordination.

## 2024-08-17 NOTE — CARE PLAN
The patient is Stable - Low risk of patient condition declining or worsening    Shift Goals  Clinical Goals: pt will report a pain level of 3 or less within 12 hour shift  Patient Goals: knee to feel better  Family Goals: ansley    Progress made toward(s) clinical / shift goals:  Pt alert and orientated and able to make needs known. 1:1 sitter continued for legal hold for SI. Pt educated on fall risk and verbalized understanding. Bed locked in lowest position. Pt reporting pain to left knee, but declines needs for any prn pain meds. Pt anxious and reports his mood is low today but has no thoughts of suicide or self-harm. Pt following fluid restriction. Medications given as ordered. Hourly rounding in place. All needs met.     Problem: Pain - Standard  Goal: Alleviation of pain or a reduction in pain to the patient’s comfort goal  Outcome: Progressing     Problem: Knowledge Deficit - Standard  Goal: Patient and family/care givers will demonstrate understanding of plan of care, disease process/condition, diagnostic tests and medications  Outcome: Progressing     Problem: Provide Safe Environment  Goal: Suicide environmental safety, protocols, policies, and practices will be implemented  Outcome: Progressing     Problem: Fall Risk  Goal: Patient will remain free from falls  Outcome: Progressing     Problem: Skin Integrity  Goal: Skin integrity is maintained or improved  Outcome: Progressing     Problem: Depression  Goal: Patient and family/caregiver will verbalize accurate information about at least two of the possible causes of depression, three-four of the signs and symptoms of depression  Outcome: Progressing       Patient is not progressing towards the following goals:      Problem: Psychosocial  Goal: Patient's ability to identify and develop effective coping behaviors will improve  Outcome: Not Progressing  Goal: Patient's ability to identify and utilize available support systems will improve  Outcome: Not  Progressing

## 2024-08-17 NOTE — CARE PLAN
The patient is Stable - Low risk of patient condition declining or worsening    Shift Goals  Clinical Goals: Safety, Pain management  Patient Goals: Comfort  Family Goals: FELICITAS    Progress made toward(s) clinical / shift goals:  Patient reports pain on left knee, applied voltaren gel as scheduled. Patient able to rest throughout night. Patient placed on 1:1 sitter, bed locked to lowest position and call light in reach. No other concerns at this time.     Problem: Pain - Standard  Goal: Alleviation of pain or a reduction in pain to the patient’s comfort goal  Outcome: Progressing     Problem: Fall Risk  Goal: Patient will remain free from falls  Outcome: Progressing     Problem: Skin Integrity  Goal: Skin integrity is maintained or improved  Outcome: Progressing       Patient is not progressing towards the following goals:

## 2024-08-17 NOTE — ASSESSMENT & PLAN NOTE
8/21/2024  Ongoing depressive symptoms and hallucinations noted, but clinically improving, possible lift  on 8/22  Psychiatry following.  Wean Effexor.  Start Wellbutrin  Ativan prn, adjusted on 8/21  LH extended on 8/19  Continue Trazodone, Abilify

## 2024-08-18 LAB
ALBUMIN SERPL BCP-MCNC: 3.7 G/DL (ref 3.2–4.9)
BUN SERPL-MCNC: 15 MG/DL (ref 8–22)
CALCIUM ALBUM COR SERPL-MCNC: 9.4 MG/DL (ref 8.5–10.5)
CALCIUM SERPL-MCNC: 9.2 MG/DL (ref 8.5–10.5)
CHLORIDE SERPL-SCNC: 97 MMOL/L (ref 96–112)
CO2 SERPL-SCNC: 20 MMOL/L (ref 20–33)
CREAT SERPL-MCNC: 0.67 MG/DL (ref 0.5–1.4)
ERYTHROCYTE [DISTWIDTH] IN BLOOD BY AUTOMATED COUNT: 41.9 FL (ref 35.9–50)
GFR SERPLBLD CREATININE-BSD FMLA CKD-EPI: 104 ML/MIN/1.73 M 2
GLUCOSE SERPL-MCNC: 112 MG/DL (ref 65–99)
HCT VFR BLD AUTO: 40.2 % (ref 42–52)
HGB BLD-MCNC: 13.8 G/DL (ref 14–18)
MAGNESIUM SERPL-MCNC: 2 MG/DL (ref 1.5–2.5)
MCH RBC QN AUTO: 29.7 PG (ref 27–33)
MCHC RBC AUTO-ENTMCNC: 34.3 G/DL (ref 32.3–36.5)
MCV RBC AUTO: 86.6 FL (ref 81.4–97.8)
PHOSPHATE SERPL-MCNC: 3.2 MG/DL (ref 2.5–4.5)
PLATELET # BLD AUTO: 421 K/UL (ref 164–446)
PMV BLD AUTO: 8.5 FL (ref 9–12.9)
POTASSIUM SERPL-SCNC: 4.2 MMOL/L (ref 3.6–5.5)
RBC # BLD AUTO: 4.64 M/UL (ref 4.7–6.1)
SODIUM SERPL-SCNC: 130 MMOL/L (ref 135–145)
WBC # BLD AUTO: 7.4 K/UL (ref 4.8–10.8)

## 2024-08-18 PROCEDURE — 36415 COLL VENOUS BLD VENIPUNCTURE: CPT

## 2024-08-18 PROCEDURE — 80069 RENAL FUNCTION PANEL: CPT

## 2024-08-18 PROCEDURE — A9270 NON-COVERED ITEM OR SERVICE: HCPCS | Performed by: STUDENT IN AN ORGANIZED HEALTH CARE EDUCATION/TRAINING PROGRAM

## 2024-08-18 PROCEDURE — 83735 ASSAY OF MAGNESIUM: CPT

## 2024-08-18 PROCEDURE — 99233 SBSQ HOSP IP/OBS HIGH 50: CPT | Performed by: INTERNAL MEDICINE

## 2024-08-18 PROCEDURE — A9270 NON-COVERED ITEM OR SERVICE: HCPCS | Performed by: INTERNAL MEDICINE

## 2024-08-18 PROCEDURE — 700102 HCHG RX REV CODE 250 W/ 637 OVERRIDE(OP): Performed by: STUDENT IN AN ORGANIZED HEALTH CARE EDUCATION/TRAINING PROGRAM

## 2024-08-18 PROCEDURE — 85027 COMPLETE CBC AUTOMATED: CPT

## 2024-08-18 PROCEDURE — A9270 NON-COVERED ITEM OR SERVICE: HCPCS | Performed by: GENERAL PRACTICE

## 2024-08-18 PROCEDURE — 90837 PSYTX W PT 60 MINUTES: CPT | Performed by: SOCIAL WORKER

## 2024-08-18 PROCEDURE — 700102 HCHG RX REV CODE 250 W/ 637 OVERRIDE(OP): Performed by: GENERAL PRACTICE

## 2024-08-18 PROCEDURE — 770001 HCHG ROOM/CARE - MED/SURG/GYN PRIV*

## 2024-08-18 PROCEDURE — 700102 HCHG RX REV CODE 250 W/ 637 OVERRIDE(OP): Performed by: INTERNAL MEDICINE

## 2024-08-18 RX ORDER — CARVEDILOL 12.5 MG/1
12.5 TABLET ORAL 2 TIMES DAILY WITH MEALS
Status: DISCONTINUED | OUTPATIENT
Start: 2024-08-18 | End: 2024-08-22 | Stop reason: HOSPADM

## 2024-08-18 RX ORDER — CARVEDILOL 12.5 MG/1
12.5 TABLET ORAL 2 TIMES DAILY WITH MEALS
Status: DISCONTINUED | OUTPATIENT
Start: 2024-08-18 | End: 2024-08-18

## 2024-08-18 RX ADMIN — VENLAFAXINE HYDROCHLORIDE 75 MG: 75 CAPSULE, EXTENDED RELEASE ORAL at 09:22

## 2024-08-18 RX ADMIN — LOSARTAN POTASSIUM 100 MG: 50 TABLET, FILM COATED ORAL at 04:54

## 2024-08-18 RX ADMIN — DICLOFENAC SODIUM 2 G: 10 GEL TOPICAL at 17:46

## 2024-08-18 RX ADMIN — SODIUM CHLORIDE 1 G: 1 TABLET ORAL at 09:22

## 2024-08-18 RX ADMIN — SODIUM CHLORIDE 1 G: 1 TABLET ORAL at 12:48

## 2024-08-18 RX ADMIN — CARVEDILOL 12.5 MG: 12.5 TABLET, FILM COATED ORAL at 09:22

## 2024-08-18 RX ADMIN — DICLOFENAC SODIUM 2 G: 10 GEL TOPICAL at 20:20

## 2024-08-18 RX ADMIN — Medication 400 MG: at 04:54

## 2024-08-18 RX ADMIN — RIVAROXABAN 10 MG: 10 TABLET, FILM COATED ORAL at 17:46

## 2024-08-18 RX ADMIN — SODIUM CHLORIDE 1 G: 1 TABLET ORAL at 17:45

## 2024-08-18 RX ADMIN — AMLODIPINE BESYLATE 10 MG: 10 TABLET ORAL at 04:53

## 2024-08-18 RX ADMIN — FINASTERIDE 5 MG: 5 TABLET, FILM COATED ORAL at 04:53

## 2024-08-18 RX ADMIN — LORAZEPAM 0.5 MG: 0.5 TABLET ORAL at 04:54

## 2024-08-18 RX ADMIN — ARIPIPRAZOLE 10 MG: 10 TABLET ORAL at 04:53

## 2024-08-18 RX ADMIN — DICLOFENAC SODIUM 2 G: 10 GEL TOPICAL at 09:23

## 2024-08-18 RX ADMIN — BUPROPION HYDROCHLORIDE 150 MG: 150 TABLET, FILM COATED, EXTENDED RELEASE ORAL at 04:53

## 2024-08-18 RX ADMIN — TRAZODONE HYDROCHLORIDE 100 MG: 50 TABLET ORAL at 20:19

## 2024-08-18 RX ADMIN — SENNOSIDES AND DOCUSATE SODIUM 2 TABLET: 50; 8.6 TABLET ORAL at 17:45

## 2024-08-18 RX ADMIN — LORAZEPAM 0.5 MG: 0.5 TABLET ORAL at 17:45

## 2024-08-18 RX ADMIN — CARVEDILOL 12.5 MG: 12.5 TABLET, FILM COATED ORAL at 17:45

## 2024-08-18 ASSESSMENT — PAIN DESCRIPTION - PAIN TYPE
TYPE: ACUTE PAIN
TYPE: ACUTE PAIN

## 2024-08-18 NOTE — PROGRESS NOTES
Patient A&Ox4, denies any thoughts of self harm. 1-1 sitter at bedside. Ambulating standby assist with FWW, tolerating diet.  Declines pain intervention at this time.    room air

## 2024-08-18 NOTE — DISCHARGE PLANNING
Alert Team/Behavioral Health   Note:        - Chavo YOUNG: Talked to [facility]. Re-sent referral. Referral is on pending list. No beds currently available.

## 2024-08-18 NOTE — CONSULTS
[facility]    INPATIENT ASSESSMENT    Name: Terrell Hensley  MRN: [id number]  : 1960  Age: 64 y.o.  Date of assessment: 2024  PCP: Neelima Rivera M.D.  Persons in attendance: Patient and Siblings    CHIEF COMPLAINT/PRESENTING ISSUE (as stated by Patient and brother): Terrell Hensley is a 64 year old male seen lying on hospital bed, alert, oriented to self and location, speech clear at time and at times patient mumbles, making it difficult to understand what is being said. Patient appears disheveled with flat affect, appears able to follow the conversation and respond to most questions appropriately. Patient continues to experience auditory and visual hallucinations with command voices, although significantly less than previously. Patient is improving.    Patient appears to stay in bed most of the day in silence. This behavior is not helping the hallucinations. Discussed with patient and brother a behavioral intervention plan with the goal of increasing functioning and developing tools to manage hallucinations.    Patient has been permitted to have his cell phone. He reports enjoying music and has his favorite variety of music downloaded on his phone. Clinician encouraged patient to engage in cognitively stimulating activities throughout the day. The first plan is to listen to music to give his mind something to process besides his hallucinations.  Patients is permitted to have an Ipad . Patient's brother agreed to bring an Ipad to the hospital with crossword puzzles and other word games so patient can play while sitting in hospital chair. The goal is to stimulate his mind. Patient would benefit from activities geared to improve cognitions and distract from the hallucinations.  Patient has been assigned to watch a comedy or other light hearted tv show daily. Patient is very flat, with no range of emotion. Its important for patient to engage in activities that help lessen his worry  thought patterns and also present light hearted, funny ways of being on occasion.   Clinician introduced Cognitive Behavioral Therapy techniques pertaining to thought interruption. Clinician explained to patient the technique of interrupting intrusive thought patterns instead of allowing the thoughts to linger and repeat in his mind.  Clinician requested patient to walk around the unit at least 3 times with the sitter , per the sitter shift, with the goal of building strength and independence.  Clinician requested patient to have all meals out of bed  Clinician requests a nutrition/dietitian consult to determine if patient is underweight and in need of more calories, with the goal of improving energy and lessen fatigue.    Chief Complaint   Patient presents with    Drug Overdose    Suicidal Ideation    Suicide Attempt         MENTAL STATUS              Participation: Limited verbal participation  Grooming: disheveled  Orientation: Alert and oriented to self and location  Behavior: Tense  Eye contact: Limited  Mood: Anxious  Affect: flat  Thought process:  improved  Thought content: Evidence of delusion-improved  Speech: Latency of response  Perception: Evidence of auditory and visual hallucinations-but improved  Memory:  Poor memory for chronology of events  Insight: Poor  Judgment:  Poor  Other:    Collateral information:   Source:   Significant other present in person:brother    Significant other by telephone   [facility]    [facility]   [facility]   Other: [facility] physician     Unable to complete full assessment due to:   Acute intoxication   Patient declined to participate/engage   Patient verbally unresponsive   Significant cognitive deficits   Significant perceptual distortions or behavioral disorganization   Other:             CLINICAL IMPRESSIONS:  Primary:  R/O Major Depressive disorder with psychotic features   Secondary:                                         IDENTIFIED  NEEDS/PLAN:  [Trigger DISPOSITION list for any items marked]    x  Imminent safety risk - self  Imminent safety risk - others     Acute substance withdrawal x  Psychosis/Impaired reality testing    x Mood/anxiety   Substance use/Addictive behavior    x Maladaptive behavior   Parent/child conflict     Family/Couples conflict   Biomedical     Housing   Financial      Legal  Occupational/Educational     Domestic violence   Other:     Recommendations and Observation Level:  Sitter: one to one  Phone: yes  Visitors: yes  Personal belongings: Ipad; bedside table tray and nursing call light approved      Requested Nutrition/dietitian be ordered to consult on patient's weight to determine if he is under weight and in need of more calories to help strengthen and improve functioning.    Requested nursing to have patient walk three times per shift with sitter to improve deconditioning.        Legal Hold: extended    Please transfer to an inpatient psychiatric facility when patient is medically cleared and a bed becomes available.    Thank you,    Nichole Damico, Ph.D., [facility]  8/18/2024   Length of intervention: 60 minutes

## 2024-08-18 NOTE — CARE PLAN
The patient is Watcher - Medium risk of patient condition declining or worsening    Shift Goals  Clinical Goals: Maintain safety throughout the shift.  Patient Goals: rest and sleep  Family Goals: none present    Progress made toward(s) clinical / shift goals: On continuous 1:1 sitter, kept padded side rails up, hourly rounding, bed alarm on, bed wheels locked and in low position. No occurrence of fall/injury.    Patient is not progressing towards the following goals: N/A

## 2024-08-18 NOTE — PROGRESS NOTES
[facility] Medicine Daily Progress Note    Date of Service  8/18/2024    Chief Complaint  Terrell Hensley is a 64 y.o. male admitted 7/23/2024 with SI attempt    Hospital Course  This is a 64-year-old male with past medical history of hypertension, mood disorder, who was admitted on 7/23/2024 after SI attempt.    Patient with encephalopathy, WILMAR, transaminitis, rhabdomyolysis, and high anion gap metabolic acidosis on admission. Patient had gonzalez in place and completed aggressive IVF.    Patient recently completed course of Ancef for buttock cellulitis.  MRI imaging of the lumbar spine was performed, noted iliac abscess 3.5 cm. Original plan was to have IR drain placed however fluid collection significantly reduced in size on day of drain placement, aspiration was performed, NGTD. Completed antiobiotic course 8/13.    Psychiatry was consulted and monitor the patient while he was on legal hold    Patient did have hyponatremia which was felt to be secondary to SIADH.    Patient also had an left hip and knee pain since admission likely from fall prior to admission.  Plain film radiographs and CT of his left knee were unremarkable.    Interval Problem Update  Patient was seen and examined at bedside.  I have personally reviewed and interpreted vitals, labs, and imaging.    8/13.  Afebrile.  Stable vitals.  On room air.  Denies fevers, chills, chest pains, shortness of breath.  Denies fever, chills, chest pains, shortness of breath.  He does complain his abdominal bloating.  Completed course of Zosyn today for iliac abscess.  Discussed with psychiatry.  There is no concerns for hyponatremia possibly secondary to SIADH.  Increase activity mobilization  8/14.  Afebrile.  Stable vitals.  On room air.  Denies fevers, chills, chest pains, shortness of breath.  Complains of left knee and hip pain since admission and being found down.  Pain follows on admission were negative.  Hyponatremia is improved.  A.m. cortisol normal.   Discussed with psychiatry.  Possible SIADH.  Titrating Abilify and patient is tolerating well with improvements in symptoms.  Legal goal extended.  Plan for placement to inpatient psych.  Continue to increase activity as tolerated.  8/16.  Afebrile.  Intermittent hypertension.  On room air.  Denies fevers, chills or chest pains, shortness of breath.  Discussed with psychiatry about hyponatremia.  Adjusted psych meds.  Patient still having difficulty getting around without a walker.  His left knee pain is persistent.  X-rays have been normal.  Ordered CT.  Encouraged mobilization and activity  8/17.  Afebrile.  Hypertensive.  On room air.  Denies fevers, chills, chest pain, shortness of breath.  Persistent left knee pain, decreased range of motion.  Will start some muscle relaxants.  Reconsult PT/OT.  Counseled about increased activity.  Along with stable sodium patient must be able to walk without a walker prior to going to inpatient behavioral health  8/18.  Afebrile.  Hypertension.  On room air.  Denies fever, chills, chest pains, shortness of breath.  States he feels lightheaded even when lying down in bed.  Left knee pain is persistent.  Discussed with psychiatry with concern about malnutrition.  Ordered nutrition consult  Hgb 12.3 > 13 > 13.1 > 13.6 > 13.8  P 489 > 452 > 429 > 437 > 421  Na 129 > 127 > 130 > 126 > 128 > 130    I have discussed this patient's plan of care and discharge plan at IDT rounds today with Case Management, Nursing, Nursing leadership, and other members of the IDT team.    Consultants/Specialty  psychiatry    Code Status  Full Code    Disposition  Pending inpatient psych.    I have placed the appropriate orders for post-discharge needs.    Review of Systems  Review of Systems   All other systems reviewed and are negative.       Physical Exam  Temp:  [36.7 °C (98 °F)-37.3 °C (99.2 °F)] 36.7 °C (98 °F)  Pulse:  [79-91] 90  Resp:  [17-18] 18  BP: (133-156)/(57-90) 156/90  SpO2:  [93 %-98  %] 98 %    Physical Exam  Vitals and nursing note reviewed.   Constitutional:       General: He is not in acute distress.     Appearance: Normal appearance.   HENT:      Head: Normocephalic and atraumatic.   Eyes:      Extraocular Movements: Extraocular movements intact.      Conjunctiva/sclera: Conjunctivae normal.      Pupils: Pupils are equal, round, and reactive to light.   Cardiovascular:      Rate and Rhythm: Normal rate and regular rhythm.      Pulses: Normal pulses.      Heart sounds: No murmur heard.     No friction rub. No gallop.   Pulmonary:      Effort: Pulmonary effort is normal. No respiratory distress.      Breath sounds: Normal breath sounds. No wheezing, rhonchi or rales.   Abdominal:      General: Bowel sounds are normal. There is no distension.      Palpations: Abdomen is soft.      Tenderness: There is no abdominal tenderness.   Musculoskeletal:         General: No swelling or tenderness. Normal range of motion.      Cervical back: Normal range of motion and neck supple. No muscular tenderness.      Right lower leg: No edema.      Left lower leg: No edema.   Skin:     General: Skin is warm and dry.      Capillary Refill: Capillary refill takes less than 2 seconds.      Findings: No bruising, erythema or rash.   Neurological:      General: No focal deficit present.      Mental Status: He is alert and oriented to person, place, and time.         Fluids    Intake/Output Summary (Last 24 hours) at 8/18/2024 0747  Last data filed at 8/18/2024 0458  Gross per 24 hour   Intake 630 ml   Output --   Net 630 ml       Laboratory  Recent Labs     08/16/24  0550 08/17/24  0740 08/18/24  0644   WBC 8.4 6.7 7.4   RBC 4.55* 4.58* 4.64*   HEMOGLOBIN 13.6* 13.8* 13.8*   HEMATOCRIT 39.2* 39.6* 40.2*   MCV 86.2 86.5 86.6   MCH 29.9 30.1 29.7   MCHC 34.7 34.8 34.3   RDW 40.9 40.9 41.9   PLATELETCT 429 437 421   MPV 8.3* 8.5* 8.5*       Recent Labs     08/16/24  0550 08/17/24  0740 08/18/24  0644   SODIUM 128* 128*  130*   POTASSIUM 4.1 4.1 4.2   CHLORIDE 94* 95* 97   CO2 21 21 20   GLUCOSE 114* 109* 112*   BUN 12 10 15   CREATININE 0.58 0.70 0.67   CALCIUM 9.3 9.1 9.2                       Imaging  CT-KNEE WITH PLUS RECONS LEFT   Final Result      1.  Small joint effusion and moderate popliteal cyst. Minor degenerative changes.   2.  Atherosclerotic vascular calcifications.      DX-KNEE 2- LEFT   Final Result      1. No left knee joint effusion.   2. No acute osseous or joint abnormality.   3. Atherosclerotic calcifications.      CT-IMAGE-GUIDED DRAIN PERITONEAL   Final Result      Successful LEFT iliacus CT-guided fluid aspiration.               MR-LUMBAR SPINE-WITH & W/O   Final Result      1.  There are multiloculated fluid collection noted involving left iliacus muscle likely representing abscess. It measures an approximately 3.5 cm in the transverse dimension.   2.  There is no evidence of osteomyelitis, discitis or epidural abscess.   3.  Degenerative disease as described above.      US-EXTREMITY VENOUS UPPER UNILAT RIGHT   Final Result      CT-PELVIS WITH   Final Result         1.  Low-density enlargement of the distal left psoas muscle and iliacus muscle extending to the anterior left thigh. Could represent changes of myositis or possibly early forming infectious process. No definite enhancing fluid collection identified to    indicate abscess at this time.   2.  Distended bladder with nondependent air, consider history of recent instrumentation or changes of urinary tract infection, correlate with urinalysis as clinically appropriate.   3.  Bilateral fat-containing inguinal hernias   4.  Diverticulosis      MR-CERVICAL SPINE-W/O   Final Result      1.  Degenerative disease in the segment cervical spine as described above.   2.  Left paracentral/foraminal disc protrusion at C6-7 causing severe left C7 neural foraminal stenosis.      MR-BRAIN-W/O   Final Result      1.  No acute abnormality.   2.  A few punctate  nonspecific supratentorial T2 hyperintensities likely representing nonspecific foci of gliosis/chronic ischemia.   3.  Mild cerebral volume loss.      DX-FEMUR-2+ LEFT   Final Result      Unremarkable LEFT femur.      DX-HIP-COMPLETE - UNILATERAL 2+ LEFT   Final Result      No radiographic evidence of acute traumatic injury.      CT-HEAD W/O   Final Result         1.  No acute intracranial abnormality.   2.  Atherosclerosis.                    Assessment/Plan  * Drug overdose of undetermined intent, initial encounter- (present on admission)  Assessment & Plan  8/18/2024  Apparent suicide attempt  Polypharmacy: some amlodipine but other ingestions unclear  Cont supportive care as clinically improving  On legal hold  Psychiatry team following  Case discussed today with psychiatry, medications changed to Abilify and Ativan. Legal hold extended.    Major depressive disorder, single episode, severe, with psychosis (HCC)  Assessment & Plan  8/18/2024  Increased anxiety noted on exam.  Psychiatry following.  Wean Effexor.  Start Wellbutrin  Ativan prn  Continue Trazodone, Abilify    Abscess of iliac fossa  Assessment & Plan  8/18/2024  Completed course of ancef for buttock cellulitis    Patient recently completed course of Ancef for buttock cellulitis.  MRI imaging of the lumbar spine was performed, noted iliac abscess 3.5 cm. Original plan was to have IR drain placed however fluid collection significantly reduced in size, aspiration was performed.  Will follow cultures.MRSA swab negative, continue Zosyn.    Abx completed    Thrombophlebitis of cephalic vein- (present on admission)  Assessment & Plan  8/18/2024  Right upper extremity pain improving  Ultrasound with no DVT but does have occlusive superficial thrombosis in the cephalic vein.  Warm compresses ordered    Neck pain, acute- (present on admission)  Assessment & Plan  8/18/2024  Patient complaining of LLE numbness, neck pain and bilateral arm numbness.  Head  injury 4/2024.  Ordered MRI c spine with head MRI - negative  Oxycodone prn pain.     Cellulitis of buttock  Assessment & Plan  8/18/2024  Patient with erythema, warmth, pain left buttock s/p found down at home.  CT pelvis with contrast- negative for abscess  Completed ancef 2gm IV q 8 x 7 days.  Wound care  Off loading, out of bed, increase activity.    Anemia- (present on admission)  Assessment & Plan  8/18/2024  Mild   Cont to monitor    Rhabdomyolysis- (present on admission)  Assessment & Plan  8/18/2024  CK elevated to 16k, suspected 2/2 being found down and drug overdose.  Cont agressive IVFs but change to LR from NS  Follow daily AST, ALT, Bun, Creat and K  Resolved    Elevated liver enzymes- (present on admission)  Assessment & Plan  8/18/2024  Transaminase elevation likely from Rhabdo and not acute liver injury though certainly could have a toxic component from OD  Coming down as expected with resolution of Rhabdo  Cont to monitor daily CMP  Consider further evaluation if it does not resolve as CPK drops and Rhabdo resolves    Acute renal failure with tubular necrosis (HCC)- (present on admission)  Assessment & Plan  8/18/2024  Likely 2/2 prerenal damage from hypotension 2/2 drug overdose and uremia related to rhabdomyolysis.  Creat imroving daily: 1.4 today  Good UOP  Cont LR until CPK<3k  Daily BMP  Renally dose medications as appropriate  Resolved    High anion gap metabolic acidosis- (present on admission)  Assessment & Plan  8/18/2024  Likely 2/2 prerenal damage from hypotension 2/2 drug overdose and uremia related to rhabdomyolysis.  -fluid resuscitation with NS 200cc/hr  -IV potassium 10meq x4  Resolved    Mitral regurgitation- (present on admission)  Assessment & Plan  8/18/2024  Suspected murmur, though difficult to auscultate. Unclear if preexisting or related to overdose.   Does not appear to have any clinically significant heart failure symptoms    Acute encephalopathy- (present on  admission)  Assessment & Plan  8/18/2024  Acute metabolic/toxic encephalopathy  CT head neg  Mental status improving daily though still not completely back to normal  Cont to monitor clinically  Repeat lab in am  Minimize sedating medications  Resolved. MRI of the brain without acute changes.     Hypertension- (present on admission)  Assessment & Plan  8/18/2024  Hx of HTN, holding home meds in light of hypotension  Blood pressure still elevated.  Increase losartan to 100 mg daily  Continue amlodipine 10 mg daily  Added coreg 6.25mg BID  Monitor BP    Hyponatremia- (present on admission)  Assessment & Plan  8/18/2024  Has resolved with IVF resuscitation  Cont to monitor  Serum and urine studies consistent with SIADH.  Discussed with psychiatry.  Titrating psychiatric meds         VTE prophylaxis: Xarelto ppx    I have performed a physical exam and reviewed and updated ROS and Plan today (8/18/2024). In review of yesterday's note (8/17/2024), there are no changes except as documented above.    Greater than 51 minutes spent prepping to see patient (e.g. review of tests) obtaining and/or reviewing separately obtained history. Performing a medically appropriate examination and/ evaluation.  Counseling and educating the patient/family/caregiver.  Ordering medications, tests, or procedures.  Referring and communicating with other health care professionals.  Documenting clinical information in EPIC.  Independently interpreting results and communicating results to patient/family/caregiver.  Care coordination.

## 2024-08-19 LAB
ALBUMIN SERPL BCP-MCNC: 4 G/DL (ref 3.2–4.9)
BUN SERPL-MCNC: 18 MG/DL (ref 8–22)
CALCIUM ALBUM COR SERPL-MCNC: 9.5 MG/DL (ref 8.5–10.5)
CALCIUM SERPL-MCNC: 9.5 MG/DL (ref 8.5–10.5)
CHLORIDE SERPL-SCNC: 97 MMOL/L (ref 96–112)
CO2 SERPL-SCNC: 24 MMOL/L (ref 20–33)
CREAT SERPL-MCNC: 0.79 MG/DL (ref 0.5–1.4)
ERYTHROCYTE [DISTWIDTH] IN BLOOD BY AUTOMATED COUNT: 43.9 FL (ref 35.9–50)
GFR SERPLBLD CREATININE-BSD FMLA CKD-EPI: 99 ML/MIN/1.73 M 2
GLUCOSE SERPL-MCNC: 106 MG/DL (ref 65–99)
HCT VFR BLD AUTO: 42.4 % (ref 42–52)
HGB BLD-MCNC: 14.2 G/DL (ref 14–18)
MAGNESIUM SERPL-MCNC: 2.1 MG/DL (ref 1.5–2.5)
MCH RBC QN AUTO: 30 PG (ref 27–33)
MCHC RBC AUTO-ENTMCNC: 33.5 G/DL (ref 32.3–36.5)
MCV RBC AUTO: 89.5 FL (ref 81.4–97.8)
PHOSPHATE SERPL-MCNC: 3.4 MG/DL (ref 2.5–4.5)
PLATELET # BLD AUTO: 439 K/UL (ref 164–446)
PMV BLD AUTO: 8.7 FL (ref 9–12.9)
POTASSIUM SERPL-SCNC: 4.2 MMOL/L (ref 3.6–5.5)
RBC # BLD AUTO: 4.74 M/UL (ref 4.7–6.1)
SODIUM SERPL-SCNC: 134 MMOL/L (ref 135–145)
WBC # BLD AUTO: 8.3 K/UL (ref 4.8–10.8)

## 2024-08-19 PROCEDURE — A9270 NON-COVERED ITEM OR SERVICE: HCPCS | Performed by: STUDENT IN AN ORGANIZED HEALTH CARE EDUCATION/TRAINING PROGRAM

## 2024-08-19 PROCEDURE — 700102 HCHG RX REV CODE 250 W/ 637 OVERRIDE(OP): Performed by: INTERNAL MEDICINE

## 2024-08-19 PROCEDURE — 700102 HCHG RX REV CODE 250 W/ 637 OVERRIDE(OP): Performed by: GENERAL PRACTICE

## 2024-08-19 PROCEDURE — A9270 NON-COVERED ITEM OR SERVICE: HCPCS | Performed by: GENERAL PRACTICE

## 2024-08-19 PROCEDURE — A9270 NON-COVERED ITEM OR SERVICE: HCPCS | Performed by: INTERNAL MEDICINE

## 2024-08-19 PROCEDURE — 85027 COMPLETE CBC AUTOMATED: CPT

## 2024-08-19 PROCEDURE — 99232 SBSQ HOSP IP/OBS MODERATE 35: CPT | Performed by: STUDENT IN AN ORGANIZED HEALTH CARE EDUCATION/TRAINING PROGRAM

## 2024-08-19 PROCEDURE — 36415 COLL VENOUS BLD VENIPUNCTURE: CPT

## 2024-08-19 PROCEDURE — 99233 SBSQ HOSP IP/OBS HIGH 50: CPT | Performed by: INTERNAL MEDICINE

## 2024-08-19 PROCEDURE — 80069 RENAL FUNCTION PANEL: CPT

## 2024-08-19 PROCEDURE — 700102 HCHG RX REV CODE 250 W/ 637 OVERRIDE(OP): Performed by: STUDENT IN AN ORGANIZED HEALTH CARE EDUCATION/TRAINING PROGRAM

## 2024-08-19 PROCEDURE — 770001 HCHG ROOM/CARE - MED/SURG/GYN PRIV*

## 2024-08-19 PROCEDURE — 83735 ASSAY OF MAGNESIUM: CPT

## 2024-08-19 RX ORDER — ARIPIPRAZOLE 10 MG/1
5 TABLET ORAL ONCE
Status: COMPLETED | OUTPATIENT
Start: 2024-08-19 | End: 2024-08-19

## 2024-08-19 RX ORDER — TRAZODONE HYDROCHLORIDE 150 MG/1
150 TABLET ORAL
Status: DISCONTINUED | OUTPATIENT
Start: 2024-08-19 | End: 2024-08-22 | Stop reason: HOSPADM

## 2024-08-19 RX ORDER — ARIPIPRAZOLE 15 MG/1
15 TABLET ORAL DAILY
Status: DISCONTINUED | OUTPATIENT
Start: 2024-08-20 | End: 2024-08-22 | Stop reason: HOSPADM

## 2024-08-19 RX ADMIN — ARIPIPRAZOLE 5 MG: 10 TABLET ORAL at 11:49

## 2024-08-19 RX ADMIN — CARVEDILOL 12.5 MG: 12.5 TABLET, FILM COATED ORAL at 18:09

## 2024-08-19 RX ADMIN — SODIUM CHLORIDE 1 G: 1 TABLET ORAL at 11:49

## 2024-08-19 RX ADMIN — LORAZEPAM 0.5 MG: 0.5 TABLET ORAL at 18:09

## 2024-08-19 RX ADMIN — LOSARTAN POTASSIUM 100 MG: 50 TABLET, FILM COATED ORAL at 05:38

## 2024-08-19 RX ADMIN — TRAZODONE HYDROCHLORIDE 150 MG: 150 TABLET ORAL at 21:27

## 2024-08-19 RX ADMIN — SODIUM CHLORIDE 1 G: 1 TABLET ORAL at 18:09

## 2024-08-19 RX ADMIN — AMLODIPINE BESYLATE 10 MG: 10 TABLET ORAL at 05:38

## 2024-08-19 RX ADMIN — LORAZEPAM 0.5 MG: 0.5 TABLET ORAL at 05:38

## 2024-08-19 RX ADMIN — DICLOFENAC SODIUM 2 G: 10 GEL TOPICAL at 16:35

## 2024-08-19 RX ADMIN — ARIPIPRAZOLE 10 MG: 10 TABLET ORAL at 05:37

## 2024-08-19 RX ADMIN — BUPROPION HYDROCHLORIDE 150 MG: 150 TABLET, FILM COATED, EXTENDED RELEASE ORAL at 05:38

## 2024-08-19 RX ADMIN — VENLAFAXINE HYDROCHLORIDE 75 MG: 75 CAPSULE, EXTENDED RELEASE ORAL at 08:18

## 2024-08-19 RX ADMIN — DICLOFENAC SODIUM 2 G: 10 GEL TOPICAL at 08:20

## 2024-08-19 RX ADMIN — FINASTERIDE 5 MG: 5 TABLET, FILM COATED ORAL at 05:38

## 2024-08-19 RX ADMIN — RIVAROXABAN 10 MG: 10 TABLET, FILM COATED ORAL at 18:08

## 2024-08-19 RX ADMIN — SODIUM CHLORIDE 1 G: 1 TABLET ORAL at 08:17

## 2024-08-19 RX ADMIN — DICLOFENAC SODIUM 2 G: 10 GEL TOPICAL at 21:28

## 2024-08-19 RX ADMIN — SENNOSIDES AND DOCUSATE SODIUM 2 TABLET: 50; 8.6 TABLET ORAL at 18:09

## 2024-08-19 RX ADMIN — CARVEDILOL 12.5 MG: 12.5 TABLET, FILM COATED ORAL at 08:18

## 2024-08-19 ASSESSMENT — PAIN DESCRIPTION - PAIN TYPE
TYPE: ACUTE PAIN

## 2024-08-19 ASSESSMENT — FIBROSIS 4 INDEX
FIB4 SCORE: 0.41
FIB4 SCORE: 0.41

## 2024-08-19 NOTE — PROGRESS NOTES
[facility] Medicine Daily Progress Note    Date of Service  8/19/2024    Chief Complaint  Terrell Hensley is a 64 y.o. male admitted 7/23/2024 with SI attempt    Hospital Course  This is a 64-year-old male with past medical history of hypertension, mood disorder, who was admitted on 7/23/2024 after SI attempt.    Patient with encephalopathy, WILMAR, transaminitis, rhabdomyolysis, and high anion gap metabolic acidosis on admission. Patient had gonzalez in place and completed aggressive IVF.    Patient recently completed course of Ancef for buttock cellulitis.  MRI imaging of the lumbar spine was performed, noted iliac abscess 3.5 cm. Original plan was to have IR drain placed however fluid collection significantly reduced in size on day of drain placement, aspiration was performed, NGTD. Completed antiobiotic course 8/13.    Psychiatry was consulted and monitor the patient while he was on legal hold    Patient did have hyponatremia which was felt to be secondary to SIADH.    Patient also had an left hip and knee pain since admission likely from fall prior to admission.  Plain film radiographs and CT of his left knee were unremarkable.    Interval Problem Update  Patient was seen and examined at bedside.  I have personally reviewed and interpreted vitals, labs, and imaging.    8/13.  Afebrile.  Stable vitals.  On room air.  Denies fevers, chills, chest pains, shortness of breath.  Denies fever, chills, chest pains, shortness of breath.  He does complain his abdominal bloating.  Completed course of Zosyn today for iliac abscess.  Discussed with psychiatry.  There is no concerns for hyponatremia possibly secondary to SIADH.  Increase activity mobilization  8/14.  Afebrile.  Stable vitals.  On room air.  Denies fevers, chills, chest pains, shortness of breath.  Complains of left knee and hip pain since admission and being found down.  Pain follows on admission were negative.  Hyponatremia is improved.  A.m. cortisol normal.   Discussed with psychiatry.  Possible SIADH.  Titrating Abilify and patient is tolerating well with improvements in symptoms.  Legal goal extended.  Plan for placement to inpatient psych.  Continue to increase activity as tolerated.  8/16.  Afebrile.  Intermittent hypertension.  On room air.  Denies fevers, chills or chest pains, shortness of breath.  Discussed with psychiatry about hyponatremia.  Adjusted psych meds.  Patient still having difficulty getting around without a walker.  His left knee pain is persistent.  X-rays have been normal.  Ordered CT.  Encouraged mobilization and activity  8/17.  Afebrile.  Hypertensive.  On room air.  Denies fevers, chills, chest pain, shortness of breath.  Persistent left knee pain, decreased range of motion.  Will start some muscle relaxants.  Reconsult PT/OT.  Counseled about increased activity.  Along with stable sodium patient must be able to walk without a walker prior to going to inpatient behavioral health  8/18.  Afebrile.  Hypertension.  On room air.  Denies fever, chills, chest pains, shortness of breath.  States he feels lightheaded even when lying down in bed.  Left knee pain is persistent.  Discussed with psychiatry with concern about malnutrition.  Ordered nutrition consult  8/19.  Afebrile.  Mild hypertension.  On room air.  Denies fever, chills, chest pains, shortness of breath.  Currently pain is slightly improved.  He is ambulating more but still using walker most of the time.  Patient does not like his cardiac diet.  Counseled because of his hypertension and blood pressure.  States he does not feel like he is getting enough protein and calories.  I did add some Ensure supplements.  By dietary assessment he does not meet malnutrition guidelines.  Encouraged oral intake and increased activity.  His sodium did improve.  Discussed with psychiatry and titrating psychiatric meds.  Hgb 12.3 > 13 > 13.1 > 13.6 > 13.8 > 14.2  P 489 > 452 > 429 > 437 > 421  Na 129 >  127 > 130 > 126 > 128 > 130 > 134    I have discussed this patient's plan of care and discharge plan at IDT rounds today with Case Management, Nursing, Nursing leadership, and other members of the IDT team.    Consultants/Specialty  psychiatry    Code Status  Full Code    Disposition  Pending inpatient psych.    I have placed the appropriate orders for post-discharge needs.    Review of Systems  Review of Systems   All other systems reviewed and are negative.       Physical Exam  Temp:  [36.1 °C (96.9 °F)-36.7 °C (98.1 °F)] 36.3 °C (97.4 °F)  Pulse:  [76-91] 89  Resp:  [16-18] 18  BP: (114-145)/(69-86) 126/80  SpO2:  [95 %-96 %] 95 %    Physical Exam  Vitals and nursing note reviewed.   Constitutional:       General: He is not in acute distress.     Appearance: Normal appearance.   HENT:      Head: Normocephalic and atraumatic.   Eyes:      Extraocular Movements: Extraocular movements intact.      Conjunctiva/sclera: Conjunctivae normal.      Pupils: Pupils are equal, round, and reactive to light.   Cardiovascular:      Rate and Rhythm: Normal rate and regular rhythm.      Pulses: Normal pulses.      Heart sounds: No murmur heard.     No friction rub. No gallop.   Pulmonary:      Effort: Pulmonary effort is normal. No respiratory distress.      Breath sounds: Normal breath sounds. No wheezing, rhonchi or rales.   Abdominal:      General: Bowel sounds are normal. There is no distension.      Palpations: Abdomen is soft.      Tenderness: There is no abdominal tenderness.   Musculoskeletal:         General: No swelling or tenderness. Normal range of motion.      Cervical back: Normal range of motion and neck supple. No muscular tenderness.      Right lower leg: No edema.      Left lower leg: No edema.   Skin:     General: Skin is warm and dry.      Capillary Refill: Capillary refill takes less than 2 seconds.      Findings: No bruising, erythema or rash.   Neurological:      General: No focal deficit present.       Mental Status: He is alert and oriented to person, place, and time.         Fluids    Intake/Output Summary (Last 24 hours) at 8/19/2024 0935  Last data filed at 8/19/2024 0835  Gross per 24 hour   Intake 600 ml   Output --   Net 600 ml       Laboratory  Recent Labs     08/17/24  0740 08/18/24  0644 08/19/24  0830   WBC 6.7 7.4 8.3   RBC 4.58* 4.64* 4.74   HEMOGLOBIN 13.8* 13.8* 14.2   HEMATOCRIT 39.6* 40.2* 42.4   MCV 86.5 86.6 89.5   MCH 30.1 29.7 30.0   MCHC 34.8 34.3 33.5   RDW 40.9 41.9 43.9   PLATELETCT 437 421 439   MPV 8.5* 8.5* 8.7*       Recent Labs     08/17/24  0740 08/18/24  0644   SODIUM 128* 130*   POTASSIUM 4.1 4.2   CHLORIDE 95* 97   CO2 21 20   GLUCOSE 109* 112*   BUN 10 15   CREATININE 0.70 0.67   CALCIUM 9.1 9.2                       Imaging  CT-KNEE WITH PLUS RECONS LEFT   Final Result      1.  Small joint effusion and moderate popliteal cyst. Minor degenerative changes.   2.  Atherosclerotic vascular calcifications.      DX-KNEE 2- LEFT   Final Result      1. No left knee joint effusion.   2. No acute osseous or joint abnormality.   3. Atherosclerotic calcifications.      CT-IMAGE-GUIDED DRAIN PERITONEAL   Final Result      Successful LEFT iliacus CT-guided fluid aspiration.               MR-LUMBAR SPINE-WITH & W/O   Final Result      1.  There are multiloculated fluid collection noted involving left iliacus muscle likely representing abscess. It measures an approximately 3.5 cm in the transverse dimension.   2.  There is no evidence of osteomyelitis, discitis or epidural abscess.   3.  Degenerative disease as described above.      US-EXTREMITY VENOUS UPPER UNILAT RIGHT   Final Result      CT-PELVIS WITH   Final Result         1.  Low-density enlargement of the distal left psoas muscle and iliacus muscle extending to the anterior left thigh. Could represent changes of myositis or possibly early forming infectious process. No definite enhancing fluid collection identified to    indicate abscess  at this time.   2.  Distended bladder with nondependent air, consider history of recent instrumentation or changes of urinary tract infection, correlate with urinalysis as clinically appropriate.   3.  Bilateral fat-containing inguinal hernias   4.  Diverticulosis      MR-CERVICAL SPINE-W/O   Final Result      1.  Degenerative disease in the segment cervical spine as described above.   2.  Left paracentral/foraminal disc protrusion at C6-7 causing severe left C7 neural foraminal stenosis.      MR-BRAIN-W/O   Final Result      1.  No acute abnormality.   2.  A few punctate nonspecific supratentorial T2 hyperintensities likely representing nonspecific foci of gliosis/chronic ischemia.   3.  Mild cerebral volume loss.      DX-FEMUR-2+ LEFT   Final Result      Unremarkable LEFT femur.      DX-HIP-COMPLETE - UNILATERAL 2+ LEFT   Final Result      No radiographic evidence of acute traumatic injury.      CT-HEAD W/O   Final Result         1.  No acute intracranial abnormality.   2.  Atherosclerosis.                    Assessment/Plan  * Drug overdose of undetermined intent, initial encounter- (present on admission)  Assessment & Plan  8/19/2024  Apparent suicide attempt  Polypharmacy: some amlodipine but other ingestions unclear  Cont supportive care as clinically improving  On legal hold  Psychiatry team following  Case discussed today with psychiatry, medications changed to Abilify and Ativan. Legal hold extended.    Major depressive disorder, single episode, severe, with psychosis (HCC)  Assessment & Plan  8/19/2024  Increased anxiety noted on exam.  Psychiatry following.  Wean Effexor.  Start Wellbutrin  Ativan prn  Continue Trazodone, Abilify    Abscess of iliac fossa  Assessment & Plan  8/19/2024  Completed course of ancef for buttock cellulitis    Patient recently completed course of Ancef for buttock cellulitis.  MRI imaging of the lumbar spine was performed, noted iliac abscess 3.5 cm. Original plan was to have IR  drain placed however fluid collection significantly reduced in size, aspiration was performed.  Will follow cultures.MRSA swab negative, continue Zosyn.    Abx completed    Thrombophlebitis of cephalic vein- (present on admission)  Assessment & Plan  8/19/2024  Right upper extremity pain improving  Ultrasound with no DVT but does have occlusive superficial thrombosis in the cephalic vein.  Warm compresses ordered    Neck pain, acute- (present on admission)  Assessment & Plan  8/19/2024  Patient complaining of LLE numbness, neck pain and bilateral arm numbness.  Head injury 4/2024.  Ordered MRI c spine with head MRI - negative  Oxycodone prn pain.     Cellulitis of buttock  Assessment & Plan  8/19/2024  Patient with erythema, warmth, pain left buttock s/p found down at home.  CT pelvis with contrast- negative for abscess  Completed ancef 2gm IV q 8 x 7 days.  Wound care  Off loading, out of bed, increase activity.    Anemia- (present on admission)  Assessment & Plan  8/19/2024  Mild   Cont to monitor    Rhabdomyolysis- (present on admission)  Assessment & Plan  8/19/2024  CK elevated to 16k, suspected 2/2 being found down and drug overdose.  Cont agressive IVFs but change to LR from NS  Follow daily AST, ALT, Bun, Creat and K  Resolved    Elevated liver enzymes- (present on admission)  Assessment & Plan  8/19/2024  Transaminase elevation likely from Rhabdo and not acute liver injury though certainly could have a toxic component from OD  Coming down as expected with resolution of Rhabdo  Cont to monitor daily CMP  Consider further evaluation if it does not resolve as CPK drops and Rhabdo resolves    Acute renal failure with tubular necrosis (HCC)- (present on admission)  Assessment & Plan  8/19/2024  Likely 2/2 prerenal damage from hypotension 2/2 drug overdose and uremia related to rhabdomyolysis.  Creat imroving daily: 1.4 today  Good UOP  Cont LR until CPK<3k  Daily BMP  Renally dose medications as  appropriate  Resolved    High anion gap metabolic acidosis- (present on admission)  Assessment & Plan  8/19/2024  Likely 2/2 prerenal damage from hypotension 2/2 drug overdose and uremia related to rhabdomyolysis.  -fluid resuscitation with NS 200cc/hr  -IV potassium 10meq x4  Resolved    Mitral regurgitation- (present on admission)  Assessment & Plan  8/19/2024  Suspected murmur, though difficult to auscultate. Unclear if preexisting or related to overdose.   Does not appear to have any clinically significant heart failure symptoms    Acute encephalopathy- (present on admission)  Assessment & Plan  8/19/2024  Acute metabolic/toxic encephalopathy  CT head neg  Mental status improving daily though still not completely back to normal  Cont to monitor clinically  Repeat lab in am  Minimize sedating medications  Resolved. MRI of the brain without acute changes.     Hypertension- (present on admission)  Assessment & Plan  8/19/2024  Hx of HTN, holding home meds in light of hypotension  Blood pressure still elevated.  Increase losartan to 100 mg daily  Continue amlodipine 10 mg daily  Added coreg 6.25mg BID  Monitor BP    Hyponatremia- (present on admission)  Assessment & Plan  8/19/2024  Has resolved with IVF resuscitation  Cont to monitor  Serum and urine studies consistent with SIADH.  Discussed with psychiatry.  Titrating psychiatric meds         VTE prophylaxis: Xarelto ppx    I have performed a physical exam and reviewed and updated ROS and Plan today (8/19/2024). In review of yesterday's note (8/18/2024), there are no changes except as documented above.    Greater than 50 minutes spent prepping to see patient (e.g. review of tests) obtaining and/or reviewing separately obtained history. Performing a medically appropriate examination and/ evaluation.  Counseling and educating the patient/family/caregiver.  Ordering medications, tests, or procedures.  Referring and communicating with other health care professionals.   Documenting clinical information in EPIC.  Independently interpreting results and communicating results to patient/family/caregiver.  Care coordination.

## 2024-08-19 NOTE — CARE PLAN
The patient is Stable - Low risk of patient condition declining or worsening    Shift Goals  Clinical Goals: Patient will ambulate in hallway x3 and sit in chair for meals  Patient Goals: Rest  Family Goals: none present    Progress made toward(s) clinical / shift goals:  Patient has ambulated in hallway x2 standby with FWW, up to chair for meals.        Problem: Pain - Standard  Goal: Alleviation of pain or a reduction in pain to the patient’s comfort goal  Description: Target End Date:  Prior to discharge or change in level of care    Document on Vitals flowsheet    1.  Document pain using the appropriate pain scale per order or unit policy  2.  Educate and implement non-pharmacologic comfort measures (i.e. relaxation, distraction, massage, cold/heat therapy, etc.)  3.  Pain management medications as ordered  4.  Reassess pain after pain med administration per policy  5.  If opiods administered assess patient's response to pain medication is appropriate per POSS sedation scale  6.  Follow pain management plan developed in collaboration with patient and interdisciplinary team (including palliative care or pain specialists if applicable)  Outcome: Progressing     Problem: Psychosocial  Goal: Patient's ability to identify and utilize available support systems will improve  Description: Target End Date:  1 to 3 days    1.  Help patient identify available resources and support systems  2.  Collaborate with interdisciplinary team  3.  Collaborate with patient, family/caregiver and other support systems  Outcome: Progressing       Patient is not progressing towards the following goals:

## 2024-08-19 NOTE — CARE PLAN
The patient is Stable - Low risk of patient condition declining or worsening    Shift Goals  Clinical Goals: Wound care, safety/free from falls, increased mobility  Patient Goals: Rest  Family Goals: none present    Progress made toward(s) clinical / shift goals:  Patient alert and oriented x4. Patient with pain to leg, administered medications per MAR. Patient OOB ambulating hallway with FWW and sitter, and to bathroom. Completed dressing change to help maintain skin integrity. Patient resting comfortably, bed in lowest position, call light within reach. Patient on legal hold, with 1:1 safety sitter at bedside.      Problem: Pain - Standard  Goal: Alleviation of pain or a reduction in pain to the patient’s comfort goal  Outcome: Progressing     Problem: Knowledge Deficit - Standard  Goal: Patient and family/care givers will demonstrate understanding of plan of care, disease process/condition, diagnostic tests and medications  Outcome: Progressing     Problem: Skin Integrity  Goal: Skin integrity is maintained or improved  Outcome: Progressing       Patient is not progressing towards the following goals:

## 2024-08-19 NOTE — CARE PLAN
The patient is Stable - Low risk of patient condition declining or worsening    Shift Goals  Clinical Goals: Patient will remain free from injury throughout shift  Patient Goals: rest and sleep  Family Goals: none present    Progress made toward(s) clinical / shift goals:  1-1 sitter at bedside, denies thoughts of self harm      Problem: Pain - Standard  Goal: Alleviation of pain or a reduction in pain to the patient’s comfort goal  Description: Target End Date:  Prior to discharge or change in level of care    Document on Vitals flowsheet    1.  Document pain using the appropriate pain scale per order or unit policy  2.  Educate and implement non-pharmacologic comfort measures (i.e. relaxation, distraction, massage, cold/heat therapy, etc.)  3.  Pain management medications as ordered  4.  Reassess pain after pain med administration per policy  5.  If opiods administered assess patient's response to pain medication is appropriate per POSS sedation scale  6.  Follow pain management plan developed in collaboration with patient and interdisciplinary team (including palliative care or pain specialists if applicable)  Outcome: Progressing     Problem: Depression  Goal: Patient and family/caregiver will verbalize accurate information about at least two of the possible causes of depression, three-four of the signs and symptoms of depression  Description: Target End Date:  1 to 3 days    1.  Assess the patient's and family/caregiver's knowledge regarding depression and its causes.  2.  Explain to the patient and family/caregiver regarding the major symptoms of depression.  3.  Inform the patient and family/caregiver that depression can be treated through medications and psychotherapy.  4.  Allow the patient to express feelings and perceptions  5.  Express hope to the patient with realistic comments about the patient's strengths and resources.  5.  Give positive feedback after a tasks are achieved.  6.  Encourage  identification of positive aspects of self.  7.  Educate the patient about crisis intervention services such as suicide hotlines and other resources.  Outcome: Progressing       Patient is not progressing towards the following goals:

## 2024-08-19 NOTE — DISCHARGE PLANNING
Case Management Discharge Planning    Admission Date: 7/23/2024  GMLOS: 8.2  ALOS: 26    6-Clicks ADL Score: 19  6-Clicks Mobility Score: 20      Anticipated Discharge Dispo: Discharge Disposition: D/T to psych hosp or distinct part unit (65)    Pt discussed during IDT Rounds.     Pt is medically cleared at this time to DC to an IP Facility.     Legal hold was extended due to continued psychosis causing inability to care for self.    Per Psychiatry D.O., she has tentatively scheduled the patient for an intensive outpatient intake assessment through [facility] for Thursday 8/22 at 2 PM in the event that patient no longer meets criteria for legal hold and needs outpatient follow-up.    LMSW made provider aware that [facility] does not have a bed available for pt at this time.

## 2024-08-19 NOTE — CONSULTS
"PSYCHIATRIC FOLLOW-UP: (established)  Reason for admission:   Drug overdose, suicide attempt  Legal Hold Status:       Active, extended     Chart reviewed.       Patient has been appropriate, adherent on medications.     Interval hx:      The patient was seen visiting with his son.  He appeared to be having a good conversation with his son.  Son left the room to allow me to interview the patient independently.  The patient reports that his mood is\" so-so.\"  He reports he is concerned about making it out of the hospital to sell his home by September 17 and is also discouraged because he he still feels weak when walking and needs to use a walker.  We attempted to walk down the hallway together this morning during our interview and the patient had to turn around because he felt that his knees were weak and buckling underneath him.  He denied any dizziness or lightheadedness while walking.    The patient did review his journaling with me.  He is still having auditory hallucinations and reports they have not decreased much in intensity since our last visit on Friday.  He states today that these voices might be coming from his inner conscience and not from a demon.  He still states that they sound like deep growling voices when they occur.  The voices are mocking him from being in the hospital still and are telling him that he should just give up.  He states he has been having passive suicidal ideation still but has been trying to push these thoughts away.  No thoughts of harming others.  He is agreeable to further adjusting Abilify as he is not experiencing any side effects so far from this medication.  He is not endorsing any problems with appetite or any nausea, vomiting or abdominal pain.  He reports he feels he is eating pretty good.    He still reports problems with sleep.  He did not use as needed trazodone because he was not aware that this was available for him.  I informed him I would increase his trazodone " evening dose to 150 mg at bedtime but leave the 50 mg as needed dose active in case he needs this as well.  The patient requested a copy of his medication list and I printed this off and gave it to him.    The patient is now expressing fears that he will be going to [facility] if the legal hold is not lifted.  I informed him that legal hold is for mental health reasons only and does not mean he will be going to [facility].  He has a vague sense that he is not a good person and worries for this reason he will be going to [facility].    Reviewed nursing noted and spoke with nursing staff. The patient is compliant with medication. The patient has not been aggressive or agitated. The patient has not required psychiatric PRN medications since my last encounter with him.  Spoke with sitter at bedside reports that the patient ate very well this morning.     Medical ROS (as pertinent):     Review of Systems   The patient denies chest pain or shortness of breath  The patient is denying any nausea, vomiting or abdominal pain.    The patient is not experiencing any tremors, dystonias or dyskinesias.  He still endorses feeling weak while walking because he is still not able to bear much weight on his left leg         Psychiatric Examination:  Vitals:   Vitals:    08/19/24 0651   BP: 126/80   Pulse: 89   Resp: 18   Temp: 36.3 °C (97.4 °F)   SpO2: 95%          General Appearance: Appears state age, appropriate grooming & hygiene, not acutely distraught   Behavior:               -Calm and cooperative  Neuro:              -No tremors noted, no tics, dyskinesias or dystonias. NO cogwheeling or rigidity bradykinesia not present.               -No psychomotor agitation or slowing              -Gait does appear steady with walker.  Narrow stride.  No shuffling noted.  Patient does need walker to ambulate due to weakness in his knees  Speech: Low volume but normal rate and rhythm  Language: English  Thought processes: Logical, linear and  "goal-directed  Thought content: Appropriate, no delusions expressed.  Continues to have passive suicidal ideations.  No homicidal ideations.    Perceptions: The patient continues to endorse auditory hallucinations that are bothersome and disruptive.  He reports he did not sleep well last night due to auditory hallucinations.  He has not been experiencing visual hallucinations   mood: \"So-so\"  Affect: Congruent with stated mood.  Dysphoric  Judgement and Insight: Fair/fair  Cognition:               -Alert & oriented x 4 (Person, place, situation and time)              -Attention & concentration grossly intact               -Immediate/delayed memory not formally tested but grossly intact              -Age-appropriate fund of knowledge        New PAST MEDICAL/PSYCH/FAMILY/SOCIAL(as reported by patient):                None     Medications    Current Facility-Administered Medications:     carvedilol (Coreg) tablet 12.5 mg, 12.5 mg, Oral, BID WITH MEALS, Frantz Bell D.O., 12.5 mg at 08/19/24 0818    diclofenac sodium (Voltaren) 1 % gel 2 g, 2 g, Topical, TID, ELLEN VanO., 2 g at 08/19/24 0820    sodium chloride (Salt) tablet 1 g, 1 g, Oral, TID WITH MEALS, ELLEN VanO., 1 g at 08/19/24 0817    ARIPiprazole (Abilify) tablet 10 mg, 10 mg, Oral, DAILY, ELLEN WingO., 10 mg at 08/19/24 0537    venlafaxine XR (Effexor XR) capsule 75 mg, 75 mg, Oral, QDAY with Breakfast, ELLEN WingO., 75 mg at 08/19/24 0818    buPROPion SR (Wellbutrin-SR) tablet 150 mg, 150 mg, Oral, DAILY, ELLEN WingO., 150 mg at 08/19/24 0538    traZODone (Desyrel) tablet 50 mg, 50 mg, Oral, QHS PRN, ELLEN WingO.    finasteride (Proscar) tablet 5 mg, 5 mg, Oral, DAILY, ELLEN VanO., 5 mg at 08/19/24 0538    GI Cocktail (hyoscyamine-lidocaine-Maalox) oral susp cup 30 mL, 30 mL, Oral, Q6HRS PRN, Frantz Bell D.O.    vitamin D2 (Ergocalciferol) (Drisdol) capsule " 50,000 Units, 50,000 Units, Oral, Q7 DAYS, Frantz Bell D.O., 50,000 Units at 24 1140    LORazepam (Ativan) tablet 0.5 mg, 0.5 mg, Oral, BID, RYAN Ly.O., 0.5 mg at 24 0538    rivaroxaban (Xarelto) tablet 10 mg, 10 mg, Oral, DAILY AT 1800, RYAN Ly.O., 10 mg at 24 1746    senna-docusate (Pericolace Or Senokot S) 8.6-50 MG per tablet 2 Tablet, 2 Tablet, Oral, Q EVENING, 2 Tablet at 24 1745 **AND** polyethylene glycol/lytes (Miralax) Packet 1 Packet, 1 Packet, Oral, QDAY PRN, Tahira Guzman D.O.    traZODone (Desyrel) tablet 100 mg, 100 mg, Oral, QHS, RYAN Ly.O., 100 mg at 24 2019    losartan (Cozaar) tablet 100 mg, 100 mg, Oral, Q DAY, Roman Valle M.D., 100 mg at 24 0538    amLODIPine (Norvasc) tablet 10 mg, 10 mg, Oral, Q DAY, Roman Valle M.D., 10 mg at 24 0538    oxyCODONE immediate-release (Roxicodone) tablet 5 mg, 5 mg, Oral, Q4HRS PRN, Jaimie Church M.D., 5 mg at 24 0213    magnesium hydroxide (Milk Of Magnesia) suspension 30 mL, 30 mL, Oral, QDAY PRN, Fina Velasquez, A.P.R.N., 30 mL at 24 0438    acetaminophen (Tylenol) tablet 650 mg, 650 mg, Oral, Q4HRS PRN, Cristofer Tejeda D.O., 650 mg at 24 2158    ondansetron (Zofran) syringe/vial injection 4 mg, 4 mg, Intravenous, Q4HRS PRN, Cristofer Tejeda D.O.       Allergies  Allergies   Allergen Reactions    Sulfa Drugs Hives and Rash                                 EKG:               Results for orders placed or performed during the hospital encounter of 24   EKG   Result Value Ref Range     Report           [facility]     Test Date:  2024  Pt Name:    FAIZAN GARDNER             Department: ER  MRN:        [id number]                      Room:       [facility]  Gender:     Male                         Technician: [id number]  :        1960                   Requested By:ER TRIAGE PROTOCOL  Order #:     [id number]                    Reading MD: ILDEFONSO ARNOLD DO     Measurements  Intervals                                Axis  Rate:       85                           P:          61  FL:         153                          QRS:        63  QRSD:       106                          T:          25  QT:         413  QTc:        492     Interpretive Statements  Sinus rhythm  Probable left atrial enlargement  Borderline prolonged QT interval  Compared to ECG 2024 17:09:50  No significant changes  Electronically Signed On 2024 00:56:04 PDT by ILDEFONSO ARNOLD DO      EKG   Result Value Ref Range     Report           [facility]     Test Date:  2024  Pt Name:    FAIZAN GARDNER             Department: 61  MRN:        [id number]                      Room:       [facility]  Gender:     Male                         Technician: ALANNA  :        1960                   Requested By:ANICETO AMBRIZ  Order #:    [id number]                    Reading MD: Lucio Guzman MD     Measurements  Intervals                                Axis  Rate:       82                           P:          67  FL:         176                          QRS:        55  QRSD:       93                           T:          35  QT:         355  QTc:        415     Interpretive Statements  Sinus rhythm  Consider right atrial enlargement  Compared to ECG 2024 19:51:19  No significant changes  Electronically Signed On 2024 06:48:42 PDT by Lucio Guzman MD         Brain Imaging:   MRI brain 24  FINDINGS:  There is no restricted diffusion, acute hemorrhage, hydrocephalus, intracranial space-occupying lesion, abnormal volume loss, vasogenic edema, mass effect or abnormal CSF signal.     The gray matter structures are unremarkable. There are a few nonspecific T2 hyperintensities in the supratentorial white matter. The ventricles are unremarkable. The subarachnoid spaces are unremarkable. The brain coverings are  unremarkable.     The ventricles and subarachnoid spaces are unremarkable. The visualized flow voids of the cerebral vasculature are unremarkable. The visualized portions of the cranial nerves are unremarkable. The visualized bones, glands, muscles and the adipose tissues   are unremarkable. There is mild mucosal thickening in the right maxillary sinus.     IMPRESSION:     1.  No acute abnormality.  2.  A few punctate nonspecific supratentorial T2 hyperintensities likely representing nonspecific foci of gliosis/chronic ischemia.  3.  Mild cerebral volume loss.     EEG:  no new EEG     Labs personally reviewed:   Lab Results   Component Value Date/Time    SODIUM 130 (L) 08/18/2024 06:44 AM    POTASSIUM 4.2 08/18/2024 06:44 AM    CHLORIDE 97 08/18/2024 06:44 AM    CO2 20 08/18/2024 06:44 AM    GLUCOSE 112 (H) 08/18/2024 06:44 AM    BUN 15 08/18/2024 06:44 AM    CREATININE 0.67 08/18/2024 06:44 AM    BUNCREATRAT 16 05/15/2023 11:24 AM      Lab Results   Component Value Date/Time    WBC 7.4 08/18/2024 06:44 AM    RBC 4.64 (L) 08/18/2024 06:44 AM    HEMOGLOBIN 13.8 (L) 08/18/2024 06:44 AM    HEMATOCRIT 40.2 (L) 08/18/2024 06:44 AM    MCV 86.6 08/18/2024 06:44 AM    MCH 29.7 08/18/2024 06:44 AM    MCHC 34.3 08/18/2024 06:44 AM    MPV 8.5 (L) 08/18/2024 06:44 AM    NEUTSPOLYS 77.20 (H) 08/05/2024 07:39 AM    LYMPHOCYTES 13.30 (L) 08/05/2024 07:39 AM    MONOCYTES 7.90 08/05/2024 07:39 AM    EOSINOPHILS 0.70 08/05/2024 07:39 AM    BASOPHILS 0.40 08/05/2024 07:39 AM      TSH 8/13/24 2.420  B12 8/13/24 527          Assessment:  Terrell Hensley is a 63 y.o. male admitted 7/23/2024 with altered mental status, found down after intentional OD of his home medications amlodipine in order to kill himself.      The patient tolerated the initiation of Wellbutrin without any significant side effects.  No tremors or EPS noted, auditory hallucinations are still bothersome.  The patient appears anxious and this is primary concern  today.  He does not express delusions.  He continues to have passive suicidal ideations but no active intent or plan.  He does not have homicidal ideations.  He is agreeable to further adjusting Abilify to see if this does lessen the intensity of auditory hallucinations and helps with mood. We will also stop Effexor as hyponatremia still noted even with salt tablets and fluid restriction.  He is complaining of difficulty sleeping so trazodone will be increased.    Asked patient to continue journaling when he hears auditory hallucinations and to rate the intensity of these hallucinations on a scale of 1-10 with 10 being most bothersome so we can more objectively measure these hallucinations.  Provided patient with a list of his medications per his request.  Also provided patient with a list of the behavioral intervention suggested by therapy yesterday to assist with treatment of depression.  Encouraged the patient to try these interventions today    Sent a message to physical therapy to ask if they are able to continue working with the patient as he is still complaining of difficulty walking due to weakness in his knees.    Legal hold continued due to continued psychosis necessitating further psychiatric treatment     Dx:  #Catatonia, excited -resolved  #MDD with Psychosis        Medical :  Per primary team        Plan:  Legal hold: Extended, due to continued psychosis causing inability to care for self  Psychotropic medications  Increase Abilify to 15 mg daily for MDD with psychosis  Continue Ativan 0.5mg po BID for catatonia  Stop Effexor  Continue Wellbutrin  mg daily  Increase Trazodone to 150mg to scheduled qHS for insomnia, added trazodone 50 mg as needed for insomnia     Please transfer pt to inpatient psychiatric hospital when medically cleared and bed is available  Labs reviewed  EKG reviewed  Old records reviewed/summarized  Safety plan completed, copy in chart  Discussed the case with:   [facility]  Psychiatry will follow up   Family (brother Jean Paul) was updated with treatment today     I have tentatively scheduled the patient for an intensive outpatient intake through [facility] for Thursday 8/22 at 2 PM in the event that patient no longer meets criteria for legal hold and needs outpatient follow-up.       Sitter: 1:1  Phone: Yes room phone; may have personal phone if family brings it  Visitors: Yes, Jean Paul (brother), Cortes (brother), YOSELIN (son), Tressa (daughter)   Personal belongings:  May have paper with crayons/markers; may be in milieu with sitter present. May have IPad

## 2024-08-19 NOTE — DISCHARGE PLANNING
Alert Team Note:     Contacted Selene at [facility], was advised no inpatient beds available. Will be notified when beds become available.

## 2024-08-19 NOTE — DISCHARGE PLANNING
Case Management Discharge Planning    Admission Date: 7/23/2024  GMLOS: 8.2  ALOS: 26    6-Clicks ADL Score: 19  6-Clicks Mobility Score: 20    Anticipated Discharge Dispo: Discharge Disposition: D/T to psych hosp or distinct part unit (64)  Discharge Address: [facility]    DME Needed: No    Action(s) Taken: DC Assessment Complete (See below) via chart review. Pt is here due to suicide attempt, taking 15 x 75 mg doxepin at home.     Pt has brother, Jean Paul, for support.     Escalations Completed: None    Medically Clear: Yes    Barriers to Discharge: Pending Placement at IP Psych    Is the patient up for discharge tomorrow: No    Care Transition Team Assessment    Information Source  Orientation Level: Oriented X4  Information Given By: Patient  Informant's Name: CHART  Who is responsible for making decisions for patient? : Patient    Readmission Evaluation  Is this a readmission?: No    Elopement Risk  Legal Hold: Yes  Ambulatory or Self Mobile in Wheelchair: Yes  Disoriented: No  Psychiatric Symptoms: None  History of Wandering: No  Elopement this Admit: No  Vocalizing Wanting to Leave: No  Displays Behaviors, Body Language Wanting to Leave: No-Not at Risk for Elopement  Time of Legal Hold: 1948  Date of Legal Hold: 07/23/24  Elopement Risk: Not at Risk for Elopement  Wanderguard On: No (See Comments)  Personal Belongings: Hospital Clothing Only  Environmental Precautions: Sharp or Dangerous Items Removed, Dietary Notified for Plastic Utensils / Paperware Only  Picture of Patient on Inside Chart Front Cover: No (See Comments)    Interdisciplinary Discharge Planning  Lives with - Patient's Self Care Capacity: Alone and Able to Care For Self  Housing / Facility: Motor Home  Prior Services: None    Discharge Preparedness  What is your plan after discharge?: Other (comment)  What are your discharge supports?: Sibling    Finances  Financial Barriers to Discharge: No  Prescription Coverage:  Yes    Vision / Hearing Impairment  Right Eye Vision: Wears Glasses  Left Eye Vision: Wears Glasses    Advance Directive  Advance Directive?: None    Domestic Abuse  Have you ever been the victim of abuse or violence?: No    Discharge Risks or Barriers  Discharge risks or barriers?: Mental health  Patient risk factors: Mental health    Anticipated Discharge Information  Discharge Disposition: D/T to psych hosp or distinct part unit (64)  Discharge Address: [facility]

## 2024-08-19 NOTE — DIETARY
"Nutrition Services: Initial Assessment     Day 26 of admit. Terrell Hensley is a 64 y.o. male with admitting DX of Drug overdose of undetermined intent, initial encounter.     Consult received for poor PO intake. Per MD note, consult \"to determine if pt is underweight and in need of more calories, with goal of improving energy and lessen fatigue.\"     Nutrition Assessment:      Height: 180.3 cm (5' 11\")  Weight: 75.1 kg (165 lb 9.1 oz)  Weight taken via bed scale  Body mass index is 23.09 kg/m². BMI classification: Normal.  Wt Readings from Last 6 Encounters:   24 75.1 kg (165 lb 9.1 oz)   24 85.9 kg (189 lb 6 oz)   24 83 kg (183 lb)   24 88.9 kg (195 lb 15.8 oz)   24 87.1 kg (192 lb)   23 84.8 kg (187 lb)        Calculation/Equation: REE with MSJ 1559 x 1.1 - 1.2  Total Calories / day: 1715 - 1870  (Calories / k.8 - 25)  Total Grams Protein / day: 75 - 90  (Grams Protein / k - 1.2)    Objective:  Pertinent medical hx:   Past Medical History:   Diagnosis Date    HTN (hypertension)     Suicide attempt (HCC)      Pertinent labs : Na 134 (L), Glu 106 (H), Alb 4  Pertinent meds: Salt tabs, Vitamin D   Skin/wounds: stage 1, DTI to coccyx per last wound team note.   Food Allergies: No known food allergies  Last BM: 24 per flowsheets    Current diet order:   Cardiac diet and 1800 ml fluid restriction  Ensure Plus (provides 350 calories, 13 g protein per 8 fl oz) TID    Subjective:   Dietary recall/energy intake: Pt states he is eating well. He is interested in additional snacks between meals and agreed to peanut butter and crackers and sandwich BID. Recorded PO intake of meals has been good at 50-75% and %. This indicates sufficient energy intake.     Nutrition Focused Physical Exam (NFPE)   Weight loss: No weight taken since  = 85.3 kg on bed scale. Requested new weight which was obtained on bed scale = 75.1 kg. 10 kg (11.9%) weight loss in <1 month " does not appear accurate in pt who is recorded to eat well. Discussed with RN and requested reweigh on stand up scale as it is unclear as to when bed scale was last re-zeroed.   Muscle mass: Well-nourished  Subcutaneous fat: Well-nourished  Fluid Accumulation: None noted  Reduced  Strength: N/A in acute care setting.     Nutrition Diagnosis:      Based on RD assessment at this time, pt does not meet criteria in congruence with ASPEN/Academy guidelines for malnutrition.        Nutrition Interventions:      Offer snacks BID per pt preference.  Continue supplements as ordered.  Encourage continued good PO intake >50-75% of meals, snacks.  Patient aware of active plan of care as appropriate.     Nutrition Monitoring and Evaluation:     RD to monitor per department policy.

## 2024-08-20 LAB
ALBUMIN SERPL BCP-MCNC: 3.7 G/DL (ref 3.2–4.9)
BUN SERPL-MCNC: 16 MG/DL (ref 8–22)
CALCIUM ALBUM COR SERPL-MCNC: 9.5 MG/DL (ref 8.5–10.5)
CALCIUM SERPL-MCNC: 9.3 MG/DL (ref 8.5–10.5)
CHLORIDE SERPL-SCNC: 99 MMOL/L (ref 96–112)
CO2 SERPL-SCNC: 21 MMOL/L (ref 20–33)
CREAT SERPL-MCNC: 0.67 MG/DL (ref 0.5–1.4)
ERYTHROCYTE [DISTWIDTH] IN BLOOD BY AUTOMATED COUNT: 43.8 FL (ref 35.9–50)
GFR SERPLBLD CREATININE-BSD FMLA CKD-EPI: 104 ML/MIN/1.73 M 2
GLUCOSE SERPL-MCNC: 98 MG/DL (ref 65–99)
HCT VFR BLD AUTO: 39.9 % (ref 42–52)
HGB BLD-MCNC: 13.5 G/DL (ref 14–18)
MAGNESIUM SERPL-MCNC: 1.9 MG/DL (ref 1.5–2.5)
MCH RBC QN AUTO: 30.5 PG (ref 27–33)
MCHC RBC AUTO-ENTMCNC: 33.8 G/DL (ref 32.3–36.5)
MCV RBC AUTO: 90.3 FL (ref 81.4–97.8)
PHOSPHATE SERPL-MCNC: 3.1 MG/DL (ref 2.5–4.5)
PLATELET # BLD AUTO: 384 K/UL (ref 164–446)
PMV BLD AUTO: 9 FL (ref 9–12.9)
POTASSIUM SERPL-SCNC: 3.9 MMOL/L (ref 3.6–5.5)
RBC # BLD AUTO: 4.42 M/UL (ref 4.7–6.1)
SODIUM SERPL-SCNC: 135 MMOL/L (ref 135–145)
WBC # BLD AUTO: 7.4 K/UL (ref 4.8–10.8)

## 2024-08-20 PROCEDURE — A9270 NON-COVERED ITEM OR SERVICE: HCPCS | Performed by: STUDENT IN AN ORGANIZED HEALTH CARE EDUCATION/TRAINING PROGRAM

## 2024-08-20 PROCEDURE — 80069 RENAL FUNCTION PANEL: CPT

## 2024-08-20 PROCEDURE — 700102 HCHG RX REV CODE 250 W/ 637 OVERRIDE(OP): Performed by: STUDENT IN AN ORGANIZED HEALTH CARE EDUCATION/TRAINING PROGRAM

## 2024-08-20 PROCEDURE — 51798 US URINE CAPACITY MEASURE: CPT

## 2024-08-20 PROCEDURE — 99232 SBSQ HOSP IP/OBS MODERATE 35: CPT | Performed by: INTERNAL MEDICINE

## 2024-08-20 PROCEDURE — 700102 HCHG RX REV CODE 250 W/ 637 OVERRIDE(OP): Performed by: INTERNAL MEDICINE

## 2024-08-20 PROCEDURE — A9270 NON-COVERED ITEM OR SERVICE: HCPCS | Performed by: GENERAL PRACTICE

## 2024-08-20 PROCEDURE — 85027 COMPLETE CBC AUTOMATED: CPT

## 2024-08-20 PROCEDURE — 770001 HCHG ROOM/CARE - MED/SURG/GYN PRIV*

## 2024-08-20 PROCEDURE — 700102 HCHG RX REV CODE 250 W/ 637 OVERRIDE(OP): Performed by: GENERAL PRACTICE

## 2024-08-20 PROCEDURE — 99232 SBSQ HOSP IP/OBS MODERATE 35: CPT | Performed by: STUDENT IN AN ORGANIZED HEALTH CARE EDUCATION/TRAINING PROGRAM

## 2024-08-20 PROCEDURE — A9270 NON-COVERED ITEM OR SERVICE: HCPCS | Performed by: INTERNAL MEDICINE

## 2024-08-20 PROCEDURE — 83735 ASSAY OF MAGNESIUM: CPT

## 2024-08-20 PROCEDURE — 36415 COLL VENOUS BLD VENIPUNCTURE: CPT

## 2024-08-20 RX ADMIN — ARIPIPRAZOLE 15 MG: 15 TABLET ORAL at 05:02

## 2024-08-20 RX ADMIN — LOSARTAN POTASSIUM 100 MG: 50 TABLET, FILM COATED ORAL at 05:02

## 2024-08-20 RX ADMIN — DICLOFENAC SODIUM 2 G: 10 GEL TOPICAL at 09:02

## 2024-08-20 RX ADMIN — SODIUM CHLORIDE 1 G: 1 TABLET ORAL at 09:02

## 2024-08-20 RX ADMIN — BUPROPION HYDROCHLORIDE 150 MG: 150 TABLET, FILM COATED, EXTENDED RELEASE ORAL at 05:02

## 2024-08-20 RX ADMIN — ERGOCALCIFEROL 50000 UNITS: 1.25 CAPSULE ORAL at 12:43

## 2024-08-20 RX ADMIN — AMLODIPINE BESYLATE 10 MG: 10 TABLET ORAL at 05:02

## 2024-08-20 RX ADMIN — LORAZEPAM 0.5 MG: 0.5 TABLET ORAL at 05:02

## 2024-08-20 RX ADMIN — FINASTERIDE 5 MG: 5 TABLET, FILM COATED ORAL at 05:02

## 2024-08-20 RX ADMIN — POLYETHYLENE GLYCOL 3350 1 PACKET: 17 POWDER, FOR SOLUTION ORAL at 17:09

## 2024-08-20 RX ADMIN — SODIUM CHLORIDE 1 G: 1 TABLET ORAL at 17:02

## 2024-08-20 RX ADMIN — LORAZEPAM 0.5 MG: 0.5 TABLET ORAL at 17:01

## 2024-08-20 RX ADMIN — TRAZODONE HYDROCHLORIDE 150 MG: 150 TABLET ORAL at 21:27

## 2024-08-20 RX ADMIN — CARVEDILOL 12.5 MG: 12.5 TABLET, FILM COATED ORAL at 09:02

## 2024-08-20 RX ADMIN — DICLOFENAC SODIUM 2 G: 10 GEL TOPICAL at 17:02

## 2024-08-20 RX ADMIN — CARVEDILOL 12.5 MG: 12.5 TABLET, FILM COATED ORAL at 17:02

## 2024-08-20 RX ADMIN — SENNOSIDES AND DOCUSATE SODIUM 2 TABLET: 50; 8.6 TABLET ORAL at 17:01

## 2024-08-20 RX ADMIN — SODIUM CHLORIDE 1 G: 1 TABLET ORAL at 12:43

## 2024-08-20 RX ADMIN — DICLOFENAC SODIUM 2 G: 10 GEL TOPICAL at 21:28

## 2024-08-20 RX ADMIN — RIVAROXABAN 10 MG: 10 TABLET, FILM COATED ORAL at 17:02

## 2024-08-20 ASSESSMENT — LIFESTYLE VARIABLES: SUBSTANCE_ABUSE: 0

## 2024-08-20 ASSESSMENT — FIBROSIS 4 INDEX: FIB4 SCORE: 0.47

## 2024-08-20 ASSESSMENT — ENCOUNTER SYMPTOMS
DEPRESSION: 1
NERVOUS/ANXIOUS: 1
INSOMNIA: 1
HALLUCINATIONS: 1
HALLUCINATIONS: 0
NERVOUS/ANXIOUS: 0
CHILLS: 0
FEVER: 0

## 2024-08-20 ASSESSMENT — PAIN DESCRIPTION - PAIN TYPE
TYPE: ACUTE PAIN

## 2024-08-20 NOTE — THERAPY
Physical Therapy Contact Note    PT re consult received and acknowledged. Discussed patient with RN. Patient observed ambulating in unit with FWW and supervision. Please see PT note 8/8 for recommendations. No further acute PT indicated.     Jerri Arndt, PT, DPT  [phone]

## 2024-08-20 NOTE — CARE PLAN
The patient is Stable - Low risk of patient condition declining or worsening    Shift Goals  Clinical Goals: Safety and free from falls throughout the shift  Patient Goals: Rest  Family Goals: none present    Progress made toward(s) clinical / shift goals:  Patient alert and oriented x4. Patient stating pain to left leg, administered medication per MAR. Patient OOB to chair. Patient with 1:1 sitter at bedside for safety and legal hold in place. Patient resting comfortably, bed in lowest position, call light within reach.      Problem: Pain - Standard  Goal: Alleviation of pain or a reduction in pain to the patient’s comfort goal  Outcome: Progressing     Problem: Knowledge Deficit - Standard  Goal: Patient and family/care givers will demonstrate understanding of plan of care, disease process/condition, diagnostic tests and medications  Outcome: Progressing     Problem: Skin Integrity  Goal: Skin integrity is maintained or improved  Outcome: Progressing       Patient is not progressing towards the following goals:

## 2024-08-20 NOTE — DISCHARGE PLANNING
[facility] ALERT TEAM DISCHARGE PLANNING NOTE    Date:  8/20/2024  Patient Name:  Terrell Hensley - 64 y.o. - Discharge Planning  MRN:  [id number]   YOB: 1960  ADMISSION DATE:  7/23/2024     Writer forwarded referral packet for inpatient psychiatric care to the following community providers:  [facility]    Items included in the referral packet:   _x____Face Sheet   ___x__Pages 1 and 2 of completed legal hold   __x___Alert Team/Psych Assessment   __x___H&P   __x___UDS   __x___Blood Alcohol   __x___Vital signs   _____Pregnancy Test (if applicable)   __x___Medications List   _____Covid Screen

## 2024-08-20 NOTE — PROGRESS NOTES
"Hospital Medicine Daily Progress Note    Date of Service  8/20/2024    Chief Complaint  Terrell Hensley is a 64 y.o. male admitted 7/23/2024 with SI attempt    Hospital Course  This is a 64-year-old male with past medical history of hypertension, mood disorder, who was admitted on 7/23/2024 after SI attempt.    Patient with encephalopathy, WILMAR, transaminitis, rhabdomyolysis, and high anion gap metabolic acidosis on admission. Patient had gonzalez in place and completed aggressive IVF.    Patient recently completed course of Ancef for buttock cellulitis.  MRI imaging of the lumbar spine was performed, noted iliac abscess 3.5 cm. Original plan was to have IR drain placed however fluid collection significantly reduced in size on day of drain placement, aspiration was performed, NGTD. Completed antiobiotic course 8/13.    Psychiatry was consulted and monitor the patient while he was on legal hold    Patient did have hyponatremia which was felt to be secondary to SIADH.    Patient also had an left hip and knee pain since admission likely from fall prior to admission.  Plain film radiographs and CT of his left knee were unremarkable.    Interval Problem Update  8/20 (took over care)  Patient is ok this AM. LH was extended yesterday given ongoing psychosis type symptoms. Patient denies any clear SI/HI but does endorse a \"blah\" mood. He remains adherent to all of his medications and afebrile.     I have discussed this patient's plan of care and discharge plan at IDT rounds today with Case Management, Nursing, Nursing leadership, and other members of the IDT team.    Consultants/Specialty  psychiatry    Code Status  Full Code    Disposition  Pending inpatient psych.    I have placed the appropriate orders for post-discharge needs.    Review of Systems  Review of Systems   Constitutional:  Negative for chills and fever.   Psychiatric/Behavioral:  Positive for depression and hallucinations. Negative for substance abuse and " suicidal ideas. The patient is not nervous/anxious.    All other systems reviewed and are negative.       Physical Exam  Temp:  [36.2 °C (97.2 °F)-36.6 °C (97.9 °F)] 36.2 °C (97.2 °F)  Pulse:  [78-89] 89  Resp:  [18] 18  BP: (138-154)/(83-92) 138/83  SpO2:  [96 %-98 %] 98 %    Physical Exam  Vitals and nursing note reviewed.   Constitutional:       General: He is not in acute distress.     Appearance: Normal appearance.      Comments: Disheveled appearing  Flat affect, but is interactive   HENT:      Head: Normocephalic and atraumatic.   Eyes:      Extraocular Movements: Extraocular movements intact.      Conjunctiva/sclera: Conjunctivae normal.      Pupils: Pupils are equal, round, and reactive to light.   Cardiovascular:      Rate and Rhythm: Normal rate and regular rhythm.      Pulses: Normal pulses.      Heart sounds: No murmur heard.     No friction rub. No gallop.   Pulmonary:      Effort: Pulmonary effort is normal. No respiratory distress.      Breath sounds: Normal breath sounds. No wheezing, rhonchi or rales.   Abdominal:      General: Bowel sounds are normal. There is no distension.      Palpations: Abdomen is soft.      Tenderness: There is no abdominal tenderness.   Musculoskeletal:         General: No swelling or tenderness. Normal range of motion.      Cervical back: Normal range of motion and neck supple. No muscular tenderness.      Right lower leg: No edema.      Left lower leg: No edema.   Skin:     General: Skin is warm and dry.      Capillary Refill: Capillary refill takes less than 2 seconds.      Findings: No bruising, erythema or rash.   Neurological:      General: No focal deficit present.      Mental Status: He is alert and oriented to person, place, and time.         Fluids    Intake/Output Summary (Last 24 hours) at 8/20/2024 1408  Last data filed at 8/20/2024 0900  Gross per 24 hour   Intake 240 ml   Output --   Net 240 ml       Laboratory  Recent Labs     08/18/24  0644 08/19/24  0830  08/20/24  0612   WBC 7.4 8.3 7.4   RBC 4.64* 4.74 4.42*   HEMOGLOBIN 13.8* 14.2 13.5*   HEMATOCRIT 40.2* 42.4 39.9*   MCV 86.6 89.5 90.3   MCH 29.7 30.0 30.5   MCHC 34.3 33.5 33.8   RDW 41.9 43.9 43.8   PLATELETCT 421 439 384   MPV 8.5* 8.7* 9.0       Recent Labs     08/18/24  0644 08/19/24  0830 08/20/24  0612   SODIUM 130* 134* 135   POTASSIUM 4.2 4.2 3.9   CHLORIDE 97 97 99   CO2 20 24 21   GLUCOSE 112* 106* 98   BUN 15 18 16   CREATININE 0.67 0.79 0.67   CALCIUM 9.2 9.5 9.3                       Imaging  CT-KNEE WITH PLUS RECONS LEFT   Final Result      1.  Small joint effusion and moderate popliteal cyst. Minor degenerative changes.   2.  Atherosclerotic vascular calcifications.      DX-KNEE 2- LEFT   Final Result      1. No left knee joint effusion.   2. No acute osseous or joint abnormality.   3. Atherosclerotic calcifications.      CT-IMAGE-GUIDED DRAIN PERITONEAL   Final Result      Successful LEFT iliacus CT-guided fluid aspiration.               MR-LUMBAR SPINE-WITH & W/O   Final Result      1.  There are multiloculated fluid collection noted involving left iliacus muscle likely representing abscess. It measures an approximately 3.5 cm in the transverse dimension.   2.  There is no evidence of osteomyelitis, discitis or epidural abscess.   3.  Degenerative disease as described above.      US-EXTREMITY VENOUS UPPER UNILAT RIGHT   Final Result      CT-PELVIS WITH   Final Result         1.  Low-density enlargement of the distal left psoas muscle and iliacus muscle extending to the anterior left thigh. Could represent changes of myositis or possibly early forming infectious process. No definite enhancing fluid collection identified to    indicate abscess at this time.   2.  Distended bladder with nondependent air, consider history of recent instrumentation or changes of urinary tract infection, correlate with urinalysis as clinically appropriate.   3.  Bilateral fat-containing inguinal hernias   4.   Diverticulosis      MR-CERVICAL SPINE-W/O   Final Result      1.  Degenerative disease in the segment cervical spine as described above.   2.  Left paracentral/foraminal disc protrusion at C6-7 causing severe left C7 neural foraminal stenosis.      MR-BRAIN-W/O   Final Result      1.  No acute abnormality.   2.  A few punctate nonspecific supratentorial T2 hyperintensities likely representing nonspecific foci of gliosis/chronic ischemia.   3.  Mild cerebral volume loss.      DX-FEMUR-2+ LEFT   Final Result      Unremarkable LEFT femur.      DX-HIP-COMPLETE - UNILATERAL 2+ LEFT   Final Result      No radiographic evidence of acute traumatic injury.      CT-HEAD W/O   Final Result         1.  No acute intracranial abnormality.   2.  Atherosclerosis.                    Assessment/Plan  * Drug overdose of undetermined intent, initial encounter- (present on admission)  Assessment & Plan  Psych is following    Major depressive disorder, single episode, severe, with psychosis (HCC)- (present on admission)  Assessment & Plan  8/20/2024  Ongoing depressive symptoms and hallucinations noed  Psychiatry following.  Wean Effexor.  Start Wellbutrin  Ativan prn  LH extended on 8/19  Continue Trazodone, Abilify    Abscess of iliac fossa- (present on admission)  Assessment & Plan  8/20/2024  Completed course of ancef for buttock cellulitis    Patient recently completed course of Ancef for buttock cellulitis.  MRI imaging of the lumbar spine was performed, noted iliac abscess 3.5 cm. Original plan was to have IR drain placed however fluid collection significantly reduced in size, aspiration was performed.  Will follow cultures.MRSA swab negative, continue Zosyn.    Abx completed  Remains clinically stable    Thrombophlebitis of cephalic vein- (present on admission)  Assessment & Plan  Resolved    Neck pain, acute- (present on admission)  Assessment & Plan  Patient complaining of LLE numbness, neck pain and bilateral arm  numbness.  Head injury 4/2024.  Ordered MRI c spine with head MRI - negative  Oxycodone prn pain.     Cellulitis of buttock  Assessment & Plan  Clinically resolved  Patient with erythema, warmth, pain left buttock s/p found down at home.  CT pelvis with contrast- negative for abscess  Completed ancef 2gm IV q 8 x 7 days.  Wound care  Off loading, out of bed, increase activity.    Anemia- (present on admission)  Assessment & Plan  8/20/2024  Mild, remains stable  Cont to monitor    Rhabdomyolysis- (present on admission)  Assessment & Plan  resolved    Elevated liver enzymes- (present on admission)  Assessment & Plan  resolved    Hypokalemia- (present on admission)  Assessment & Plan  resolved    Acute renal failure with tubular necrosis (HCC)- (present on admission)  Assessment & Plan  resolved    High anion gap metabolic acidosis- (present on admission)  Assessment & Plan  resolved    Mitral regurgitation- (present on admission)  Assessment & Plan  8/19/2024  Suspected murmur, though difficult to auscultate. Unclear if preexisting or related to overdose.   Does not appear to have any clinically significant heart failure symptoms    Acute encephalopathy- (present on admission)  Assessment & Plan  8/20/2024  Acute metabolic/toxic encephalopathy  CT head neg  Mental status improving daily though still not completely back to normal (likely mostly psych related now)  CMP and CBC are stable, I personally reviewed each of these labs on 8/20  Cont to monitor clinically  Minimize sedating medications   MRI of the brain without acute changes.     Hypertension- (present on admission)  Assessment & Plan  8/20/2024  Hx of HTN, holding home meds in light of hypotension  Blood pressure control is better   Continue losartan to 100 mg daily  Continue amlodipine 10 mg daily  Continue coreg 6.25mg BID  Monitor BP    Hyponatremia- (present on admission)  Assessment & Plan  SIADH, resolved, monitor while on psych meds  Remains on NACL 1  gm TABS TID, consider tapering soon         VTE prophylaxis: Xarelto ppx    I have performed a physical exam and reviewed and updated ROS and Plan today (8/20/2024). In review of yesterday's note (8/19/2024), there are no changes except as documented above.

## 2024-08-20 NOTE — PROGRESS NOTES
Assumed care of Pt from NOC RN. Pt is awake at bedside report and has no c/o pain. Pt is resting while waiting for breakfast. No BM since 8/16 and PRN will be given. Bed is locked and in low position. 1:1 sitter is in room. Continued legal hold until 8/22. All needs are met.

## 2024-08-20 NOTE — DISCHARGE PLANNING
Legal Hold     Referral: Legal Hold Court     Intervention: Pt presented for legal hold meeting with  via phone call to discuss legal options of contesting hold and meeting with the court doctors tomorrow afternoon, or not contesting legal hold and continuing the hold for one week.  advised that pt's legal hold will be be continued for one week (8/29).       Plan: Pt's legal hold has been continued for one week. Pt awaiting transfer to an in patient psych facility.

## 2024-08-20 NOTE — CONSULTS
"PSYCHIATRIC FOLLOW-UP: (established)  Reason for admission:   Drug overdose, suicide attempt  Legal Hold Status:       Active, extended     Chart reviewed.       Patient has been appropriate, adherent on medications. Per nursing, the pt has been engaged with care.    Note written by PGY 2 Carlos Nina    Interval:   Pt was interviewed in room and expressed improvement in auditory hallucinations/negative self-talk, tolerating the increased dose of the Abilify. He says he is \"perseverating on wanting to leave,\" a little anxious about plans once he's discharged, but he remains appreciative of his care during this hospitalization and does believe he has improved. His sleep last night was poor and was reminded he does have a PRN trazodone for insomnia. He continues to have some visual perceptual disturbances only when he closes his eyes but these have been infrequent. Denies active suicidality and does look forward to discharging with his brother providing help with transportation and housing. Appetite remains intact, and he has been taking laps around the unit with his sitter with a four-point walker. Still feels unstable but has not had any recent GLFs.     Spoke with brother, Jean Paul. He is agreeable with tentative discharge planning for this week given the pt does have a scheduled [facility] IOP intake this Thursday. Jean Paul did request speaking with this note writer's attending, Dr. Melendez, to clarify details of the [facility] appointment on Thursday. Jean Paul will be able to  the pt either Wednesday later in the day or Thursday morning, though he is currently on the way to [place] and does have his medical appointment on Wednesday night, though he said this can be rescheduled if needed.    Medical ROS (as pertinent):     Review of Systems   Psychiatric/Behavioral:  Negative for hallucinations, substance abuse and suicidal ideas. The patient is nervous/anxious and has insomnia.            Psychiatric Examination:  Vitals:   Vitals: " "   08/20/24 0739   BP: 138/83   Pulse: 89   Resp: 18   Temp: 36.2 °C (97.2 °F)   SpO2: 98%        General Appearance: Appears state age, appropriate grooming & hygiene, looks tired but otherwise reasonably well groomed.  Behavior:               -Overall movements more spontaneous and brisk compared to prior assessments. No tremors or dyskinesia.               -No tremors noted, no tics, dyskinesias or dystonias. NO cogwheeling or rigidity              -Generalized psychomotor slowing              -No posture or gait abnormalities appreciated  Speech: No latency in speech, improved word-finding difficulty.   Language: English  Thought processes: Linear and logical  Thought content: No evidence of SI/HI/AVH during this assessment. Not observed responding to internal stimuli. No evidence of delusions or paranoia.  Perceptions: Has AH although he states they are less bothersome  Mood: \"Better.\"  Affect: Congruent with stated mood. Restricted range though expanded from prior assessment, euthymic and tired but more appropriately reactive. No evidence of lability, ernie, or agitation.  Judgement and Insight: Fair/improved  Cognition:               -Alert & oriented x 3 (Person, place and time)              -Attention & concentration grossly intact though strained              -Immediate/delayed memory not formally tested but grossly intact              -Age-appropriate fund of knowledge      New PAST MEDICAL/PSYCH/FAMILY/SOCIAL(as reported by patient):       None      Current Facility-Administered Medications:     ARIPiprazole (Abilify) tablet 15 mg, 15 mg, Oral, DAILY, Amara Mann D.O., 15 mg at 08/20/24 0502    traZODone (Desyrel) tablet 150 mg, 150 mg, Oral, QHS, Amara Mann D.O., 150 mg at 08/19/24 2127    carvedilol (Coreg) tablet 12.5 mg, 12.5 mg, Oral, BID WITH MEALS, Frantz Bell D.O., 12.5 mg at 08/20/24 0902    diclofenac sodium (Voltaren) 1 % gel 2 g, 2 g, Topical, TID, Frantz BLACK" CORIE Bell, 2 g at 08/20/24 0902    sodium chloride (Salt) tablet 1 g, 1 g, Oral, TID WITH MEALS, Frantz Bell D.O., 1 g at 08/20/24 1243    buPROPion SR (Wellbutrin-SR) tablet 150 mg, 150 mg, Oral, DAILY, Amara Mann D.O., 150 mg at 08/20/24 0502    traZODone (Desyrel) tablet 50 mg, 50 mg, Oral, QHS PRN, Amara Mann D.O.    finasteride (Proscar) tablet 5 mg, 5 mg, Oral, DAILY, Frantz Bell D.O., 5 mg at 08/20/24 0502    GI Cocktail (hyoscyamine-lidocaine-Maalox) oral susp cup 30 mL, 30 mL, Oral, Q6HRS PRN, Frantz Bell D.O.    vitamin D2 (Ergocalciferol) (Drisdol) capsule 50,000 Units, 50,000 Units, Oral, Q7 DAYS, Frantz Bell D.O., 50,000 Units at 08/20/24 1243    LORazepam (Ativan) tablet 0.5 mg, 0.5 mg, Oral, BID, Tahira Guzman D.O., 0.5 mg at 08/20/24 0502    rivaroxaban (Xarelto) tablet 10 mg, 10 mg, Oral, DAILY AT 1800, Tahira Guzman D.O., 10 mg at 08/19/24 1808    senna-docusate (Pericolace Or Senokot S) 8.6-50 MG per tablet 2 Tablet, 2 Tablet, Oral, Q EVENING, 2 Tablet at 08/19/24 1809 **AND** polyethylene glycol/lytes (Miralax) Packet 1 Packet, 1 Packet, Oral, QDAY PRN, Tahira Guzman D.O.    losartan (Cozaar) tablet 100 mg, 100 mg, Oral, Q DAY, Roman Valle M.D., 100 mg at 08/20/24 0502    amLODIPine (Norvasc) tablet 10 mg, 10 mg, Oral, Q DAY, Roman Valle M.D., 10 mg at 08/20/24 0502    oxyCODONE immediate-release (Roxicodone) tablet 5 mg, 5 mg, Oral, Q4HRS PRN, Jaimie Church M.D., 5 mg at 08/16/24 0213    magnesium hydroxide (Milk Of Magnesia) suspension 30 mL, 30 mL, Oral, QDAY PRN, JUNE Cote.P.R.N., 30 mL at 07/27/24 0438    acetaminophen (Tylenol) tablet 650 mg, 650 mg, Oral, Q4HRS PRN, Cristofer Tejeda D.O., 650 mg at 08/14/24 2158    ondansetron (Zofran) syringe/vial injection 4 mg, 4 mg, Intravenous, Q4HRS PRN, Cristofer Tejeda D.O.    Allergies   Allergen Reactions    Sulfa Drugs Hives and Rash         EKG:   Results for  orders placed or performed during the hospital encounter of 24   EKG   Result Value Ref Range    Report       [facility] Emergency Dept.    Test Date:  2024  Pt Name:    FAIZAN GARDNER             Department: ER  MRN:        [id number]                      Room:        12  Gender:     Male                         Technician: [id number]  :        1960                   Requested By:ER TRIAGE PROTOCOL  Order #:    [id number]                    Reading MD: ILDEFONSO ARNOLD DO    Measurements  Intervals                                Axis  Rate:       85                           P:          61  ND:         153                          QRS:        63  QRSD:       106                          T:          25  QT:         413  QTc:        492    Interpretive Statements  Sinus rhythm  Probable left atrial enlargement  Borderline prolonged QT interval  Compared to ECG 2024 17:09:50  No significant changes  Electronically Signed On 2024 00:56:04 PDT by ILDEFONSO ARNOLD DO     EKG   Result Value Ref Range    Report       [facility]    Test Date:  2024  Pt Name:    FAIZAN GARDNER             Department: 61  MRN:        [id number]                      Room:       [facility]  Gender:     Male                         Technician: [id number]  :        1960                   Requested By:ANICETO AMBRIZ  Order #:    [id number]                    Reading MD: Lucio Guzman MD    Measurements  Intervals                                Axis  Rate:       82                           P:          67  ND:         176                          QRS:        55  QRSD:       93                           T:          35  QT:         355  QTc:        415    Interpretive Statements  Sinus rhythm  Consider right atrial enlargement  Compared to ECG 2024 19:51:19  No significant changes  Electronically Signed On 2024 06:48:42 PDT by Lucio Guzman MD        Brain Imaging: No  new imaging   EEG:  no new EEG     Labs personally reviewed:   Recent Results (from the past 24 hour(s))   CBC WITHOUT DIFFERENTIAL    Collection Time: 08/20/24  6:12 AM   Result Value Ref Range    WBC 7.4 4.8 - 10.8 K/uL    RBC 4.42 (L) 4.70 - 6.10 M/uL    Hemoglobin 13.5 (L) 14.0 - 18.0 g/dL    Hematocrit 39.9 (L) 42.0 - 52.0 %    MCV 90.3 81.4 - 97.8 fL    MCH 30.5 27.0 - 33.0 pg    MCHC 33.8 32.3 - 36.5 g/dL    RDW 43.8 35.9 - 50.0 fL    Platelet Count 384 164 - 446 K/uL    MPV 9.0 9.0 - 12.9 fL   MAGNESIUM    Collection Time: 08/20/24  6:12 AM   Result Value Ref Range    Magnesium 1.9 1.5 - 2.5 mg/dL   Renal Function Panel    Collection Time: 08/20/24  6:12 AM   Result Value Ref Range    Sodium 135 135 - 145 mmol/L    Potassium 3.9 3.6 - 5.5 mmol/L    Chloride 99 96 - 112 mmol/L    Co2 21 20 - 33 mmol/L    Glucose 98 65 - 99 mg/dL    Creatinine 0.67 0.50 - 1.40 mg/dL    Bun 16 8 - 22 mg/dL    Calcium 9.3 8.5 - 10.5 mg/dL    Correct Calcium 9.5 8.5 - 10.5 mg/dL    Phosphorus 3.1 2.5 - 4.5 mg/dL    Albumin 3.7 3.2 - 4.9 g/dL   ESTIMATED GFR    Collection Time: 08/20/24  6:12 AM   Result Value Ref Range    GFR (CKD-EPI) 104 >60 mL/min/1.73 m 2         Assessment:  Terrell Hensley is a 63 y.o. male admitted 7/23/2024 with altered mental status, found down after intentional OD of his home medications amlodipine in order to kill himself.     Today presentation remains encouraging. Tolerating increased Abilify and Wellbutrin with improving Na since stopping Effexor, likely SIADH. Pt does believe Abilify is helping with AVH, which have decreased in frequency and intensity,  pt is better able to redirect/distract from these thoughts. Continues to deny SI. Talked with pt's brother, Jean Paul, and given continued improvement, discussed potential to prepare for discharge in time for [facility] IOP intake appointment already scheduled on Thursday. The pt has been engaged with treatment, organization of behavior and thoughts  continues to improve and her currently only has passive SI. . The pt is agreeable to discuss with brother logistics of transportation and housing. Discussed with primary team regarding setting up home health referral given pt's ongoing physical/mobility limitations. Would benefit from wraparound support. Recommend continuing current medications. Ativan may be tapered with planned IOP treatment.     Dx:  #Catatonia, excited -resolved  #MDD with Psychosis    Medical :  Per primary team     Plan:  Legal hold: Extended, due to continued psychosis causing inability to care for self  Psychotropic medications  Continue Abilify 15mg po daily for anxiety disorder and psychotic symptoms   Continue Wellbutrin SR 150mg daily for depression  Continue Ativan 0.5mg po BID for catatonia  Continue Trazodone 100mg to scheduled qHS for insomnia  Continue Trazodone 50mg po qHS PRN for insomnia if scheduled Trazodone is not effective.  Labs reviewed  Discussed the case with: Dr. Rangel  Psychiatry will follow up   Family updated with treatment     Thank you for the consult.      Sitter: 1:1  Phone: Yes room phone; may have personal phone if family brings it  Visitors: Yes, Jean Paul (brother), Cortes (brother), YOSELIN (son), Tressa (daughter)   Personal belongings:  May have paper with crayons/markers; may be in milieu with sitter present, cell phone and Ipad    I personally performed the service, or was physically present during the key or critical portions of the service when performed by a resident; participated in the management of the patient. I have reviewed Dr Nina's note and agree with the documented finding and plan of care.     Patient repots today that AH are less distressing. He has only heard them once today mocking him. He is tolerating abilify increase without side effects so far. HE endorses fleeting passive SI but no active plans.Concerns now are focused on returning to work. Denies HI. He does endorse problems sleeping.  Reminded patient he has as needed Trazodone available. Discussed listening to music on his phone to help facilitate sleep. Per nursing staff he has been walking around the unit and is up to [place] for meals.   Agree with above plan from resident but will attempt to discontinue ativan at this time as patient has not had any catatonic symptoms even with most recent decrease. Spoke with Patient's brother who requests that IOP appointment is moved to Friday so he can bring him.     Will continue to follow    DEENA Melendez

## 2024-08-20 NOTE — CARE PLAN
Problem: Psychosocial  Goal: Patient's ability to identify and develop effective coping behaviors will improve  Description: Target End Date:  1 to 3 days    1.  Present opportunities for the patient to express thoughts, and feelings in a nonjudgmental environment  2.  Help the patient with problem-solving in a constructive manner.  3.  Educate the patient on cognitive-behavioral self-management responses to suicidal thoughts.  4.  Introduce the use of self-expression methods to manage suicidal feelings  5.  Provide emotional support  6.  Encourage identification of positive aspects of self  Outcome: Progressing  Goal: Patient's ability to identify and utilize available support systems will improve  Description: Target End Date:  1 to 3 days    1.  Help patient identify available resources and support systems  2.  Collaborate with interdisciplinary team  3.  Collaborate with patient, family/caregiver and other support systems  Outcome: Progressing   The patient is Stable - Low risk of patient condition declining or worsening    Shift Goals  Clinical Goals: safety and free from falls throughout shift  Patient Goals: rest  Family Goals: none present    Progress made toward(s) clinical / shift goals:  A&Ox4, calm & cooperative, ambulated in halls multiple times throughout shift. Gave PRN miralax    Patient is not progressing towards the following goals:

## 2024-08-20 NOTE — DISCHARGE PLANNING
Alert Team Note:     Contacted Fantasma at [facility], was advised no beds available and will be contacted when beds become available and if pt is accepted.

## 2024-08-20 NOTE — DISCHARGE PLANNING
Case Management Discharge Planning    Admission Date: 7/23/2024  GMLOS: 8.2  ALOS: 27    6-Clicks ADL Score: 19  6-Clicks Mobility Score: 20      Anticipated Discharge Dispo: Discharge Disposition: D/T to psych hosp or distinct part unit (64)  Discharge Address: [facility]    DME Needed: No    Action(s) Taken: LMSW received a requested for the Alert team to resend the patient's referral to [facility] from [facility]. LMSW notified Alert Team via voalte.     Escalations Completed: None    Medically Clear: Yes    Next Steps: Patient pending inpatient psych placement.     Barriers to Discharge: Pending Placement    Is the patient up for discharge tomorrow: No

## 2024-08-21 LAB
ANION GAP SERPL CALC-SCNC: 12 MMOL/L (ref 7–16)
BUN SERPL-MCNC: 13 MG/DL (ref 8–22)
CALCIUM SERPL-MCNC: 9.3 MG/DL (ref 8.5–10.5)
CHLORIDE SERPL-SCNC: 99 MMOL/L (ref 96–112)
CO2 SERPL-SCNC: 24 MMOL/L (ref 20–33)
CREAT SERPL-MCNC: 0.7 MG/DL (ref 0.5–1.4)
GFR SERPLBLD CREATININE-BSD FMLA CKD-EPI: 102 ML/MIN/1.73 M 2
GLUCOSE SERPL-MCNC: 102 MG/DL (ref 65–99)
POTASSIUM SERPL-SCNC: 3.8 MMOL/L (ref 3.6–5.5)
SODIUM SERPL-SCNC: 135 MMOL/L (ref 135–145)

## 2024-08-21 PROCEDURE — A9270 NON-COVERED ITEM OR SERVICE: HCPCS

## 2024-08-21 PROCEDURE — 770001 HCHG ROOM/CARE - MED/SURG/GYN PRIV*

## 2024-08-21 PROCEDURE — 700102 HCHG RX REV CODE 250 W/ 637 OVERRIDE(OP): Performed by: GENERAL PRACTICE

## 2024-08-21 PROCEDURE — 700102 HCHG RX REV CODE 250 W/ 637 OVERRIDE(OP): Performed by: STUDENT IN AN ORGANIZED HEALTH CARE EDUCATION/TRAINING PROGRAM

## 2024-08-21 PROCEDURE — A9270 NON-COVERED ITEM OR SERVICE: HCPCS | Performed by: INTERNAL MEDICINE

## 2024-08-21 PROCEDURE — A9270 NON-COVERED ITEM OR SERVICE: HCPCS | Performed by: STUDENT IN AN ORGANIZED HEALTH CARE EDUCATION/TRAINING PROGRAM

## 2024-08-21 PROCEDURE — 80048 BASIC METABOLIC PNL TOTAL CA: CPT

## 2024-08-21 PROCEDURE — 700102 HCHG RX REV CODE 250 W/ 637 OVERRIDE(OP): Performed by: INTERNAL MEDICINE

## 2024-08-21 PROCEDURE — A9270 NON-COVERED ITEM OR SERVICE: HCPCS | Performed by: GENERAL PRACTICE

## 2024-08-21 PROCEDURE — 36415 COLL VENOUS BLD VENIPUNCTURE: CPT

## 2024-08-21 PROCEDURE — 700102 HCHG RX REV CODE 250 W/ 637 OVERRIDE(OP)

## 2024-08-21 RX ORDER — NYSTATIN 100000 [USP'U]/G
POWDER TOPICAL 2 TIMES DAILY
Status: DISCONTINUED | OUTPATIENT
Start: 2024-08-21 | End: 2024-08-22 | Stop reason: HOSPADM

## 2024-08-21 RX ORDER — LORAZEPAM 0.5 MG/1
0.5 TABLET ORAL 2 TIMES DAILY PRN
Status: DISCONTINUED | OUTPATIENT
Start: 2024-08-21 | End: 2024-08-22

## 2024-08-21 RX ADMIN — SENNOSIDES AND DOCUSATE SODIUM 2 TABLET: 50; 8.6 TABLET ORAL at 17:13

## 2024-08-21 RX ADMIN — DICLOFENAC SODIUM 2 G: 10 GEL TOPICAL at 17:14

## 2024-08-21 RX ADMIN — SODIUM CHLORIDE 1 G: 1 TABLET ORAL at 17:14

## 2024-08-21 RX ADMIN — MAGNESIUM HYDROXIDE 30 ML: 1200 LIQUID ORAL at 08:38

## 2024-08-21 RX ADMIN — SODIUM CHLORIDE 1 G: 1 TABLET ORAL at 13:00

## 2024-08-21 RX ADMIN — DICLOFENAC SODIUM 2 G: 10 GEL TOPICAL at 20:58

## 2024-08-21 RX ADMIN — NYSTATIN: 100000 POWDER TOPICAL at 17:14

## 2024-08-21 RX ADMIN — BUPROPION HYDROCHLORIDE 150 MG: 150 TABLET, FILM COATED, EXTENDED RELEASE ORAL at 05:37

## 2024-08-21 RX ADMIN — POLYETHYLENE GLYCOL 3350 1 PACKET: 17 POWDER, FOR SOLUTION ORAL at 08:39

## 2024-08-21 RX ADMIN — TRAZODONE HYDROCHLORIDE 150 MG: 150 TABLET ORAL at 20:58

## 2024-08-21 RX ADMIN — LOSARTAN POTASSIUM 100 MG: 50 TABLET, FILM COATED ORAL at 05:37

## 2024-08-21 RX ADMIN — RIVAROXABAN 10 MG: 10 TABLET, FILM COATED ORAL at 17:13

## 2024-08-21 RX ADMIN — AMLODIPINE BESYLATE 10 MG: 10 TABLET ORAL at 05:37

## 2024-08-21 RX ADMIN — FINASTERIDE 5 MG: 5 TABLET, FILM COATED ORAL at 05:37

## 2024-08-21 RX ADMIN — CARVEDILOL 12.5 MG: 12.5 TABLET, FILM COATED ORAL at 17:14

## 2024-08-21 RX ADMIN — DICLOFENAC SODIUM 2 G: 10 GEL TOPICAL at 08:40

## 2024-08-21 RX ADMIN — SODIUM CHLORIDE 1 G: 1 TABLET ORAL at 08:40

## 2024-08-21 RX ADMIN — CARVEDILOL 12.5 MG: 12.5 TABLET, FILM COATED ORAL at 08:40

## 2024-08-21 RX ADMIN — ARIPIPRAZOLE 15 MG: 15 TABLET ORAL at 05:37

## 2024-08-21 ASSESSMENT — ENCOUNTER SYMPTOMS
CHILLS: 0
FEVER: 0
DEPRESSION: 1
HALLUCINATIONS: 1
NERVOUS/ANXIOUS: 0

## 2024-08-21 ASSESSMENT — LIFESTYLE VARIABLES: SUBSTANCE_ABUSE: 0

## 2024-08-21 ASSESSMENT — PAIN DESCRIPTION - PAIN TYPE: TYPE: ACUTE PAIN

## 2024-08-21 NOTE — DISCHARGE PLANNING
"Alert Team Note:     Received message from Reema at [facility] via [facility]. Referral received and was advised, \"Received, thank you. We will review the packet but are prioritizing uninsured referrals first.\"  "

## 2024-08-21 NOTE — CARE PLAN
The patient is Stable - Low risk of patient condition declining or worsening    Shift Goals  Clinical Goals: Safety, monitor I&Os  Patient Goals: Rest  Family Goals: none present    Progress made toward(s) clinical / shift goals:  Patient alert and oriented x4. Patient with pain to left leg, administered medications per MAR. Patient educated about fluid restriction. Patient with legal hold and 1:1 safety sitter at bedside. Patient resting comfortably, bed in lowest position, call light within reach.      Problem: Pain - Standard  Goal: Alleviation of pain or a reduction in pain to the patient’s comfort goal  Outcome: Progressing     Problem: Knowledge Deficit - Standard  Goal: Patient and family/care givers will demonstrate understanding of plan of care, disease process/condition, diagnostic tests and medications  Outcome: Progressing       Patient is not progressing towards the following goals:

## 2024-08-21 NOTE — FACE TO FACE
Face to Face Note  -  Durable Medical Equipment    Carlos Rangel M.D. - NPI: [id number]  I certify that this patient is under my care and that they have had a durable medical equipment(DME)face to face encounter by myself that meets the physician DME face-to-face encounter requirements with this patient on:    Date of encounter:   Patient:                    MRN:                       YOB: 2025  Terrell Hensley  [id number]  1960     The encounter with the patient was in whole, or in part, for the following medical condition, which is the primary reason for durable medical equipment:  Other - debility    I certify that, based on my findings, the following durable medical equipment is medically necessary:  Walkers.        ------------------------------------------------------------------------------------------------------------------    Face to Face Supporting Documentation - Home Health    The encounter with this patient was in whole or in part the primary reason for home health admission.    Date of encounter:   Patient:                    MRN:                       YOB: 2025  Terrell Hensley  [id number]  1960     Home health to see patient for:  Skilled Nursing care for assessment, interventions & education, Medical social work consult, Home health aide, Physical Therapy evaluation and treatment, and Occupational therapy evaluation and treatment    Skilled need for:  Exacerbation of Chronic Disease State major depression    Skilled nursing interventions to include:  Comment: help with therapies    Homebound evidenced status by:  Need the aid of supportive devices such as crutches, canes, wheelchairs or walkers. Leaving home must require a considerable and taxing effort. There must exist a normal inability to leave the home.    Community Physician to provide follow up care: Neelima Rivera M.D.     Optional Interventions    Wound information &  treatment:    Home Infusion Therapy orders:    Line/Drain/Airway:    I certify the face to face encounter for this home care referral meets the CMS requirements and the encounter/clinical assessment with the patient was, in whole, or in part, for the medical condition(s) listed above, which is the primary reason for home health care. Based on my clinical findings: the service(s) are medically necessary, support the need for home health care, and the homebound criteria are met.  I certify that this patient has had a face to face encounter by myself.  Carlos Rangel M.D. - NPI: [id number]    *Debility, frailty and advanced age in the absence of an acute deterioration or exacerbation of a condition do not qualify a patient for home health.

## 2024-08-21 NOTE — PROGRESS NOTES
"Hospital Medicine Daily Progress Note    Date of Service  8/21/2024    Chief Complaint  Terrell Hensley is a 64 y.o. male admitted 7/23/2024 with SI attempt    Hospital Course  This is a 64-year-old male with past medical history of hypertension, mood disorder, who was admitted on 7/23/2024 after SI attempt.    Patient with encephalopathy, WILMAR, transaminitis, rhabdomyolysis, and high anion gap metabolic acidosis on admission. Patient had gonzalez in place and completed aggressive IVF.    Patient recently completed course of Ancef for buttock cellulitis.  MRI imaging of the lumbar spine was performed, noted iliac abscess 3.5 cm. Original plan was to have IR drain placed however fluid collection significantly reduced in size on day of drain placement, aspiration was performed, NGTD. Completed antiobiotic course 8/13.    Psychiatry was consulted and monitor the patient while he was on legal hold    Patient did have hyponatremia which was felt to be secondary to SIADH.    Patient also had an left hip and knee pain since admission likely from fall prior to admission.  Plain film radiographs and CT of his left knee were unremarkable.    Interval Problem Update  8/20 (took over care)  Patient is ok this AM. LH was extended yesterday given ongoing psychosis type symptoms. Patient denies any clear SI/HI but does endorse a \"blah\" mood. He remains adherent to all of his medications and afebrile.     8/21  Patient's mood is ok. Small fungal rash in the R groin. Adherent to all medications. No other acute changes noted. I personally spoke with psychiatry, considering lifting LH tomorrow.     I have discussed this patient's plan of care and discharge plan at IDT rounds today with Case Management, Nursing, Nursing leadership, and other members of the IDT team.    Consultants/Specialty  psychiatry    Code Status  Full Code    Disposition  Pending inpatient psych.    I have placed the appropriate orders for post-discharge " needs.    Review of Systems  Review of Systems   Constitutional:  Negative for chills and fever.   Psychiatric/Behavioral:  Positive for depression and hallucinations. Negative for substance abuse and suicidal ideas. The patient is not nervous/anxious.         Somewhat better noted 8/21   All other systems reviewed and are negative.       Physical Exam  Temp:  [36.3 °C (97.3 °F)-36.7 °C (98 °F)] 36.7 °C (98 °F)  Pulse:  [66-91] 66  Resp:  [16-18] 17  BP: (142-158)/(68-90) 142/68  SpO2:  [98 %] 98 %    Physical Exam  Vitals and nursing note reviewed.   Constitutional:       General: He is not in acute distress.     Appearance: Normal appearance.      Comments: Disheveled appearing  Flat affect, a bit more interactive today  Anxious appearing   HENT:      Head: Normocephalic and atraumatic.   Eyes:      Extraocular Movements: Extraocular movements intact.      Conjunctiva/sclera: Conjunctivae normal.      Pupils: Pupils are equal, round, and reactive to light.   Cardiovascular:      Rate and Rhythm: Normal rate and regular rhythm.      Pulses: Normal pulses.      Heart sounds: No murmur heard.     No friction rub. No gallop.   Pulmonary:      Effort: Pulmonary effort is normal. No respiratory distress.      Breath sounds: Normal breath sounds. No wheezing, rhonchi or rales.   Abdominal:      General: Bowel sounds are normal. There is no distension.      Palpations: Abdomen is soft.      Tenderness: There is no abdominal tenderness.   Musculoskeletal:         General: No swelling or tenderness. Normal range of motion.      Cervical back: Normal range of motion and neck supple. No muscular tenderness.      Right lower leg: No edema.      Left lower leg: No edema.   Skin:     General: Skin is warm and dry.      Capillary Refill: Capillary refill takes less than 2 seconds.      Findings: Rash (R groin, appears fungal) present. No bruising or erythema.   Neurological:      General: No focal deficit present.      Mental  Status: He is alert and oriented to person, place, and time.         Fluids    Intake/Output Summary (Last 24 hours) at 8/21/2024 1251  Last data filed at 8/21/2024 0830  Gross per 24 hour   Intake 340 ml   Output --   Net 340 ml       Laboratory  Recent Labs     08/19/24  0830 08/20/24  0612   WBC 8.3 7.4   RBC 4.74 4.42*   HEMOGLOBIN 14.2 13.5*   HEMATOCRIT 42.4 39.9*   MCV 89.5 90.3   MCH 30.0 30.5   MCHC 33.5 33.8   RDW 43.9 43.8   PLATELETCT 439 384   MPV 8.7* 9.0       Recent Labs     08/19/24  0830 08/20/24  0612 08/21/24  0029   SODIUM 134* 135 135   POTASSIUM 4.2 3.9 3.8   CHLORIDE 97 99 99   CO2 24 21 24   GLUCOSE 106* 98 102*   BUN 18 16 13   CREATININE 0.79 0.67 0.70   CALCIUM 9.5 9.3 9.3                       Imaging  CT-KNEE WITH PLUS RECONS LEFT   Final Result      1.  Small joint effusion and moderate popliteal cyst. Minor degenerative changes.   2.  Atherosclerotic vascular calcifications.      DX-KNEE 2- LEFT   Final Result      1. No left knee joint effusion.   2. No acute osseous or joint abnormality.   3. Atherosclerotic calcifications.      CT-IMAGE-GUIDED DRAIN PERITONEAL   Final Result      Successful LEFT iliacus CT-guided fluid aspiration.               MR-LUMBAR SPINE-WITH & W/O   Final Result      1.  There are multiloculated fluid collection noted involving left iliacus muscle likely representing abscess. It measures an approximately 3.5 cm in the transverse dimension.   2.  There is no evidence of osteomyelitis, discitis or epidural abscess.   3.  Degenerative disease as described above.      US-EXTREMITY VENOUS UPPER UNILAT RIGHT   Final Result      CT-PELVIS WITH   Final Result         1.  Low-density enlargement of the distal left psoas muscle and iliacus muscle extending to the anterior left thigh. Could represent changes of myositis or possibly early forming infectious process. No definite enhancing fluid collection identified to    indicate abscess at this time.   2.  Distended  bladder with nondependent air, consider history of recent instrumentation or changes of urinary tract infection, correlate with urinalysis as clinically appropriate.   3.  Bilateral fat-containing inguinal hernias   4.  Diverticulosis      MR-CERVICAL SPINE-W/O   Final Result      1.  Degenerative disease in the segment cervical spine as described above.   2.  Left paracentral/foraminal disc protrusion at C6-7 causing severe left C7 neural foraminal stenosis.      MR-BRAIN-W/O   Final Result      1.  No acute abnormality.   2.  A few punctate nonspecific supratentorial T2 hyperintensities likely representing nonspecific foci of gliosis/chronic ischemia.   3.  Mild cerebral volume loss.      DX-FEMUR-2+ LEFT   Final Result      Unremarkable LEFT femur.      DX-HIP-COMPLETE - UNILATERAL 2+ LEFT   Final Result      No radiographic evidence of acute traumatic injury.      CT-HEAD W/O   Final Result         1.  No acute intracranial abnormality.   2.  Atherosclerosis.                    Assessment/Plan  * Drug overdose of undetermined intent, initial encounter- (present on admission)  Assessment & Plan  Psych is following    Major depressive disorder, single episode, severe, with psychosis (HCC)- (present on admission)  Assessment & Plan  8/21/2024  Ongoing depressive symptoms and hallucinations noted, but clinically improving, possible lift LH on 8/22  Psychiatry following.  Wean Effexor.  Start Wellbutrin  Ativan prn, adjusted on 8/21  LH extended on 8/19  Continue Trazodone, Abilify    Abscess of iliac fossa- (present on admission)  Assessment & Plan    Completed course of ancef for buttock cellulitis    Patient recently completed course of Ancef for buttock cellulitis.  MRI imaging of the lumbar spine was performed, noted iliac abscess 3.5 cm. Original plan was to have IR drain placed however fluid collection significantly reduced in size, aspiration was performed.  Will follow cultures.MRSA swab negative, continue  Zosyn.    Abx completed  Remains clinically stable    Thrombophlebitis of cephalic vein- (present on admission)  Assessment & Plan  Resolved    Neck pain, acute- (present on admission)  Assessment & Plan  Patient complaining of LLE numbness, neck pain and bilateral arm numbness.  Head injury 4/2024.  Ordered MRI c spine with head MRI - negative  Oxycodone prn pain.     Cellulitis of buttock  Assessment & Plan  Clinically resolved  Patient with erythema, warmth, pain left buttock s/p found down at home.  CT pelvis with contrast- negative for abscess  Completed ancef 2gm IV q 8 x 7 days.  Wound care  Off loading, out of bed, increase activity.    Anemia- (present on admission)  Assessment & Plan    Mild, remains stable  Cont to monitor    Rhabdomyolysis- (present on admission)  Assessment & Plan  resolved    Elevated liver enzymes- (present on admission)  Assessment & Plan  resolved    Hypokalemia- (present on admission)  Assessment & Plan  resolved    Acute renal failure with tubular necrosis (HCC)- (present on admission)  Assessment & Plan  resolved    High anion gap metabolic acidosis- (present on admission)  Assessment & Plan  resolved    Mitral regurgitation- (present on admission)  Assessment & Plan  8/19/2024  Suspected murmur, though difficult to auscultate. Unclear if preexisting or related to overdose.   Does not appear to have any clinically significant heart failure symptoms    Acute encephalopathy- (present on admission)  Assessment & Plan  8/21/2024  Acute metabolic/toxic encephalopathy  CT head neg  Mental status continues to improve slowly, essentially back to his baseline  Cont to monitor clinically  Minimize sedating medications   MRI of the brain without acute changes.     Hypertension- (present on admission)  Assessment & Plan  8/21/2024  Hx of HTN, holding home meds in light of hypotension  Blood pressure control is better but remains overall elevated  Continue losartan to 100 mg daily  Continue  amlodipine 10 mg daily  INCREASE coreg to 12.5mg BID  Monitor BP    Hyponatremia- (present on admission)  Assessment & Plan  SIADH, resolved, monitor while on psych meds  Remains on NACL 1 gm TABS TID, consider tapering soon  Remains stable  I personally reviewed the bmp on 8/21         VTE prophylaxis: Xarelto ppx    I have performed a physical exam and reviewed and updated ROS and Plan today (8/21/2024). In review of yesterday's note (8/20/2024), there are no changes except as documented above.        The patient is not medically cleared for discharge to home or a post-acute facility.  Anticipate discharge to: a psychiatric hospital    VTE Selection

## 2024-08-21 NOTE — DISCHARGE PLANNING
Received Stipulation and Order from the court continuing pt's legal hold until 8/29, scanned copy into pt's chart.

## 2024-08-21 NOTE — CONSULTS
Reason for admission:   Drug overdose, suicide attempt  Legal Hold Status:       Active, extended     Chart reviewed.            Interval:   The patient reports that his mood is okay today.  He reports he is more optimistic about leaving the hospital.  He states his brother would be able to pick him up tomorrow afternoon and he feels okay with this plan.  So far anxiety still remains the same even with stopping Ativan.  No signs of catatonia noted during my examination today.  He endorses worries about getting back to his job and managing his bills but is also trying to focus in more positive aspects like seeing his children and spending time with his brothers.  We discussed that upon discharge it would be essential for the patient to follow-up with outpatient mental health care and he is agreeable to an intensive outpatient program at [facility].  I discussed with him incorporating a pleasurable activity daily in order to help add meaning and purpose to his life.  He did accept a list of adult pleasant activities and I advised him that we could go over this tomorrow morning to see what activities the patient feels he would be able to enjoy once leaving the hospital.  I also provided the patient with a new safety planning worksheet to work on today.    He reports that he now feels that the auditory hallucinations may be just his strong internal thoughts coming from his conscience.  We processed that at times when people are depressed they can often have very strong negative thoughts about themselves that seem almost like very loud voices.  He states he had some of these thoughts last night but was able to ignore them.  He reports that they are not as bothersome as they once were.  He is not having these negative internal thoughts this morning so far.  He continues to report no thoughts of harming himself.  He has no thoughts of harming others  He does not express any delusions  The patient denies any  "problems with appetite or energy level.  He reports he was able to get some sleep last night.  Reminded him he has as needed trazodone that he can request if he is unable to sleep at night.    Reviewed nursing notes.  No behavioral problems recorded yesterday.  The patient is compliant with all his medications.  He did not require as needed trazodone yesterday evening.  Breakfast was only recorded yesterday on flowsheet and he ate 75 to 100% of breakfast and also did eat a snack.     Medical ROS (as pertinent):     Review of Systems   The patient denies any chest pain or shortness of breath  The patient denies any nausea, vomiting or abdominal upset  The patient is not endorsing any headaches, dizziness or tremors            Psychiatric Examination:  Vitals:   Vitals:    08/21/24 0750   BP: (!) 142/68   Pulse: 66   Resp: 17   Temp: 36.7 °C (98 °F)   SpO2:        General Appearance: Appears state age, appropriate grooming & hygiene, looks tired but otherwise reasonably well groomed.  Behavior:             -calm and cooperative  Neuro:              -No tremors noted, no tics, dyskinesias or dystonias. NO cogwheeling or rigidity              -No posture or gait abnormalities appreciated., gait not observed as patient had just gone on a walk with his walker  Speech: low volume, not latency normal rhythm  Language: English  Thought processes: Linear and logical  Thought content: No evidence of SI/HI/delusions during this assessment. Appropriate to conversation.  Perceptions: Has AH although he states they are less bothersome  Mood: \"ok\"  Affect: Congruent with stated mood. Social smile noted  Judgement and Insight: Fair/fair, improved insight into AH  Cognition:               -Alert & oriented x 4 (Person, place, circumstance and time)              -Attention & concentration grossly intact              -Immediate/delayed memory not formally tested but grossly intact              -Age-appropriate fund of knowledge      "   New PAST MEDICAL/PSYCH/FAMILY/SOCIAL(as reported by patient):                None       Current Facility-Administered Medications:     ARIPiprazole (Abilify) tablet 15 mg, 15 mg, Oral, DAILY, Amara Mann D.O., 15 mg at 08/21/24 0537    traZODone (Desyrel) tablet 150 mg, 150 mg, Oral, QHS, Amara Mann D.O., 150 mg at 08/20/24 2127    carvedilol (Coreg) tablet 12.5 mg, 12.5 mg, Oral, BID WITH MEALS, Frantz Bell D.O., 12.5 mg at 08/21/24 0840    diclofenac sodium (Voltaren) 1 % gel 2 g, 2 g, Topical, TID, Frantz Bell D.O., 2 g at 08/21/24 0840    sodium chloride (Salt) tablet 1 g, 1 g, Oral, TID WITH MEALS, Frantz Bell D.O., 1 g at 08/21/24 0840    buPROPion SR (Wellbutrin-SR) tablet 150 mg, 150 mg, Oral, DAILY, Amara Mann D.O., 150 mg at 08/21/24 0537    traZODone (Desyrel) tablet 50 mg, 50 mg, Oral, QHS PRN, Amara Mann D.O.    finasteride (Proscar) tablet 5 mg, 5 mg, Oral, DAILY, Frantz Bell D.O., 5 mg at 08/21/24 0537    GI Cocktail (hyoscyamine-lidocaine-Maalox) oral susp cup 30 mL, 30 mL, Oral, Q6HRS PRN, Frantz Bell D.O.    vitamin D2 (Ergocalciferol) (Drisdol) capsule 50,000 Units, 50,000 Units, Oral, Q7 DAYS, Frantz Bell D.O., 50,000 Units at 08/20/24 1243    rivaroxaban (Xarelto) tablet 10 mg, 10 mg, Oral, DAILY AT 1800, Tahira Guzman D.O., 10 mg at 08/20/24 1702    senna-docusate (Pericolace Or Senokot S) 8.6-50 MG per tablet 2 Tablet, 2 Tablet, Oral, Q EVENING, 2 Tablet at 08/20/24 1701 **AND** polyethylene glycol/lytes (Miralax) Packet 1 Packet, 1 Packet, Oral, QDAY PRN, Tahira Guzman D.O., 1 Packet at 08/21/24 0839    losartan (Cozaar) tablet 100 mg, 100 mg, Oral, Q DAY, Roman Valle M.D., 100 mg at 08/21/24 0537    amLODIPine (Norvasc) tablet 10 mg, 10 mg, Oral, Q DAY, Roman Valle M.D., 10 mg at 08/21/24 0537    oxyCODONE immediate-release (Roxicodone) tablet 5 mg, 5 mg, Oral, Q4HRS PRN, Jaimie Church M.D., 5 mg at  24 0213    magnesium hydroxide (Milk Of Magnesia) suspension 30 mL, 30 mL, Oral, QDAY PRN, JUNE Cote.P.R.N., 30 mL at 24 0838    acetaminophen (Tylenol) tablet 650 mg, 650 mg, Oral, Q4HRS PRN, Cristofer Tejeda D.O., 650 mg at 24 2158    ondansetron (Zofran) syringe/vial injection 4 mg, 4 mg, Intravenous, Q4HRS PRN, Cristofer Tejeda D.O.    Allergies     Allergies   Allergen Reactions    Sulfa Drugs Hives and Rash                                 EKG:          Results for orders placed or performed during the hospital encounter of 24   EKG   Result Value Ref Range     Report           [facility] Emergency Dept[facility]     Test Date:  2024  Pt Name:    [facility]             Department: ER  MRN:        [id number]                      Room:        12  Gender:     Male                         Technician: [id number]  :        1960                   Requested By:ER TRIAGE PROTOCOL  Order #:    [id number]                    Reading MD: ILDEFONSO ARNOLD,      Measurements  Intervals                                Axis  Rate:       85                           P:          61  FL:         153                          QRS:        63  QRSD:       106                          T:          25  QT:         413  QTc:        492     Interpretive Statements  Sinus rhythm  Probable left atrial enlargement  Borderline prolonged QT interval  Compared to ECG 2024 17:09:50  No significant changes  Electronically Signed On 2024 00:56:04 PDT by ILDEFONSO ARNOLD,       EKG   Result Value Ref Range     Report           [facility]     Test Date:  2024  Pt Name:    [facility]             Department: 61  MRN:        [id number]                      Room:       [facility]  Gender:     Male                         Technician: DGG  :        1960                   Requested By:ANICETO AMBRIZ  Order #:    [id number]                     Reading MD: Lucio Guzman MD     Measurements  Intervals                                Axis  Rate:       82                           P:          67  MI:         176                          QRS:        55  QRSD:       93                           T:          35  QT:         355  QTc:        415     Interpretive Statements  Sinus rhythm  Consider right atrial enlargement  Compared to ECG 07/23/2024 19:51:19  No significant changes  Electronically Signed On 08- 06:48:42 PDT by Lucio Guzman MD         Brain Imaging: No new imaging   EEG:  no new EEG     Labs personally reviewed:   Lab Results   Component Value Date/Time    SODIUM 135 08/21/2024 12:29 AM    POTASSIUM 3.8 08/21/2024 12:29 AM    CHLORIDE 99 08/21/2024 12:29 AM    CO2 24 08/21/2024 12:29 AM    GLUCOSE 102 (H) 08/21/2024 12:29 AM    BUN 13 08/21/2024 12:29 AM    CREATININE 0.70 08/21/2024 12:29 AM    BUNCREATRAT 16 05/15/2023 11:24 AM      Lab Results   Component Value Date/Time    WBC 7.4 08/20/2024 06:12 AM    RBC 4.42 (L) 08/20/2024 06:12 AM    HEMOGLOBIN 13.5 (L) 08/20/2024 06:12 AM    HEMATOCRIT 39.9 (L) 08/20/2024 06:12 AM    MCV 90.3 08/20/2024 06:12 AM    MCH 30.5 08/20/2024 06:12 AM    MCHC 33.8 08/20/2024 06:12 AM    MPV 9.0 08/20/2024 06:12 AM    NEUTSPOLYS 77.20 (H) 08/05/2024 07:39 AM    LYMPHOCYTES 13.30 (L) 08/05/2024 07:39 AM    MONOCYTES 7.90 08/05/2024 07:39 AM    EOSINOPHILS 0.70 08/05/2024 07:39 AM    BASOPHILS 0.40 08/05/2024 07:39 AM        Assessment:  Terrell Hensley is a 63 y.o. male admitted 7/23/2024 with altered mental status, found down after intentional OD of his home medications amlodipine in order to kill himself.      The patient is endorsing more optimism regarding going home.  He has better insight today that the auditory hallucinations may be just strong internal thoughts.  He reports they are manageable at this time as he is able to distract himself when he has these thoughts.  Stop scheduled Ativan  yesterday and so far no recurrence of catatonia noted.  He denies any thoughts of harming self or others.  He does not express delusions.  No side effects endorsed from current medications      Dx:  #Catatonia, excited -resolved  #MDD with Psychosis     Medical :  Per primary team     Plan:  Legal hold: Extended, due to continued psychosis causing inability to care for self  Psychotropic medications  Continue Abilify 15mg po daily for anxiety disorder and psychotic symptoms   Continue Wellbutrin SR 150mg daily for depression  Change Ativan to 0.5 mg twice daily as needed for anxiety  Continue Trazodone 100mg to scheduled qHS for insomnia  Continue Trazodone 50mg po qHS PRN for insomnia if scheduled Trazodone is not effective.  Labs reviewed  Discussed the case with: Dr. Rangel  Psychiatry will follow up   Family updated with treatment    Patient has an intensive outpatient psychiatry intake appointment scheduled at [facility] for Friday 8/23/2024 at 8 AM     Thank you for the consult.      Sitter: 1:1  Phone: Yes room phone; may have personal phone if family brings it  Visitors: Yes, Jean Paul (brother), Cortes (brother), YOSELIN (son), Tressa (daughter)   Personal belongings:  May have paper with crayons/markers; may be in milieu with sitter present, cell phone and Ipad

## 2024-08-21 NOTE — PROGRESS NOTES
Assumed care of Pt from NOC RN. Pt is awake at bedside report. Bed is locked and low position. 1:1 sitter is currently walking the villarreal with Pt. Pt is on bowel protocol and is taking PRN meds as last BM was 8/16.

## 2024-08-21 NOTE — DISCHARGE PLANNING
Alert Team Note:     Contacted Lana at [facility] was advised she is unsure about bed availability and will have someone follow up later today.     Escalated to Alert  Kaylene Pruitt since pt has been declined by all other [place] facilities except [facility]. Was advised to send referral to [facility] while pt's referral is pending at [facility] waiting for a bed.     Sent referral to [facility] via [facility].

## 2024-08-21 NOTE — CARE PLAN
Problem: Skin Integrity  Goal: Skin integrity is maintained or improved  Description: Target End Date:  Prior to discharge or change in level of care    Document interventions on Skin Risk/Ulises flowsheet groups and corresponding LDA    1.  Assess and monitor skin integrity, appearance and/or temperature  2.  Assess risk factors for impaired skin integrity and/or pressures ulcers  3.  Implement precautions to protect skin integrity in collaboration with interdisciplinary team  4.  Implement pressure ulcer prevention protocol if at risk for skin breakdown  5.  Confirm wound care consult if at risk for skin breakdown  6.  Ensure patient use of pressure relieving devices  (Low air loss bed, waffle overlay, heel protectors, ROHO cushion, etc)  Outcome: Progressing   The patient is Stable - Low risk of patient condition declining or worsening    Shift Goals  Clinical Goals: Safety, monitor I&Os  Patient Goals: Rest  Family Goals: none present    Progress made toward(s) clinical / shift goals:   A&Ox4, calm & cooperative, ambulated in halls multiple times throughout shift. Gave PRN miralax and milk of mag-1x BM this shift.     Patient is not progressing towards the following goals:

## 2024-08-22 ENCOUNTER — PHARMACY VISIT (OUTPATIENT)
Dept: PHARMACY | Facility: MEDICAL CENTER | Age: 64
End: 2024-08-22
Payer: COMMERCIAL

## 2024-08-22 VITALS
RESPIRATION RATE: 18 BRPM | TEMPERATURE: 98.3 F | BODY MASS INDEX: 23.36 KG/M2 | HEART RATE: 87 BPM | DIASTOLIC BLOOD PRESSURE: 81 MMHG | SYSTOLIC BLOOD PRESSURE: 146 MMHG | WEIGHT: 166.89 LBS | HEIGHT: 71 IN | OXYGEN SATURATION: 95 %

## 2024-08-22 LAB
CHOLEST SERPL-MCNC: 164 MG/DL (ref 100–199)
HDLC SERPL-MCNC: 36 MG/DL
LDLC SERPL CALC-MCNC: 100 MG/DL
TRIGL SERPL-MCNC: 142 MG/DL (ref 0–149)

## 2024-08-22 PROCEDURE — A9270 NON-COVERED ITEM OR SERVICE: HCPCS | Performed by: GENERAL PRACTICE

## 2024-08-22 PROCEDURE — 700102 HCHG RX REV CODE 250 W/ 637 OVERRIDE(OP): Performed by: INTERNAL MEDICINE

## 2024-08-22 PROCEDURE — 99232 SBSQ HOSP IP/OBS MODERATE 35: CPT | Performed by: STUDENT IN AN ORGANIZED HEALTH CARE EDUCATION/TRAINING PROGRAM

## 2024-08-22 PROCEDURE — 700102 HCHG RX REV CODE 250 W/ 637 OVERRIDE(OP): Performed by: STUDENT IN AN ORGANIZED HEALTH CARE EDUCATION/TRAINING PROGRAM

## 2024-08-22 PROCEDURE — 80061 LIPID PANEL: CPT

## 2024-08-22 PROCEDURE — 700102 HCHG RX REV CODE 250 W/ 637 OVERRIDE(OP): Performed by: GENERAL PRACTICE

## 2024-08-22 PROCEDURE — A9270 NON-COVERED ITEM OR SERVICE: HCPCS | Performed by: STUDENT IN AN ORGANIZED HEALTH CARE EDUCATION/TRAINING PROGRAM

## 2024-08-22 PROCEDURE — RXMED WILLOW AMBULATORY MEDICATION CHARGE: Performed by: INTERNAL MEDICINE

## 2024-08-22 PROCEDURE — A9270 NON-COVERED ITEM OR SERVICE: HCPCS | Performed by: INTERNAL MEDICINE

## 2024-08-22 RX ORDER — NYSTATIN 100000 [USP'U]/G
1 POWDER TOPICAL 2 TIMES DAILY
Qty: 30 G | Refills: 0 | Status: SHIPPED | OUTPATIENT
Start: 2024-08-22 | End: 2024-09-21

## 2024-08-22 RX ORDER — ARIPIPRAZOLE 15 MG/1
15 TABLET ORAL DAILY
Qty: 14 TABLET | Refills: 0 | Status: SHIPPED | OUTPATIENT
Start: 2024-08-23 | End: 2024-09-06

## 2024-08-22 RX ORDER — BUPROPION HYDROCHLORIDE 150 MG/1
150 TABLET, EXTENDED RELEASE ORAL DAILY
Qty: 14 TABLET | Refills: 0 | Status: SHIPPED | OUTPATIENT
Start: 2024-08-23 | End: 2024-09-06

## 2024-08-22 RX ORDER — TRAZODONE HYDROCHLORIDE 150 MG/1
150 TABLET ORAL
Qty: 7 TABLET | Refills: 0 | Status: SHIPPED | OUTPATIENT
Start: 2024-08-22 | End: 2024-08-29

## 2024-08-22 RX ORDER — LOSARTAN POTASSIUM 100 MG/1
100 TABLET ORAL DAILY
Qty: 30 TABLET | Refills: 1 | Status: SHIPPED | OUTPATIENT
Start: 2024-08-23

## 2024-08-22 RX ORDER — ERGOCALCIFEROL 1.25 MG/1
50000 CAPSULE, LIQUID FILLED ORAL
Qty: 4 CAPSULE | Refills: 0 | Status: SHIPPED | OUTPATIENT
Start: 2024-08-27 | End: 2024-09-19

## 2024-08-22 RX ORDER — FINASTERIDE 5 MG/1
5 TABLET, FILM COATED ORAL DAILY
Qty: 30 TABLET | Refills: 1 | Status: SHIPPED | OUTPATIENT
Start: 2024-08-23

## 2024-08-22 RX ORDER — SODIUM CHLORIDE 1 G/1
1 TABLET ORAL
Qty: 90 TABLET | Refills: 1 | Status: SHIPPED | OUTPATIENT
Start: 2024-08-22

## 2024-08-22 RX ORDER — LORAZEPAM 0.5 MG/1
0.5 TABLET ORAL 2 TIMES DAILY
Status: DISCONTINUED | OUTPATIENT
Start: 2024-08-22 | End: 2024-08-22 | Stop reason: HOSPADM

## 2024-08-22 RX ORDER — CARVEDILOL 12.5 MG/1
12.5 TABLET ORAL 2 TIMES DAILY WITH MEALS
Qty: 60 TABLET | Refills: 1 | Status: SHIPPED | OUTPATIENT
Start: 2024-08-22

## 2024-08-22 RX ORDER — LORAZEPAM 0.5 MG/1
0.5 TABLET ORAL 3 TIMES DAILY
Qty: 21 TABLET | Refills: 0 | Status: SHIPPED | OUTPATIENT
Start: 2024-08-22 | End: 2024-08-29

## 2024-08-22 RX ORDER — AMLODIPINE BESYLATE 10 MG/1
10 TABLET ORAL DAILY
Qty: 30 TABLET | Refills: 1 | Status: SHIPPED | OUTPATIENT
Start: 2024-08-23

## 2024-08-22 RX ADMIN — RIVAROXABAN 10 MG: 10 TABLET, FILM COATED ORAL at 18:08

## 2024-08-22 RX ADMIN — AMLODIPINE BESYLATE 10 MG: 10 TABLET ORAL at 05:14

## 2024-08-22 RX ADMIN — DICLOFENAC SODIUM 2 G: 10 GEL TOPICAL at 18:09

## 2024-08-22 RX ADMIN — SODIUM CHLORIDE 1 G: 1 TABLET ORAL at 08:58

## 2024-08-22 RX ADMIN — SODIUM CHLORIDE 1 G: 1 TABLET ORAL at 18:08

## 2024-08-22 RX ADMIN — CARVEDILOL 12.5 MG: 12.5 TABLET, FILM COATED ORAL at 18:08

## 2024-08-22 RX ADMIN — CARVEDILOL 12.5 MG: 12.5 TABLET, FILM COATED ORAL at 08:58

## 2024-08-22 RX ADMIN — ARIPIPRAZOLE 15 MG: 15 TABLET ORAL at 05:14

## 2024-08-22 RX ADMIN — SODIUM CHLORIDE 1 G: 1 TABLET ORAL at 13:33

## 2024-08-22 RX ADMIN — BUPROPION HYDROCHLORIDE 150 MG: 150 TABLET, FILM COATED, EXTENDED RELEASE ORAL at 05:14

## 2024-08-22 RX ADMIN — FINASTERIDE 5 MG: 5 TABLET, FILM COATED ORAL at 05:14

## 2024-08-22 RX ADMIN — DICLOFENAC SODIUM 2 G: 10 GEL TOPICAL at 09:01

## 2024-08-22 RX ADMIN — NYSTATIN: 100000 POWDER TOPICAL at 05:17

## 2024-08-22 RX ADMIN — NYSTATIN: 100000 POWDER TOPICAL at 18:09

## 2024-08-22 RX ADMIN — SENNOSIDES AND DOCUSATE SODIUM 2 TABLET: 50; 8.6 TABLET ORAL at 18:08

## 2024-08-22 RX ADMIN — LORAZEPAM 0.5 MG: 0.5 TABLET ORAL at 09:55

## 2024-08-22 RX ADMIN — LOSARTAN POTASSIUM 100 MG: 50 TABLET, FILM COATED ORAL at 05:14

## 2024-08-22 RX ADMIN — LORAZEPAM 0.5 MG: 0.5 TABLET ORAL at 18:08

## 2024-08-22 ASSESSMENT — PAIN DESCRIPTION - PAIN TYPE: TYPE: ACUTE PAIN

## 2024-08-22 NOTE — DIETARY
Nutrition Update: Follow-up for PO intakes  Day 29 of admit.  Terrell Hensley is a 64 y.o. male with admitting DX of Drug overdose of undetermined intent, initial encounter [T53.318T].    Current Diet: cardiac diet; 1800 ml fluid restriction; snacks BID: peanut butter/crackers and sandwich; ensure plus TID    Problem: Nutritional:  Goal: Achieve adequate nutritional intake  Description: Patient will consume >50% of meals  Outcome: met - consuming % of meals and snacks    Wt Readings from Last 10 Encounters:   08/20/24 75.7 kg (166 lb 14.2 oz)   05/23/24 85.9 kg (189 lb 6 oz)   04/30/24 83 kg (183 lb)   03/26/24 88.9 kg (195 lb 15.8 oz)   03/26/24 87.1 kg (192 lb)   09/11/23 84.8 kg (187 lb)   05/17/23 88.5 kg (195 lb)   05/13/23 88.5 kg (195 lb)   02/11/23 91.8 kg (202 lb 6.4 oz)   07/17/22 83.9 kg (185 lb)     Pt reports his appetite is okay. States he no longer wants to receive snacks as his meal intakes are adequate. Reports drinking ensure shakes daily.     Pt was re-weighed and current wt of 75.7 kg was taken via standing scale. This indicates 10.2 kg, 11.9% severe wt loss x 3 months per chart wts. Suspect wt loss r/t mental health. Does not meet criteria for malnutrition as pt was well-nourished on NFPE and reportedly eating well PTA.     RD will re-screen weekly and is available prn.

## 2024-08-22 NOTE — PROGRESS NOTES
Alert and able to let his needs known, denies current SI- sitter remains at the bedside for !;! safety

## 2024-08-22 NOTE — CONSULTS
"Reason for admission:   Drug overdose, suicide attempt  Legal Hold Status:       Active, extended     Chart reviewed.             Interval:   The patient appeared very anxious today. HE had a difficult time focusing and answering questions this morning due to severe anxiety. Attempted to discuss safety plan and discharge to his brother's home but he was too distracted and perseverative on other topics to participate. He thinks AH are worse today but also states he is not sure. Reports he could not sleep last night but did not take the extra dose of trazodone because his heart rate was too fast. He reports nursing staff told him his heart rate was 135. Reviewed nursing notes and it does not appear that he had any elevated heart rate overnight.    Spoke with the patient again this evening with his brother Jean Paul present as the patient will be staying with his brother Jean Paul at discharge.  Ativan 0.5 mg twice daily was restarted.  The patient was more organized and able to participate in conversation.  He clarified that\" of course I want to be alive\" but continued to endorse worries about returning to work and his finances.  His brother Jean Paul reassured him that he has been keeping up with the patient's job and they are aware that he is still on medical leave.  He has leave approved until the end of the month.  His primary care can then extend medical leave if he still meets criteria due to weakness still persistent in his left leg.  He reports that the patient is currently on paid leave.  The patient did receive a paycheck for this week.  His brother also assured him that now that he has Medicare they will be helping cover his medical expenses.  The patient seems somewhat reassured by this.  Went over the patient's safety plan with the patient and his brother.  The patient adds that thinking about his children would be a major deterrent for attempting suicide again.  He affirms he has no plans of trying to harm or hurt " himself again.  He reports that if these thoughts were to return he would definitely tell his brother or his children.  He is agreeable to outpatient mental health care follow-up at [facility].  Discussed the intensive outpatient program and he is agreeable to try this.  He also has a psychiatrist (Dr. Dubose) who he can resume follow-up with once he has completed the intensive outpatient program.  Went over some of the activities that the patient highlighted on his pleasant activities list that he could begin to implement to help also improve his mood.  Discussed walking with his brother 30 minutes a day to improve his gait and also to help improve mood.  The patient did not endorse any thoughts of harming others.  He continues to believe that the voices he thought he was hearing were actually internal thoughts.  He does not express any delusions.     Reviewed nursing notes.  No behavioral problems recorded yesterday.  The patient is compliant with all his medications.  He did not require as needed trazodone yesterday evening.  Breakfast was only recorded yesterday on flowsheet and he ate 75 to 100% of breakfast and also did eat a snack.     Medical ROS (as pertinent):     Review of Systems   The patient denies any chest pain or shortness of breath  The patient denies any nausea, vomiting or abdominal upset  The patient is not endorsing any headaches, dizziness or tremors            Psychiatric Examination:  Vitals:       Vitals:     08/21/24 0750   BP: (!) 142/68   Pulse: 66   Resp: 17   Temp: 36.7 °C (98 °F)   SpO2:           General Appearance: Appears state age, appropriate grooming & hygiene, looks tired but otherwise reasonably well groomed.  Behavior:             -calm and cooperative  Neuro:              -No tremors noted, no tics, dyskinesias or dystonias. NO cogwheeling or rigidity              -No posture or gait abnormalities appreciated.  Patient is able to walk without a walker for short  "distances.  Speech: low volume, normal rate and rhythm  Language: English  Thought processes: Linear and logical  Thought content: No evidence of SI/HI/delusions during this assessment. Appropriate to conversation..   Perceptions: Patient continues to report that auditory hallucinations he thought he was having more strong internal thoughts.  He is not currently having any auditory hallucinations  Mood: \"Okay\"  Affect: Congruent with stated mood. Social smile noted, patient did make a joke with his brother at the end of the interview and had a small smile.  Judgement and Insight: Good/good  Cognition:               -Alert & oriented x 4 (Person, place, circumstance and time)              -Attention & concentration grossly intact              -Immediate/delayed memory not formally tested but grossly intact              -Age-appropriate fund of knowledge     MMSE (8/22/24)  MMSE    -What is the:  Year, season, date, day, month.               1/5 points    -Where are we:   State, county, town, hospital, floor.      5/5 points    -3 objects immediate recall.                                             3/3 points    -World backwards or serial sevens.                                 5/5 points    -Three-minute recall.                                                         3/3 points    -Name 2 objects.                                                               2/2 points    -Repeat the following.      \"No ifs and is or buts\"                                                    1/1 point    -Follow a 3 stage command.      Paper right hand, fold in half, place on floor.                 3/3 points    -Follow a written command.       Close your eyes                                                          1/1 point    -Write a complete sentence.                                           1/1 point    -Copy a design.                                                                1/1 point    Total                              "                                                     30/30    Cutoff of 24 indicating dementia.       New PAST MEDICAL/PSYCH/FAMILY/SOCIAL(as reported by patient):                None    Medications    Current Facility-Administered Medications:     LORazepam (Ativan) tablet 0.5 mg, 0.5 mg, Oral, BID, Amara Mann D.O., 0.5 mg at 08/22/24 0955    nystatin (Mycostatin) powder, , Topical, BID, Carlos Rangel M.D., Given at 08/22/24 0517    ARIPiprazole (Abilify) tablet 15 mg, 15 mg, Oral, DAILY, Amara Mann D.O., 15 mg at 08/22/24 0514    traZODone (Desyrel) tablet 150 mg, 150 mg, Oral, QHS, Amara Mann D.O., 150 mg at 08/21/24 2058    carvedilol (Coreg) tablet 12.5 mg, 12.5 mg, Oral, BID WITH MEALS, Frantz Bell D.O., 12.5 mg at 08/22/24 0858    diclofenac sodium (Voltaren) 1 % gel 2 g, 2 g, Topical, TID, Frantz Bell D.O., 2 g at 08/22/24 0901    sodium chloride (Salt) tablet 1 g, 1 g, Oral, TID WITH MEALS, Frantz Bell D.O., 1 g at 08/22/24 1333    buPROPion SR (Wellbutrin-SR) tablet 150 mg, 150 mg, Oral, DAILY, Amara Mann D.O., 150 mg at 08/22/24 0514    traZODone (Desyrel) tablet 50 mg, 50 mg, Oral, QHS PRN, Amara Mann D.O.    finasteride (Proscar) tablet 5 mg, 5 mg, Oral, DAILY, Frantz Bell D.O., 5 mg at 08/22/24 0514    GI Cocktail (hyoscyamine-lidocaine-Maalox) oral susp cup 30 mL, 30 mL, Oral, Q6HRS PRN, Yves Acierto, D.O.    vitamin D2 (Ergocalciferol) (Drisdol) capsule 50,000 Units, 50,000 Units, Oral, Q7 DAYS, Frantz Bell D.O., 50,000 Units at 08/20/24 1243    rivaroxaban (Xarelto) tablet 10 mg, 10 mg, Oral, DAILY AT 1800, Tahira Guzman D.O., 10 mg at 08/21/24 1713    senna-docusate (Pericolace Or Senokot S) 8.6-50 MG per tablet 2 Tablet, 2 Tablet, Oral, Q EVENING, 2 Tablet at 08/21/24 1713 **AND** polyethylene glycol/lytes (Miralax) Packet 1 Packet, 1 Packet, Oral, QDAY PRN, Tahira Guzman D.O., 1 Packet at 08/21/24  0839    losartan (Cozaar) tablet 100 mg, 100 mg, Oral, Q DAY, Roman Valle M.D., 100 mg at 24 0514    amLODIPine (Norvasc) tablet 10 mg, 10 mg, Oral, Q DAY, Roman Valle M.D., 10 mg at 24 0514    oxyCODONE immediate-release (Roxicodone) tablet 5 mg, 5 mg, Oral, Q4HRS PRN, Jaimie Church M.D., 5 mg at 24 0213    magnesium hydroxide (Milk Of Magnesia) suspension 30 mL, 30 mL, Oral, QDAY PRN, PALMA CoteP.R.N., 30 mL at 24 0838    acetaminophen (Tylenol) tablet 650 mg, 650 mg, Oral, Q4HRS PRN, Cristofer Tejeda D.O., 650 mg at 24 2158    ondansetron (Zofran) syringe/vial injection 4 mg, 4 mg, Intravenous, Q4HRS PRN, Cristofer Tejeda, D.O.         Allergies     Allergies   Allergen Reactions    Sulfa Drugs Hives and Rash                                       EKG:               Results for orders placed or performed during the hospital encounter of 24   EKG   Result Value Ref Range     Report           [facility] Emergency Dept[facility]     Test Date:  2024  Pt Name:    FAIZAN GARDNER             Department: ER  MRN:        [id number]                      Room:       [facility]  Gender:     Male                         Technician: [id number]  :        1960                   Requested By:ER TRIAGE PROTOCOL  Order #:    [id number]                    Reading MD: ILDEFONSO ARNOLD DO     Measurements  Intervals                                Axis  Rate:       85                           P:          61  PA:         153                          QRS:        63  QRSD:       106                          T:          25  QT:         413  QTc:        492     Interpretive Statements  Sinus rhythm  Probable left atrial enlargement  Borderline prolonged QT interval  Compared to ECG 2024 17:09:50  No significant changes  Electronically Signed On 2024 00:56:04 PDT by ILDEFONSO ARNOLD DO      EKG   Result Value Ref Range     Report           [facility]  Cardiology     Test Date:  2024  Pt Name:    TERRELL HENSLEY             Department: 61  MRN:        [id number]                      Room:       [facility]  Gender:     Male                         Technician: [id number]  :        1960                   Requested By:ANICETO AMBRIZ  Order #:    [id number]                    Reading MD: Lucio Guzman MD     Measurements  Intervals                                Axis  Rate:       82                           P:          67  CA:         176                          QRS:        55  QRSD:       93                           T:          35  QT:         355  QTc:        415     Interpretive Statements  Sinus rhythm  Consider right atrial enlargement  Compared to ECG 2024 19:51:19  No significant changes  Electronically Signed On 2024 06:48:42 PDT by Lucio Guzman MD         Brain Imaging: No new imaging   EEG:  no new EEG     Labs personally reviewed:   Lab Results   Component Value Date/Time    SODIUM 135 2024 12:29 AM    POTASSIUM 3.8 2024 12:29 AM    CHLORIDE 99 2024 12:29 AM    CO2 24 2024 12:29 AM    GLUCOSE 102 (H) 2024 12:29 AM    BUN 13 2024 12:29 AM    CREATININE 0.70 2024 12:29 AM    BUNCREATRAT 16 05/15/2023 11:24 AM              Lab Results   Component Value Date/Time     WBC 7.4 2024 06:12 AM     RBC 4.42 (L) 2024 06:12 AM     HEMOGLOBIN 13.5 (L) 2024 06:12 AM     HEMATOCRIT 39.9 (L) 2024 06:12 AM     MCV 90.3 2024 06:12 AM     MCH 30.5 2024 06:12 AM     MCHC 33.8 2024 06:12 AM     MPV 9.0 2024 06:12 AM     NEUTSPOLYS 77.20 (H) 2024 07:39 AM     LYMPHOCYTES 13.30 (L) 2024 07:39 AM     MONOCYTES 7.90 2024 07:39 AM     EOSINOPHILS 0.70 2024 07:39 AM     BASOPHILS 0.40 2024 07:39 AM         Assessment:  Terrell Hensley is a 63 y.o. male admitted 2024 with altered mental status, found down after intentional OD of  his home medications amlodipine in order to kill himself.      The patient was initially very anxious regarding going home today and had difficulty participating in the interview due to his severe anxiety.  However, Ativan was provided and the patient did calm down and was able to discuss discharge planning appropriately with this provider and his brother.  He does endorse anticipatory anxiety about resuming work and managing his finances now that he will be outside of the hospital.  His brother provided a lot of support and encouragement and after conversing with his brothers some of the patient's anxiety regarding his finances and his job seem to improve.  He continues to deny any thoughts of wanting to harm himself or others.  He continues to deny having any auditory or visual hallucinations and does not express any delusions.  He is agreeable to outpatient psychiatric care and has an appointment tomorrow at [facility].  His brother agrees to take him to this appointment.    Discussed the safety plan that the patient wrote out yesterday with the patient and his brother.  His brother agreed to lock up all medications and leave no more than 1 days worth of medications out of the time.  His brother also confirmed that there are no guns or firearms that the patient will have access to.  The patient will also not have any unsupervised access to any sharp objects or knives as patient's brother and patient's friend will be alternating being with him at the house at all times.  Patient's brother also reports he was contacted by [facility] regarding home health and is agreeable to have home health visit his brother in his home.    At this time, as patient has shown persistent resolution of psychosis and suicidal thoughts legal hold is no longer warranted as the patient is no longer in acute risk of harm to self or others.        Dx:  #Catatonia, excited -resolved  #MDD with Psychosis     Medical :  Per  primary team     Plan:  Legal hold: Discontinued on 8/22  Continue Abilify 15mg po daily for anxiety disorder and psychotic symptoms   Continue Wellbutrin SR 150mg daily for depression  Restart Ativan 0.5 mg twice daily for anxiety with an additional 0.5 mg once daily as needed for anxiety  Continue Trazodone 100mg to scheduled qHS for insomnia  Continue Trazodone 50mg po qHS PRN for insomnia if scheduled Trazodone is not effective.  Labs reviewed  Discussed the case with: Dr. Rangel, advised that patient will be having an intake tomorrow at [facility].  I am attending is able to send 1 weeks worth of Ativan 0.5 mg 3 times daily and can send 2 weeks worth of his above psychiatric medications.  I advised the patient and his brother of this and they expressed understanding.  Per my conversation with [facility] outpatient intake if the patient does attend his intake appointment tomorrow he may be able to start the intensive outpatient program as early as next week and usually either the first day of the program or secondary of the program he can see a psychiatrist for medication so patient should have enough medications to last until this time.  I did provide my office phone number for the patient and his brother to call in case there are problems with initiating the program at [facility].  Patient does have an outpatient psychiatrist Dr. Dubose that can be utilized in case there is a delay in the patient starting the intensive outpatient program.  Psychiatry will sign off.       Patient has an intensive outpatient psychiatry intake appointment scheduled at [facility] for Friday 8/23/2024 at 8 AM, advised the patient and his brother about this appointment and provided the phone number and address for this appointment     Thank you for the consult.

## 2024-08-22 NOTE — DISCHARGE PLANNING
Alert Team Note:     Contacted Selene at [facility], was advised there are no beds available at this time. Will be notified when beds become available.

## 2024-08-22 NOTE — PROGRESS NOTES
Assumed care of Pt from NOC RN. Pt is awake at bedside report. Bed is locked and low position with bed alarm on. 1:1 sitter is in room and Call light is within reach, all needs are met.

## 2024-08-23 NOTE — PROGRESS NOTES
Patient to be discharged today - patient aware and agreeable to plan. D/c instructions reviewed with patient - ?'s/concerns answered. 1 piv and waiting on meds to beds.

## 2024-08-23 NOTE — PROGRESS NOTES
Al Morris, at bedside and is releasing legal hold. Pt will dc to his brother's home. Alexandra and MD have conversed. DCL order placed.

## 2024-08-23 NOTE — DISCHARGE SUMMARY
Discharge Summary    CHIEF COMPLAINT ON ADMISSION  Chief Complaint   Patient presents with    Drug Overdose    Suicidal Ideation    Suicide Attempt       Reason for Admission  ems     Admission Date  7/23/2024    CODE STATUS  Prior    HPI & HOSPITAL COURSE  This is a 64-year-old male with past medical history of hypertension, mood disorder, who was admitted on 7/23/2024 after SI attempt.    Patient with encephalopathy, WILMAR, transaminitis, rhabdomyolysis, and high anion gap metabolic acidosis on admission. Patient had gonzalez in place and completed aggressive IVF.    Patient recently completed course of Ancef for buttock cellulitis.  MRI imaging of the lumbar spine was performed, noted iliac abscess 3.5 cm. Original plan was to have IR drain placed however fluid collection significantly reduced in size on day of drain placement, aspiration was performed, NGTD. Completed antiobiotic course 8/13.    Psychiatry was consulted and monitor the patient while he was on legal hold. See below for final psychiatric recommendations.     Patient did have hyponatremia which was felt to be secondary to SIADH. See below for final medical management.     Patient also had an left hip and knee pain since admission likely from fall prior to admission.  Plain film radiographs and CT of his left knee were unremarkable.    All of the above medical issues were resolved.  Patient developed mild tinea cruris that responded to nystatin powder.  His hyponatremia improved with sodium chloride tablet supplementation.  He will need a repeat BMP in 2 to 3 weeks.  If the sodium remains normal the sodium chloride tablets can likely be weaned off.  Additionally he was started on multiple blood pressure medications with good blood pressure control.    As for her psychiatric issues that psychiatry team adjusted mental medications outlined below.  His legal hold was lifted on day of discharge.  He will be following up with [facility] for intensive outpatient  therapy 3 times a week with his first appointment being on August 23, 2024.    Therefore, he is discharged in good and stable condition to home with close outpatient follow-up.    The patient met 2-midnight criteria for an inpatient stay at the time of discharge.    Discharge Date  8/22/2024    FOLLOW UP ITEMS POST DISCHARGE  F/U with [facility] oin 8/23/2024 for intensive outpatient psychiatric treatment  Follow-up with his primary care physician 1 to 2 weeks for postadmission follow-up    DISCHARGE DIAGNOSES  Principal Problem:    Drug overdose of undetermined intent, initial encounter (POA: Yes)  Active Problems:    Hyponatremia (POA: Yes)    Hypertension (POA: Yes)    Acute encephalopathy (POA: Yes)    Mitral regurgitation (POA: Yes)    High anion gap metabolic acidosis (POA: Yes)    Acute renal failure with tubular necrosis (HCC) (POA: Yes)    Hypokalemia (POA: Yes)    Elevated liver enzymes (POA: Yes)    Rhabdomyolysis (POA: Yes)    Anemia (POA: Yes)    Cellulitis of buttock (POA: No)    Neck pain, acute (POA: Yes)    Thrombophlebitis of cephalic vein (POA: Yes)    Abscess of iliac fossa (POA: Yes)    Major depressive disorder, single episode, severe, with psychosis (HCC) (POA: Yes)  Resolved Problems:    * No resolved hospital problems. *      FOLLOW UP  No future appointments.  [facility]  [address] [address]  [phone]  Follow up on 8/23/2024  please go to your scheduled appointment    Neelima Rivera M.D.  [id number]  [facility] [id number] [phone]            MEDICATIONS ON DISCHARGE     Medication List        START taking these medications        Instructions   amLODIPine 10 MG Tabs  Commonly known as: Norvasc   Take 1 Tablet by mouth every day.  Dose: 10 mg     ARIPiprazole 15 MG Tabs  Commonly known as: Abilify   Take 1 Tablet by mouth every day for 14 days.  Dose: 15 mg     buPROPion  MG Tb12 sustained-release  tablet  Commonly known as: Wellbutrin-SR   Take 1 Tablet by mouth every day for 14 days.  Dose: 150 mg     carvedilol 12.5 MG Tabs  Commonly known as: Coreg   Take 1 Tablet by mouth 2 times a day with meals.  Dose: 12.5 mg     finasteride 5 MG Tabs  Commonly known as: Proscar   Take 1 Tablet by mouth every day.  Dose: 5 mg     LORazepam 0.5 MG Tabs  Commonly known as: Ativan   Take 1 Tablet by mouth in the morning, at noon, and at bedtime for 7 days.  Dose: 0.5 mg     losartan 100 MG Tabs  Commonly known as: Cozaar   Take 1 Tablet by mouth every day.  Dose: 100 mg     nystatin powder  Commonly known as: Mycostatin   Apply 1 gram topically 2 times a day for 10 days.  Dose: 1 Application     sodium chloride 1 GM Tabs  Commonly known as: Salt   Take 1 Tablet by mouth 3 times a day with meals.  Dose: 1 g     traZODone 150 MG Tabs  Commonly known as: Desyrel   Take 1 Tablet by mouth at bedtime for 7 days.  Dose: 150 mg     vitamin D2 (Ergocalciferol) 1.25 MG (69301 UT) Caps capsule  Start taking on: August 27, 2024  Commonly known as: Drisdol   Take 1 Capsule by mouth every 7 days for 4 doses.  Dose: 50,000 Units            STOP taking these medications      chlorthalidone 25 MG Tabs  Commonly known as: Hygroton     clonazePAM 1 MG Tabs  Commonly known as: KlonoPIN     indomethacin 50 MG Caps  Commonly known as: Indocin     Lotrel 5-10 MG per capsule  Generic drug: amlodipine-benazepril     NON SPECIFIED     oxybutynin 15 MG CR tablet  Commonly known as: Ditropan-XL     QUEtiapine 100 MG Tabs  Commonly known as: SEROquel     sertraline 50 MG Tabs  Commonly known as: Zoloft              Allergies  Allergies   Allergen Reactions    Sulfa Drugs Hives and Rash       DIET  regular    ACTIVITY  As tolerated.  Weight bearing as tolerated    CONSULTATIONS  Psychiatry    PROCEDURES  CT-KNEE WITH PLUS RECONS LEFT   Final Result      1.  Small joint effusion and moderate popliteal cyst. Minor degenerative changes.   2.   Atherosclerotic vascular calcifications.      DX-KNEE 2- LEFT   Final Result      1. No left knee joint effusion.   2. No acute osseous or joint abnormality.   3. Atherosclerotic calcifications.      CT-IMAGE-GUIDED DRAIN PERITONEAL   Final Result      Successful LEFT iliacus CT-guided fluid aspiration.               MR-LUMBAR SPINE-WITH & W/O   Final Result      1.  There are multiloculated fluid collection noted involving left iliacus muscle likely representing abscess. It measures an approximately 3.5 cm in the transverse dimension.   2.  There is no evidence of osteomyelitis, discitis or epidural abscess.   3.  Degenerative disease as described above.      US-EXTREMITY VENOUS UPPER UNILAT RIGHT   Final Result      CT-PELVIS WITH   Final Result         1.  Low-density enlargement of the distal left psoas muscle and iliacus muscle extending to the anterior left thigh. Could represent changes of myositis or possibly early forming infectious process. No definite enhancing fluid collection identified to    indicate abscess at this time.   2.  Distended bladder with nondependent air, consider history of recent instrumentation or changes of urinary tract infection, correlate with urinalysis as clinically appropriate.   3.  Bilateral fat-containing inguinal hernias   4.  Diverticulosis      MR-CERVICAL SPINE-W/O   Final Result      1.  Degenerative disease in the segment cervical spine as described above.   2.  Left paracentral/foraminal disc protrusion at C6-7 causing severe left C7 neural foraminal stenosis.      MR-BRAIN-W/O   Final Result      1.  No acute abnormality.   2.  A few punctate nonspecific supratentorial T2 hyperintensities likely representing nonspecific foci of gliosis/chronic ischemia.   3.  Mild cerebral volume loss.      DX-FEMUR-2+ LEFT   Final Result      Unremarkable LEFT femur.      DX-HIP-COMPLETE - UNILATERAL 2+ LEFT   Final Result      No radiographic evidence of acute traumatic injury.       CT-HEAD W/O   Final Result         1.  No acute intracranial abnormality.   2.  Atherosclerosis.                     LABORATORY  Lab Results   Component Value Date    SODIUM 135 08/21/2024    POTASSIUM 3.8 08/21/2024    CHLORIDE 99 08/21/2024    CO2 24 08/21/2024    GLUCOSE 102 (H) 08/21/2024    BUN 13 08/21/2024    CREATININE 0.70 08/21/2024        Lab Results   Component Value Date    WBC 7.4 08/20/2024    HEMOGLOBIN 13.5 (L) 08/20/2024    HEMATOCRIT 39.9 (L) 08/20/2024    PLATELETCT 384 08/20/2024        Total time of the discharge process exceeds 54 minutes.

## 2024-08-23 NOTE — CARE PLAN
The patient is Stable - Low risk of patient condition declining or worsening    Shift Goals  Clinical Goals: SI, legal hold, 1;1 sitter.  Patient Goals: Comfort, walk  Family Goals: none present    Progress made toward(s) clinical / shift goals:  Pt legal hold has been stopped and Pt is discharging to Brother's house. Legal hold documentation has been verified.     Patient is not progressing towards the following goals:

## 2024-08-30 ENCOUNTER — APPOINTMENT (OUTPATIENT)
Dept: LAB | Facility: MEDICAL CENTER | Age: 64
End: 2024-08-30
Payer: MEDICARE

## 2024-09-01 NOTE — CONSULTS
"PSYCHIATRIC CONSULTATION - Follow-up  Established Patient    DOS: 08/11/24     Legal Status: on legal hold - extended    Reason for Admission:  63 year-old male who presented 7/23/2024 after suicidal attempt drug overdose   CC:   Chief Complaint   Patient presents with    Drug Overdose    Suicidal Ideation    Suicide Attempt               S:   Patient observed in bed, with ear plugs and eye mask, easy to wake to sound. Patient able to recall this provider from previous encounter. States feeling \"even keel,\" reports difficulty getting to sleep during HS, observed with reduced energy, with no change appetite. Reports slightly improved mood from previous day, denies SI when asked, remains unable to state he has plans for the future 2/2 anxiety, expressing difficulty making progress towards desired goals. Admits less depressed, and reduce perseveration over past failures, \"now I just worry about what's next.\" Denies HI, A/VH, expressing feeling a sensation of something pushing his leg down, never experienced before or after this incident. Patient continues to perseverate on \"negative\" influences in life, does not appear to be connecting them to a demonic force or anything supernatural. Does express feeling alienated or \"targeted\" based on negative past events, expressing difficulty moving forward with a feeling things will persist and remain unchanged. States he was able to get up with assist, and ambulate with assistive device. Accepting oral medications, denies GI distress, HA, dizziness; appears to be tolerating medications. Patient appears to be trending towards baseline, able to engage in reframing of negative situations. Remains on legal hold, continue to seek IP MH placement for safety.     O:   Medical ROS (as pertinent):   Recent Labs     08/10/24  1221   WBC 7.1   RBC 4.10*   HEMOGLOBIN 12.1*   HEMATOCRIT 36.2*   MCV 88.3   MCH 29.5   RDW 43.5   PLATELETCT 448*   MPV 8.2*     Recent Labs     08/10/24  1221 " ED to inpatient nurses report      Chief Complaint:  Chief Complaint   Patient presents with    Shortness of Breath     Present to ED from: HOME    MOA:     LOC: alert and orientated to name, place, date  Mobility: Requires assistance * 1  Oxygen Baseline: ROOM AIR    Current needs required: NONE     Code Status:   Prior    What abnormal results were found and what did you give/do to treat them? SHORTNESS OF BREATH  Any procedures or intervention occur? N/A    Mental Status:  Level of Consciousness: Alert (0)    Psych Assessment:        Vitals:  Patient Vitals for the past 24 hrs:   BP Temp Temp src Pulse Resp SpO2 Height Weight   09/01/24 1735 (!) 162/79 -- -- 82 22 100 % -- --   09/01/24 1609 (!) 153/77 -- -- 83 20 97 % -- --   09/01/24 1517 -- -- -- 82 20 97 % -- --   09/01/24 1425 (!) 148/71 -- -- 84 20 97 % -- --   09/01/24 1333 133/78 -- -- 80 18 99 % -- --   09/01/24 1223 (!) 148/106 97.7 °F (36.5 °C) Oral 96 25 99 % 1.753 m (5' 9\") 93.4 kg (206 lb)        LDAs:   Peripheral IV 09/01/24 Right Antecubital (Active)   Site Assessment Clean, dry & intact 09/01/24 1609   Line Status Infusing 09/01/24 1518   Phlebitis Assessment No symptoms 09/01/24 1609   Infiltration Assessment 0 09/01/24 1609   Dressing Status Clean, dry & intact 09/01/24 1224   Dressing Type Transparent 09/01/24 1224   Dressing Intervention New 09/01/24 1224       Ambulatory Status:  Presents to emergency department  because of falls (Syncope, seizure, or loss of consciousness): No, Age > 70: No, Altered Mental Status, Intoxication with alcohol or substance confusion (Disorientation, impaired judgment, poor safety awaremess, or inability to follow instructions): No, Impaired Mobility: Ambulates or transfers with assistive devices or assistance; Unable to ambulate or transer.: No    Diagnosis:  DISPOSITION     Final diagnoses:   None        Consults:  None     Pain Score:  Pain Assessment  Pain Assessment: None - Denies Pain  Pain Level:    SODIUM 129*   POTASSIUM 3.9   CHLORIDE 92*   CO2 24   GLUCOSE 126*   BUN 13     Recent Labs     08/10/24  1221   ALBUMIN 3.4   TBILIRUBIN 0.3   ALKPHOSPHAT 92   TOTPROTEIN 6.5   ALTSGPT 22   ASTSGOT 13   CREATININE 0.67       EKG:   Results for orders placed or performed during the hospital encounter of 24   EKG   Result Value Ref Range    Report       [facility] Emergency Dept.    Test Date:  2024  Pt Name:    FAIZAN [facility]             Department: ER  MRN:        [id number]                      Room:        12  Gender:     Male                         Technician: [id number]  :        1960                   Requested By:ER TRIAGE PROTOCOL  Order #:    [id number]                    Reading MD: ILDEFONSO ARNOLD DO    Measurements  Intervals                                Axis  Rate:       85                           P:          61  IN:         153                          QRS:        63  QRSD:       106                          T:          25  QT:         413  QTc:        492    Interpretive Statements  Sinus rhythm  Probable left atrial enlargement  Borderline prolonged QT interval  Compared to ECG 2024 17:09:50  No significant changes  Electronically Signed On 2024 00:56:04 PDT by ILDEFONSO ARNOLD, DO     EKG   Result Value Ref Range    Report       [facility]    Test Date:  2024  Pt Name:    [facility]             Department: 61  MRN:        [id number]                      Room:       [facility]  Gender:     Male                         Technician: [id number]  :        1960                   Requested By:ANICETO AMBRIZ  Order #:    [id number]                    Reading MD: Lucio Guzman MD    Measurements  Intervals                                Axis  Rate:       82                           P:          67  IN:         176                          QRS:        55  QRSD:       93                           T:          35  QT:         355  QTc:  "       415    Interpretive Statements  Sinus rhythm  Consider right atrial enlargement  Compared to ECG 07/23/2024 19:51:19  No significant changes  Electronically Signed On 08- 06:48:42 PDT by Lucio Guzman MD          MSE:   /57   Pulse 62   Temp 36.6 °C (97.9 °F) (Temporal)   Resp 18   Ht 1.803 m (5' 11\")   Wt 85.3 kg (188 lb 0.8 oz)   SpO2 97%     Constitutional: as noted above  General Appearance/Behavior: appears good eye contact cooperative friendly, No behavioral disturbances  Abnormal Movements: none, no PMA/PMR or tremor observed.  Gait and Posture: not observed  Musculoskeletal: as noted above  Mood: \"even keel\"  Affect: Mood/Congruent   Speech: monotone  Language:  spontaneous, comprehends spoken commands, and fluent   Thought Process: Perseverative and Rumanitive  Thought Content: Vague SI without plan or intent. Denies HI, A/VH. Paranoid thinking  Insight/Judgement:  limited/improved  Alert/Orientation: alert, oriented to person, place and time  Attn/Concentration: normal  MMSE: deferred this visit     Medications:  Scheduled Medications   Medication Dose Frequency    ARIPiprazole  10 mg DAILY    rivaroxaban  10 mg DAILY AT 1800    senna-docusate  2 Tablet Q EVENING    carvedilol  6.25 mg BID WITH MEALS    LORazepam  0.5 mg TID    piperacillin-tazobactam  4.5 g Q8HRS    traZODone  100 mg QHS    venlafaxine XR  150 mg QDAY with Breakfast    losartan  100 mg Q DAY    amLODIPine  10 mg Q DAY       Allergies:   Allergies   Allergen Reactions    Sulfa Drugs Hives and Rash        Diagnosis:   1. Altered mental status, unspecified altered mental status type    2. Acute renal failure, unspecified acute renal failure type (HCC)    3. Drug overdose of undetermined intent, initial encounter    4. Left leg weakness         Psychiatric:   Depressive disorder, with psychosis  R/O Schizoaffective disorder  R/O Catatonia  Hx OCD    Medical: as noted by the medical treatment team.    "   Recommendations:  Legal Hold: on legal hold - extended    Please transfer patient to inpatient psychiatric hospital when medically cleared and bed is available.    Observation status:   - Line of site with sitter    Room phone: Yes, supervised call to family     Visitors: Yes, Jean Paul (brother), Cortes (brother), YOSELIN (son), Tressa (daughter)  Personal belongings: No     Discussed/voalted: JACK Guzman DO    Medications and treatment: Final orders per Treatment Team  Agree with Abilify 10mg PO QAM for mood  Continue Effexor 150mg QAM, Trazodone 100mg QHS, Lorazepam 0.5mg TID    Reviewed safety plan: 911, ER, PCM, MHC, suicide crisis line, nursing staff while inpatient.    Will Continue to Follow. Thank you for the consult.

## 2024-10-06 ENCOUNTER — APPOINTMENT (OUTPATIENT)
Dept: RADIOLOGY | Facility: MEDICAL CENTER | Age: 64
DRG: 917 | End: 2024-10-06
Attending: EMERGENCY MEDICINE
Payer: MEDICARE

## 2024-10-06 ENCOUNTER — HOSPITAL ENCOUNTER (INPATIENT)
Facility: MEDICAL CENTER | Age: 64
LOS: 12 days | DRG: 917 | End: 2024-10-18
Attending: EMERGENCY MEDICINE | Admitting: EMERGENCY MEDICINE
Payer: MEDICARE

## 2024-10-06 DIAGNOSIS — J96.00 ACUTE RESPIRATORY FAILURE, UNSPECIFIED WHETHER WITH HYPOXIA OR HYPERCAPNIA (HCC): ICD-10-CM

## 2024-10-06 DIAGNOSIS — T50.902A INTENTIONAL OVERDOSE, INITIAL ENCOUNTER (HCC): ICD-10-CM

## 2024-10-06 DIAGNOSIS — T39.1X2A INTENTIONAL ACETAMINOPHEN OVERDOSE, INITIAL ENCOUNTER (HCC): ICD-10-CM

## 2024-10-06 PROBLEM — R57.9 SHOCK (HCC): Status: ACTIVE | Noted: 2024-10-06

## 2024-10-06 LAB
ALBUMIN SERPL BCP-MCNC: 3.5 G/DL (ref 3.2–4.9)
ALBUMIN/GLOB SERPL: 1.5 G/DL
ALP SERPL-CCNC: 56 U/L (ref 30–99)
ALT SERPL-CCNC: 37 U/L (ref 2–50)
AMMONIA PLAS-SCNC: 27 UMOL/L (ref 11–45)
AMPHET UR QL SCN: POSITIVE
ANION GAP SERPL CALC-SCNC: 19 MMOL/L (ref 7–16)
ANION GAP SERPL CALC-SCNC: 22 MMOL/L (ref 7–16)
ANION GAP SERPL CALC-SCNC: 29 MMOL/L (ref 7–16)
APAP SERPL-MCNC: 654 UG/ML (ref 10–30)
APPEARANCE UR: CLEAR
APTT PPP: 26.9 SEC (ref 24.7–36)
ARTERIAL PATENCY WRIST A: ABNORMAL
AST SERPL-CCNC: 31 U/L (ref 12–45)
BACTERIA #/AREA URNS HPF: NEGATIVE /HPF
BARBITURATES UR QL SCN: NEGATIVE
BASE EXCESS BLDA CALC-SCNC: -12 MMOL/L (ref -4–3)
BASE EXCESS BLDA CALC-SCNC: -9 MMOL/L (ref -4–3)
BASOPHILS # BLD AUTO: 0.2 % (ref 0–1.8)
BASOPHILS # BLD: 0.01 K/UL (ref 0–0.12)
BENZODIAZ UR QL SCN: NEGATIVE
BILIRUB SERPL-MCNC: 0.4 MG/DL (ref 0.1–1.5)
BILIRUB UR QL STRIP.AUTO: NEGATIVE
BODY TEMPERATURE: ABNORMAL DEGREES
BODY TEMPERATURE: ABNORMAL DEGREES
BREATHS SETTING VENT: 24
BREATHS SETTING VENT: 28
BUN SERPL-MCNC: 16 MG/DL (ref 8–22)
BUN SERPL-MCNC: 18 MG/DL (ref 8–22)
BUN SERPL-MCNC: 18 MG/DL (ref 8–22)
BZE UR QL SCN: NEGATIVE
CALCIUM ALBUM COR SERPL-MCNC: 9.2 MG/DL (ref 8.5–10.5)
CALCIUM SERPL-MCNC: 8.5 MG/DL (ref 8.5–10.5)
CALCIUM SERPL-MCNC: 8.8 MG/DL (ref 8.5–10.5)
CALCIUM SERPL-MCNC: 9.3 MG/DL (ref 8.5–10.5)
CANNABINOIDS UR QL SCN: NEGATIVE
CHLORIDE SERPL-SCNC: 100 MMOL/L (ref 96–112)
CHLORIDE SERPL-SCNC: 96 MMOL/L (ref 96–112)
CHLORIDE SERPL-SCNC: 99 MMOL/L (ref 96–112)
CK SERPL-CCNC: 352 U/L (ref 0–154)
CK SERPL-CCNC: 530 U/L (ref 0–154)
CO2 BLDA-SCNC: 14 MMOL/L (ref 20–33)
CO2 BLDA-SCNC: 20 MMOL/L (ref 20–33)
CO2 SERPL-SCNC: 10 MMOL/L (ref 20–33)
CO2 SERPL-SCNC: 15 MMOL/L (ref 20–33)
CO2 SERPL-SCNC: 15 MMOL/L (ref 20–33)
COLOR UR: YELLOW
CREAT SERPL-MCNC: 1.26 MG/DL (ref 0.5–1.4)
CREAT SERPL-MCNC: 1.46 MG/DL (ref 0.5–1.4)
CREAT SERPL-MCNC: 1.59 MG/DL (ref 0.5–1.4)
DELSYS IDSYS: ABNORMAL
DELSYS IDSYS: ABNORMAL
EKG IMPRESSION: NORMAL
END TIDAL CARBON DIOXIDE IECO2: 24 MMHG
EOSINOPHIL # BLD AUTO: 0.01 K/UL (ref 0–0.51)
EOSINOPHIL NFR BLD: 0.2 % (ref 0–6.9)
EPI CELLS #/AREA URNS HPF: NEGATIVE /HPF
ERYTHROCYTE [DISTWIDTH] IN BLOOD BY AUTOMATED COUNT: 44.2 FL (ref 35.9–50)
ETHANOL BLD-MCNC: <10.1 MG/DL
FENTANYL UR QL: NEGATIVE
GFR SERPLBLD CREATININE-BSD FMLA CKD-EPI: 48 ML/MIN/1.73 M 2
GFR SERPLBLD CREATININE-BSD FMLA CKD-EPI: 53 ML/MIN/1.73 M 2
GFR SERPLBLD CREATININE-BSD FMLA CKD-EPI: 63 ML/MIN/1.73 M 2
GLOBULIN SER CALC-MCNC: 2.4 G/DL (ref 1.9–3.5)
GLUCOSE SERPL-MCNC: 243 MG/DL (ref 65–99)
GLUCOSE SERPL-MCNC: 328 MG/DL (ref 65–99)
GLUCOSE SERPL-MCNC: 380 MG/DL (ref 65–99)
GLUCOSE UR STRIP.AUTO-MCNC: NEGATIVE MG/DL
HCO3 BLDA-SCNC: 13.3 MMOL/L (ref 17–25)
HCO3 BLDA-SCNC: 18.9 MMOL/L (ref 17–25)
HCT VFR BLD AUTO: 38.6 % (ref 42–52)
HGB BLD-MCNC: 13.1 G/DL (ref 14–18)
HOROWITZ INDEX BLDA+IHG-RTO: 162 MM[HG]
HOROWITZ INDEX BLDA+IHG-RTO: 386 MM[HG]
HYALINE CASTS #/AREA URNS LPF: ABNORMAL /LPF
IMM GRANULOCYTES # BLD AUTO: 0.02 K/UL (ref 0–0.11)
IMM GRANULOCYTES NFR BLD AUTO: 0.4 % (ref 0–0.9)
INR PPP: 1.17 (ref 0.87–1.13)
KETONES UR STRIP.AUTO-MCNC: ABNORMAL MG/DL
LACTATE BLD-SCNC: 4.7 MMOL/L (ref 0.5–2)
LACTATE BLD-SCNC: 5.3 MMOL/L (ref 0.5–2)
LACTATE SERPL-SCNC: 10.2 MMOL/L (ref 0.5–2)
LACTATE SERPL-SCNC: 5.3 MMOL/L (ref 0.5–2)
LACTATE SERPL-SCNC: 5.7 MMOL/L (ref 0.5–2)
LACTATE SERPL-SCNC: 5.9 MMOL/L (ref 0.5–2)
LEUKOCYTE ESTERASE UR QL STRIP.AUTO: ABNORMAL
LYMPHOCYTES # BLD AUTO: 1.26 K/UL (ref 1–4.8)
LYMPHOCYTES NFR BLD: 27.8 % (ref 22–41)
MAGNESIUM SERPL-MCNC: 1.6 MG/DL (ref 1.5–2.5)
MCH RBC QN AUTO: 30.3 PG (ref 27–33)
MCHC RBC AUTO-ENTMCNC: 33.9 G/DL (ref 32.3–36.5)
MCV RBC AUTO: 89.1 FL (ref 81.4–97.8)
METHADONE UR QL SCN: NEGATIVE
MICRO URNS: ABNORMAL
MODE IMODE: ABNORMAL
MODE IMODE: ABNORMAL
MONOCYTES # BLD AUTO: 0.49 K/UL (ref 0–0.85)
MONOCYTES NFR BLD AUTO: 10.8 % (ref 0–13.4)
NEUTROPHILS # BLD AUTO: 2.74 K/UL (ref 1.82–7.42)
NEUTROPHILS NFR BLD: 60.6 % (ref 44–72)
NITRITE UR QL STRIP.AUTO: NEGATIVE
NRBC # BLD AUTO: 0 K/UL
NRBC BLD-RTO: 0 /100 WBC (ref 0–0.2)
O2/TOTAL GAS SETTING VFR VENT: 100 %
O2/TOTAL GAS SETTING VFR VENT: 50 %
OPIATES UR QL SCN: NEGATIVE
OSMOLALITY SERPL: 301 MOSM/KG H2O (ref 278–298)
OXYCODONE UR QL SCN: NEGATIVE
PCO2 BLDA: 27.3 MMHG (ref 26–37)
PCO2 BLDA: 46.8 MMHG (ref 26–37)
PCO2 TEMP ADJ BLDA: 25.8 MMHG (ref 26–37)
PCO2 TEMP ADJ BLDA: 46.8 MMHG (ref 26–37)
PCP UR QL SCN: NEGATIVE
PEEP END EXPIRATORY PRESSURE IPEEP: 8 CMH20
PEEP END EXPIRATORY PRESSURE IPEEP: 8 CMH20
PH BLDA: 7.21 [PH] (ref 7.4–7.5)
PH BLDA: 7.29 [PH] (ref 7.4–7.5)
PH TEMP ADJ BLDA: 7.21 [PH] (ref 7.4–7.5)
PH TEMP ADJ BLDA: 7.31 [PH] (ref 7.4–7.5)
PH UR STRIP.AUTO: 5 [PH] (ref 5–8)
PLATELET # BLD AUTO: 283 K/UL (ref 164–446)
PMV BLD AUTO: 8.8 FL (ref 9–12.9)
PO2 BLDA: 162 MMHG (ref 64–87)
PO2 BLDA: 193 MMHG (ref 64–87)
PO2 TEMP ADJ BLDA: 162 MMHG (ref 64–87)
PO2 TEMP ADJ BLDA: 187 MMHG (ref 64–87)
POTASSIUM SERPL-SCNC: 3.7 MMOL/L (ref 3.6–5.5)
POTASSIUM SERPL-SCNC: 3.9 MMOL/L (ref 3.6–5.5)
POTASSIUM SERPL-SCNC: 3.9 MMOL/L (ref 3.6–5.5)
PROCALCITONIN SERPL-MCNC: 0.27 NG/ML
PROPOXYPH UR QL SCN: NEGATIVE
PROT SERPL-MCNC: 5.9 G/DL (ref 6–8.2)
PROT UR QL STRIP: NEGATIVE MG/DL
PROTHROMBIN TIME: 14.9 SEC (ref 12–14.6)
RBC # BLD AUTO: 4.33 M/UL (ref 4.7–6.1)
RBC # URNS HPF: ABNORMAL /HPF
RBC UR QL AUTO: NEGATIVE
SALICYLATES SERPL-MCNC: <1 MG/DL (ref 15–25)
SAO2 % BLDA: 100 % (ref 93–99)
SAO2 % BLDA: 99 % (ref 93–99)
SODIUM SERPL-SCNC: 133 MMOL/L (ref 135–145)
SODIUM SERPL-SCNC: 135 MMOL/L (ref 135–145)
SODIUM SERPL-SCNC: 137 MMOL/L (ref 135–145)
SP GR UR STRIP.AUTO: 1.04
SPECIMEN DRAWN FROM PATIENT: ABNORMAL
SPECIMEN DRAWN FROM PATIENT: ABNORMAL
TIDAL VOLUME IVT: 450 ML
TIDAL VOLUME IVT: 460 ML
UROBILINOGEN UR STRIP.AUTO-MCNC: 1 MG/DL
WBC # BLD AUTO: 4.5 K/UL (ref 4.8–10.8)
WBC #/AREA URNS HPF: ABNORMAL /HPF

## 2024-10-06 PROCEDURE — 700102 HCHG RX REV CODE 250 W/ 637 OVERRIDE(OP): Performed by: EMERGENCY MEDICINE

## 2024-10-06 PROCEDURE — 700111 HCHG RX REV CODE 636 W/ 250 OVERRIDE (IP): Performed by: EMERGENCY MEDICINE

## 2024-10-06 PROCEDURE — 5A1935Z RESPIRATORY VENTILATION, LESS THAN 24 CONSECUTIVE HOURS: ICD-10-PCS | Performed by: EMERGENCY MEDICINE

## 2024-10-06 PROCEDURE — 303105 HCHG CATHETER EXTRA

## 2024-10-06 PROCEDURE — 81001 URINALYSIS AUTO W/SCOPE: CPT

## 2024-10-06 PROCEDURE — 82010 KETONE BODYS QUAN: CPT

## 2024-10-06 PROCEDURE — 31500 INSERT EMERGENCY AIRWAY: CPT

## 2024-10-06 PROCEDURE — 96368 THER/DIAG CONCURRENT INF: CPT

## 2024-10-06 PROCEDURE — 700105 HCHG RX REV CODE 258: Performed by: EMERGENCY MEDICINE

## 2024-10-06 PROCEDURE — 80307 DRUG TEST PRSMV CHEM ANLYZR: CPT

## 2024-10-06 PROCEDURE — 80143 DRUG ASSAY ACETAMINOPHEN: CPT

## 2024-10-06 PROCEDURE — 36415 COLL VENOUS BLD VENIPUNCTURE: CPT

## 2024-10-06 PROCEDURE — 82803 BLOOD GASES ANY COMBINATION: CPT | Mod: 91

## 2024-10-06 PROCEDURE — 94003 VENT MGMT INPAT SUBQ DAY: CPT

## 2024-10-06 PROCEDURE — 99292 CRITICAL CARE ADDL 30 MIN: CPT | Performed by: EMERGENCY MEDICINE

## 2024-10-06 PROCEDURE — 83735 ASSAY OF MAGNESIUM: CPT

## 2024-10-06 PROCEDURE — 84145 PROCALCITONIN (PCT): CPT

## 2024-10-06 PROCEDURE — 71045 X-RAY EXAM CHEST 1 VIEW: CPT

## 2024-10-06 PROCEDURE — 36600 WITHDRAWAL OF ARTERIAL BLOOD: CPT

## 2024-10-06 PROCEDURE — 93005 ELECTROCARDIOGRAM TRACING: CPT | Performed by: EMERGENCY MEDICINE

## 2024-10-06 PROCEDURE — 95822 EEG COMA OR SLEEP ONLY: CPT | Performed by: STUDENT IN AN ORGANIZED HEALTH CARE EDUCATION/TRAINING PROGRAM

## 2024-10-06 PROCEDURE — 70450 CT HEAD/BRAIN W/O DYE: CPT

## 2024-10-06 PROCEDURE — 700101 HCHG RX REV CODE 250

## 2024-10-06 PROCEDURE — 82550 ASSAY OF CK (CPK): CPT | Mod: 91

## 2024-10-06 PROCEDURE — 99291 CRITICAL CARE FIRST HOUR: CPT | Performed by: EMERGENCY MEDICINE

## 2024-10-06 PROCEDURE — 87086 URINE CULTURE/COLONY COUNT: CPT

## 2024-10-06 PROCEDURE — 94799 UNLISTED PULMONARY SVC/PX: CPT

## 2024-10-06 PROCEDURE — 95819 EEG AWAKE AND ASLEEP: CPT | Performed by: STUDENT IN AN ORGANIZED HEALTH CARE EDUCATION/TRAINING PROGRAM

## 2024-10-06 PROCEDURE — 80053 COMPREHEN METABOLIC PANEL: CPT

## 2024-10-06 PROCEDURE — 304538 HCHG NG TUBE

## 2024-10-06 PROCEDURE — 4A10X4Z MONITORING OF CENTRAL NERVOUS ELECTRICAL ACTIVITY, EXTERNAL APPROACH: ICD-10-PCS | Performed by: STUDENT IN AN ORGANIZED HEALTH CARE EDUCATION/TRAINING PROGRAM

## 2024-10-06 PROCEDURE — 83930 ASSAY OF BLOOD OSMOLALITY: CPT

## 2024-10-06 PROCEDURE — 85610 PROTHROMBIN TIME: CPT

## 2024-10-06 PROCEDURE — 770022 HCHG ROOM/CARE - ICU (200)

## 2024-10-06 PROCEDURE — 700111 HCHG RX REV CODE 636 W/ 250 OVERRIDE (IP): Mod: JZ

## 2024-10-06 PROCEDURE — 87040 BLOOD CULTURE FOR BACTERIA: CPT

## 2024-10-06 PROCEDURE — 93010 ELECTROCARDIOGRAM REPORT: CPT | Performed by: INTERNAL MEDICINE

## 2024-10-06 PROCEDURE — 96365 THER/PROPH/DIAG IV INF INIT: CPT

## 2024-10-06 PROCEDURE — 0BH17EZ INSERTION OF ENDOTRACHEAL AIRWAY INTO TRACHEA, VIA NATURAL OR ARTIFICIAL OPENING: ICD-10-PCS | Performed by: EMERGENCY MEDICINE

## 2024-10-06 PROCEDURE — 80179 DRUG ASSAY SALICYLATE: CPT

## 2024-10-06 PROCEDURE — 83605 ASSAY OF LACTIC ACID: CPT | Mod: 91

## 2024-10-06 PROCEDURE — 700101 HCHG RX REV CODE 250: Performed by: EMERGENCY MEDICINE

## 2024-10-06 PROCEDURE — 94002 VENT MGMT INPAT INIT DAY: CPT

## 2024-10-06 PROCEDURE — 82140 ASSAY OF AMMONIA: CPT

## 2024-10-06 PROCEDURE — 85730 THROMBOPLASTIN TIME PARTIAL: CPT

## 2024-10-06 PROCEDURE — 96366 THER/PROPH/DIAG IV INF ADDON: CPT

## 2024-10-06 PROCEDURE — 51702 INSERT TEMP BLADDER CATH: CPT

## 2024-10-06 PROCEDURE — A9270 NON-COVERED ITEM OR SERVICE: HCPCS | Performed by: EMERGENCY MEDICINE

## 2024-10-06 PROCEDURE — 96375 TX/PRO/DX INJ NEW DRUG ADDON: CPT

## 2024-10-06 PROCEDURE — 96367 TX/PROPH/DG ADDL SEQ IV INF: CPT

## 2024-10-06 PROCEDURE — 85025 COMPLETE CBC W/AUTO DIFF WBC: CPT

## 2024-10-06 PROCEDURE — 700111 HCHG RX REV CODE 636 W/ 250 OVERRIDE (IP)

## 2024-10-06 PROCEDURE — 80076 HEPATIC FUNCTION PANEL: CPT

## 2024-10-06 PROCEDURE — 80048 BASIC METABOLIC PNL TOTAL CA: CPT | Mod: 91

## 2024-10-06 PROCEDURE — 82077 ASSAY SPEC XCP UR&BREATH IA: CPT

## 2024-10-06 PROCEDURE — 95822 EEG COMA OR SLEEP ONLY: CPT | Mod: 26 | Performed by: STUDENT IN AN ORGANIZED HEALTH CARE EDUCATION/TRAINING PROGRAM

## 2024-10-06 PROCEDURE — 700111 HCHG RX REV CODE 636 W/ 250 OVERRIDE (IP): Mod: JZ | Performed by: EMERGENCY MEDICINE

## 2024-10-06 PROCEDURE — 99291 CRITICAL CARE FIRST HOUR: CPT

## 2024-10-06 RX ORDER — ROCURONIUM BROMIDE 10 MG/ML
80 INJECTION, SOLUTION INTRAVENOUS ONCE
Status: COMPLETED | OUTPATIENT
Start: 2024-10-06 | End: 2024-10-06

## 2024-10-06 RX ORDER — ETOMIDATE 2 MG/ML
20 INJECTION INTRAVENOUS ONCE
Status: COMPLETED | OUTPATIENT
Start: 2024-10-06 | End: 2024-10-06

## 2024-10-06 RX ORDER — POLYETHYLENE GLYCOL 3350 17 G/17G
1 POWDER, FOR SOLUTION ORAL
Status: DISCONTINUED | OUTPATIENT
Start: 2024-10-06 | End: 2024-10-18 | Stop reason: HOSPADM

## 2024-10-06 RX ORDER — FAMOTIDINE 20 MG/1
20 TABLET, FILM COATED ORAL DAILY
Status: DISCONTINUED | OUTPATIENT
Start: 2024-10-06 | End: 2024-10-07

## 2024-10-06 RX ORDER — CEFTRIAXONE 2 G/1
2000 INJECTION, POWDER, FOR SOLUTION INTRAMUSCULAR; INTRAVENOUS ONCE
Status: COMPLETED | OUTPATIENT
Start: 2024-10-06 | End: 2024-10-06

## 2024-10-06 RX ORDER — INSULIN LISPRO 100 [IU]/ML
3-14 INJECTION, SOLUTION INTRAVENOUS; SUBCUTANEOUS EVERY 6 HOURS
Status: DISCONTINUED | OUTPATIENT
Start: 2024-10-07 | End: 2024-10-10

## 2024-10-06 RX ORDER — CLONAZEPAM 1 MG/1
1-2 TABLET ORAL
Status: ON HOLD | COMMUNITY
End: 2024-10-06

## 2024-10-06 RX ORDER — SODIUM BICARBONATE IN D5W 150/1000ML
PLASTIC BAG, INJECTION (ML) INTRAVENOUS CONTINUOUS
Status: DISCONTINUED | OUTPATIENT
Start: 2024-10-06 | End: 2024-10-08

## 2024-10-06 RX ORDER — DEXTROSE MONOHYDRATE 25 G/50ML
25 INJECTION, SOLUTION INTRAVENOUS
Status: DISCONTINUED | OUTPATIENT
Start: 2024-10-06 | End: 2024-10-06

## 2024-10-06 RX ORDER — SODIUM CHLORIDE, SODIUM LACTATE, POTASSIUM CHLORIDE, AND CALCIUM CHLORIDE .6; .31; .03; .02 G/100ML; G/100ML; G/100ML; G/100ML
30 INJECTION, SOLUTION INTRAVENOUS ONCE
Status: COMPLETED | OUTPATIENT
Start: 2024-10-06 | End: 2024-10-06

## 2024-10-06 RX ORDER — INSULIN LISPRO 100 [IU]/ML
2-9 INJECTION, SOLUTION INTRAVENOUS; SUBCUTANEOUS EVERY 6 HOURS
Status: DISCONTINUED | OUTPATIENT
Start: 2024-10-06 | End: 2024-10-06

## 2024-10-06 RX ORDER — ERGOCALCIFEROL 1.25 MG/1
50000 CAPSULE, LIQUID FILLED ORAL
Status: ON HOLD | COMMUNITY
End: 2024-10-06

## 2024-10-06 RX ORDER — ARIPIPRAZOLE 15 MG/1
15 TABLET ORAL EVERY MORNING
COMMUNITY

## 2024-10-06 RX ORDER — POLYETHYLENE GLYCOL 3350 17 G/17G
1 POWDER, FOR SOLUTION ORAL
Status: DISCONTINUED | OUTPATIENT
Start: 2024-10-06 | End: 2024-10-06

## 2024-10-06 RX ORDER — QUETIAPINE FUMARATE 100 MG/1
300 TABLET, FILM COATED ORAL
Status: ON HOLD | COMMUNITY
End: 2024-10-06

## 2024-10-06 RX ORDER — BUPROPION HYDROCHLORIDE 150 MG/1
150 TABLET, EXTENDED RELEASE ORAL EVERY MORNING
COMMUNITY

## 2024-10-06 RX ORDER — THIAMINE HYDROCHLORIDE 100 MG/ML
250 INJECTION, SOLUTION INTRAMUSCULAR; INTRAVENOUS DAILY
Status: COMPLETED | OUTPATIENT
Start: 2024-10-08 | End: 2024-10-12

## 2024-10-06 RX ORDER — CHLORTHALIDONE 25 MG/1
25 TABLET ORAL EVERY MORNING
Status: ON HOLD | COMMUNITY
End: 2024-10-06

## 2024-10-06 RX ORDER — GAUZE BANDAGE 2" X 2"
100 BANDAGE TOPICAL DAILY
Status: DISCONTINUED | OUTPATIENT
Start: 2024-10-13 | End: 2024-10-18 | Stop reason: HOSPADM

## 2024-10-06 RX ORDER — INDOMETHACIN 50 MG/1
50 CAPSULE ORAL 2 TIMES DAILY
Status: ON HOLD | COMMUNITY
End: 2024-10-06

## 2024-10-06 RX ORDER — TAMSULOSIN HYDROCHLORIDE 0.4 MG/1
0.4 CAPSULE ORAL DAILY
COMMUNITY

## 2024-10-06 RX ORDER — TRAZODONE HYDROCHLORIDE 150 MG/1
225 TABLET ORAL
COMMUNITY

## 2024-10-06 RX ORDER — NOREPINEPHRINE BITARTRATE 0.03 MG/ML
0-1 INJECTION, SOLUTION INTRAVENOUS CONTINUOUS
Status: DISCONTINUED | OUTPATIENT
Start: 2024-10-06 | End: 2024-10-07

## 2024-10-06 RX ORDER — DEXTROSE MONOHYDRATE 25 G/50ML
25 INJECTION, SOLUTION INTRAVENOUS
Status: DISCONTINUED | OUTPATIENT
Start: 2024-10-06 | End: 2024-10-10

## 2024-10-06 RX ORDER — AMOXICILLIN 250 MG
2 CAPSULE ORAL EVERY EVENING
Status: DISCONTINUED | OUTPATIENT
Start: 2024-10-06 | End: 2024-10-18 | Stop reason: HOSPADM

## 2024-10-06 RX ORDER — GAUZE BANDAGE 2" X 2"
100 BANDAGE TOPICAL DAILY
Status: DISCONTINUED | OUTPATIENT
Start: 2024-10-13 | End: 2024-10-06

## 2024-10-06 RX ORDER — AMOXICILLIN 250 MG
2 CAPSULE ORAL 2 TIMES DAILY
Status: DISCONTINUED | OUTPATIENT
Start: 2024-10-06 | End: 2024-10-06

## 2024-10-06 RX ORDER — THIAMINE HYDROCHLORIDE 100 MG/ML
250 INJECTION, SOLUTION INTRAMUSCULAR; INTRAVENOUS DAILY
Status: DISCONTINUED | OUTPATIENT
Start: 2024-10-08 | End: 2024-10-06

## 2024-10-06 RX ORDER — OXYBUTYNIN CHLORIDE 15 MG/1
15 TABLET, EXTENDED RELEASE ORAL DAILY
Status: ON HOLD | COMMUNITY
End: 2024-10-06

## 2024-10-06 RX ORDER — BISACODYL 10 MG
10 SUPPOSITORY, RECTAL RECTAL
Status: DISCONTINUED | OUTPATIENT
Start: 2024-10-06 | End: 2024-10-06

## 2024-10-06 RX ORDER — LORAZEPAM 0.5 MG/1
0.5 TABLET ORAL 3 TIMES DAILY PRN
COMMUNITY

## 2024-10-06 RX ORDER — MAGNESIUM SULFATE HEPTAHYDRATE 40 MG/ML
4 INJECTION, SOLUTION INTRAVENOUS ONCE
Status: COMPLETED | OUTPATIENT
Start: 2024-10-06 | End: 2024-10-07

## 2024-10-06 RX ADMIN — ACTIVATED CHARCOAL 25 G: 208 SUSPENSION ORAL at 15:04

## 2024-10-06 RX ADMIN — ROCURONIUM BROMIDE 80 MG: 50 INJECTION, SOLUTION INTRAVENOUS at 11:50

## 2024-10-06 RX ADMIN — SODIUM CHLORIDE, POTASSIUM CHLORIDE, SODIUM LACTATE AND CALCIUM CHLORIDE 2244 ML: 600; 310; 30; 20 INJECTION, SOLUTION INTRAVENOUS at 13:30

## 2024-10-06 RX ADMIN — FENTANYL CITRATE 100 MCG: 50 INJECTION, SOLUTION INTRAMUSCULAR; INTRAVENOUS at 17:41

## 2024-10-06 RX ADMIN — Medication 100 MCG/HR: at 18:40

## 2024-10-06 RX ADMIN — FENTANYL CITRATE 200 MCG: 50 INJECTION, SOLUTION INTRAMUSCULAR; INTRAVENOUS at 23:50

## 2024-10-06 RX ADMIN — MAGNESIUM SULFATE HEPTAHYDRATE 4 G: 4 INJECTION, SOLUTION INTRAVENOUS at 22:55

## 2024-10-06 RX ADMIN — ACETYLCYSTEINE 15000 MG: 200 SOLUTION ORAL; RESPIRATORY (INHALATION) at 19:38

## 2024-10-06 RX ADMIN — ACETYLCYSTEINE 14000 MG: 200 SOLUTION ORAL; RESPIRATORY (INHALATION) at 15:06

## 2024-10-06 RX ADMIN — PROPOFOL 5 MCG/KG/MIN: 10 INJECTION, EMULSION INTRAVENOUS at 12:47

## 2024-10-06 RX ADMIN — NOREPINEPHRINE BITARTRATE 0.15 MCG/KG/MIN: 1 INJECTION, SOLUTION, CONCENTRATE INTRAVENOUS at 11:54

## 2024-10-06 RX ADMIN — FENTANYL CITRATE 100 MCG: 50 INJECTION, SOLUTION INTRAMUSCULAR; INTRAVENOUS at 21:30

## 2024-10-06 RX ADMIN — INSULIN LISPRO 6 UNITS: 100 INJECTION, SOLUTION INTRAVENOUS; SUBCUTANEOUS at 18:17

## 2024-10-06 RX ADMIN — SODIUM BICARBONATE: 84 INJECTION, SOLUTION INTRAVENOUS at 16:45

## 2024-10-06 RX ADMIN — THIAMINE HYDROCHLORIDE 500 MG: 100 INJECTION, SOLUTION INTRAMUSCULAR; INTRAVENOUS at 19:29

## 2024-10-06 RX ADMIN — PROPOFOL 5 MCG/KG/MIN: 10 INJECTION, EMULSION INTRAVENOUS at 22:09

## 2024-10-06 RX ADMIN — DEXTROSE MONOHYDRATE 3 G: 50 INJECTION, SOLUTION INTRAVENOUS at 19:12

## 2024-10-06 RX ADMIN — FAMOTIDINE 20 MG: 20 TABLET, FILM COATED ORAL at 15:15

## 2024-10-06 RX ADMIN — CEFTRIAXONE SODIUM 2000 MG: 2 INJECTION, POWDER, FOR SOLUTION INTRAMUSCULAR; INTRAVENOUS at 13:03

## 2024-10-06 RX ADMIN — NOREPINEPHRINE BITARTRATE 0.2 MCG/KG/MIN: 1 INJECTION, SOLUTION, CONCENTRATE INTRAVENOUS at 16:46

## 2024-10-06 RX ADMIN — FENTANYL CITRATE 100 MCG: 50 INJECTION, SOLUTION INTRAMUSCULAR; INTRAVENOUS at 18:34

## 2024-10-06 RX ADMIN — FENTANYL CITRATE 100 MCG: 50 INJECTION, SOLUTION INTRAMUSCULAR; INTRAVENOUS at 19:23

## 2024-10-06 RX ADMIN — FOMEPIZOLE 1120 MG: 1 INJECTION, SOLUTION INTRAVENOUS at 16:55

## 2024-10-06 RX ADMIN — ETOMIDATE 20 MG: 2 INJECTION, SOLUTION INTRAVENOUS at 11:49

## 2024-10-06 RX ADMIN — AMPICILLIN SODIUM, SULBACTAM SODIUM 1.5 G: 1; .5 INJECTION, POWDER, FOR SOLUTION INTRAMUSCULAR; INTRAVENOUS at 20:12

## 2024-10-06 RX ADMIN — NOREPINEPHRINE BITARTRATE 0.4 MCG/KG/MIN: 1 INJECTION, SOLUTION, CONCENTRATE INTRAVENOUS at 12:50

## 2024-10-06 ASSESSMENT — PAIN DESCRIPTION - PAIN TYPE
TYPE: ACUTE PAIN

## 2024-10-06 ASSESSMENT — FIBROSIS 4 INDEX: FIB4 SCORE: 0.47

## 2024-10-07 ENCOUNTER — HOSPITAL ENCOUNTER (OUTPATIENT)
Dept: RADIOLOGY | Facility: MEDICAL CENTER | Age: 64
End: 2024-10-07
Attending: EMERGENCY MEDICINE
Payer: MEDICARE

## 2024-10-07 LAB
ALBUMIN SERPL BCP-MCNC: 3 G/DL (ref 3.2–4.9)
ALBUMIN SERPL BCP-MCNC: 3.2 G/DL (ref 3.2–4.9)
ALBUMIN SERPL BCP-MCNC: 3.6 G/DL (ref 3.2–4.9)
ALBUMIN/GLOB SERPL: 1.7 G/DL
ALBUMIN/GLOB SERPL: 1.8 G/DL
ALP SERPL-CCNC: 48 U/L (ref 30–99)
ALP SERPL-CCNC: 51 U/L (ref 30–99)
ALP SERPL-CCNC: 55 U/L (ref 30–99)
ALT SERPL-CCNC: 128 U/L (ref 2–50)
ALT SERPL-CCNC: 138 U/L (ref 2–50)
ALT SERPL-CCNC: 166 U/L (ref 2–50)
ANION GAP SERPL CALC-SCNC: 11 MMOL/L (ref 7–16)
ANION GAP SERPL CALC-SCNC: 14 MMOL/L (ref 7–16)
ANION GAP SERPL CALC-SCNC: 18 MMOL/L (ref 7–16)
APAP SERPL-MCNC: 101 UG/ML (ref 10–30)
APAP SERPL-MCNC: 168 UG/ML (ref 10–30)
ARTERIAL PATENCY WRIST A: ABNORMAL
AST SERPL-CCNC: 79 U/L (ref 12–45)
AST SERPL-CCNC: 88 U/L (ref 12–45)
AST SERPL-CCNC: 94 U/L (ref 12–45)
B-OH-BUTYR SERPL-MCNC: 0.35 MMOL/L (ref 0.02–0.27)
BASE EXCESS BLDA CALC-SCNC: -6 MMOL/L (ref -4–3)
BASOPHILS # BLD AUTO: 0.2 % (ref 0–1.8)
BASOPHILS # BLD: 0.04 K/UL (ref 0–0.12)
BILIRUB CONJ SERPL-MCNC: 0.4 MG/DL (ref 0.1–0.5)
BILIRUB INDIRECT SERPL-MCNC: 0.5 MG/DL (ref 0–1)
BILIRUB SERPL-MCNC: 0.6 MG/DL (ref 0.1–1.5)
BILIRUB SERPL-MCNC: 0.7 MG/DL (ref 0.1–1.5)
BILIRUB SERPL-MCNC: 0.9 MG/DL (ref 0.1–1.5)
BODY TEMPERATURE: ABNORMAL DEGREES
BREATHS SETTING VENT: 28
BUN SERPL-MCNC: 15 MG/DL (ref 8–22)
BUN SERPL-MCNC: 17 MG/DL (ref 8–22)
BUN SERPL-MCNC: 17 MG/DL (ref 8–22)
CALCIUM ALBUM COR SERPL-MCNC: 8.7 MG/DL (ref 8.5–10.5)
CALCIUM ALBUM COR SERPL-MCNC: 8.9 MG/DL (ref 8.5–10.5)
CALCIUM SERPL-MCNC: 8.1 MG/DL (ref 8.5–10.5)
CALCIUM SERPL-MCNC: 8.1 MG/DL (ref 8.5–10.5)
CALCIUM SERPL-MCNC: 8.5 MG/DL (ref 8.5–10.5)
CHLORIDE SERPL-SCNC: 100 MMOL/L (ref 96–112)
CHLORIDE SERPL-SCNC: 103 MMOL/L (ref 96–112)
CHLORIDE SERPL-SCNC: 104 MMOL/L (ref 96–112)
CK SERPL-CCNC: 662 U/L (ref 0–154)
CO2 BLDA-SCNC: 17 MMOL/L (ref 20–33)
CO2 SERPL-SCNC: 17 MMOL/L (ref 20–33)
CO2 SERPL-SCNC: 18 MMOL/L (ref 20–33)
CO2 SERPL-SCNC: 22 MMOL/L (ref 20–33)
CREAT SERPL-MCNC: 1.11 MG/DL (ref 0.5–1.4)
CREAT SERPL-MCNC: 1.17 MG/DL (ref 0.5–1.4)
CREAT SERPL-MCNC: 1.25 MG/DL (ref 0.5–1.4)
DELSYS IDSYS: ABNORMAL
EKG IMPRESSION: NORMAL
END TIDAL CARBON DIOXIDE IECO2: 23 MMHG
EOSINOPHIL # BLD AUTO: 0.08 K/UL (ref 0–0.51)
EOSINOPHIL NFR BLD: 0.5 % (ref 0–6.9)
ERYTHROCYTE [DISTWIDTH] IN BLOOD BY AUTOMATED COUNT: 43.8 FL (ref 35.9–50)
GFR SERPLBLD CREATININE-BSD FMLA CKD-EPI: 64 ML/MIN/1.73 M 2
GFR SERPLBLD CREATININE-BSD FMLA CKD-EPI: 69 ML/MIN/1.73 M 2
GFR SERPLBLD CREATININE-BSD FMLA CKD-EPI: 74 ML/MIN/1.73 M 2
GLOBULIN SER CALC-MCNC: 1.7 G/DL (ref 1.9–3.5)
GLOBULIN SER CALC-MCNC: 1.9 G/DL (ref 1.9–3.5)
GLUCOSE BLD STRIP.AUTO-MCNC: 137 MG/DL (ref 65–99)
GLUCOSE SERPL-MCNC: 134 MG/DL (ref 65–99)
GLUCOSE SERPL-MCNC: 157 MG/DL (ref 65–99)
GLUCOSE SERPL-MCNC: 223 MG/DL (ref 65–99)
GLUCOSE SERPL-MCNC: 235 MG/DL (ref 65–99)
HCO3 BLDA-SCNC: 16.1 MMOL/L (ref 17–25)
HCT VFR BLD AUTO: 38.8 % (ref 42–52)
HGB BLD-MCNC: 13.5 G/DL (ref 14–18)
HOROWITZ INDEX BLDA+IHG-RTO: 243 MM[HG]
IMM GRANULOCYTES # BLD AUTO: 0.11 K/UL (ref 0–0.11)
IMM GRANULOCYTES NFR BLD AUTO: 0.7 % (ref 0–0.9)
INR PPP: 1.37 (ref 0.87–1.13)
INR PPP: 1.45 (ref 0.87–1.13)
LACTATE BLD-SCNC: 2 MMOL/L (ref 0.5–2)
LACTATE SERPL-SCNC: 2.4 MMOL/L (ref 0.5–2)
LACTATE SERPL-SCNC: 5.4 MMOL/L (ref 0.5–2)
LYMPHOCYTES # BLD AUTO: 2.43 K/UL (ref 1–4.8)
LYMPHOCYTES NFR BLD: 15.1 % (ref 22–41)
MAGNESIUM SERPL-MCNC: 2.7 MG/DL (ref 1.5–2.5)
MCH RBC QN AUTO: 30.6 PG (ref 27–33)
MCHC RBC AUTO-ENTMCNC: 34.8 G/DL (ref 32.3–36.5)
MCV RBC AUTO: 88 FL (ref 81.4–97.8)
MODE IMODE: ABNORMAL
MONOCYTES # BLD AUTO: 0.52 K/UL (ref 0–0.85)
MONOCYTES NFR BLD AUTO: 3.2 % (ref 0–13.4)
NEUTROPHILS # BLD AUTO: 12.87 K/UL (ref 1.82–7.42)
NEUTROPHILS NFR BLD: 80.3 % (ref 44–72)
NRBC # BLD AUTO: 0 K/UL
NRBC BLD-RTO: 0 /100 WBC (ref 0–0.2)
O2/TOTAL GAS SETTING VFR VENT: 30 %
PCO2 BLDA: 22.7 MMHG (ref 26–37)
PCO2 TEMP ADJ BLDA: 23.2 MMHG (ref 26–37)
PEEP END EXPIRATORY PRESSURE IPEEP: 8 CMH20
PH BLDA: 7.46 [PH] (ref 7.4–7.5)
PH TEMP ADJ BLDA: 7.45 [PH] (ref 7.4–7.5)
PHOSPHATE SERPL-MCNC: 2.2 MG/DL (ref 2.5–4.5)
PLATELET # BLD AUTO: 209 K/UL (ref 164–446)
PMV BLD AUTO: 8.9 FL (ref 9–12.9)
PO2 BLDA: 73 MMHG (ref 64–87)
PO2 TEMP ADJ BLDA: 76 MMHG (ref 64–87)
POTASSIUM SERPL-SCNC: 2.9 MMOL/L (ref 3.6–5.5)
POTASSIUM SERPL-SCNC: 3 MMOL/L (ref 3.6–5.5)
POTASSIUM SERPL-SCNC: 3 MMOL/L (ref 3.6–5.5)
PROT SERPL-MCNC: 4.7 G/DL (ref 6–8.2)
PROT SERPL-MCNC: 5.1 G/DL (ref 6–8.2)
PROT SERPL-MCNC: 5.7 G/DL (ref 6–8.2)
PROTHROMBIN TIME: 16.9 SEC (ref 12–14.6)
PROTHROMBIN TIME: 17.7 SEC (ref 12–14.6)
RBC # BLD AUTO: 4.41 M/UL (ref 4.7–6.1)
SAO2 % BLDA: 96 % (ref 93–99)
SODIUM SERPL-SCNC: 135 MMOL/L (ref 135–145)
SODIUM SERPL-SCNC: 135 MMOL/L (ref 135–145)
SODIUM SERPL-SCNC: 137 MMOL/L (ref 135–145)
SPECIMEN DRAWN FROM PATIENT: ABNORMAL
TIDAL VOLUME IVT: 460 ML
WBC # BLD AUTO: 16.1 K/UL (ref 4.8–10.8)

## 2024-10-07 PROCEDURE — 700111 HCHG RX REV CODE 636 W/ 250 OVERRIDE (IP)

## 2024-10-07 PROCEDURE — A9270 NON-COVERED ITEM OR SERVICE: HCPCS | Performed by: EMERGENCY MEDICINE

## 2024-10-07 PROCEDURE — 36600 WITHDRAWAL OF ARTERIAL BLOOD: CPT

## 2024-10-07 PROCEDURE — A9270 NON-COVERED ITEM OR SERVICE: HCPCS

## 2024-10-07 PROCEDURE — 80053 COMPREHEN METABOLIC PANEL: CPT

## 2024-10-07 PROCEDURE — 83605 ASSAY OF LACTIC ACID: CPT

## 2024-10-07 PROCEDURE — 700105 HCHG RX REV CODE 258: Performed by: EMERGENCY MEDICINE

## 2024-10-07 PROCEDURE — 700102 HCHG RX REV CODE 250 W/ 637 OVERRIDE(OP): Performed by: EMERGENCY MEDICINE

## 2024-10-07 PROCEDURE — 700105 HCHG RX REV CODE 258: Performed by: INTERNAL MEDICINE

## 2024-10-07 PROCEDURE — 71045 X-RAY EXAM CHEST 1 VIEW: CPT

## 2024-10-07 PROCEDURE — 700101 HCHG RX REV CODE 250: Mod: JZ | Performed by: EMERGENCY MEDICINE

## 2024-10-07 PROCEDURE — 82962 GLUCOSE BLOOD TEST: CPT

## 2024-10-07 PROCEDURE — 94150 VITAL CAPACITY TEST: CPT

## 2024-10-07 PROCEDURE — 700102 HCHG RX REV CODE 250 W/ 637 OVERRIDE(OP): Performed by: INTERNAL MEDICINE

## 2024-10-07 PROCEDURE — 99291 CRITICAL CARE FIRST HOUR: CPT | Performed by: INTERNAL MEDICINE

## 2024-10-07 PROCEDURE — 85025 COMPLETE CBC W/AUTO DIFF WBC: CPT

## 2024-10-07 PROCEDURE — 700111 HCHG RX REV CODE 636 W/ 250 OVERRIDE (IP): Performed by: EMERGENCY MEDICINE

## 2024-10-07 PROCEDURE — 82803 BLOOD GASES ANY COMBINATION: CPT

## 2024-10-07 PROCEDURE — 84100 ASSAY OF PHOSPHORUS: CPT

## 2024-10-07 PROCEDURE — 770022 HCHG ROOM/CARE - ICU (200)

## 2024-10-07 PROCEDURE — 80143 DRUG ASSAY ACETAMINOPHEN: CPT

## 2024-10-07 PROCEDURE — 82947 ASSAY GLUCOSE BLOOD QUANT: CPT

## 2024-10-07 PROCEDURE — 700101 HCHG RX REV CODE 250: Performed by: INTERNAL MEDICINE

## 2024-10-07 PROCEDURE — 85610 PROTHROMBIN TIME: CPT

## 2024-10-07 PROCEDURE — 83735 ASSAY OF MAGNESIUM: CPT

## 2024-10-07 PROCEDURE — 700102 HCHG RX REV CODE 250 W/ 637 OVERRIDE(OP)

## 2024-10-07 PROCEDURE — A9270 NON-COVERED ITEM OR SERVICE: HCPCS | Performed by: INTERNAL MEDICINE

## 2024-10-07 PROCEDURE — 700111 HCHG RX REV CODE 636 W/ 250 OVERRIDE (IP): Mod: JZ | Performed by: INTERNAL MEDICINE

## 2024-10-07 PROCEDURE — 700111 HCHG RX REV CODE 636 W/ 250 OVERRIDE (IP): Performed by: INTERNAL MEDICINE

## 2024-10-07 PROCEDURE — 94799 UNLISTED PULMONARY SVC/PX: CPT

## 2024-10-07 PROCEDURE — 93010 ELECTROCARDIOGRAM REPORT: CPT | Performed by: INTERNAL MEDICINE

## 2024-10-07 PROCEDURE — 94003 VENT MGMT INPAT SUBQ DAY: CPT

## 2024-10-07 PROCEDURE — 82550 ASSAY OF CK (CPK): CPT

## 2024-10-07 RX ORDER — POTASSIUM CHLORIDE 29.8 MG/ML
40 INJECTION INTRAVENOUS ONCE
Status: COMPLETED | OUTPATIENT
Start: 2024-10-07 | End: 2024-10-07

## 2024-10-07 RX ORDER — ENOXAPARIN SODIUM 100 MG/ML
40 INJECTION SUBCUTANEOUS DAILY
Status: DISCONTINUED | OUTPATIENT
Start: 2024-10-07 | End: 2024-10-18 | Stop reason: HOSPADM

## 2024-10-07 RX ORDER — NYSTATIN 100000 [USP'U]/G
POWDER TOPICAL 2 TIMES DAILY
Status: COMPLETED | OUTPATIENT
Start: 2024-10-07 | End: 2024-10-09

## 2024-10-07 RX ORDER — AMLODIPINE BESYLATE 5 MG/1
5 TABLET ORAL
Status: DISCONTINUED | OUTPATIENT
Start: 2024-10-07 | End: 2024-10-11

## 2024-10-07 RX ORDER — HYDRALAZINE HYDROCHLORIDE 20 MG/ML
20 INJECTION INTRAMUSCULAR; INTRAVENOUS EVERY 6 HOURS PRN
Status: ACTIVE | OUTPATIENT
Start: 2024-10-07 | End: 2024-10-09

## 2024-10-07 RX ORDER — LABETALOL HYDROCHLORIDE 5 MG/ML
10 INJECTION, SOLUTION INTRAVENOUS EVERY 4 HOURS
Status: DISCONTINUED | OUTPATIENT
Start: 2024-10-07 | End: 2024-10-07

## 2024-10-07 RX ORDER — FAMOTIDINE 20 MG/1
20 TABLET, FILM COATED ORAL 2 TIMES DAILY
Status: DISCONTINUED | OUTPATIENT
Start: 2024-10-07 | End: 2024-10-07

## 2024-10-07 RX ADMIN — PROPOFOL 35 MCG/KG/MIN: 10 INJECTION, EMULSION INTRAVENOUS at 04:56

## 2024-10-07 RX ADMIN — SODIUM BICARBONATE: 84 INJECTION, SOLUTION INTRAVENOUS at 04:32

## 2024-10-07 RX ADMIN — Medication 300 MCG/HR: at 01:50

## 2024-10-07 RX ADMIN — INSULIN LISPRO 3 UNITS: 100 INJECTION, SOLUTION INTRAVENOUS; SUBCUTANEOUS at 13:24

## 2024-10-07 RX ADMIN — ENOXAPARIN SODIUM 40 MG: 100 INJECTION SUBCUTANEOUS at 17:17

## 2024-10-07 RX ADMIN — INSULIN LISPRO 4 UNITS: 100 INJECTION, SOLUTION INTRAVENOUS; SUBCUTANEOUS at 00:59

## 2024-10-07 RX ADMIN — AMLODIPINE BESYLATE 5 MG: 10 TABLET ORAL at 14:16

## 2024-10-07 RX ADMIN — SODIUM PHOSPHATE, MONOBASIC, MONOHYDRATE AND SODIUM PHOSPHATE, DIBASIC, ANHYDROUS 15 MMOL: 276; 142 INJECTION, SOLUTION INTRAVENOUS at 10:15

## 2024-10-07 RX ADMIN — THIAMINE HYDROCHLORIDE 500 MG: 100 INJECTION, SOLUTION INTRAMUSCULAR; INTRAVENOUS at 06:09

## 2024-10-07 RX ADMIN — POTASSIUM CHLORIDE 40 MEQ: 29.8 INJECTION, SOLUTION INTRAVENOUS at 01:17

## 2024-10-07 RX ADMIN — SODIUM BICARBONATE: 84 INJECTION, SOLUTION INTRAVENOUS at 20:36

## 2024-10-07 RX ADMIN — POTASSIUM CHLORIDE 40 MEQ: 29.8 INJECTION, SOLUTION INTRAVENOUS at 15:51

## 2024-10-07 RX ADMIN — SENNOSIDES AND DOCUSATE SODIUM 2 TABLET: 50; 8.6 TABLET ORAL at 17:16

## 2024-10-07 RX ADMIN — FOMEPIZOLE 750 MG: 1 INJECTION, SOLUTION INTRAVENOUS at 04:25

## 2024-10-07 RX ADMIN — ACETYLCYSTEINE 15000 MG: 200 SOLUTION ORAL; RESPIRATORY (INHALATION) at 12:20

## 2024-10-07 RX ADMIN — AMPICILLIN SODIUM, SULBACTAM SODIUM 1.5 G: 1; .5 INJECTION, POWDER, FOR SOLUTION INTRAMUSCULAR; INTRAVENOUS at 06:11

## 2024-10-07 RX ADMIN — THIAMINE HYDROCHLORIDE 500 MG: 100 INJECTION, SOLUTION INTRAMUSCULAR; INTRAVENOUS at 01:01

## 2024-10-07 RX ADMIN — INSULIN LISPRO 4 UNITS: 100 INJECTION, SOLUTION INTRAVENOUS; SUBCUTANEOUS at 06:21

## 2024-10-07 RX ADMIN — FAMOTIDINE 20 MG: 10 INJECTION, SOLUTION INTRAVENOUS at 06:06

## 2024-10-07 RX ADMIN — NYSTATIN: 100000 POWDER TOPICAL at 17:17

## 2024-10-07 RX ADMIN — FOMEPIZOLE 750 MG: 1 INJECTION, SOLUTION INTRAVENOUS at 17:27

## 2024-10-07 RX ADMIN — POTASSIUM CHLORIDE 40 MEQ: 29.8 INJECTION, SOLUTION INTRAVENOUS at 06:42

## 2024-10-07 ASSESSMENT — COPD QUESTIONNAIRES
COPD SCREENING SCORE: 4
DURING THE PAST 4 WEEKS HOW MUCH DID YOU FEEL SHORT OF BREATH: NONE/LITTLE OF THE TIME
HAVE YOU SMOKED AT LEAST 100 CIGARETTES IN YOUR ENTIRE LIFE: YES
DO YOU EVER COUGH UP ANY MUCUS OR PHLEGM?: NO/ONLY WITH OCCASIONAL COLDS OR INFECTIONS

## 2024-10-07 ASSESSMENT — PAIN DESCRIPTION - PAIN TYPE
TYPE: ACUTE PAIN

## 2024-10-07 ASSESSMENT — PULMONARY FUNCTION TESTS: FVC: 1.3

## 2024-10-08 ENCOUNTER — APPOINTMENT (OUTPATIENT)
Dept: RADIOLOGY | Facility: MEDICAL CENTER | Age: 64
DRG: 917 | End: 2024-10-08
Payer: MEDICARE

## 2024-10-08 PROBLEM — R79.89 ELEVATED LFTS: Status: ACTIVE | Noted: 2024-10-08

## 2024-10-08 PROBLEM — D69.6 THROMBOCYTOPENIA (HCC): Status: ACTIVE | Noted: 2024-10-08

## 2024-10-08 PROBLEM — R79.1 ELEVATED INR: Status: ACTIVE | Noted: 2024-10-08

## 2024-10-08 LAB
ALBUMIN SERPL BCP-MCNC: 2.9 G/DL (ref 3.2–4.9)
ALBUMIN/GLOB SERPL: 1.5 G/DL
ALBUMIN/GLOB SERPL: 1.7 G/DL
ALBUMIN/GLOB SERPL: 1.8 G/DL
ALP SERPL-CCNC: 48 U/L (ref 30–99)
ALP SERPL-CCNC: 52 U/L (ref 30–99)
ALP SERPL-CCNC: 55 U/L (ref 30–99)
ALT SERPL-CCNC: 1092 U/L (ref 2–50)
ALT SERPL-CCNC: 1588 U/L (ref 2–50)
ALT SERPL-CCNC: 3001 U/L (ref 2–50)
ANION GAP SERPL CALC-SCNC: 10 MMOL/L (ref 7–16)
ANION GAP SERPL CALC-SCNC: 8 MMOL/L (ref 7–16)
ANION GAP SERPL CALC-SCNC: 9 MMOL/L (ref 7–16)
APAP SERPL-MCNC: 12 UG/ML (ref 10–30)
APAP SERPL-MCNC: 33.3 UG/ML (ref 10–30)
AST SERPL-CCNC: 1161 U/L (ref 12–45)
AST SERPL-CCNC: 1467 U/L (ref 12–45)
AST SERPL-CCNC: 785 U/L (ref 12–45)
BACTERIA UR CULT: NORMAL
BASOPHILS # BLD AUTO: 0 % (ref 0–1.8)
BASOPHILS # BLD: 0 K/UL (ref 0–0.12)
BILIRUB SERPL-MCNC: 0.8 MG/DL (ref 0.1–1.5)
BILIRUB SERPL-MCNC: 0.8 MG/DL (ref 0.1–1.5)
BILIRUB SERPL-MCNC: 1 MG/DL (ref 0.1–1.5)
BUN SERPL-MCNC: 8 MG/DL (ref 8–22)
BUN SERPL-MCNC: 9 MG/DL (ref 8–22)
BUN SERPL-MCNC: 9 MG/DL (ref 8–22)
CALCIUM ALBUM COR SERPL-MCNC: 9 MG/DL (ref 8.5–10.5)
CALCIUM ALBUM COR SERPL-MCNC: 9.3 MG/DL (ref 8.5–10.5)
CALCIUM ALBUM COR SERPL-MCNC: 9.4 MG/DL (ref 8.5–10.5)
CALCIUM SERPL-MCNC: 8.1 MG/DL (ref 8.5–10.5)
CALCIUM SERPL-MCNC: 8.4 MG/DL (ref 8.5–10.5)
CALCIUM SERPL-MCNC: 8.5 MG/DL (ref 8.5–10.5)
CHLORIDE SERPL-SCNC: 103 MMOL/L (ref 96–112)
CHLORIDE SERPL-SCNC: 104 MMOL/L (ref 96–112)
CHLORIDE SERPL-SCNC: 99 MMOL/L (ref 96–112)
CK SERPL-CCNC: 655 U/L (ref 0–154)
CO2 SERPL-SCNC: 26 MMOL/L (ref 20–33)
CO2 SERPL-SCNC: 27 MMOL/L (ref 20–33)
CO2 SERPL-SCNC: 28 MMOL/L (ref 20–33)
CREAT SERPL-MCNC: 0.56 MG/DL (ref 0.5–1.4)
CREAT SERPL-MCNC: 0.75 MG/DL (ref 0.5–1.4)
CREAT SERPL-MCNC: 0.88 MG/DL (ref 0.5–1.4)
EOSINOPHIL # BLD AUTO: 0 K/UL (ref 0–0.51)
EOSINOPHIL NFR BLD: 0 % (ref 0–6.9)
ERYTHROCYTE [DISTWIDTH] IN BLOOD BY AUTOMATED COUNT: 45 FL (ref 35.9–50)
GFR SERPLBLD CREATININE-BSD FMLA CKD-EPI: 100 ML/MIN/1.73 M 2
GFR SERPLBLD CREATININE-BSD FMLA CKD-EPI: 109 ML/MIN/1.73 M 2
GFR SERPLBLD CREATININE-BSD FMLA CKD-EPI: 95 ML/MIN/1.73 M 2
GLOBULIN SER CALC-MCNC: 1.6 G/DL (ref 1.9–3.5)
GLOBULIN SER CALC-MCNC: 1.7 G/DL (ref 1.9–3.5)
GLOBULIN SER CALC-MCNC: 2 G/DL (ref 1.9–3.5)
GLUCOSE BLD STRIP.AUTO-MCNC: 132 MG/DL (ref 65–99)
GLUCOSE BLD STRIP.AUTO-MCNC: 139 MG/DL (ref 65–99)
GLUCOSE BLD STRIP.AUTO-MCNC: 176 MG/DL (ref 65–99)
GLUCOSE SERPL-MCNC: 143 MG/DL (ref 65–99)
GLUCOSE SERPL-MCNC: 144 MG/DL (ref 65–99)
GLUCOSE SERPL-MCNC: 148 MG/DL (ref 65–99)
HAV IGM SERPL QL IA: NONREACTIVE
HBV CORE IGM SER QL: NONREACTIVE
HBV SURFACE AG SER QL: NONREACTIVE
HCT VFR BLD AUTO: 36.6 % (ref 42–52)
HCV AB SER QL: NONREACTIVE
HGB BLD-MCNC: 12.7 G/DL (ref 14–18)
INR PPP: 1.82 (ref 0.87–1.13)
LYMPHOCYTES # BLD AUTO: 0.41 K/UL (ref 1–4.8)
LYMPHOCYTES NFR BLD: 4.3 % (ref 22–41)
MAGNESIUM SERPL-MCNC: 2.1 MG/DL (ref 1.5–2.5)
MANUAL DIFF BLD: NORMAL
MCH RBC QN AUTO: 30.8 PG (ref 27–33)
MCHC RBC AUTO-ENTMCNC: 34.7 G/DL (ref 32.3–36.5)
MCV RBC AUTO: 88.8 FL (ref 81.4–97.8)
MONOCYTES # BLD AUTO: 0 K/UL (ref 0–0.85)
MONOCYTES NFR BLD AUTO: 0 % (ref 0–13.4)
MORPHOLOGY BLD-IMP: NORMAL
NEUTROPHILS # BLD AUTO: 9.09 K/UL (ref 1.82–7.42)
NEUTROPHILS NFR BLD: 95.7 % (ref 44–72)
NRBC # BLD AUTO: 0 K/UL
NRBC BLD-RTO: 0 /100 WBC (ref 0–0.2)
PHOSPHATE SERPL-MCNC: 2.6 MG/DL (ref 2.5–4.5)
PLATELET # BLD AUTO: 152 K/UL (ref 164–446)
PLATELET BLD QL SMEAR: NORMAL
PMV BLD AUTO: 9.2 FL (ref 9–12.9)
POTASSIUM SERPL-SCNC: 2.9 MMOL/L (ref 3.6–5.5)
POTASSIUM SERPL-SCNC: 3 MMOL/L (ref 3.6–5.5)
POTASSIUM SERPL-SCNC: 3 MMOL/L (ref 3.6–5.5)
PROT SERPL-MCNC: 4.5 G/DL (ref 6–8.2)
PROT SERPL-MCNC: 4.6 G/DL (ref 6–8.2)
PROT SERPL-MCNC: 4.9 G/DL (ref 6–8.2)
PROTHROMBIN TIME: 21.1 SEC (ref 12–14.6)
RBC # BLD AUTO: 4.12 M/UL (ref 4.7–6.1)
RBC BLD AUTO: NORMAL
SIGNIFICANT IND 70042: NORMAL
SITE SITE: NORMAL
SODIUM SERPL-SCNC: 136 MMOL/L (ref 135–145)
SODIUM SERPL-SCNC: 139 MMOL/L (ref 135–145)
SODIUM SERPL-SCNC: 139 MMOL/L (ref 135–145)
SOURCE SOURCE: NORMAL
WBC # BLD AUTO: 9.5 K/UL (ref 4.8–10.8)

## 2024-10-08 PROCEDURE — A9270 NON-COVERED ITEM OR SERVICE: HCPCS | Performed by: INTERNAL MEDICINE

## 2024-10-08 PROCEDURE — 84100 ASSAY OF PHOSPHORUS: CPT

## 2024-10-08 PROCEDURE — 85610 PROTHROMBIN TIME: CPT

## 2024-10-08 PROCEDURE — 85027 COMPLETE CBC AUTOMATED: CPT

## 2024-10-08 PROCEDURE — 770022 HCHG ROOM/CARE - ICU (200)

## 2024-10-08 PROCEDURE — 700102 HCHG RX REV CODE 250 W/ 637 OVERRIDE(OP): Performed by: INTERNAL MEDICINE

## 2024-10-08 PROCEDURE — 700111 HCHG RX REV CODE 636 W/ 250 OVERRIDE (IP): Mod: JZ | Performed by: INTERNAL MEDICINE

## 2024-10-08 PROCEDURE — 83735 ASSAY OF MAGNESIUM: CPT

## 2024-10-08 PROCEDURE — 700105 HCHG RX REV CODE 258: Performed by: EMERGENCY MEDICINE

## 2024-10-08 PROCEDURE — 99222 1ST HOSP IP/OBS MODERATE 55: CPT | Mod: GC | Performed by: STUDENT IN AN ORGANIZED HEALTH CARE EDUCATION/TRAINING PROGRAM

## 2024-10-08 PROCEDURE — 80143 DRUG ASSAY ACETAMINOPHEN: CPT | Mod: 91

## 2024-10-08 PROCEDURE — 85007 BL SMEAR W/DIFF WBC COUNT: CPT

## 2024-10-08 PROCEDURE — 82962 GLUCOSE BLOOD TEST: CPT | Mod: 91

## 2024-10-08 PROCEDURE — 99291 CRITICAL CARE FIRST HOUR: CPT | Performed by: INTERNAL MEDICINE

## 2024-10-08 PROCEDURE — 700111 HCHG RX REV CODE 636 W/ 250 OVERRIDE (IP)

## 2024-10-08 PROCEDURE — 700101 HCHG RX REV CODE 250: Mod: JZ | Performed by: EMERGENCY MEDICINE

## 2024-10-08 PROCEDURE — 700111 HCHG RX REV CODE 636 W/ 250 OVERRIDE (IP): Performed by: EMERGENCY MEDICINE

## 2024-10-08 PROCEDURE — A9270 NON-COVERED ITEM OR SERVICE: HCPCS | Performed by: EMERGENCY MEDICINE

## 2024-10-08 PROCEDURE — 76705 ECHO EXAM OF ABDOMEN: CPT

## 2024-10-08 PROCEDURE — 700102 HCHG RX REV CODE 250 W/ 637 OVERRIDE(OP): Performed by: EMERGENCY MEDICINE

## 2024-10-08 PROCEDURE — 82550 ASSAY OF CK (CPK): CPT

## 2024-10-08 PROCEDURE — 80053 COMPREHEN METABOLIC PANEL: CPT

## 2024-10-08 PROCEDURE — 80074 ACUTE HEPATITIS PANEL: CPT

## 2024-10-08 RX ORDER — POTASSIUM CHLORIDE 29.8 MG/ML
40 INJECTION INTRAVENOUS ONCE
Status: COMPLETED | OUTPATIENT
Start: 2024-10-08 | End: 2024-10-08

## 2024-10-08 RX ORDER — POTASSIUM CHLORIDE 7.45 MG/ML
10 INJECTION INTRAVENOUS
Status: COMPLETED | OUTPATIENT
Start: 2024-10-08 | End: 2024-10-09

## 2024-10-08 RX ADMIN — FOMEPIZOLE 750 MG: 1 INJECTION, SOLUTION INTRAVENOUS at 03:59

## 2024-10-08 RX ADMIN — FOMEPIZOLE 750 MG: 1 INJECTION, SOLUTION INTRAVENOUS at 16:44

## 2024-10-08 RX ADMIN — POTASSIUM CHLORIDE 10 MEQ: 7.46 INJECTION, SOLUTION INTRAVENOUS at 22:45

## 2024-10-08 RX ADMIN — THIAMINE HYDROCHLORIDE 250 MG: 100 INJECTION, SOLUTION INTRAMUSCULAR; INTRAVENOUS at 05:27

## 2024-10-08 RX ADMIN — POTASSIUM CHLORIDE 10 MEQ: 7.46 INJECTION, SOLUTION INTRAVENOUS at 20:36

## 2024-10-08 RX ADMIN — ACETYLCYSTEINE 15000 MG: 200 SOLUTION ORAL; RESPIRATORY (INHALATION) at 20:55

## 2024-10-08 RX ADMIN — POTASSIUM CHLORIDE 10 MEQ: 7.46 INJECTION, SOLUTION INTRAVENOUS at 21:38

## 2024-10-08 RX ADMIN — POTASSIUM CHLORIDE 40 MEQ: 29.8 INJECTION, SOLUTION INTRAVENOUS at 08:27

## 2024-10-08 RX ADMIN — NYSTATIN: 100000 POWDER TOPICAL at 05:27

## 2024-10-08 RX ADMIN — INSULIN LISPRO 3 UNITS: 100 INJECTION, SOLUTION INTRAVENOUS; SUBCUTANEOUS at 12:39

## 2024-10-08 RX ADMIN — POTASSIUM CHLORIDE 10 MEQ: 7.46 INJECTION, SOLUTION INTRAVENOUS at 23:56

## 2024-10-08 RX ADMIN — ACETYLCYSTEINE 15000 MG: 200 SOLUTION ORAL; RESPIRATORY (INHALATION) at 03:58

## 2024-10-08 RX ADMIN — SENNOSIDES AND DOCUSATE SODIUM 2 TABLET: 50; 8.6 TABLET ORAL at 18:22

## 2024-10-08 RX ADMIN — NYSTATIN: 100000 POWDER TOPICAL at 18:24

## 2024-10-08 RX ADMIN — AMLODIPINE BESYLATE 5 MG: 10 TABLET ORAL at 05:25

## 2024-10-08 RX ADMIN — ENOXAPARIN SODIUM 40 MG: 100 INJECTION SUBCUTANEOUS at 18:23

## 2024-10-08 SDOH — ECONOMIC STABILITY: TRANSPORTATION INSECURITY
IN THE PAST 12 MONTHS, HAS LACK OF RELIABLE TRANSPORTATION KEPT YOU FROM MEDICAL APPOINTMENTS, MEETINGS, WORK OR FROM GETTING THINGS NEEDED FOR DAILY LIVING?: YES

## 2024-10-08 SDOH — ECONOMIC STABILITY: TRANSPORTATION INSECURITY
IN THE PAST 12 MONTHS, HAS THE LACK OF TRANSPORTATION KEPT YOU FROM MEDICAL APPOINTMENTS OR FROM GETTING MEDICATIONS?: YES

## 2024-10-08 ASSESSMENT — ENCOUNTER SYMPTOMS
DECREASED APPETITE: 1
HEADACHES: 0
VOMITING: 0
COUGH: 0
NERVOUS/ANXIOUS: 1
ABDOMINAL PAIN: 0
DOUBLE VISION: 0
DECREASED ENERGY: 1
NAUSEA: 0
SHORTNESS OF BREATH: 0
HOPELESSNESS: 1
WEIGHT LOSS: 0
PSYCHOMOTOR RETARDATION: 1
DEPRESSION: 1
THOUGHT CONTENT - SHAME: 1
INSOMNIA: 1
AGGRESSION: 0
EYES NEGATIVE: 1
DECREASED NEED FOR SLEEP: 0
RECENT WEIGHT LOSS: 0
DIFFICULTY FALLING ASLEEP: 1
DIZZINESS: 0
HALLUCINATIONS: 0
DIARRHEA: 0
FOCAL WEAKNESS: 0
AWAKENING FROM SLEEP: 1
PARANOIA: 0
FEVER: 0
PALPITATIONS: 0
MYALGIAS: 0
PREOCCUPATION: 1
HEMOPTYSIS: 0
LOSS OF CONSCIOUSNESS: 0
MUSCULOSKELETAL NEGATIVE: 1
HEARTBURN: 0
BLOOD IN STOOL: 0
TREMORS: 0
EYE PAIN: 0
SEIZURES: 0

## 2024-10-08 ASSESSMENT — SOCIAL DETERMINANTS OF HEALTH (SDOH)
ANXIETY ASSOCIATED WITH SOCIAL SUPPORT SYSTEM: 1
WITHIN THE LAST YEAR, HAVE TO BEEN RAPED OR FORCED TO HAVE ANY KIND OF SEXUAL ACTIVITY BY YOUR PARTNER OR EX-PARTNER?: NO
WITHIN THE LAST YEAR, HAVE YOU BEEN KICKED, HIT, SLAPPED, OR OTHERWISE PHYSICALLY HURT BY YOUR PARTNER OR EX-PARTNER?: NO
WITHIN THE PAST 12 MONTHS, THE FOOD YOU BOUGHT JUST DIDN'T LAST AND YOU DIDN'T HAVE MONEY TO GET MORE: NEVER TRUE
IN THE PAST 12 MONTHS, HAS THE ELECTRIC, GAS, OIL, OR WATER COMPANY THREATENED TO SHUT OFF SERVICE IN YOUR HOME?: NO
WITHIN THE LAST YEAR, HAVE YOU BEEN AFRAID OF YOUR PARTNER OR EX-PARTNER?: NO
WITHIN THE LAST YEAR, HAVE YOU BEEN HUMILIATED OR EMOTIONALLY ABUSED IN OTHER WAYS BY YOUR PARTNER OR EX-PARTNER?: NO
WITHIN THE PAST 12 MONTHS, YOU WORRIED THAT YOUR FOOD WOULD RUN OUT BEFORE YOU GOT THE MONEY TO BUY MORE: NEVER TRUE

## 2024-10-08 ASSESSMENT — LIFESTYLE VARIABLES
AVERAGE NUMBER OF DAYS PER WEEK YOU HAVE A DRINK CONTAINING ALCOHOL: 0
ON A TYPICAL DAY WHEN YOU DRINK ALCOHOL HOW MANY DRINKS DO YOU HAVE: 0
ALCOHOL_USE: NO
HOW MANY TIMES IN THE PAST YEAR HAVE YOU HAD 5 OR MORE DRINKS IN A DAY: 0
TOTAL SCORE: 0
EVER FELT BAD OR GUILTY ABOUT YOUR DRINKING: NO
HAVE YOU EVER FELT YOU SHOULD CUT DOWN ON YOUR DRINKING: NO
CONSUMPTION TOTAL: NEGATIVE
EVER HAD A DRINK FIRST THING IN THE MORNING TO STEADY YOUR NERVES TO GET RID OF A HANGOVER: NO
TOTAL SCORE: 0
HAVE PEOPLE ANNOYED YOU BY CRITICIZING YOUR DRINKING: NO
TOTAL SCORE: 0

## 2024-10-08 ASSESSMENT — COGNITIVE AND FUNCTIONAL STATUS - GENERAL
DAILY ACTIVITIY SCORE: 18
EATING MEALS: A LITTLE
CLIMB 3 TO 5 STEPS WITH RAILING: A LITTLE
PERSONAL GROOMING: A LITTLE
MOBILITY SCORE: 18
DRESSING REGULAR LOWER BODY CLOTHING: A LITTLE
MOVING FROM LYING ON BACK TO SITTING ON SIDE OF FLAT BED: A LITTLE
STANDING UP FROM CHAIR USING ARMS: A LITTLE
HELP NEEDED FOR BATHING: A LITTLE
MOVING TO AND FROM BED TO CHAIR: A LITTLE
TOILETING: A LITTLE
DRESSING REGULAR UPPER BODY CLOTHING: A LITTLE
SUGGESTED CMS G CODE MODIFIER MOBILITY: CK
SUGGESTED CMS G CODE MODIFIER DAILY ACTIVITY: CK
TURNING FROM BACK TO SIDE WHILE IN FLAT BAD: A LITTLE
WALKING IN HOSPITAL ROOM: A LITTLE

## 2024-10-08 ASSESSMENT — FIBROSIS 4 INDEX
FIB4 SCORE: 12.46

## 2024-10-08 ASSESSMENT — PATIENT HEALTH QUESTIONNAIRE - PHQ9
4. FEELING TIRED OR HAVING LITTLE ENERGY: NEARLY EVERY DAY
7. TROUBLE CONCENTRATING ON THINGS, SUCH AS READING THE NEWSPAPER OR WATCHING TELEVISION: SEVERAL DAYS
1. LITTLE INTEREST OR PLEASURE IN DOING THINGS: MORE THAN HALF THE DAYS
6. FEELING BAD ABOUT YOURSELF - OR THAT YOU ARE A FAILURE OR HAVE LET YOURSELF OR YOUR FAMILY DOWN: SEVERAL DAYS
3. TROUBLE FALLING OR STAYING ASLEEP OR SLEEPING TOO MUCH: NEARLY EVERY DAY
8. MOVING OR SPEAKING SO SLOWLY THAT OTHER PEOPLE COULD HAVE NOTICED. OR THE OPPOSITE, BEING SO FIGETY OR RESTLESS THAT YOU HAVE BEEN MOVING AROUND A LOT MORE THAN USUAL: SEVERAL DAYS
2. FEELING DOWN, DEPRESSED, IRRITABLE, OR HOPELESS: NEARLY EVERY DAY
5. POOR APPETITE OR OVEREATING: NEARLY EVERY DAY
SUM OF ALL RESPONSES TO PHQ QUESTIONS 1-9: 18
SUM OF ALL RESPONSES TO PHQ9 QUESTIONS 1 AND 2: 5
9. THOUGHTS THAT YOU WOULD BE BETTER OFF DEAD, OR OF HURTING YOURSELF: SEVERAL DAYS

## 2024-10-08 ASSESSMENT — PAIN DESCRIPTION - PAIN TYPE
TYPE: ACUTE PAIN

## 2024-10-09 LAB
ALBUMIN SERPL BCP-MCNC: 2.8 G/DL (ref 3.2–4.9)
ALBUMIN SERPL BCP-MCNC: 3 G/DL (ref 3.2–4.9)
ALBUMIN/GLOB SERPL: 1.4 G/DL
ALBUMIN/GLOB SERPL: 1.4 G/DL
ALP SERPL-CCNC: 52 U/L (ref 30–99)
ALP SERPL-CCNC: 55 U/L (ref 30–99)
ALT SERPL-CCNC: 2581 U/L (ref 2–50)
ALT SERPL-CCNC: 2770 U/L (ref 2–50)
ANION GAP SERPL CALC-SCNC: 11 MMOL/L (ref 7–16)
ANION GAP SERPL CALC-SCNC: 9 MMOL/L (ref 7–16)
APAP SERPL-MCNC: 6.5 UG/ML (ref 10–30)
APAP SERPL-MCNC: 9.2 UG/ML (ref 10–30)
AST SERPL-CCNC: 555 U/L (ref 12–45)
AST SERPL-CCNC: 800 U/L (ref 12–45)
BASOPHILS # BLD AUTO: 0.4 % (ref 0–1.8)
BASOPHILS # BLD: 0.03 K/UL (ref 0–0.12)
BILIRUB SERPL-MCNC: 1.3 MG/DL (ref 0.1–1.5)
BILIRUB SERPL-MCNC: 1.4 MG/DL (ref 0.1–1.5)
BUN SERPL-MCNC: 9 MG/DL (ref 8–22)
BUN SERPL-MCNC: 9 MG/DL (ref 8–22)
CALCIUM ALBUM COR SERPL-MCNC: 9.1 MG/DL (ref 8.5–10.5)
CALCIUM ALBUM COR SERPL-MCNC: 9.4 MG/DL (ref 8.5–10.5)
CALCIUM SERPL-MCNC: 8.1 MG/DL (ref 8.5–10.5)
CALCIUM SERPL-MCNC: 8.6 MG/DL (ref 8.5–10.5)
CHLORIDE SERPL-SCNC: 100 MMOL/L (ref 96–112)
CHLORIDE SERPL-SCNC: 98 MMOL/L (ref 96–112)
CK SERPL-CCNC: 261 U/L (ref 0–154)
CO2 SERPL-SCNC: 28 MMOL/L (ref 20–33)
CO2 SERPL-SCNC: 28 MMOL/L (ref 20–33)
CREAT SERPL-MCNC: 0.57 MG/DL (ref 0.5–1.4)
CREAT SERPL-MCNC: 0.64 MG/DL (ref 0.5–1.4)
EKG IMPRESSION: NORMAL
EOSINOPHIL # BLD AUTO: 0.2 K/UL (ref 0–0.51)
EOSINOPHIL NFR BLD: 2.7 % (ref 0–6.9)
ERYTHROCYTE [DISTWIDTH] IN BLOOD BY AUTOMATED COUNT: 44 FL (ref 35.9–50)
GFR SERPLBLD CREATININE-BSD FMLA CKD-EPI: 105 ML/MIN/1.73 M 2
GFR SERPLBLD CREATININE-BSD FMLA CKD-EPI: 109 ML/MIN/1.73 M 2
GLOBULIN SER CALC-MCNC: 2 G/DL (ref 1.9–3.5)
GLOBULIN SER CALC-MCNC: 2.1 G/DL (ref 1.9–3.5)
GLUCOSE BLD STRIP.AUTO-MCNC: 111 MG/DL (ref 65–99)
GLUCOSE BLD STRIP.AUTO-MCNC: 123 MG/DL (ref 65–99)
GLUCOSE BLD STRIP.AUTO-MCNC: 125 MG/DL (ref 65–99)
GLUCOSE BLD STRIP.AUTO-MCNC: 146 MG/DL (ref 65–99)
GLUCOSE SERPL-MCNC: 136 MG/DL (ref 65–99)
GLUCOSE SERPL-MCNC: 192 MG/DL (ref 65–99)
HCT VFR BLD AUTO: 35 % (ref 42–52)
HGB BLD-MCNC: 11.8 G/DL (ref 14–18)
IMM GRANULOCYTES # BLD AUTO: 0.04 K/UL (ref 0–0.11)
IMM GRANULOCYTES NFR BLD AUTO: 0.5 % (ref 0–0.9)
INR PPP: 1.28 (ref 0.87–1.13)
INR PPP: 1.42 (ref 0.87–1.13)
LYMPHOCYTES # BLD AUTO: 0.68 K/UL (ref 1–4.8)
LYMPHOCYTES NFR BLD: 9.2 % (ref 22–41)
MAGNESIUM SERPL-MCNC: 1.8 MG/DL (ref 1.5–2.5)
MCH RBC QN AUTO: 30.2 PG (ref 27–33)
MCHC RBC AUTO-ENTMCNC: 33.7 G/DL (ref 32.3–36.5)
MCV RBC AUTO: 89.5 FL (ref 81.4–97.8)
MONOCYTES # BLD AUTO: 0.12 K/UL (ref 0–0.85)
MONOCYTES NFR BLD AUTO: 1.6 % (ref 0–13.4)
NEUTROPHILS # BLD AUTO: 6.35 K/UL (ref 1.82–7.42)
NEUTROPHILS NFR BLD: 85.6 % (ref 44–72)
NRBC # BLD AUTO: 0 K/UL
NRBC BLD-RTO: 0 /100 WBC (ref 0–0.2)
PHOSPHATE SERPL-MCNC: 1.5 MG/DL (ref 2.5–4.5)
PLATELET # BLD AUTO: 116 K/UL (ref 164–446)
PMV BLD AUTO: 9.1 FL (ref 9–12.9)
POTASSIUM SERPL-SCNC: 3.3 MMOL/L (ref 3.6–5.5)
POTASSIUM SERPL-SCNC: 3.3 MMOL/L (ref 3.6–5.5)
PROT SERPL-MCNC: 4.8 G/DL (ref 6–8.2)
PROT SERPL-MCNC: 5.1 G/DL (ref 6–8.2)
PROTHROMBIN TIME: 16 SEC (ref 12–14.6)
PROTHROMBIN TIME: 17.4 SEC (ref 12–14.6)
RBC # BLD AUTO: 3.91 M/UL (ref 4.7–6.1)
SODIUM SERPL-SCNC: 135 MMOL/L (ref 135–145)
SODIUM SERPL-SCNC: 139 MMOL/L (ref 135–145)
WBC # BLD AUTO: 7.4 K/UL (ref 4.8–10.8)

## 2024-10-09 PROCEDURE — 82550 ASSAY OF CK (CPK): CPT

## 2024-10-09 PROCEDURE — 700105 HCHG RX REV CODE 258: Performed by: EMERGENCY MEDICINE

## 2024-10-09 PROCEDURE — 99222 1ST HOSP IP/OBS MODERATE 55: CPT | Performed by: HOSPITALIST

## 2024-10-09 PROCEDURE — 700111 HCHG RX REV CODE 636 W/ 250 OVERRIDE (IP): Performed by: HOSPITALIST

## 2024-10-09 PROCEDURE — 99233 SBSQ HOSP IP/OBS HIGH 50: CPT | Performed by: INTERNAL MEDICINE

## 2024-10-09 PROCEDURE — 90471 IMMUNIZATION ADMIN: CPT

## 2024-10-09 PROCEDURE — 83735 ASSAY OF MAGNESIUM: CPT

## 2024-10-09 PROCEDURE — 700101 HCHG RX REV CODE 250: Mod: JZ | Performed by: EMERGENCY MEDICINE

## 2024-10-09 PROCEDURE — 700105 HCHG RX REV CODE 258

## 2024-10-09 PROCEDURE — 700102 HCHG RX REV CODE 250 W/ 637 OVERRIDE(OP): Performed by: EMERGENCY MEDICINE

## 2024-10-09 PROCEDURE — 90662 IIV NO PRSV INCREASED AG IM: CPT | Performed by: HOSPITALIST

## 2024-10-09 PROCEDURE — 700111 HCHG RX REV CODE 636 W/ 250 OVERRIDE (IP): Mod: JZ

## 2024-10-09 PROCEDURE — A9270 NON-COVERED ITEM OR SERVICE: HCPCS | Performed by: EMERGENCY MEDICINE

## 2024-10-09 PROCEDURE — 93010 ELECTROCARDIOGRAM REPORT: CPT | Performed by: STUDENT IN AN ORGANIZED HEALTH CARE EDUCATION/TRAINING PROGRAM

## 2024-10-09 PROCEDURE — 85025 COMPLETE CBC W/AUTO DIFF WBC: CPT

## 2024-10-09 PROCEDURE — A9270 NON-COVERED ITEM OR SERVICE: HCPCS | Performed by: HOSPITALIST

## 2024-10-09 PROCEDURE — 82962 GLUCOSE BLOOD TEST: CPT

## 2024-10-09 PROCEDURE — 84100 ASSAY OF PHOSPHORUS: CPT

## 2024-10-09 PROCEDURE — 3E02340 INTRODUCTION OF INFLUENZA VACCINE INTO MUSCLE, PERCUTANEOUS APPROACH: ICD-10-PCS | Performed by: HOSPITALIST

## 2024-10-09 PROCEDURE — A9270 NON-COVERED ITEM OR SERVICE: HCPCS | Performed by: INTERNAL MEDICINE

## 2024-10-09 PROCEDURE — 770020 HCHG ROOM/CARE - TELE (206)

## 2024-10-09 PROCEDURE — 85610 PROTHROMBIN TIME: CPT

## 2024-10-09 PROCEDURE — 93005 ELECTROCARDIOGRAM TRACING: CPT | Performed by: HOSPITALIST

## 2024-10-09 PROCEDURE — 80053 COMPREHEN METABOLIC PANEL: CPT

## 2024-10-09 PROCEDURE — 700111 HCHG RX REV CODE 636 W/ 250 OVERRIDE (IP): Performed by: EMERGENCY MEDICINE

## 2024-10-09 PROCEDURE — 700111 HCHG RX REV CODE 636 W/ 250 OVERRIDE (IP): Performed by: INTERNAL MEDICINE

## 2024-10-09 PROCEDURE — 700102 HCHG RX REV CODE 250 W/ 637 OVERRIDE(OP): Performed by: HOSPITALIST

## 2024-10-09 PROCEDURE — 80143 DRUG ASSAY ACETAMINOPHEN: CPT

## 2024-10-09 PROCEDURE — 700102 HCHG RX REV CODE 250 W/ 637 OVERRIDE(OP): Performed by: INTERNAL MEDICINE

## 2024-10-09 PROCEDURE — 700101 HCHG RX REV CODE 250

## 2024-10-09 RX ORDER — ALPRAZOLAM 0.25 MG/1
0.25 TABLET ORAL 4 TIMES DAILY PRN
Status: DISCONTINUED | OUTPATIENT
Start: 2024-10-09 | End: 2024-10-10

## 2024-10-09 RX ORDER — MAGNESIUM SULFATE HEPTAHYDRATE 40 MG/ML
2 INJECTION, SOLUTION INTRAVENOUS ONCE
Status: COMPLETED | OUTPATIENT
Start: 2024-10-09 | End: 2024-10-09

## 2024-10-09 RX ORDER — OXYCODONE HYDROCHLORIDE 5 MG/1
5 TABLET ORAL EVERY 6 HOURS PRN
Status: DISCONTINUED | OUTPATIENT
Start: 2024-10-09 | End: 2024-10-18 | Stop reason: HOSPADM

## 2024-10-09 RX ORDER — POTASSIUM CHLORIDE 29.8 MG/ML
40 INJECTION INTRAVENOUS ONCE
Status: DISCONTINUED | OUTPATIENT
Start: 2024-10-09 | End: 2024-10-09 | Stop reason: ALTCHOICE

## 2024-10-09 RX ORDER — PROCHLORPERAZINE EDISYLATE 5 MG/ML
5 INJECTION INTRAMUSCULAR; INTRAVENOUS EVERY 6 HOURS PRN
Status: DISCONTINUED | OUTPATIENT
Start: 2024-10-09 | End: 2024-10-18 | Stop reason: HOSPADM

## 2024-10-09 RX ADMIN — PROCHLORPERAZINE EDISYLATE 5 MG: 5 INJECTION INTRAMUSCULAR; INTRAVENOUS at 10:28

## 2024-10-09 RX ADMIN — INFLUENZA A VIRUS A/VICTORIA/4897/2022 IVR-238 (H1N1) ANTIGEN (FORMALDEHYDE INACTIVATED), INFLUENZA A VIRUS A/CALIFORNIA/122/2022 SAN-022 (H3N2) ANTIGEN (FORMALDEHYDE INACTIVATED), AND INFLUENZA B VIRUS B/MICHIGAN/01/2021 ANTIGEN (FORMALDEHYDE INACTIVATED) 0.5 ML: 60; 60; 60 INJECTION, SUSPENSION INTRAMUSCULAR at 15:46

## 2024-10-09 RX ADMIN — OXYCODONE HYDROCHLORIDE 5 MG: 5 TABLET ORAL at 19:32

## 2024-10-09 RX ADMIN — AMLODIPINE BESYLATE 5 MG: 10 TABLET ORAL at 05:51

## 2024-10-09 RX ADMIN — POTASSIUM PHOSPHATE, MONOBASIC AND POTASSIUM PHOSPHATE, DIBASIC 30 MMOL: 224; 236 INJECTION, SOLUTION, CONCENTRATE INTRAVENOUS at 08:34

## 2024-10-09 RX ADMIN — MAGNESIUM SULFATE HEPTAHYDRATE 2 G: 2 INJECTION, SOLUTION INTRAVENOUS at 08:31

## 2024-10-09 RX ADMIN — PROCHLORPERAZINE EDISYLATE 5 MG: 5 INJECTION INTRAMUSCULAR; INTRAVENOUS at 04:29

## 2024-10-09 RX ADMIN — ALPRAZOLAM 0.25 MG: 0.25 TABLET ORAL at 19:33

## 2024-10-09 RX ADMIN — ACETYLCYSTEINE 15000 MG: 200 SOLUTION ORAL; RESPIRATORY (INHALATION) at 13:43

## 2024-10-09 RX ADMIN — THIAMINE HYDROCHLORIDE 250 MG: 100 INJECTION, SOLUTION INTRAMUSCULAR; INTRAVENOUS at 05:50

## 2024-10-09 RX ADMIN — ENOXAPARIN SODIUM 40 MG: 100 INJECTION SUBCUTANEOUS at 17:32

## 2024-10-09 RX ADMIN — POLYETHYLENE GLYCOL 3350 1 PACKET: 17 POWDER, FOR SOLUTION ORAL at 19:32

## 2024-10-09 RX ADMIN — SENNOSIDES AND DOCUSATE SODIUM 2 TABLET: 50; 8.6 TABLET ORAL at 17:31

## 2024-10-09 RX ADMIN — NYSTATIN: 100000 POWDER TOPICAL at 05:51

## 2024-10-09 ASSESSMENT — ENCOUNTER SYMPTOMS
ABDOMINAL PAIN: 0
FOCAL WEAKNESS: 0
SEIZURES: 0
TREMORS: 0
CHILLS: 1
RESPIRATORY NEGATIVE: 1
DIARRHEA: 0
NERVOUS/ANXIOUS: 1
VOMITING: 1
HEADACHES: 0
SHORTNESS OF BREATH: 0
BLOOD IN STOOL: 0
EYES NEGATIVE: 1
HEMOPTYSIS: 0
CARDIOVASCULAR NEGATIVE: 1
DIZZINESS: 0
MUSCULOSKELETAL NEGATIVE: 1
FEVER: 0
PALPITATIONS: 0
LOSS OF CONSCIOUSNESS: 0
HEARTBURN: 0
WEAKNESS: 1
NAUSEA: 1
VOMITING: 0
DEPRESSION: 1
ABDOMINAL PAIN: 1

## 2024-10-09 ASSESSMENT — PAIN DESCRIPTION - PAIN TYPE
TYPE: ACUTE PAIN

## 2024-10-09 ASSESSMENT — COGNITIVE AND FUNCTIONAL STATUS - GENERAL
EATING MEALS: A LITTLE
STANDING UP FROM CHAIR USING ARMS: A LOT
DRESSING REGULAR UPPER BODY CLOTHING: A LITTLE
SUGGESTED CMS G CODE MODIFIER DAILY ACTIVITY: CK
TOILETING: A LITTLE
PERSONAL GROOMING: A LITTLE
WALKING IN HOSPITAL ROOM: A LOT
MOVING TO AND FROM BED TO CHAIR: A LOT
MOBILITY SCORE: 13
CLIMB 3 TO 5 STEPS WITH RAILING: A LOT
DAILY ACTIVITIY SCORE: 17
MOVING FROM LYING ON BACK TO SITTING ON SIDE OF FLAT BED: A LOT
SUGGESTED CMS G CODE MODIFIER MOBILITY: CL
DRESSING REGULAR LOWER BODY CLOTHING: A LITTLE
TURNING FROM BACK TO SIDE WHILE IN FLAT BAD: A LITTLE
HELP NEEDED FOR BATHING: A LOT

## 2024-10-09 ASSESSMENT — FIBROSIS 4 INDEX: FIB4 SCORE: 6.12

## 2024-10-10 ENCOUNTER — APPOINTMENT (OUTPATIENT)
Dept: RADIOLOGY | Facility: MEDICAL CENTER | Age: 64
DRG: 917 | End: 2024-10-10
Attending: STUDENT IN AN ORGANIZED HEALTH CARE EDUCATION/TRAINING PROGRAM
Payer: MEDICARE

## 2024-10-10 LAB
ALBUMIN SERPL BCP-MCNC: 3.2 G/DL (ref 3.2–4.9)
ALBUMIN SERPL BCP-MCNC: 3.5 G/DL (ref 3.2–4.9)
ALBUMIN/GLOB SERPL: 1.5 G/DL
ALBUMIN/GLOB SERPL: 1.5 G/DL
ALP SERPL-CCNC: 64 U/L (ref 30–99)
ALP SERPL-CCNC: 64 U/L (ref 30–99)
ALT SERPL-CCNC: 1678 U/L (ref 2–50)
ALT SERPL-CCNC: 1683 U/L (ref 2–50)
ANION GAP SERPL CALC-SCNC: 11 MMOL/L (ref 7–16)
ANION GAP SERPL CALC-SCNC: 9 MMOL/L (ref 7–16)
APAP SERPL-MCNC: <5 UG/ML (ref 10–30)
AST SERPL-CCNC: 152 U/L (ref 12–45)
AST SERPL-CCNC: 216 U/L (ref 12–45)
BASOPHILS # BLD AUTO: 0.5 % (ref 0–1.8)
BASOPHILS # BLD: 0.03 K/UL (ref 0–0.12)
BILIRUB SERPL-MCNC: 1.3 MG/DL (ref 0.1–1.5)
BILIRUB SERPL-MCNC: 1.4 MG/DL (ref 0.1–1.5)
BUN SERPL-MCNC: 8 MG/DL (ref 8–22)
BUN SERPL-MCNC: 9 MG/DL (ref 8–22)
CALCIUM ALBUM COR SERPL-MCNC: 9.4 MG/DL (ref 8.5–10.5)
CALCIUM ALBUM COR SERPL-MCNC: 9.4 MG/DL (ref 8.5–10.5)
CALCIUM SERPL-MCNC: 8.8 MG/DL (ref 8.5–10.5)
CALCIUM SERPL-MCNC: 9 MG/DL (ref 8.5–10.5)
CHLORIDE SERPL-SCNC: 100 MMOL/L (ref 96–112)
CHLORIDE SERPL-SCNC: 100 MMOL/L (ref 96–112)
CK SERPL-CCNC: 183 U/L (ref 0–154)
CO2 SERPL-SCNC: 27 MMOL/L (ref 20–33)
CO2 SERPL-SCNC: 28 MMOL/L (ref 20–33)
CREAT SERPL-MCNC: 0.57 MG/DL (ref 0.5–1.4)
CREAT SERPL-MCNC: 0.64 MG/DL (ref 0.5–1.4)
EKG IMPRESSION: NORMAL
EOSINOPHIL # BLD AUTO: 0.22 K/UL (ref 0–0.51)
EOSINOPHIL NFR BLD: 3.6 % (ref 0–6.9)
ERYTHROCYTE [DISTWIDTH] IN BLOOD BY AUTOMATED COUNT: 44.2 FL (ref 35.9–50)
GFR SERPLBLD CREATININE-BSD FMLA CKD-EPI: 105 ML/MIN/1.73 M 2
GFR SERPLBLD CREATININE-BSD FMLA CKD-EPI: 109 ML/MIN/1.73 M 2
GLOBULIN SER CALC-MCNC: 2.2 G/DL (ref 1.9–3.5)
GLOBULIN SER CALC-MCNC: 2.4 G/DL (ref 1.9–3.5)
GLUCOSE BLD STRIP.AUTO-MCNC: 100 MG/DL (ref 65–99)
GLUCOSE BLD STRIP.AUTO-MCNC: 114 MG/DL (ref 65–99)
GLUCOSE BLD STRIP.AUTO-MCNC: 125 MG/DL (ref 65–99)
GLUCOSE BLD STRIP.AUTO-MCNC: 144 MG/DL (ref 65–99)
GLUCOSE SERPL-MCNC: 115 MG/DL (ref 65–99)
GLUCOSE SERPL-MCNC: 129 MG/DL (ref 65–99)
HCT VFR BLD AUTO: 35.8 % (ref 42–52)
HGB BLD-MCNC: 12.4 G/DL (ref 14–18)
IMM GRANULOCYTES # BLD AUTO: 0.02 K/UL (ref 0–0.11)
IMM GRANULOCYTES NFR BLD AUTO: 0.3 % (ref 0–0.9)
INR PPP: 1.07 (ref 0.87–1.13)
LYMPHOCYTES # BLD AUTO: 1.01 K/UL (ref 1–4.8)
LYMPHOCYTES NFR BLD: 16.5 % (ref 22–41)
MAGNESIUM SERPL-MCNC: 1.9 MG/DL (ref 1.5–2.5)
MCH RBC QN AUTO: 30.8 PG (ref 27–33)
MCHC RBC AUTO-ENTMCNC: 34.6 G/DL (ref 32.3–36.5)
MCV RBC AUTO: 88.8 FL (ref 81.4–97.8)
MONOCYTES # BLD AUTO: 0.23 K/UL (ref 0–0.85)
MONOCYTES NFR BLD AUTO: 3.8 % (ref 0–13.4)
NEUTROPHILS # BLD AUTO: 4.61 K/UL (ref 1.82–7.42)
NEUTROPHILS NFR BLD: 75.3 % (ref 44–72)
NRBC # BLD AUTO: 0 K/UL
NRBC BLD-RTO: 0 /100 WBC (ref 0–0.2)
PHOSPHATE SERPL-MCNC: 2.5 MG/DL (ref 2.5–4.5)
PLATELET # BLD AUTO: 156 K/UL (ref 164–446)
PMV BLD AUTO: 9.5 FL (ref 9–12.9)
POTASSIUM SERPL-SCNC: 2.8 MMOL/L (ref 3.6–5.5)
POTASSIUM SERPL-SCNC: 3.4 MMOL/L (ref 3.6–5.5)
PROT SERPL-MCNC: 5.4 G/DL (ref 6–8.2)
PROT SERPL-MCNC: 5.9 G/DL (ref 6–8.2)
PROTHROMBIN TIME: 13.9 SEC (ref 12–14.6)
RBC # BLD AUTO: 4.03 M/UL (ref 4.7–6.1)
SODIUM SERPL-SCNC: 136 MMOL/L (ref 135–145)
SODIUM SERPL-SCNC: 139 MMOL/L (ref 135–145)
WBC # BLD AUTO: 6.1 K/UL (ref 4.8–10.8)

## 2024-10-10 PROCEDURE — 84100 ASSAY OF PHOSPHORUS: CPT

## 2024-10-10 PROCEDURE — 700102 HCHG RX REV CODE 250 W/ 637 OVERRIDE(OP): Performed by: STUDENT IN AN ORGANIZED HEALTH CARE EDUCATION/TRAINING PROGRAM

## 2024-10-10 PROCEDURE — 700111 HCHG RX REV CODE 636 W/ 250 OVERRIDE (IP): Mod: JZ | Performed by: INTERNAL MEDICINE

## 2024-10-10 PROCEDURE — A9270 NON-COVERED ITEM OR SERVICE: HCPCS | Mod: JZ

## 2024-10-10 PROCEDURE — 700102 HCHG RX REV CODE 250 W/ 637 OVERRIDE(OP): Performed by: HOSPITALIST

## 2024-10-10 PROCEDURE — 82962 GLUCOSE BLOOD TEST: CPT | Mod: 91

## 2024-10-10 PROCEDURE — 85610 PROTHROMBIN TIME: CPT

## 2024-10-10 PROCEDURE — 99232 SBSQ HOSP IP/OBS MODERATE 35: CPT | Performed by: STUDENT IN AN ORGANIZED HEALTH CARE EDUCATION/TRAINING PROGRAM

## 2024-10-10 PROCEDURE — 770020 HCHG ROOM/CARE - TELE (206)

## 2024-10-10 PROCEDURE — A9270 NON-COVERED ITEM OR SERVICE: HCPCS | Performed by: HOSPITALIST

## 2024-10-10 PROCEDURE — 700102 HCHG RX REV CODE 250 W/ 637 OVERRIDE(OP): Mod: JZ

## 2024-10-10 PROCEDURE — 36415 COLL VENOUS BLD VENIPUNCTURE: CPT

## 2024-10-10 PROCEDURE — 80143 DRUG ASSAY ACETAMINOPHEN: CPT

## 2024-10-10 PROCEDURE — 700111 HCHG RX REV CODE 636 W/ 250 OVERRIDE (IP): Mod: JZ | Performed by: NURSE PRACTITIONER

## 2024-10-10 PROCEDURE — A9270 NON-COVERED ITEM OR SERVICE: HCPCS | Performed by: EMERGENCY MEDICINE

## 2024-10-10 PROCEDURE — 700102 HCHG RX REV CODE 250 W/ 637 OVERRIDE(OP): Performed by: INTERNAL MEDICINE

## 2024-10-10 PROCEDURE — 700111 HCHG RX REV CODE 636 W/ 250 OVERRIDE (IP)

## 2024-10-10 PROCEDURE — 82550 ASSAY OF CK (CPK): CPT

## 2024-10-10 PROCEDURE — 97535 SELF CARE MNGMENT TRAINING: CPT

## 2024-10-10 PROCEDURE — 85025 COMPLETE CBC W/AUTO DIFF WBC: CPT

## 2024-10-10 PROCEDURE — 80053 COMPREHEN METABOLIC PANEL: CPT

## 2024-10-10 PROCEDURE — 93005 ELECTROCARDIOGRAM TRACING: CPT | Performed by: STUDENT IN AN ORGANIZED HEALTH CARE EDUCATION/TRAINING PROGRAM

## 2024-10-10 PROCEDURE — 99233 SBSQ HOSP IP/OBS HIGH 50: CPT | Performed by: STUDENT IN AN ORGANIZED HEALTH CARE EDUCATION/TRAINING PROGRAM

## 2024-10-10 PROCEDURE — 700111 HCHG RX REV CODE 636 W/ 250 OVERRIDE (IP): Performed by: EMERGENCY MEDICINE

## 2024-10-10 PROCEDURE — 97162 PT EVAL MOD COMPLEX 30 MIN: CPT

## 2024-10-10 PROCEDURE — 93010 ELECTROCARDIOGRAM REPORT: CPT | Performed by: INTERNAL MEDICINE

## 2024-10-10 PROCEDURE — 83735 ASSAY OF MAGNESIUM: CPT

## 2024-10-10 PROCEDURE — A9270 NON-COVERED ITEM OR SERVICE: HCPCS | Performed by: INTERNAL MEDICINE

## 2024-10-10 PROCEDURE — 700102 HCHG RX REV CODE 250 W/ 637 OVERRIDE(OP): Performed by: EMERGENCY MEDICINE

## 2024-10-10 PROCEDURE — A9270 NON-COVERED ITEM OR SERVICE: HCPCS | Performed by: STUDENT IN AN ORGANIZED HEALTH CARE EDUCATION/TRAINING PROGRAM

## 2024-10-10 RX ORDER — ARIPIPRAZOLE 10 MG/1
10 TABLET ORAL DAILY
Status: DISCONTINUED | OUTPATIENT
Start: 2024-10-11 | End: 2024-10-18 | Stop reason: HOSPADM

## 2024-10-10 RX ORDER — POTASSIUM CHLORIDE 1500 MG/1
40 TABLET, EXTENDED RELEASE ORAL EVERY 4 HOURS
Status: COMPLETED | OUTPATIENT
Start: 2024-10-10 | End: 2024-10-10

## 2024-10-10 RX ORDER — MAGNESIUM SULFATE HEPTAHYDRATE 40 MG/ML
2 INJECTION, SOLUTION INTRAVENOUS ONCE
Status: COMPLETED | OUTPATIENT
Start: 2024-10-10 | End: 2024-10-10

## 2024-10-10 RX ORDER — CLONIDINE HYDROCHLORIDE 0.1 MG/1
0.1 TABLET ORAL NIGHTLY
Status: DISCONTINUED | OUTPATIENT
Start: 2024-10-10 | End: 2024-10-13

## 2024-10-10 RX ORDER — LORAZEPAM 0.5 MG/1
0.5 TABLET ORAL EVERY 6 HOURS PRN
Status: DISCONTINUED | OUTPATIENT
Start: 2024-10-10 | End: 2024-10-18 | Stop reason: HOSPADM

## 2024-10-10 RX ORDER — KETOROLAC TROMETHAMINE 15 MG/ML
15 INJECTION, SOLUTION INTRAMUSCULAR; INTRAVENOUS ONCE
Status: COMPLETED | OUTPATIENT
Start: 2024-10-10 | End: 2024-10-10

## 2024-10-10 RX ADMIN — POTASSIUM CHLORIDE 40 MEQ: 1500 TABLET, EXTENDED RELEASE ORAL at 04:54

## 2024-10-10 RX ADMIN — THIAMINE HYDROCHLORIDE 250 MG: 100 INJECTION, SOLUTION INTRAMUSCULAR; INTRAVENOUS at 04:55

## 2024-10-10 RX ADMIN — AMLODIPINE BESYLATE 5 MG: 10 TABLET ORAL at 04:55

## 2024-10-10 RX ADMIN — OXYCODONE HYDROCHLORIDE 5 MG: 5 TABLET ORAL at 22:55

## 2024-10-10 RX ADMIN — POLYETHYLENE GLYCOL 3350 1 PACKET: 17 POWDER, FOR SOLUTION ORAL at 04:54

## 2024-10-10 RX ADMIN — MAGNESIUM SULFATE HEPTAHYDRATE 2 G: 2 INJECTION, SOLUTION INTRAVENOUS at 03:26

## 2024-10-10 RX ADMIN — LORAZEPAM 0.5 MG: 0.5 TABLET ORAL at 22:55

## 2024-10-10 RX ADMIN — CLONIDINE HYDROCHLORIDE 0.1 MG: 0.1 TABLET ORAL at 20:51

## 2024-10-10 RX ADMIN — ENOXAPARIN SODIUM 40 MG: 100 INJECTION SUBCUTANEOUS at 17:38

## 2024-10-10 RX ADMIN — POTASSIUM CHLORIDE 40 MEQ: 1500 TABLET, EXTENDED RELEASE ORAL at 02:18

## 2024-10-10 RX ADMIN — POTASSIUM CHLORIDE 40 MEQ: 1500 TABLET, EXTENDED RELEASE ORAL at 09:52

## 2024-10-10 RX ADMIN — KETOROLAC TROMETHAMINE 15 MG: 15 INJECTION, SOLUTION INTRAMUSCULAR; INTRAVENOUS at 20:51

## 2024-10-10 ASSESSMENT — ENCOUNTER SYMPTOMS
HALLUCINATIONS: 0
HEADACHES: 0
DIZZINESS: 0
INSOMNIA: 1
NERVOUS/ANXIOUS: 1
WEAKNESS: 1
DEPRESSION: 1

## 2024-10-10 ASSESSMENT — GAIT ASSESSMENTS
GAIT LEVEL OF ASSIST: STANDBY ASSIST
ASSISTIVE DEVICE: QUAD CANE;FRONT WHEEL WALKER
DISTANCE (FEET): 80

## 2024-10-10 ASSESSMENT — COGNITIVE AND FUNCTIONAL STATUS - GENERAL
STANDING UP FROM CHAIR USING ARMS: A LITTLE
MOVING TO AND FROM BED TO CHAIR: A LITTLE
TURNING FROM BACK TO SIDE WHILE IN FLAT BAD: A LITTLE
WALKING IN HOSPITAL ROOM: A LITTLE
CLIMB 3 TO 5 STEPS WITH RAILING: A LITTLE
SUGGESTED CMS G CODE MODIFIER MOBILITY: CK
MOBILITY SCORE: 18
MOVING FROM LYING ON BACK TO SITTING ON SIDE OF FLAT BED: A LITTLE

## 2024-10-10 ASSESSMENT — PAIN DESCRIPTION - PAIN TYPE
TYPE: ACUTE PAIN
TYPE: ACUTE PAIN

## 2024-10-11 LAB
ALBUMIN SERPL BCP-MCNC: 3.3 G/DL (ref 3.2–4.9)
ALBUMIN/GLOB SERPL: 1.4 G/DL
ALP SERPL-CCNC: 67 U/L (ref 30–99)
ALT SERPL-CCNC: 956 U/L (ref 2–50)
ANION GAP SERPL CALC-SCNC: 10 MMOL/L (ref 7–16)
AST SERPL-CCNC: 66 U/L (ref 12–45)
BACTERIA BLD CULT: NORMAL
BACTERIA BLD CULT: NORMAL
BASOPHILS # BLD AUTO: 0.7 % (ref 0–1.8)
BASOPHILS # BLD: 0.03 K/UL (ref 0–0.12)
BILIRUB SERPL-MCNC: 1.1 MG/DL (ref 0.1–1.5)
BUN SERPL-MCNC: 12 MG/DL (ref 8–22)
CALCIUM ALBUM COR SERPL-MCNC: 9.4 MG/DL (ref 8.5–10.5)
CALCIUM SERPL-MCNC: 8.8 MG/DL (ref 8.5–10.5)
CHLORIDE SERPL-SCNC: 100 MMOL/L (ref 96–112)
CO2 SERPL-SCNC: 25 MMOL/L (ref 20–33)
CREAT SERPL-MCNC: 0.62 MG/DL (ref 0.5–1.4)
EOSINOPHIL # BLD AUTO: 0.22 K/UL (ref 0–0.51)
EOSINOPHIL NFR BLD: 5.4 % (ref 0–6.9)
ERYTHROCYTE [DISTWIDTH] IN BLOOD BY AUTOMATED COUNT: 42.6 FL (ref 35.9–50)
FLUAV RNA SPEC QL NAA+PROBE: NEGATIVE
FLUBV RNA SPEC QL NAA+PROBE: NEGATIVE
GFR SERPLBLD CREATININE-BSD FMLA CKD-EPI: 106 ML/MIN/1.73 M 2
GLOBULIN SER CALC-MCNC: 2.3 G/DL (ref 1.9–3.5)
GLUCOSE SERPL-MCNC: 92 MG/DL (ref 65–99)
HCT VFR BLD AUTO: 34.5 % (ref 42–52)
HGB BLD-MCNC: 12.3 G/DL (ref 14–18)
IMM GRANULOCYTES # BLD AUTO: 0.01 K/UL (ref 0–0.11)
IMM GRANULOCYTES NFR BLD AUTO: 0.2 % (ref 0–0.9)
LYMPHOCYTES # BLD AUTO: 1.45 K/UL (ref 1–4.8)
LYMPHOCYTES NFR BLD: 35.4 % (ref 22–41)
MCH RBC QN AUTO: 31.1 PG (ref 27–33)
MCHC RBC AUTO-ENTMCNC: 35.7 G/DL (ref 32.3–36.5)
MCV RBC AUTO: 87.1 FL (ref 81.4–97.8)
MONOCYTES # BLD AUTO: 0.46 K/UL (ref 0–0.85)
MONOCYTES NFR BLD AUTO: 11.2 % (ref 0–13.4)
NEUTROPHILS # BLD AUTO: 1.93 K/UL (ref 1.82–7.42)
NEUTROPHILS NFR BLD: 47.1 % (ref 44–72)
NRBC # BLD AUTO: 0 K/UL
NRBC BLD-RTO: 0 /100 WBC (ref 0–0.2)
PLATELET # BLD AUTO: 146 K/UL (ref 164–446)
PMV BLD AUTO: 8.9 FL (ref 9–12.9)
POTASSIUM SERPL-SCNC: 3.4 MMOL/L (ref 3.6–5.5)
PROT SERPL-MCNC: 5.6 G/DL (ref 6–8.2)
RBC # BLD AUTO: 3.96 M/UL (ref 4.7–6.1)
RSV RNA SPEC QL NAA+PROBE: NEGATIVE
SARS-COV-2 RNA RESP QL NAA+PROBE: NOTDETECTED
SIGNIFICANT IND 70042: NORMAL
SIGNIFICANT IND 70042: NORMAL
SITE SITE: NORMAL
SITE SITE: NORMAL
SODIUM SERPL-SCNC: 135 MMOL/L (ref 135–145)
SOURCE SOURCE: NORMAL
SOURCE SOURCE: NORMAL
SPECIMEN SOURCE: NORMAL
WBC # BLD AUTO: 4.1 K/UL (ref 4.8–10.8)

## 2024-10-11 PROCEDURE — 700102 HCHG RX REV CODE 250 W/ 637 OVERRIDE(OP): Performed by: INTERNAL MEDICINE

## 2024-10-11 PROCEDURE — 99233 SBSQ HOSP IP/OBS HIGH 50: CPT | Performed by: STUDENT IN AN ORGANIZED HEALTH CARE EDUCATION/TRAINING PROGRAM

## 2024-10-11 PROCEDURE — A9270 NON-COVERED ITEM OR SERVICE: HCPCS | Performed by: EMERGENCY MEDICINE

## 2024-10-11 PROCEDURE — 700111 HCHG RX REV CODE 636 W/ 250 OVERRIDE (IP): Mod: JZ | Performed by: INTERNAL MEDICINE

## 2024-10-11 PROCEDURE — 0241U HCHG SARS-COV-2 COVID-19 NFCT DS RESP RNA 4 TRGT MIC: CPT

## 2024-10-11 PROCEDURE — 87040 BLOOD CULTURE FOR BACTERIA: CPT

## 2024-10-11 PROCEDURE — 700102 HCHG RX REV CODE 250 W/ 637 OVERRIDE(OP): Performed by: HOSPITALIST

## 2024-10-11 PROCEDURE — 700102 HCHG RX REV CODE 250 W/ 637 OVERRIDE(OP): Performed by: EMERGENCY MEDICINE

## 2024-10-11 PROCEDURE — 97116 GAIT TRAINING THERAPY: CPT

## 2024-10-11 PROCEDURE — A9270 NON-COVERED ITEM OR SERVICE: HCPCS | Performed by: HOSPITALIST

## 2024-10-11 PROCEDURE — 97110 THERAPEUTIC EXERCISES: CPT

## 2024-10-11 PROCEDURE — A9270 NON-COVERED ITEM OR SERVICE: HCPCS | Performed by: INTERNAL MEDICINE

## 2024-10-11 PROCEDURE — A9270 NON-COVERED ITEM OR SERVICE: HCPCS | Performed by: STUDENT IN AN ORGANIZED HEALTH CARE EDUCATION/TRAINING PROGRAM

## 2024-10-11 PROCEDURE — 700111 HCHG RX REV CODE 636 W/ 250 OVERRIDE (IP): Performed by: EMERGENCY MEDICINE

## 2024-10-11 PROCEDURE — 700102 HCHG RX REV CODE 250 W/ 637 OVERRIDE(OP): Performed by: STUDENT IN AN ORGANIZED HEALTH CARE EDUCATION/TRAINING PROGRAM

## 2024-10-11 PROCEDURE — 770001 HCHG ROOM/CARE - MED/SURG/GYN PRIV*

## 2024-10-11 PROCEDURE — 36415 COLL VENOUS BLD VENIPUNCTURE: CPT

## 2024-10-11 PROCEDURE — 85025 COMPLETE CBC W/AUTO DIFF WBC: CPT

## 2024-10-11 PROCEDURE — 80053 COMPREHEN METABOLIC PANEL: CPT

## 2024-10-11 PROCEDURE — 99232 SBSQ HOSP IP/OBS MODERATE 35: CPT | Performed by: STUDENT IN AN ORGANIZED HEALTH CARE EDUCATION/TRAINING PROGRAM

## 2024-10-11 RX ORDER — CARVEDILOL 12.5 MG/1
12.5 TABLET ORAL 2 TIMES DAILY WITH MEALS
Status: DISCONTINUED | OUTPATIENT
Start: 2024-10-11 | End: 2024-10-18 | Stop reason: HOSPADM

## 2024-10-11 RX ORDER — AMLODIPINE BESYLATE 10 MG/1
10 TABLET ORAL
Status: DISCONTINUED | OUTPATIENT
Start: 2024-10-12 | End: 2024-10-18 | Stop reason: HOSPADM

## 2024-10-11 RX ORDER — AMLODIPINE BESYLATE 5 MG/1
5 TABLET ORAL ONCE
Status: COMPLETED | OUTPATIENT
Start: 2024-10-11 | End: 2024-10-11

## 2024-10-11 RX ORDER — POTASSIUM CHLORIDE 1500 MG/1
40 TABLET, EXTENDED RELEASE ORAL ONCE
Status: COMPLETED | OUTPATIENT
Start: 2024-10-11 | End: 2024-10-11

## 2024-10-11 RX ORDER — LOSARTAN POTASSIUM 25 MG/1
25 TABLET ORAL
Status: DISCONTINUED | OUTPATIENT
Start: 2024-10-11 | End: 2024-10-18 | Stop reason: HOSPADM

## 2024-10-11 RX ADMIN — ARIPIPRAZOLE 10 MG: 10 TABLET ORAL at 05:26

## 2024-10-11 RX ADMIN — LOSARTAN POTASSIUM 25 MG: 25 TABLET, FILM COATED ORAL at 15:45

## 2024-10-11 RX ADMIN — CARVEDILOL 12.5 MG: 12.5 TABLET, FILM COATED ORAL at 16:53

## 2024-10-11 RX ADMIN — LORAZEPAM 0.5 MG: 0.5 TABLET ORAL at 22:06

## 2024-10-11 RX ADMIN — CARVEDILOL 12.5 MG: 12.5 TABLET, FILM COATED ORAL at 09:13

## 2024-10-11 RX ADMIN — ENOXAPARIN SODIUM 40 MG: 100 INJECTION SUBCUTANEOUS at 16:55

## 2024-10-11 RX ADMIN — POTASSIUM CHLORIDE 40 MEQ: 1500 TABLET, EXTENDED RELEASE ORAL at 09:13

## 2024-10-11 RX ADMIN — OXYCODONE HYDROCHLORIDE 5 MG: 5 TABLET ORAL at 20:34

## 2024-10-11 RX ADMIN — CLONIDINE HYDROCHLORIDE 0.1 MG: 0.1 TABLET ORAL at 20:34

## 2024-10-11 RX ADMIN — OXYCODONE HYDROCHLORIDE 5 MG: 5 TABLET ORAL at 05:26

## 2024-10-11 RX ADMIN — THIAMINE HYDROCHLORIDE 250 MG: 100 INJECTION, SOLUTION INTRAMUSCULAR; INTRAVENOUS at 05:25

## 2024-10-11 RX ADMIN — AMLODIPINE BESYLATE 5 MG: 10 TABLET ORAL at 05:26

## 2024-10-11 RX ADMIN — SENNOSIDES AND DOCUSATE SODIUM 2 TABLET: 50; 8.6 TABLET ORAL at 16:53

## 2024-10-11 RX ADMIN — AMLODIPINE BESYLATE 5 MG: 5 TABLET ORAL at 10:54

## 2024-10-11 ASSESSMENT — GAIT ASSESSMENTS
ASSISTIVE DEVICE: SINGLE POINT CANE
DISTANCE (FEET): 120
DEVIATION: DECREASED HEEL STRIKE;DECREASED TOE OFF
GAIT LEVEL OF ASSIST: STANDBY ASSIST

## 2024-10-11 ASSESSMENT — PAIN DESCRIPTION - PAIN TYPE
TYPE: CHRONIC PAIN
TYPE: ACUTE PAIN
TYPE: ACUTE PAIN
TYPE: CHRONIC PAIN

## 2024-10-11 ASSESSMENT — ENCOUNTER SYMPTOMS
NERVOUS/ANXIOUS: 1
DEPRESSION: 1
WEAKNESS: 1

## 2024-10-11 ASSESSMENT — COGNITIVE AND FUNCTIONAL STATUS - GENERAL
MOVING TO AND FROM BED TO CHAIR: A LITTLE
SUGGESTED CMS G CODE MODIFIER MOBILITY: CK
MOBILITY SCORE: 18
MOVING FROM LYING ON BACK TO SITTING ON SIDE OF FLAT BED: A LITTLE
STANDING UP FROM CHAIR USING ARMS: A LITTLE
WALKING IN HOSPITAL ROOM: A LITTLE
CLIMB 3 TO 5 STEPS WITH RAILING: A LITTLE
TURNING FROM BACK TO SIDE WHILE IN FLAT BAD: A LITTLE

## 2024-10-11 ASSESSMENT — FIBROSIS 4 INDEX: FIB4 SCORE: 0.95

## 2024-10-12 ENCOUNTER — APPOINTMENT (OUTPATIENT)
Dept: RADIOLOGY | Facility: MEDICAL CENTER | Age: 64
DRG: 917 | End: 2024-10-12
Attending: STUDENT IN AN ORGANIZED HEALTH CARE EDUCATION/TRAINING PROGRAM
Payer: MEDICARE

## 2024-10-12 LAB
ALBUMIN SERPL BCP-MCNC: 3.4 G/DL (ref 3.2–4.9)
ALBUMIN/GLOB SERPL: 1.4 G/DL
ALP SERPL-CCNC: 71 U/L (ref 30–99)
ALT SERPL-CCNC: 651 U/L (ref 2–50)
ANION GAP SERPL CALC-SCNC: 14 MMOL/L (ref 7–16)
AST SERPL-CCNC: 35 U/L (ref 12–45)
BILIRUB SERPL-MCNC: 0.9 MG/DL (ref 0.1–1.5)
BUN SERPL-MCNC: 14 MG/DL (ref 8–22)
CALCIUM ALBUM COR SERPL-MCNC: 9.4 MG/DL (ref 8.5–10.5)
CALCIUM SERPL-MCNC: 8.9 MG/DL (ref 8.5–10.5)
CHLORIDE SERPL-SCNC: 97 MMOL/L (ref 96–112)
CO2 SERPL-SCNC: 23 MMOL/L (ref 20–33)
CREAT SERPL-MCNC: 0.66 MG/DL (ref 0.5–1.4)
ERYTHROCYTE [DISTWIDTH] IN BLOOD BY AUTOMATED COUNT: 40.7 FL (ref 35.9–50)
GFR SERPLBLD CREATININE-BSD FMLA CKD-EPI: 104 ML/MIN/1.73 M 2
GLOBULIN SER CALC-MCNC: 2.4 G/DL (ref 1.9–3.5)
GLUCOSE SERPL-MCNC: 94 MG/DL (ref 65–99)
HCT VFR BLD AUTO: 37.5 % (ref 42–52)
HGB BLD-MCNC: 13 G/DL (ref 14–18)
MCH RBC QN AUTO: 29.8 PG (ref 27–33)
MCHC RBC AUTO-ENTMCNC: 34.7 G/DL (ref 32.3–36.5)
MCV RBC AUTO: 86 FL (ref 81.4–97.8)
PLATELET # BLD AUTO: 196 K/UL (ref 164–446)
PMV BLD AUTO: 8.9 FL (ref 9–12.9)
POTASSIUM SERPL-SCNC: 3.5 MMOL/L (ref 3.6–5.5)
PROCALCITONIN SERPL-MCNC: 0.7 NG/ML
PROT SERPL-MCNC: 5.8 G/DL (ref 6–8.2)
RBC # BLD AUTO: 4.36 M/UL (ref 4.7–6.1)
SODIUM SERPL-SCNC: 134 MMOL/L (ref 135–145)
WBC # BLD AUTO: 6.8 K/UL (ref 4.8–10.8)

## 2024-10-12 PROCEDURE — 700102 HCHG RX REV CODE 250 W/ 637 OVERRIDE(OP): Performed by: HOSPITALIST

## 2024-10-12 PROCEDURE — A9270 NON-COVERED ITEM OR SERVICE: HCPCS | Performed by: EMERGENCY MEDICINE

## 2024-10-12 PROCEDURE — 700102 HCHG RX REV CODE 250 W/ 637 OVERRIDE(OP): Performed by: STUDENT IN AN ORGANIZED HEALTH CARE EDUCATION/TRAINING PROGRAM

## 2024-10-12 PROCEDURE — 700111 HCHG RX REV CODE 636 W/ 250 OVERRIDE (IP): Performed by: EMERGENCY MEDICINE

## 2024-10-12 PROCEDURE — 700102 HCHG RX REV CODE 250 W/ 637 OVERRIDE(OP): Performed by: EMERGENCY MEDICINE

## 2024-10-12 PROCEDURE — 84145 PROCALCITONIN (PCT): CPT

## 2024-10-12 PROCEDURE — A9270 NON-COVERED ITEM OR SERVICE: HCPCS | Performed by: HOSPITALIST

## 2024-10-12 PROCEDURE — 85027 COMPLETE CBC AUTOMATED: CPT

## 2024-10-12 PROCEDURE — 700111 HCHG RX REV CODE 636 W/ 250 OVERRIDE (IP): Mod: JZ | Performed by: INTERNAL MEDICINE

## 2024-10-12 PROCEDURE — 36415 COLL VENOUS BLD VENIPUNCTURE: CPT

## 2024-10-12 PROCEDURE — 71045 X-RAY EXAM CHEST 1 VIEW: CPT

## 2024-10-12 PROCEDURE — 85025 COMPLETE CBC W/AUTO DIFF WBC: CPT

## 2024-10-12 PROCEDURE — A9270 NON-COVERED ITEM OR SERVICE: HCPCS | Performed by: STUDENT IN AN ORGANIZED HEALTH CARE EDUCATION/TRAINING PROGRAM

## 2024-10-12 PROCEDURE — 99233 SBSQ HOSP IP/OBS HIGH 50: CPT | Performed by: STUDENT IN AN ORGANIZED HEALTH CARE EDUCATION/TRAINING PROGRAM

## 2024-10-12 PROCEDURE — 80053 COMPREHEN METABOLIC PANEL: CPT

## 2024-10-12 PROCEDURE — 770001 HCHG ROOM/CARE - MED/SURG/GYN PRIV*

## 2024-10-12 PROCEDURE — 51798 US URINE CAPACITY MEASURE: CPT

## 2024-10-12 RX ORDER — POTASSIUM CHLORIDE 1500 MG/1
40 TABLET, EXTENDED RELEASE ORAL ONCE
Status: COMPLETED | OUTPATIENT
Start: 2024-10-12 | End: 2024-10-12

## 2024-10-12 RX ORDER — METHOCARBAMOL 500 MG/1
500 TABLET, FILM COATED ORAL 3 TIMES DAILY PRN
Status: DISCONTINUED | OUTPATIENT
Start: 2024-10-12 | End: 2024-10-18 | Stop reason: HOSPADM

## 2024-10-12 RX ORDER — FINASTERIDE 5 MG/1
5 TABLET, FILM COATED ORAL DAILY
Status: DISCONTINUED | OUTPATIENT
Start: 2024-10-12 | End: 2024-10-18 | Stop reason: HOSPADM

## 2024-10-12 RX ADMIN — LORAZEPAM 0.5 MG: 0.5 TABLET ORAL at 20:38

## 2024-10-12 RX ADMIN — SENNOSIDES AND DOCUSATE SODIUM 2 TABLET: 50; 8.6 TABLET ORAL at 18:20

## 2024-10-12 RX ADMIN — CARVEDILOL 12.5 MG: 12.5 TABLET, FILM COATED ORAL at 18:21

## 2024-10-12 RX ADMIN — METHOCARBAMOL 500 MG: 500 TABLET ORAL at 10:16

## 2024-10-12 RX ADMIN — FINASTERIDE 5 MG: 5 TABLET, FILM COATED ORAL at 10:16

## 2024-10-12 RX ADMIN — OXYCODONE HYDROCHLORIDE 5 MG: 5 TABLET ORAL at 12:14

## 2024-10-12 RX ADMIN — ARIPIPRAZOLE 10 MG: 10 TABLET ORAL at 05:24

## 2024-10-12 RX ADMIN — LORAZEPAM 0.5 MG: 0.5 TABLET ORAL at 05:24

## 2024-10-12 RX ADMIN — OXYCODONE HYDROCHLORIDE 5 MG: 5 TABLET ORAL at 18:21

## 2024-10-12 RX ADMIN — POTASSIUM CHLORIDE 40 MEQ: 1500 TABLET, EXTENDED RELEASE ORAL at 09:35

## 2024-10-12 RX ADMIN — CARVEDILOL 12.5 MG: 12.5 TABLET, FILM COATED ORAL at 08:29

## 2024-10-12 RX ADMIN — ENOXAPARIN SODIUM 40 MG: 100 INJECTION SUBCUTANEOUS at 18:20

## 2024-10-12 RX ADMIN — THIAMINE HYDROCHLORIDE 250 MG: 100 INJECTION, SOLUTION INTRAMUSCULAR; INTRAVENOUS at 05:24

## 2024-10-12 RX ADMIN — AMLODIPINE BESYLATE 10 MG: 10 TABLET ORAL at 05:24

## 2024-10-12 RX ADMIN — METHOCARBAMOL 500 MG: 500 TABLET ORAL at 20:38

## 2024-10-12 RX ADMIN — LOSARTAN POTASSIUM 25 MG: 25 TABLET, FILM COATED ORAL at 05:24

## 2024-10-12 RX ADMIN — CLONIDINE HYDROCHLORIDE 0.1 MG: 0.1 TABLET ORAL at 20:38

## 2024-10-12 ASSESSMENT — FIBROSIS 4 INDEX
FIB4 SCORE: 0.45
FIB4 SCORE: 0.61

## 2024-10-12 ASSESSMENT — ENCOUNTER SYMPTOMS
NERVOUS/ANXIOUS: 1
WEAKNESS: 1
DEPRESSION: 1

## 2024-10-12 ASSESSMENT — PAIN DESCRIPTION - PAIN TYPE
TYPE: ACUTE PAIN;CHRONIC PAIN

## 2024-10-13 ENCOUNTER — APPOINTMENT (OUTPATIENT)
Dept: RADIOLOGY | Facility: MEDICAL CENTER | Age: 64
DRG: 917 | End: 2024-10-13
Attending: STUDENT IN AN ORGANIZED HEALTH CARE EDUCATION/TRAINING PROGRAM
Payer: MEDICARE

## 2024-10-13 PROBLEM — M25.562 PAIN AND SWELLING OF LEFT KNEE: Status: ACTIVE | Noted: 2024-10-13

## 2024-10-13 PROBLEM — M25.462 PAIN AND SWELLING OF LEFT KNEE: Status: ACTIVE | Noted: 2024-10-13

## 2024-10-13 LAB
ALBUMIN SERPL BCP-MCNC: 3.5 G/DL (ref 3.2–4.9)
ALBUMIN/GLOB SERPL: 1.3 G/DL
ALP SERPL-CCNC: 83 U/L (ref 30–99)
ALT SERPL-CCNC: 448 U/L (ref 2–50)
ANION GAP SERPL CALC-SCNC: 13 MMOL/L (ref 7–16)
AST SERPL-CCNC: 27 U/L (ref 12–45)
BASOPHILS # BLD AUTO: 0.3 % (ref 0–1.8)
BASOPHILS # BLD AUTO: 0.8 % (ref 0–1.8)
BASOPHILS # BLD: 0.02 K/UL (ref 0–0.12)
BASOPHILS # BLD: 0.05 K/UL (ref 0–0.12)
BILIRUB SERPL-MCNC: 0.7 MG/DL (ref 0.1–1.5)
BUN SERPL-MCNC: 14 MG/DL (ref 8–22)
CALCIUM ALBUM COR SERPL-MCNC: 9.6 MG/DL (ref 8.5–10.5)
CALCIUM SERPL-MCNC: 9.2 MG/DL (ref 8.5–10.5)
CHLORIDE SERPL-SCNC: 98 MMOL/L (ref 96–112)
CO2 SERPL-SCNC: 20 MMOL/L (ref 20–33)
CREAT SERPL-MCNC: 0.61 MG/DL (ref 0.5–1.4)
EOSINOPHIL # BLD AUTO: 0.27 K/UL (ref 0–0.51)
EOSINOPHIL # BLD AUTO: 0.32 K/UL (ref 0–0.51)
EOSINOPHIL NFR BLD: 3.4 % (ref 0–6.9)
EOSINOPHIL NFR BLD: 5.3 % (ref 0–6.9)
ERYTHROCYTE [DISTWIDTH] IN BLOOD BY AUTOMATED COUNT: 42 FL (ref 35.9–50)
ERYTHROCYTE [DISTWIDTH] IN BLOOD BY AUTOMATED COUNT: 42.4 FL (ref 35.9–50)
GFR SERPLBLD CREATININE-BSD FMLA CKD-EPI: 106 ML/MIN/1.73 M 2
GLOBULIN SER CALC-MCNC: 2.6 G/DL (ref 1.9–3.5)
GLUCOSE SERPL-MCNC: 105 MG/DL (ref 65–99)
HCT VFR BLD AUTO: 35.9 % (ref 42–52)
HCT VFR BLD AUTO: 36.8 % (ref 42–52)
HGB BLD-MCNC: 12.5 G/DL (ref 14–18)
HGB BLD-MCNC: 12.6 G/DL (ref 14–18)
IMM GRANULOCYTES # BLD AUTO: 0.02 K/UL (ref 0–0.11)
IMM GRANULOCYTES # BLD AUTO: 0.04 K/UL (ref 0–0.11)
IMM GRANULOCYTES NFR BLD AUTO: 0.3 % (ref 0–0.9)
IMM GRANULOCYTES NFR BLD AUTO: 0.5 % (ref 0–0.9)
LYMPHOCYTES # BLD AUTO: 1.22 K/UL (ref 1–4.8)
LYMPHOCYTES # BLD AUTO: 1.29 K/UL (ref 1–4.8)
LYMPHOCYTES NFR BLD: 16.5 % (ref 22–41)
LYMPHOCYTES NFR BLD: 20.1 % (ref 22–41)
MAGNESIUM SERPL-MCNC: 1.8 MG/DL (ref 1.5–2.5)
MCH RBC QN AUTO: 29.9 PG (ref 27–33)
MCH RBC QN AUTO: 30.8 PG (ref 27–33)
MCHC RBC AUTO-ENTMCNC: 34.2 G/DL (ref 32.3–36.5)
MCHC RBC AUTO-ENTMCNC: 34.8 G/DL (ref 32.3–36.5)
MCV RBC AUTO: 87.4 FL (ref 81.4–97.8)
MCV RBC AUTO: 88.4 FL (ref 81.4–97.8)
MONOCYTES # BLD AUTO: 0.63 K/UL (ref 0–0.85)
MONOCYTES # BLD AUTO: 0.94 K/UL (ref 0–0.85)
MONOCYTES NFR BLD AUTO: 10.4 % (ref 0–13.4)
MONOCYTES NFR BLD AUTO: 12 % (ref 0–13.4)
NEUTROPHILS # BLD AUTO: 3.83 K/UL (ref 1.82–7.42)
NEUTROPHILS # BLD AUTO: 5.27 K/UL (ref 1.82–7.42)
NEUTROPHILS NFR BLD: 63.1 % (ref 44–72)
NEUTROPHILS NFR BLD: 67.3 % (ref 44–72)
NRBC # BLD AUTO: 0 K/UL
NRBC # BLD AUTO: 0 K/UL
NRBC BLD-RTO: 0 /100 WBC (ref 0–0.2)
NRBC BLD-RTO: 0 /100 WBC (ref 0–0.2)
PHOSPHATE SERPL-MCNC: 3.6 MG/DL (ref 2.5–4.5)
PLATELET # BLD AUTO: 177 K/UL (ref 164–446)
PLATELET # BLD AUTO: 177 K/UL (ref 164–446)
PMV BLD AUTO: 9.3 FL (ref 9–12.9)
PMV BLD AUTO: 9.4 FL (ref 9–12.9)
POTASSIUM SERPL-SCNC: 3.9 MMOL/L (ref 3.6–5.5)
PROT SERPL-MCNC: 6.1 G/DL (ref 6–8.2)
RBC # BLD AUTO: 4.06 M/UL (ref 4.7–6.1)
RBC # BLD AUTO: 4.21 M/UL (ref 4.7–6.1)
SCCMEC + MECA PNL NOSE NAA+PROBE: NEGATIVE
SODIUM SERPL-SCNC: 131 MMOL/L (ref 135–145)
WBC # BLD AUTO: 6.1 K/UL (ref 4.8–10.8)
WBC # BLD AUTO: 7.8 K/UL (ref 4.8–10.8)

## 2024-10-13 PROCEDURE — 770001 HCHG ROOM/CARE - MED/SURG/GYN PRIV*

## 2024-10-13 PROCEDURE — 700102 HCHG RX REV CODE 250 W/ 637 OVERRIDE(OP)

## 2024-10-13 PROCEDURE — 700117 HCHG RX CONTRAST REV CODE 255: Mod: JZ | Performed by: STUDENT IN AN ORGANIZED HEALTH CARE EDUCATION/TRAINING PROGRAM

## 2024-10-13 PROCEDURE — A9270 NON-COVERED ITEM OR SERVICE: HCPCS | Performed by: STUDENT IN AN ORGANIZED HEALTH CARE EDUCATION/TRAINING PROGRAM

## 2024-10-13 PROCEDURE — A9270 NON-COVERED ITEM OR SERVICE: HCPCS

## 2024-10-13 PROCEDURE — 83735 ASSAY OF MAGNESIUM: CPT

## 2024-10-13 PROCEDURE — 700102 HCHG RX REV CODE 250 W/ 637 OVERRIDE(OP): Performed by: STUDENT IN AN ORGANIZED HEALTH CARE EDUCATION/TRAINING PROGRAM

## 2024-10-13 PROCEDURE — 36415 COLL VENOUS BLD VENIPUNCTURE: CPT

## 2024-10-13 PROCEDURE — 87641 MR-STAPH DNA AMP PROBE: CPT

## 2024-10-13 PROCEDURE — 700102 HCHG RX REV CODE 250 W/ 637 OVERRIDE(OP): Performed by: EMERGENCY MEDICINE

## 2024-10-13 PROCEDURE — A9270 NON-COVERED ITEM OR SERVICE: HCPCS | Performed by: EMERGENCY MEDICINE

## 2024-10-13 PROCEDURE — 97166 OT EVAL MOD COMPLEX 45 MIN: CPT

## 2024-10-13 PROCEDURE — A9579 GAD-BASE MR CONTRAST NOS,1ML: HCPCS | Mod: JZ | Performed by: STUDENT IN AN ORGANIZED HEALTH CARE EDUCATION/TRAINING PROGRAM

## 2024-10-13 PROCEDURE — 700105 HCHG RX REV CODE 258: Performed by: STUDENT IN AN ORGANIZED HEALTH CARE EDUCATION/TRAINING PROGRAM

## 2024-10-13 PROCEDURE — 73723 MRI JOINT LWR EXTR W/O&W/DYE: CPT | Mod: LT

## 2024-10-13 PROCEDURE — 700111 HCHG RX REV CODE 636 W/ 250 OVERRIDE (IP): Mod: JZ | Performed by: INTERNAL MEDICINE

## 2024-10-13 PROCEDURE — 99233 SBSQ HOSP IP/OBS HIGH 50: CPT | Performed by: STUDENT IN AN ORGANIZED HEALTH CARE EDUCATION/TRAINING PROGRAM

## 2024-10-13 PROCEDURE — 80053 COMPREHEN METABOLIC PANEL: CPT

## 2024-10-13 PROCEDURE — 85025 COMPLETE CBC W/AUTO DIFF WBC: CPT

## 2024-10-13 PROCEDURE — 84100 ASSAY OF PHOSPHORUS: CPT

## 2024-10-13 PROCEDURE — 700111 HCHG RX REV CODE 636 W/ 250 OVERRIDE (IP): Mod: JZ | Performed by: STUDENT IN AN ORGANIZED HEALTH CARE EDUCATION/TRAINING PROGRAM

## 2024-10-13 PROCEDURE — A9270 NON-COVERED ITEM OR SERVICE: HCPCS | Performed by: HOSPITALIST

## 2024-10-13 PROCEDURE — 90832 PSYTX W PT 30 MINUTES: CPT | Performed by: SOCIAL WORKER

## 2024-10-13 PROCEDURE — 700102 HCHG RX REV CODE 250 W/ 637 OVERRIDE(OP): Performed by: HOSPITALIST

## 2024-10-13 RX ORDER — LINEZOLID 600 MG/1
600 TABLET, FILM COATED ORAL EVERY 12 HOURS
Status: DISCONTINUED | OUTPATIENT
Start: 2024-10-13 | End: 2024-10-14

## 2024-10-13 RX ORDER — GABAPENTIN 100 MG/1
200 CAPSULE ORAL ONCE
Status: COMPLETED | OUTPATIENT
Start: 2024-10-13 | End: 2024-10-13

## 2024-10-13 RX ORDER — CLONIDINE HYDROCHLORIDE 0.1 MG/1
0.2 TABLET ORAL NIGHTLY
Status: DISCONTINUED | OUTPATIENT
Start: 2024-10-13 | End: 2024-10-14

## 2024-10-13 RX ADMIN — PIPERACILLIN AND TAZOBACTAM 3.38 G: 3; .375 INJECTION, POWDER, FOR SOLUTION INTRAVENOUS at 18:00

## 2024-10-13 RX ADMIN — GABAPENTIN 200 MG: 100 CAPSULE ORAL at 02:51

## 2024-10-13 RX ADMIN — PIPERACILLIN AND TAZOBACTAM 3.38 G: 3; .375 INJECTION, POWDER, FOR SOLUTION INTRAVENOUS at 16:36

## 2024-10-13 RX ADMIN — AMLODIPINE BESYLATE 10 MG: 10 TABLET ORAL at 05:29

## 2024-10-13 RX ADMIN — OXYCODONE HYDROCHLORIDE 5 MG: 5 TABLET ORAL at 07:45

## 2024-10-13 RX ADMIN — ARIPIPRAZOLE 10 MG: 10 TABLET ORAL at 05:29

## 2024-10-13 RX ADMIN — Medication 100 MG: at 05:29

## 2024-10-13 RX ADMIN — CLONIDINE HYDROCHLORIDE 0.2 MG: 0.1 TABLET ORAL at 20:45

## 2024-10-13 RX ADMIN — LINEZOLID 600 MG: 600 TABLET, FILM COATED ORAL at 16:31

## 2024-10-13 RX ADMIN — LORAZEPAM 0.5 MG: 0.5 TABLET ORAL at 20:45

## 2024-10-13 RX ADMIN — OXYCODONE HYDROCHLORIDE 5 MG: 5 TABLET ORAL at 13:22

## 2024-10-13 RX ADMIN — LOSARTAN POTASSIUM 25 MG: 25 TABLET, FILM COATED ORAL at 05:29

## 2024-10-13 RX ADMIN — LORAZEPAM 0.5 MG: 0.5 TABLET ORAL at 13:25

## 2024-10-13 RX ADMIN — CARVEDILOL 12.5 MG: 12.5 TABLET, FILM COATED ORAL at 07:47

## 2024-10-13 RX ADMIN — FINASTERIDE 5 MG: 5 TABLET, FILM COATED ORAL at 05:29

## 2024-10-13 RX ADMIN — GADOTERIDOL 15 ML: 279.3 INJECTION, SOLUTION INTRAVENOUS at 15:20

## 2024-10-13 RX ADMIN — OXYCODONE HYDROCHLORIDE 5 MG: 5 TABLET ORAL at 00:33

## 2024-10-13 RX ADMIN — CARVEDILOL 12.5 MG: 12.5 TABLET, FILM COATED ORAL at 16:30

## 2024-10-13 RX ADMIN — OXYCODONE HYDROCHLORIDE 5 MG: 5 TABLET ORAL at 19:54

## 2024-10-13 RX ADMIN — ENOXAPARIN SODIUM 40 MG: 100 INJECTION SUBCUTANEOUS at 16:31

## 2024-10-13 RX ADMIN — SENNOSIDES AND DOCUSATE SODIUM 2 TABLET: 50; 8.6 TABLET ORAL at 16:30

## 2024-10-13 ASSESSMENT — ENCOUNTER SYMPTOMS
WEAKNESS: 1
NERVOUS/ANXIOUS: 1
DEPRESSION: 1

## 2024-10-13 ASSESSMENT — ACTIVITIES OF DAILY LIVING (ADL): TOILETING: INDEPENDENT

## 2024-10-13 ASSESSMENT — COGNITIVE AND FUNCTIONAL STATUS - GENERAL
DAILY ACTIVITIY SCORE: 17
TOILETING: A LITTLE
PERSONAL GROOMING: A LITTLE
HELP NEEDED FOR BATHING: A LOT
SUGGESTED CMS G CODE MODIFIER DAILY ACTIVITY: CK
DRESSING REGULAR LOWER BODY CLOTHING: A LOT
DRESSING REGULAR UPPER BODY CLOTHING: A LITTLE

## 2024-10-13 ASSESSMENT — PAIN DESCRIPTION - PAIN TYPE
TYPE: ACUTE PAIN
TYPE: ACUTE PAIN;CHRONIC PAIN
TYPE: ACUTE PAIN;CHRONIC PAIN
TYPE: ACUTE PAIN
TYPE: ACUTE PAIN

## 2024-10-13 ASSESSMENT — FIBROSIS 4 INDEX: FIB4 SCORE: 0.47

## 2024-10-14 LAB
A PREV+VAG DNA SNV QL NAA+NON-PROBE: NOT DETECTED
ALBUMIN SERPL BCP-MCNC: 3.5 G/DL (ref 3.2–4.9)
ALBUMIN/GLOB SERPL: 1.4 G/DL
ALP SERPL-CCNC: 105 U/L (ref 30–99)
ALT SERPL-CCNC: 299 U/L (ref 2–50)
ANION GAP SERPL CALC-SCNC: 15 MMOL/L (ref 7–16)
APPEARANCE FLD: NORMAL
APPEARANCE UR: CLEAR
AST SERPL-CCNC: 26 U/L (ref 12–45)
B FRAGILIS DNA SNV QL NAA+NON-PROBE: NOT DETECTED
BILIRUB SERPL-MCNC: 0.8 MG/DL (ref 0.1–1.5)
BILIRUB UR QL STRIP.AUTO: NEGATIVE
BLACTX-M ISLT/SPM QL: NORMAL
BLAIMP ISLT/SPM QL: NORMAL
BLAKPC ISLT/SPM QL: NORMAL
BLAOXA-48-LIKE ISLT/SPM QL: NORMAL
BLAVIM ISLT/SPM QL: NORMAL
BODY FLD TYPE: NORMAL
BODY FLD TYPE: NORMAL
BUN SERPL-MCNC: 23 MG/DL (ref 8–22)
C ALBICANS DNA SNV QL NAA+NON-PROBE: NOT DETECTED
C AVID+GRANUL DNA SNV QL NAA+NON-PROBE: NOT DETECTED
C PERFRINGENS SNV QL NAA+NON-PROBE: NOT DETECTED
CALCIUM ALBUM COR SERPL-MCNC: 9.5 MG/DL (ref 8.5–10.5)
CALCIUM SERPL-MCNC: 9.1 MG/DL (ref 8.5–10.5)
CANDIDA DNA SNV QL NAA+NON-PROBE: NOT DETECTED
CHLORIDE SERPL-SCNC: 95 MMOL/L (ref 96–112)
CITROBAC SP DNA SNV QL NAA+NON-PROBE: NOT DETECTED
CO2 SERPL-SCNC: 20 MMOL/L (ref 20–33)
COLOR FLD: YELLOW
COLOR UR: YELLOW
CREAT SERPL-MCNC: 1.41 MG/DL (ref 0.5–1.4)
CRYSTALS FLD MICRO: NORMAL
E CLOAC COMP DNA SNV QL NAA+NON-PROBE: NOT DETECTED
E COLI DNA SNV QL NAA+NON-PROBE: NOT DETECTED
E FAECALIS DNA SNV QL NAA+NON-PROBE: NOT DETECTED
E FAECIUM DNA SNV QL NAA+NON-PROBE: NOT DETECTED
ERYTHROCYTE [DISTWIDTH] IN BLOOD BY AUTOMATED COUNT: 41.1 FL (ref 35.9–50)
F MAGNA DNA SNV QL NAA+NON-PROBE: NOT DETECTED
GFR SERPLBLD CREATININE-BSD FMLA CKD-EPI: 55 ML/MIN/1.73 M 2
GLOBULIN SER CALC-MCNC: 2.5 G/DL (ref 1.9–3.5)
GLUCOSE SERPL-MCNC: 151 MG/DL (ref 65–99)
GLUCOSE UR STRIP.AUTO-MCNC: NEGATIVE MG/DL
GP B STREP DNA SNV QL NAA+NON-PROBE: NOT DETECTED
GRAM STN SPEC: NORMAL
HAEM INFLU DNA SNV QL NAA+NON-PROBE: NOT DETECTED
HCT VFR BLD AUTO: 36.5 % (ref 42–52)
HGB BLD-MCNC: 12.3 G/DL (ref 14–18)
K KINGAE DNA SNV QL NAA+NON-PROBE: NOT DETECTED
K. AEROGENES DNA SNV QL NAA+NON-PROBE: NOT DETECTED
K. PNEUMON GROUP DNA SNV QL NAA+NON-PRB: NOT DETECTED
KETONES UR STRIP.AUTO-MCNC: NEGATIVE MG/DL
LACTATE SERPL-SCNC: 2.2 MMOL/L (ref 0.5–2)
LEUKOCYTE ESTERASE UR QL STRIP.AUTO: NEGATIVE
LYMPHOCYTES NFR FLD: 1 %
M. MORGANII DNA SNV QL NAA+NON-PROBE: NOT DETECTED
MCH RBC QN AUTO: 29.1 PG (ref 27–33)
MCHC RBC AUTO-ENTMCNC: 33.7 G/DL (ref 32.3–36.5)
MCV RBC AUTO: 86.5 FL (ref 81.4–97.8)
MECA+MECC+MREJ ISLT/SPM QL: NORMAL
MICRO URNS: NORMAL
MONOS+MACROS NFR FLD MANUAL: 5 %
N GONORRHOEA DNA SNV QL NAA+NON-PROBE: NOT DETECTED
NDM (CARBAPENEMASE), PCR L739821A: NORMAL
NEUTROPHILS NFR FLD: 94 %
NITRITE UR QL STRIP.AUTO: NEGATIVE
NUC CELL # FLD: NORMAL CELLS/UL
P AERUGINOSA DNA SNV QL NAA+NON-PROBE: NOT DETECTED
P ANAEROBIUS DNA SNV QL NAA+NON-PROBE: NOT DETECTED
P MICRA DNA SNV QL NAA+NON-PROBE: NOT DETECTED
PEPTONIPHILUS SP DNA SNV QL NAA+NON-PRB: NOT DETECTED
PH UR STRIP.AUTO: 5.5 [PH] (ref 5–8)
PLATELET # BLD AUTO: 213 K/UL (ref 164–446)
PMV BLD AUTO: 9.1 FL (ref 9–12.9)
POTASSIUM SERPL-SCNC: 4.2 MMOL/L (ref 3.6–5.5)
PROT SERPL-MCNC: 6 G/DL (ref 6–8.2)
PROT UR QL STRIP: NEGATIVE MG/DL
PROTEUS SP DNA SNV QL NAA+NON-PROBE: NOT DETECTED
RBC # BLD AUTO: 4.22 M/UL (ref 4.7–6.1)
RBC # FLD: 3000 CELLS/UL
RBC UR QL AUTO: NEGATIVE
S AUREUS DNA SNV QL NAA+NON-PROBE: NOT DETECTED
S LUGDUNENSIS DNA SNV QL NAA+NON-PROBE: NOT DETECTED
S MARCESCENS DNA SNV QL NAA+NON-PROBE: NOT DETECTED
S PNEUM DNA SNV QL NAA+NON-PROBE: NOT DETECTED
S PYO DNA SNV QL NAA+NON-PROBE: NOT DETECTED
SALMONELLA DNA SNV QL NAA+NON-PROBE: NOT DETECTED
SIGNIFICANT IND 70042: NORMAL
SITE SITE: NORMAL
SODIUM SERPL-SCNC: 130 MMOL/L (ref 135–145)
SOURCE SOURCE: NORMAL
SP GR UR STRIP.AUTO: 1.02
STREPTOCOCCUS DNA SNV QL NAA+NON-PROBE: NOT DETECTED
UROBILINOGEN UR STRIP.AUTO-MCNC: 1 MG/DL
VANA+VANB ISLT/SPM QL: NORMAL
WBC # BLD AUTO: 10.4 K/UL (ref 4.8–10.8)

## 2024-10-14 PROCEDURE — 83605 ASSAY OF LACTIC ACID: CPT

## 2024-10-14 PROCEDURE — 89060 EXAM SYNOVIAL FLUID CRYSTALS: CPT

## 2024-10-14 PROCEDURE — 99232 SBSQ HOSP IP/OBS MODERATE 35: CPT | Performed by: STUDENT IN AN ORGANIZED HEALTH CARE EDUCATION/TRAINING PROGRAM

## 2024-10-14 PROCEDURE — 700102 HCHG RX REV CODE 250 W/ 637 OVERRIDE(OP): Performed by: STUDENT IN AN ORGANIZED HEALTH CARE EDUCATION/TRAINING PROGRAM

## 2024-10-14 PROCEDURE — 36415 COLL VENOUS BLD VENIPUNCTURE: CPT

## 2024-10-14 PROCEDURE — A9270 NON-COVERED ITEM OR SERVICE: HCPCS | Performed by: HOSPITALIST

## 2024-10-14 PROCEDURE — 99233 SBSQ HOSP IP/OBS HIGH 50: CPT | Performed by: STUDENT IN AN ORGANIZED HEALTH CARE EDUCATION/TRAINING PROGRAM

## 2024-10-14 PROCEDURE — A9270 NON-COVERED ITEM OR SERVICE: HCPCS | Performed by: EMERGENCY MEDICINE

## 2024-10-14 PROCEDURE — 700102 HCHG RX REV CODE 250 W/ 637 OVERRIDE(OP): Performed by: HOSPITALIST

## 2024-10-14 PROCEDURE — 80053 COMPREHEN METABOLIC PANEL: CPT

## 2024-10-14 PROCEDURE — 0S9D3ZZ DRAINAGE OF LEFT KNEE JOINT, PERCUTANEOUS APPROACH: ICD-10-PCS | Performed by: ORTHOPAEDIC SURGERY

## 2024-10-14 PROCEDURE — 85027 COMPLETE CBC AUTOMATED: CPT

## 2024-10-14 PROCEDURE — 87205 SMEAR GRAM STAIN: CPT

## 2024-10-14 PROCEDURE — A9270 NON-COVERED ITEM OR SERVICE: HCPCS | Performed by: STUDENT IN AN ORGANIZED HEALTH CARE EDUCATION/TRAINING PROGRAM

## 2024-10-14 PROCEDURE — 87070 CULTURE OTHR SPECIMN AEROBIC: CPT

## 2024-10-14 PROCEDURE — 770001 HCHG ROOM/CARE - MED/SURG/GYN PRIV*

## 2024-10-14 PROCEDURE — 87999 UNLISTED MICROBIOLOGY PX: CPT

## 2024-10-14 PROCEDURE — 700102 HCHG RX REV CODE 250 W/ 637 OVERRIDE(OP): Performed by: EMERGENCY MEDICINE

## 2024-10-14 PROCEDURE — 81003 URINALYSIS AUTO W/O SCOPE: CPT

## 2024-10-14 PROCEDURE — 700105 HCHG RX REV CODE 258: Performed by: STUDENT IN AN ORGANIZED HEALTH CARE EDUCATION/TRAINING PROGRAM

## 2024-10-14 PROCEDURE — 700111 HCHG RX REV CODE 636 W/ 250 OVERRIDE (IP): Performed by: STUDENT IN AN ORGANIZED HEALTH CARE EDUCATION/TRAINING PROGRAM

## 2024-10-14 PROCEDURE — 89051 BODY FLUID CELL COUNT: CPT

## 2024-10-14 PROCEDURE — 700111 HCHG RX REV CODE 636 W/ 250 OVERRIDE (IP): Mod: JZ | Performed by: INTERNAL MEDICINE

## 2024-10-14 RX ORDER — CLONIDINE HYDROCHLORIDE 0.1 MG/1
0.1 TABLET ORAL NIGHTLY
Status: DISCONTINUED | OUTPATIENT
Start: 2024-10-14 | End: 2024-10-16

## 2024-10-14 RX ORDER — SODIUM CHLORIDE 9 MG/ML
1000 INJECTION, SOLUTION INTRAVENOUS CONTINUOUS
Status: ACTIVE | OUTPATIENT
Start: 2024-10-14 | End: 2024-10-14

## 2024-10-14 RX ORDER — SODIUM CHLORIDE 9 MG/ML
1000 INJECTION, SOLUTION INTRAVENOUS ONCE
Status: COMPLETED | OUTPATIENT
Start: 2024-10-14 | End: 2024-10-14

## 2024-10-14 RX ORDER — PREDNISONE 20 MG/1
40 TABLET ORAL DAILY
Status: COMPLETED | OUTPATIENT
Start: 2024-10-14 | End: 2024-10-18

## 2024-10-14 RX ADMIN — SODIUM CHLORIDE 1000 ML: 9 INJECTION, SOLUTION INTRAVENOUS at 11:02

## 2024-10-14 RX ADMIN — SODIUM CHLORIDE 1000 ML: 9 INJECTION, SOLUTION INTRAVENOUS at 09:58

## 2024-10-14 RX ADMIN — METHOCARBAMOL 500 MG: 500 TABLET ORAL at 00:37

## 2024-10-14 RX ADMIN — CARVEDILOL 12.5 MG: 12.5 TABLET, FILM COATED ORAL at 17:03

## 2024-10-14 RX ADMIN — LINEZOLID 600 MG: 600 TABLET, FILM COATED ORAL at 04:25

## 2024-10-14 RX ADMIN — PIPERACILLIN AND TAZOBACTAM 3.38 G: 3; .375 INJECTION, POWDER, FOR SOLUTION INTRAVENOUS at 21:00

## 2024-10-14 RX ADMIN — SENNOSIDES AND DOCUSATE SODIUM 2 TABLET: 50; 8.6 TABLET ORAL at 17:04

## 2024-10-14 RX ADMIN — LORAZEPAM 0.5 MG: 0.5 TABLET ORAL at 21:05

## 2024-10-14 RX ADMIN — CLONIDINE HYDROCHLORIDE 0.1 MG: 0.1 TABLET ORAL at 20:58

## 2024-10-14 RX ADMIN — OXYCODONE HYDROCHLORIDE 5 MG: 5 TABLET ORAL at 00:37

## 2024-10-14 RX ADMIN — OXYCODONE HYDROCHLORIDE 5 MG: 5 TABLET ORAL at 08:27

## 2024-10-14 RX ADMIN — OXYCODONE HYDROCHLORIDE 5 MG: 5 TABLET ORAL at 14:40

## 2024-10-14 RX ADMIN — Medication 100 MG: at 04:25

## 2024-10-14 RX ADMIN — SODIUM CHLORIDE 1000 ML: 9 INJECTION, SOLUTION INTRAVENOUS at 08:35

## 2024-10-14 RX ADMIN — PREDNISONE 40 MG: 20 TABLET ORAL at 14:40

## 2024-10-14 RX ADMIN — ENOXAPARIN SODIUM 40 MG: 100 INJECTION SUBCUTANEOUS at 17:03

## 2024-10-14 RX ADMIN — PIPERACILLIN AND TAZOBACTAM 3.38 G: 3; .375 INJECTION, POWDER, FOR SOLUTION INTRAVENOUS at 04:25

## 2024-10-14 RX ADMIN — FINASTERIDE 5 MG: 5 TABLET, FILM COATED ORAL at 04:25

## 2024-10-14 RX ADMIN — ARIPIPRAZOLE 10 MG: 10 TABLET ORAL at 04:25

## 2024-10-14 RX ADMIN — PIPERACILLIN AND TAZOBACTAM 3.38 G: 3; .375 INJECTION, POWDER, FOR SOLUTION INTRAVENOUS at 13:11

## 2024-10-14 ASSESSMENT — PAIN DESCRIPTION - PAIN TYPE
TYPE: ACUTE PAIN

## 2024-10-14 ASSESSMENT — FIBROSIS 4 INDEX: FIB4 SCORE: 0.46

## 2024-10-14 ASSESSMENT — ENCOUNTER SYMPTOMS
NERVOUS/ANXIOUS: 1
WEAKNESS: 1
DEPRESSION: 1

## 2024-10-15 LAB
ALBUMIN SERPL BCP-MCNC: 3 G/DL (ref 3.2–4.9)
ALBUMIN/GLOB SERPL: 1.1 G/DL
ALP SERPL-CCNC: 76 U/L (ref 30–99)
ALT SERPL-CCNC: 180 U/L (ref 2–50)
ANION GAP SERPL CALC-SCNC: 12 MMOL/L (ref 7–16)
AST SERPL-CCNC: 16 U/L (ref 12–45)
BILIRUB SERPL-MCNC: 0.5 MG/DL (ref 0.1–1.5)
BUN SERPL-MCNC: 21 MG/DL (ref 8–22)
CALCIUM ALBUM COR SERPL-MCNC: 9.7 MG/DL (ref 8.5–10.5)
CALCIUM SERPL-MCNC: 8.9 MG/DL (ref 8.5–10.5)
CHLORIDE SERPL-SCNC: 100 MMOL/L (ref 96–112)
CO2 SERPL-SCNC: 20 MMOL/L (ref 20–33)
CREAT SERPL-MCNC: 0.95 MG/DL (ref 0.5–1.4)
ERYTHROCYTE [DISTWIDTH] IN BLOOD BY AUTOMATED COUNT: 42.5 FL (ref 35.9–50)
GFR SERPLBLD CREATININE-BSD FMLA CKD-EPI: 89 ML/MIN/1.73 M 2
GLOBULIN SER CALC-MCNC: 2.7 G/DL (ref 1.9–3.5)
GLUCOSE SERPL-MCNC: 159 MG/DL (ref 65–99)
HCT VFR BLD AUTO: 31.3 % (ref 42–52)
HGB BLD-MCNC: 10.7 G/DL (ref 14–18)
MCH RBC QN AUTO: 29.7 PG (ref 27–33)
MCHC RBC AUTO-ENTMCNC: 34.2 G/DL (ref 32.3–36.5)
MCV RBC AUTO: 86.9 FL (ref 81.4–97.8)
PLATELET # BLD AUTO: 209 K/UL (ref 164–446)
PMV BLD AUTO: 9.8 FL (ref 9–12.9)
POTASSIUM SERPL-SCNC: 3.9 MMOL/L (ref 3.6–5.5)
PROT SERPL-MCNC: 5.7 G/DL (ref 6–8.2)
RBC # BLD AUTO: 3.6 M/UL (ref 4.7–6.1)
SODIUM SERPL-SCNC: 132 MMOL/L (ref 135–145)
WBC # BLD AUTO: 9.9 K/UL (ref 4.8–10.8)

## 2024-10-15 PROCEDURE — A9270 NON-COVERED ITEM OR SERVICE: HCPCS | Performed by: STUDENT IN AN ORGANIZED HEALTH CARE EDUCATION/TRAINING PROGRAM

## 2024-10-15 PROCEDURE — 770001 HCHG ROOM/CARE - MED/SURG/GYN PRIV*

## 2024-10-15 PROCEDURE — 700105 HCHG RX REV CODE 258: Performed by: STUDENT IN AN ORGANIZED HEALTH CARE EDUCATION/TRAINING PROGRAM

## 2024-10-15 PROCEDURE — 700111 HCHG RX REV CODE 636 W/ 250 OVERRIDE (IP): Mod: JZ | Performed by: INTERNAL MEDICINE

## 2024-10-15 PROCEDURE — 36415 COLL VENOUS BLD VENIPUNCTURE: CPT

## 2024-10-15 PROCEDURE — A9270 NON-COVERED ITEM OR SERVICE: HCPCS | Performed by: EMERGENCY MEDICINE

## 2024-10-15 PROCEDURE — 700102 HCHG RX REV CODE 250 W/ 637 OVERRIDE(OP): Performed by: HOSPITALIST

## 2024-10-15 PROCEDURE — 700102 HCHG RX REV CODE 250 W/ 637 OVERRIDE(OP): Performed by: STUDENT IN AN ORGANIZED HEALTH CARE EDUCATION/TRAINING PROGRAM

## 2024-10-15 PROCEDURE — 80053 COMPREHEN METABOLIC PANEL: CPT

## 2024-10-15 PROCEDURE — A9270 NON-COVERED ITEM OR SERVICE: HCPCS | Performed by: INTERNAL MEDICINE

## 2024-10-15 PROCEDURE — 85027 COMPLETE CBC AUTOMATED: CPT

## 2024-10-15 PROCEDURE — 99233 SBSQ HOSP IP/OBS HIGH 50: CPT | Performed by: INTERNAL MEDICINE

## 2024-10-15 PROCEDURE — 700102 HCHG RX REV CODE 250 W/ 637 OVERRIDE(OP): Performed by: EMERGENCY MEDICINE

## 2024-10-15 PROCEDURE — 700111 HCHG RX REV CODE 636 W/ 250 OVERRIDE (IP): Performed by: STUDENT IN AN ORGANIZED HEALTH CARE EDUCATION/TRAINING PROGRAM

## 2024-10-15 PROCEDURE — 700102 HCHG RX REV CODE 250 W/ 637 OVERRIDE(OP): Performed by: INTERNAL MEDICINE

## 2024-10-15 PROCEDURE — 97116 GAIT TRAINING THERAPY: CPT | Mod: CQ

## 2024-10-15 PROCEDURE — 97530 THERAPEUTIC ACTIVITIES: CPT | Mod: CQ

## 2024-10-15 PROCEDURE — A9270 NON-COVERED ITEM OR SERVICE: HCPCS | Performed by: HOSPITALIST

## 2024-10-15 RX ORDER — TRAZODONE HYDROCHLORIDE 50 MG/1
50 TABLET, FILM COATED ORAL
Status: DISCONTINUED | OUTPATIENT
Start: 2024-10-15 | End: 2024-10-16

## 2024-10-15 RX ADMIN — CARVEDILOL 12.5 MG: 12.5 TABLET, FILM COATED ORAL at 07:53

## 2024-10-15 RX ADMIN — FINASTERIDE 5 MG: 5 TABLET, FILM COATED ORAL at 05:16

## 2024-10-15 RX ADMIN — AMLODIPINE BESYLATE 10 MG: 10 TABLET ORAL at 05:16

## 2024-10-15 RX ADMIN — OXYCODONE HYDROCHLORIDE 5 MG: 5 TABLET ORAL at 16:30

## 2024-10-15 RX ADMIN — CARVEDILOL 12.5 MG: 12.5 TABLET, FILM COATED ORAL at 17:08

## 2024-10-15 RX ADMIN — PIPERACILLIN AND TAZOBACTAM 3.38 G: 3; .375 INJECTION, POWDER, FOR SOLUTION INTRAVENOUS at 21:04

## 2024-10-15 RX ADMIN — LORAZEPAM 0.5 MG: 0.5 TABLET ORAL at 06:36

## 2024-10-15 RX ADMIN — PIPERACILLIN AND TAZOBACTAM 3.38 G: 3; .375 INJECTION, POWDER, FOR SOLUTION INTRAVENOUS at 12:32

## 2024-10-15 RX ADMIN — ENOXAPARIN SODIUM 40 MG: 100 INJECTION SUBCUTANEOUS at 17:08

## 2024-10-15 RX ADMIN — CLONIDINE HYDROCHLORIDE 0.1 MG: 0.1 TABLET ORAL at 20:58

## 2024-10-15 RX ADMIN — SENNOSIDES AND DOCUSATE SODIUM 2 TABLET: 50; 8.6 TABLET ORAL at 18:32

## 2024-10-15 RX ADMIN — PIPERACILLIN AND TAZOBACTAM 3.38 G: 3; .375 INJECTION, POWDER, FOR SOLUTION INTRAVENOUS at 04:19

## 2024-10-15 RX ADMIN — LOSARTAN POTASSIUM 25 MG: 25 TABLET, FILM COATED ORAL at 05:17

## 2024-10-15 RX ADMIN — OXYCODONE HYDROCHLORIDE 5 MG: 5 TABLET ORAL at 10:24

## 2024-10-15 RX ADMIN — TRAZODONE HYDROCHLORIDE 50 MG: 50 TABLET ORAL at 20:58

## 2024-10-15 RX ADMIN — PREDNISONE 40 MG: 20 TABLET ORAL at 05:17

## 2024-10-15 RX ADMIN — Medication 100 MG: at 05:16

## 2024-10-15 RX ADMIN — ARIPIPRAZOLE 10 MG: 10 TABLET ORAL at 05:17

## 2024-10-15 ASSESSMENT — GAIT ASSESSMENTS
ASSISTIVE DEVICE: FRONT WHEEL WALKER
DEVIATION: BRADYKINETIC;ANTALGIC
GAIT LEVEL OF ASSIST: STANDBY ASSIST
DISTANCE (FEET): 200

## 2024-10-15 ASSESSMENT — PAIN DESCRIPTION - PAIN TYPE
TYPE: ACUTE PAIN;CHRONIC PAIN
TYPE: ACUTE PAIN
TYPE: CHRONIC PAIN
TYPE: ACUTE PAIN
TYPE: CHRONIC PAIN
TYPE: CHRONIC PAIN

## 2024-10-15 ASSESSMENT — ENCOUNTER SYMPTOMS
WEAKNESS: 1
NERVOUS/ANXIOUS: 1
DEPRESSION: 1

## 2024-10-15 ASSESSMENT — COGNITIVE AND FUNCTIONAL STATUS - GENERAL
MOBILITY SCORE: 18
STANDING UP FROM CHAIR USING ARMS: A LITTLE
SUGGESTED CMS G CODE MODIFIER MOBILITY: CK
WALKING IN HOSPITAL ROOM: A LITTLE
MOVING FROM LYING ON BACK TO SITTING ON SIDE OF FLAT BED: A LITTLE
CLIMB 3 TO 5 STEPS WITH RAILING: A LITTLE
TURNING FROM BACK TO SIDE WHILE IN FLAT BAD: A LITTLE
MOVING TO AND FROM BED TO CHAIR: A LITTLE

## 2024-10-16 LAB
ALBUMIN SERPL BCP-MCNC: 3.1 G/DL (ref 3.2–4.9)
ALBUMIN/GLOB SERPL: 1.1 G/DL
ALP SERPL-CCNC: 101 U/L (ref 30–99)
ALT SERPL-CCNC: 166 U/L (ref 2–50)
ANION GAP SERPL CALC-SCNC: 11 MMOL/L (ref 7–16)
AST SERPL-CCNC: 31 U/L (ref 12–45)
BACTERIA BLD CULT: NORMAL
BACTERIA BLD CULT: NORMAL
BILIRUB SERPL-MCNC: 0.3 MG/DL (ref 0.1–1.5)
BUN SERPL-MCNC: 15 MG/DL (ref 8–22)
CALCIUM ALBUM COR SERPL-MCNC: 9.6 MG/DL (ref 8.5–10.5)
CALCIUM SERPL-MCNC: 8.9 MG/DL (ref 8.5–10.5)
CHLORIDE SERPL-SCNC: 104 MMOL/L (ref 96–112)
CO2 SERPL-SCNC: 22 MMOL/L (ref 20–33)
CREAT SERPL-MCNC: 0.79 MG/DL (ref 0.5–1.4)
GFR SERPLBLD CREATININE-BSD FMLA CKD-EPI: 98 ML/MIN/1.73 M 2
GLOBULIN SER CALC-MCNC: 2.7 G/DL (ref 1.9–3.5)
GLUCOSE SERPL-MCNC: 100 MG/DL (ref 65–99)
POTASSIUM SERPL-SCNC: 3.4 MMOL/L (ref 3.6–5.5)
PROT SERPL-MCNC: 5.8 G/DL (ref 6–8.2)
SIGNIFICANT IND 70042: NORMAL
SIGNIFICANT IND 70042: NORMAL
SITE SITE: NORMAL
SITE SITE: NORMAL
SODIUM SERPL-SCNC: 137 MMOL/L (ref 135–145)
SOURCE SOURCE: NORMAL
SOURCE SOURCE: NORMAL

## 2024-10-16 PROCEDURE — A9270 NON-COVERED ITEM OR SERVICE: HCPCS | Performed by: HOSPITALIST

## 2024-10-16 PROCEDURE — 700102 HCHG RX REV CODE 250 W/ 637 OVERRIDE(OP): Performed by: STUDENT IN AN ORGANIZED HEALTH CARE EDUCATION/TRAINING PROGRAM

## 2024-10-16 PROCEDURE — 700105 HCHG RX REV CODE 258: Performed by: STUDENT IN AN ORGANIZED HEALTH CARE EDUCATION/TRAINING PROGRAM

## 2024-10-16 PROCEDURE — A9270 NON-COVERED ITEM OR SERVICE: HCPCS | Performed by: STUDENT IN AN ORGANIZED HEALTH CARE EDUCATION/TRAINING PROGRAM

## 2024-10-16 PROCEDURE — A9270 NON-COVERED ITEM OR SERVICE: HCPCS | Performed by: EMERGENCY MEDICINE

## 2024-10-16 PROCEDURE — 770001 HCHG ROOM/CARE - MED/SURG/GYN PRIV*

## 2024-10-16 PROCEDURE — 85027 COMPLETE CBC AUTOMATED: CPT

## 2024-10-16 PROCEDURE — 99232 SBSQ HOSP IP/OBS MODERATE 35: CPT | Performed by: INTERNAL MEDICINE

## 2024-10-16 PROCEDURE — 36415 COLL VENOUS BLD VENIPUNCTURE: CPT

## 2024-10-16 PROCEDURE — 97530 THERAPEUTIC ACTIVITIES: CPT | Mod: CQ

## 2024-10-16 PROCEDURE — 700102 HCHG RX REV CODE 250 W/ 637 OVERRIDE(OP): Performed by: HOSPITALIST

## 2024-10-16 PROCEDURE — 700111 HCHG RX REV CODE 636 W/ 250 OVERRIDE (IP): Mod: JZ | Performed by: INTERNAL MEDICINE

## 2024-10-16 PROCEDURE — 97535 SELF CARE MNGMENT TRAINING: CPT

## 2024-10-16 PROCEDURE — 700102 HCHG RX REV CODE 250 W/ 637 OVERRIDE(OP): Performed by: EMERGENCY MEDICINE

## 2024-10-16 PROCEDURE — 80053 COMPREHEN METABOLIC PANEL: CPT

## 2024-10-16 PROCEDURE — 99232 SBSQ HOSP IP/OBS MODERATE 35: CPT | Performed by: STUDENT IN AN ORGANIZED HEALTH CARE EDUCATION/TRAINING PROGRAM

## 2024-10-16 PROCEDURE — 700111 HCHG RX REV CODE 636 W/ 250 OVERRIDE (IP): Performed by: STUDENT IN AN ORGANIZED HEALTH CARE EDUCATION/TRAINING PROGRAM

## 2024-10-16 RX ORDER — TRAZODONE HYDROCHLORIDE 50 MG/1
50 TABLET, FILM COATED ORAL
Status: DISCONTINUED | OUTPATIENT
Start: 2024-10-16 | End: 2024-10-18 | Stop reason: HOSPADM

## 2024-10-16 RX ORDER — BUPROPION HYDROCHLORIDE 75 MG/1
75 TABLET ORAL DAILY
Status: DISCONTINUED | OUTPATIENT
Start: 2024-10-16 | End: 2024-10-18 | Stop reason: HOSPADM

## 2024-10-16 RX ORDER — CLONIDINE HYDROCHLORIDE 0.1 MG/1
0.2 TABLET ORAL NIGHTLY
Status: DISCONTINUED | OUTPATIENT
Start: 2024-10-16 | End: 2024-10-18 | Stop reason: HOSPADM

## 2024-10-16 RX ADMIN — AMLODIPINE BESYLATE 10 MG: 10 TABLET ORAL at 05:37

## 2024-10-16 RX ADMIN — ENOXAPARIN SODIUM 40 MG: 100 INJECTION SUBCUTANEOUS at 16:53

## 2024-10-16 RX ADMIN — OXYCODONE HYDROCHLORIDE 5 MG: 5 TABLET ORAL at 05:38

## 2024-10-16 RX ADMIN — CARVEDILOL 12.5 MG: 12.5 TABLET, FILM COATED ORAL at 08:07

## 2024-10-16 RX ADMIN — ARIPIPRAZOLE 10 MG: 10 TABLET ORAL at 05:38

## 2024-10-16 RX ADMIN — CARVEDILOL 12.5 MG: 12.5 TABLET, FILM COATED ORAL at 16:53

## 2024-10-16 RX ADMIN — PREDNISONE 40 MG: 20 TABLET ORAL at 05:38

## 2024-10-16 RX ADMIN — PIPERACILLIN AND TAZOBACTAM 3.38 G: 3; .375 INJECTION, POWDER, FOR SOLUTION INTRAVENOUS at 05:43

## 2024-10-16 RX ADMIN — LOSARTAN POTASSIUM 25 MG: 25 TABLET, FILM COATED ORAL at 05:37

## 2024-10-16 RX ADMIN — CLONIDINE HYDROCHLORIDE 0.2 MG: 0.1 TABLET ORAL at 20:37

## 2024-10-16 RX ADMIN — BUPROPION HYDROCHLORIDE 75 MG: 75 TABLET, FILM COATED ORAL at 12:57

## 2024-10-16 RX ADMIN — FINASTERIDE 5 MG: 5 TABLET, FILM COATED ORAL at 05:37

## 2024-10-16 RX ADMIN — TRAZODONE HYDROCHLORIDE 50 MG: 50 TABLET ORAL at 20:37

## 2024-10-16 RX ADMIN — Medication 100 MG: at 05:38

## 2024-10-16 ASSESSMENT — COGNITIVE AND FUNCTIONAL STATUS - GENERAL
WALKING IN HOSPITAL ROOM: A LITTLE
MOVING TO AND FROM BED TO CHAIR: A LITTLE
MOBILITY SCORE: 18
DAILY ACTIVITIY SCORE: 21
CLIMB 3 TO 5 STEPS WITH RAILING: A LITTLE
MOVING FROM LYING ON BACK TO SITTING ON SIDE OF FLAT BED: A LITTLE
TURNING FROM BACK TO SIDE WHILE IN FLAT BAD: A LITTLE
HELP NEEDED FOR BATHING: A LITTLE
SUGGESTED CMS G CODE MODIFIER DAILY ACTIVITY: CJ
SUGGESTED CMS G CODE MODIFIER MOBILITY: CK
DRESSING REGULAR LOWER BODY CLOTHING: A LOT
STANDING UP FROM CHAIR USING ARMS: A LITTLE

## 2024-10-16 ASSESSMENT — GAIT ASSESSMENTS
DEVIATION: BRADYKINETIC;ANTALGIC
GAIT LEVEL OF ASSIST: STANDBY ASSIST
DISTANCE (FEET): 400
ASSISTIVE DEVICE: FRONT WHEEL WALKER

## 2024-10-16 ASSESSMENT — PAIN DESCRIPTION - PAIN TYPE: TYPE: ACUTE PAIN

## 2024-10-16 ASSESSMENT — ENCOUNTER SYMPTOMS
WEAKNESS: 1
NERVOUS/ANXIOUS: 1
DEPRESSION: 1

## 2024-10-16 ASSESSMENT — FIBROSIS 4 INDEX: FIB4 SCORE: 0.37

## 2024-10-17 LAB
ALBUMIN SERPL BCP-MCNC: 3.5 G/DL (ref 3.2–4.9)
ALBUMIN/GLOB SERPL: 1.3 G/DL
ALP SERPL-CCNC: 78 U/L (ref 30–99)
ALT SERPL-CCNC: 134 U/L (ref 2–50)
ANION GAP SERPL CALC-SCNC: 12 MMOL/L (ref 7–16)
AST SERPL-CCNC: 24 U/L (ref 12–45)
BACTERIA FLD AEROBE CULT: NORMAL
BILIRUB SERPL-MCNC: 0.3 MG/DL (ref 0.1–1.5)
BUN SERPL-MCNC: 15 MG/DL (ref 8–22)
CALCIUM ALBUM COR SERPL-MCNC: 10 MG/DL (ref 8.5–10.5)
CALCIUM SERPL-MCNC: 9.6 MG/DL (ref 8.5–10.5)
CHLORIDE SERPL-SCNC: 101 MMOL/L (ref 96–112)
CO2 SERPL-SCNC: 22 MMOL/L (ref 20–33)
CREAT SERPL-MCNC: 0.68 MG/DL (ref 0.5–1.4)
ERYTHROCYTE [DISTWIDTH] IN BLOOD BY AUTOMATED COUNT: 45.1 FL (ref 35.9–50)
GFR SERPLBLD CREATININE-BSD FMLA CKD-EPI: 103 ML/MIN/1.73 M 2
GLOBULIN SER CALC-MCNC: 2.7 G/DL (ref 1.9–3.5)
GLUCOSE SERPL-MCNC: 78 MG/DL (ref 65–99)
GRAM STN SPEC: NORMAL
HCT VFR BLD AUTO: 35.6 % (ref 42–52)
HGB BLD-MCNC: 12.3 G/DL (ref 14–18)
MCH RBC QN AUTO: 30.4 PG (ref 27–33)
MCHC RBC AUTO-ENTMCNC: 34.6 G/DL (ref 32.3–36.5)
MCV RBC AUTO: 87.9 FL (ref 81.4–97.8)
PLATELET # BLD AUTO: 370 K/UL (ref 164–446)
PMV BLD AUTO: 9.7 FL (ref 9–12.9)
POTASSIUM SERPL-SCNC: 3.5 MMOL/L (ref 3.6–5.5)
PROT SERPL-MCNC: 6.2 G/DL (ref 6–8.2)
RBC # BLD AUTO: 4.05 M/UL (ref 4.7–6.1)
SIGNIFICANT IND 70042: NORMAL
SITE SITE: NORMAL
SODIUM SERPL-SCNC: 135 MMOL/L (ref 135–145)
SOURCE SOURCE: NORMAL
WBC # BLD AUTO: 8.3 K/UL (ref 4.8–10.8)

## 2024-10-17 PROCEDURE — 700102 HCHG RX REV CODE 250 W/ 637 OVERRIDE(OP): Performed by: STUDENT IN AN ORGANIZED HEALTH CARE EDUCATION/TRAINING PROGRAM

## 2024-10-17 PROCEDURE — 99231 SBSQ HOSP IP/OBS SF/LOW 25: CPT | Performed by: INTERNAL MEDICINE

## 2024-10-17 PROCEDURE — 700111 HCHG RX REV CODE 636 W/ 250 OVERRIDE (IP): Mod: JZ | Performed by: INTERNAL MEDICINE

## 2024-10-17 PROCEDURE — A9270 NON-COVERED ITEM OR SERVICE: HCPCS | Performed by: STUDENT IN AN ORGANIZED HEALTH CARE EDUCATION/TRAINING PROGRAM

## 2024-10-17 PROCEDURE — 97530 THERAPEUTIC ACTIVITIES: CPT | Mod: CQ

## 2024-10-17 PROCEDURE — 770001 HCHG ROOM/CARE - MED/SURG/GYN PRIV*

## 2024-10-17 PROCEDURE — 700102 HCHG RX REV CODE 250 W/ 637 OVERRIDE(OP): Performed by: HOSPITALIST

## 2024-10-17 PROCEDURE — A9270 NON-COVERED ITEM OR SERVICE: HCPCS | Performed by: EMERGENCY MEDICINE

## 2024-10-17 PROCEDURE — 700111 HCHG RX REV CODE 636 W/ 250 OVERRIDE (IP): Performed by: STUDENT IN AN ORGANIZED HEALTH CARE EDUCATION/TRAINING PROGRAM

## 2024-10-17 PROCEDURE — 700102 HCHG RX REV CODE 250 W/ 637 OVERRIDE(OP): Performed by: EMERGENCY MEDICINE

## 2024-10-17 PROCEDURE — 97116 GAIT TRAINING THERAPY: CPT | Mod: CQ

## 2024-10-17 PROCEDURE — A9270 NON-COVERED ITEM OR SERVICE: HCPCS | Performed by: HOSPITALIST

## 2024-10-17 RX ADMIN — PREDNISONE 40 MG: 20 TABLET ORAL at 05:31

## 2024-10-17 RX ADMIN — ARIPIPRAZOLE 10 MG: 10 TABLET ORAL at 05:31

## 2024-10-17 RX ADMIN — FINASTERIDE 5 MG: 5 TABLET, FILM COATED ORAL at 05:31

## 2024-10-17 RX ADMIN — BUPROPION HYDROCHLORIDE 75 MG: 75 TABLET, FILM COATED ORAL at 05:31

## 2024-10-17 RX ADMIN — CLONIDINE HYDROCHLORIDE 0.2 MG: 0.1 TABLET ORAL at 21:22

## 2024-10-17 RX ADMIN — TRAZODONE HYDROCHLORIDE 50 MG: 50 TABLET ORAL at 21:23

## 2024-10-17 RX ADMIN — CARVEDILOL 12.5 MG: 12.5 TABLET, FILM COATED ORAL at 17:28

## 2024-10-17 RX ADMIN — SENNOSIDES AND DOCUSATE SODIUM 2 TABLET: 50; 8.6 TABLET ORAL at 17:29

## 2024-10-17 RX ADMIN — OXYCODONE HYDROCHLORIDE 5 MG: 5 TABLET ORAL at 21:23

## 2024-10-17 RX ADMIN — AMLODIPINE BESYLATE 10 MG: 10 TABLET ORAL at 05:31

## 2024-10-17 RX ADMIN — ENOXAPARIN SODIUM 40 MG: 100 INJECTION SUBCUTANEOUS at 17:27

## 2024-10-17 RX ADMIN — OXYCODONE HYDROCHLORIDE 5 MG: 5 TABLET ORAL at 07:53

## 2024-10-17 RX ADMIN — LOSARTAN POTASSIUM 25 MG: 25 TABLET, FILM COATED ORAL at 05:31

## 2024-10-17 RX ADMIN — CARVEDILOL 12.5 MG: 12.5 TABLET, FILM COATED ORAL at 07:53

## 2024-10-17 RX ADMIN — Medication 100 MG: at 05:31

## 2024-10-17 ASSESSMENT — PAIN DESCRIPTION - PAIN TYPE
TYPE: ACUTE PAIN

## 2024-10-17 ASSESSMENT — COGNITIVE AND FUNCTIONAL STATUS - GENERAL
TURNING FROM BACK TO SIDE WHILE IN FLAT BAD: A LITTLE
CLIMB 3 TO 5 STEPS WITH RAILING: A LITTLE
WALKING IN HOSPITAL ROOM: A LITTLE
STANDING UP FROM CHAIR USING ARMS: A LITTLE
MOVING FROM LYING ON BACK TO SITTING ON SIDE OF FLAT BED: A LITTLE
SUGGESTED CMS G CODE MODIFIER MOBILITY: CK
MOBILITY SCORE: 18
MOVING TO AND FROM BED TO CHAIR: A LITTLE

## 2024-10-17 ASSESSMENT — PATIENT HEALTH QUESTIONNAIRE - PHQ9
6. FEELING BAD ABOUT YOURSELF - OR THAT YOU ARE A FAILURE OR HAVE LET YOURSELF OR YOUR FAMILY DOWN: SEVERAL DAYS
2. FEELING DOWN, DEPRESSED, IRRITABLE, OR HOPELESS: NEARLY EVERY DAY
7. TROUBLE CONCENTRATING ON THINGS, SUCH AS READING THE NEWSPAPER OR WATCHING TELEVISION: SEVERAL DAYS
SUM OF ALL RESPONSES TO PHQ9 QUESTIONS 1 AND 2: 5
9. THOUGHTS THAT YOU WOULD BE BETTER OFF DEAD, OR OF HURTING YOURSELF: SEVERAL DAYS
5. POOR APPETITE OR OVEREATING: NEARLY EVERY DAY
SUM OF ALL RESPONSES TO PHQ QUESTIONS 1-9: 18
1. LITTLE INTEREST OR PLEASURE IN DOING THINGS: MORE THAN HALF THE DAYS
4. FEELING TIRED OR HAVING LITTLE ENERGY: NEARLY EVERY DAY
8. MOVING OR SPEAKING SO SLOWLY THAT OTHER PEOPLE COULD HAVE NOTICED. OR THE OPPOSITE, BEING SO FIGETY OR RESTLESS THAT YOU HAVE BEEN MOVING AROUND A LOT MORE THAN USUAL: SEVERAL DAYS
3. TROUBLE FALLING OR STAYING ASLEEP OR SLEEPING TOO MUCH: NEARLY EVERY DAY

## 2024-10-17 ASSESSMENT — GAIT ASSESSMENTS
ASSISTIVE DEVICE: QUAD CANE;HAND HELD ASSIST
DEVIATION: BRADYKINETIC
GAIT LEVEL OF ASSIST: CONTACT GUARD ASSIST

## 2024-10-17 ASSESSMENT — ENCOUNTER SYMPTOMS
NERVOUS/ANXIOUS: 1
DEPRESSION: 1
WEAKNESS: 1

## 2024-10-17 ASSESSMENT — FIBROSIS 4 INDEX: FIB4 SCORE: 0.36

## 2024-10-18 VITALS
OXYGEN SATURATION: 95 % | HEIGHT: 71 IN | RESPIRATION RATE: 15 BRPM | BODY MASS INDEX: 24.23 KG/M2 | TEMPERATURE: 97.7 F | HEART RATE: 76 BPM | WEIGHT: 173.06 LBS | DIASTOLIC BLOOD PRESSURE: 89 MMHG | SYSTOLIC BLOOD PRESSURE: 144 MMHG

## 2024-10-18 LAB
ALBUMIN SERPL BCP-MCNC: 3.3 G/DL (ref 3.2–4.9)
ALBUMIN/GLOB SERPL: 1.2 G/DL
ALP SERPL-CCNC: 70 U/L (ref 30–99)
ALT SERPL-CCNC: 107 U/L (ref 2–50)
ANION GAP SERPL CALC-SCNC: 11 MMOL/L (ref 7–16)
AST SERPL-CCNC: 17 U/L (ref 12–45)
BILIRUB SERPL-MCNC: 0.4 MG/DL (ref 0.1–1.5)
BUN SERPL-MCNC: 18 MG/DL (ref 8–22)
CALCIUM ALBUM COR SERPL-MCNC: 9.9 MG/DL (ref 8.5–10.5)
CALCIUM SERPL-MCNC: 9.3 MG/DL (ref 8.5–10.5)
CHLORIDE SERPL-SCNC: 99 MMOL/L (ref 96–112)
CO2 SERPL-SCNC: 23 MMOL/L (ref 20–33)
CREAT SERPL-MCNC: 0.88 MG/DL (ref 0.5–1.4)
ERYTHROCYTE [DISTWIDTH] IN BLOOD BY AUTOMATED COUNT: 43.3 FL (ref 35.9–50)
GFR SERPLBLD CREATININE-BSD FMLA CKD-EPI: 95 ML/MIN/1.73 M 2
GLOBULIN SER CALC-MCNC: 2.8 G/DL (ref 1.9–3.5)
GLUCOSE SERPL-MCNC: 98 MG/DL (ref 65–99)
HCT VFR BLD AUTO: 39 % (ref 42–52)
HGB BLD-MCNC: 13 G/DL (ref 14–18)
MCH RBC QN AUTO: 29 PG (ref 27–33)
MCHC RBC AUTO-ENTMCNC: 33.3 G/DL (ref 32.3–36.5)
MCV RBC AUTO: 86.9 FL (ref 81.4–97.8)
PLATELET # BLD AUTO: 464 K/UL (ref 164–446)
PMV BLD AUTO: 8.9 FL (ref 9–12.9)
POTASSIUM SERPL-SCNC: 3.6 MMOL/L (ref 3.6–5.5)
PROT SERPL-MCNC: 6.1 G/DL (ref 6–8.2)
RBC # BLD AUTO: 4.49 M/UL (ref 4.7–6.1)
SODIUM SERPL-SCNC: 133 MMOL/L (ref 135–145)
WBC # BLD AUTO: 7.3 K/UL (ref 4.8–10.8)

## 2024-10-18 PROCEDURE — A9270 NON-COVERED ITEM OR SERVICE: HCPCS | Performed by: STUDENT IN AN ORGANIZED HEALTH CARE EDUCATION/TRAINING PROGRAM

## 2024-10-18 PROCEDURE — A9270 NON-COVERED ITEM OR SERVICE: HCPCS | Performed by: EMERGENCY MEDICINE

## 2024-10-18 PROCEDURE — 700111 HCHG RX REV CODE 636 W/ 250 OVERRIDE (IP): Performed by: STUDENT IN AN ORGANIZED HEALTH CARE EDUCATION/TRAINING PROGRAM

## 2024-10-18 PROCEDURE — 700102 HCHG RX REV CODE 250 W/ 637 OVERRIDE(OP): Performed by: STUDENT IN AN ORGANIZED HEALTH CARE EDUCATION/TRAINING PROGRAM

## 2024-10-18 PROCEDURE — A9270 NON-COVERED ITEM OR SERVICE: HCPCS | Performed by: HOSPITALIST

## 2024-10-18 PROCEDURE — 700102 HCHG RX REV CODE 250 W/ 637 OVERRIDE(OP): Performed by: HOSPITALIST

## 2024-10-18 PROCEDURE — 36415 COLL VENOUS BLD VENIPUNCTURE: CPT

## 2024-10-18 PROCEDURE — 700102 HCHG RX REV CODE 250 W/ 637 OVERRIDE(OP): Performed by: EMERGENCY MEDICINE

## 2024-10-18 PROCEDURE — 99239 HOSP IP/OBS DSCHRG MGMT >30: CPT | Performed by: INTERNAL MEDICINE

## 2024-10-18 PROCEDURE — 80053 COMPREHEN METABOLIC PANEL: CPT

## 2024-10-18 PROCEDURE — 85027 COMPLETE CBC AUTOMATED: CPT

## 2024-10-18 RX ADMIN — Medication 100 MG: at 05:41

## 2024-10-18 RX ADMIN — CARVEDILOL 12.5 MG: 12.5 TABLET, FILM COATED ORAL at 07:59

## 2024-10-18 RX ADMIN — OXYCODONE HYDROCHLORIDE 5 MG: 5 TABLET ORAL at 14:37

## 2024-10-18 RX ADMIN — PREDNISONE 40 MG: 20 TABLET ORAL at 05:41

## 2024-10-18 RX ADMIN — BUPROPION HYDROCHLORIDE 75 MG: 75 TABLET, FILM COATED ORAL at 05:41

## 2024-10-18 RX ADMIN — ARIPIPRAZOLE 10 MG: 10 TABLET ORAL at 05:41

## 2024-10-18 RX ADMIN — FINASTERIDE 5 MG: 5 TABLET, FILM COATED ORAL at 05:41

## 2024-10-18 RX ADMIN — AMLODIPINE BESYLATE 10 MG: 10 TABLET ORAL at 05:41

## 2024-10-18 RX ADMIN — LOSARTAN POTASSIUM 25 MG: 25 TABLET, FILM COATED ORAL at 05:41

## 2024-10-18 ASSESSMENT — FIBROSIS 4 INDEX: FIB4 SCORE: 0.23

## 2024-10-18 ASSESSMENT — PAIN DESCRIPTION - PAIN TYPE: TYPE: ACUTE PAIN

## 2024-12-08 ENCOUNTER — APPOINTMENT (OUTPATIENT)
Dept: URGENT CARE | Facility: PHYSICIAN GROUP | Age: 64
End: 2024-12-08

## 2024-12-08 ENCOUNTER — OFFICE VISIT (OUTPATIENT)
Dept: URGENT CARE | Facility: PHYSICIAN GROUP | Age: 64
End: 2024-12-08

## 2024-12-08 VITALS
HEIGHT: 71 IN | HEART RATE: 77 BPM | BODY MASS INDEX: 26.32 KG/M2 | DIASTOLIC BLOOD PRESSURE: 60 MMHG | TEMPERATURE: 97.7 F | SYSTOLIC BLOOD PRESSURE: 122 MMHG | WEIGHT: 188 LBS

## 2024-12-08 DIAGNOSIS — M25.421 PAIN AND SWELLING OF ELBOW, RIGHT: ICD-10-CM

## 2024-12-08 DIAGNOSIS — M77.8 TENDONITIS OF ELBOW, RIGHT: Primary | ICD-10-CM

## 2024-12-08 DIAGNOSIS — M25.521 PAIN AND SWELLING OF ELBOW, RIGHT: ICD-10-CM

## 2024-12-08 PROCEDURE — 99213 OFFICE O/P EST LOW 20 MIN: CPT | Performed by: PHYSICIAN ASSISTANT

## 2024-12-08 PROCEDURE — 3074F SYST BP LT 130 MM HG: CPT | Performed by: PHYSICIAN ASSISTANT

## 2024-12-08 PROCEDURE — 3078F DIAST BP <80 MM HG: CPT | Performed by: PHYSICIAN ASSISTANT

## 2024-12-08 RX ORDER — INDOMETHACIN 50 MG/1
50 CAPSULE ORAL 3 TIMES DAILY
Qty: 90 CAPSULE | Refills: 0 | Status: SHIPPED | OUTPATIENT
Start: 2024-12-08

## 2024-12-08 RX ORDER — GABAPENTIN 300 MG/1
CAPSULE ORAL
COMMUNITY
Start: 2024-12-05

## 2024-12-08 RX ORDER — ATORVASTATIN CALCIUM 20 MG/1
TABLET, FILM COATED ORAL
COMMUNITY
Start: 2024-11-26

## 2024-12-08 RX ORDER — METHYLPREDNISOLONE 4 MG/1
TABLET ORAL
Qty: 21 TABLET | Refills: 0 | Status: SHIPPED | OUTPATIENT
Start: 2024-12-08

## 2024-12-08 ASSESSMENT — FIBROSIS 4 INDEX: FIB4 SCORE: 0.23

## 2024-12-08 NOTE — PROGRESS NOTES
Subjective     Terrell Hensley is a 64 y.o. male who presents with Elbow Pain (R x 1 week but started to have more pain yesterday. )    PMH:  has a past medical history of HTN (hypertension) and Suicide attempt (HCC).  MEDS:   Current Outpatient Medications:     atorvastatin (LIPITOR) 20 MG Tab, 1 tablet Orally Once a day at bedtime for cholesterol lowering for 90 days, Disp: , Rfl:     gabapentin (NEURONTIN) 300 MG Cap, , Disp: , Rfl:     methylPREDNISolone (MEDROL DOSEPAK) 4 MG Tablet Therapy Pack, Follow schedule on package instructions., Disp: 21 Tablet, Rfl: 0    indomethacin (INDOCIN) 50 MG Cap, Take 1 Capsule by mouth 3 times a day., Disp: 90 Capsule, Rfl: 0    buPROPion SR (WELLBUTRIN-SR) 150 MG TABLET SR 12 HR sustained-release tablet, Take 150 mg by mouth every morning., Disp: , Rfl:     tamsulosin (FLOMAX) 0.4 MG capsule, Take 0.4 mg by mouth every day., Disp: , Rfl:     ARIPiprazole (ABILIFY) 15 MG Tab, Take 15 mg by mouth every morning., Disp: , Rfl:     traZODone (DESYREL) 150 MG Tab, Take 225 mg by mouth at bedtime. 1&1/2 tablets= 225mg, Disp: , Rfl:     LORazepam (ATIVAN) 0.5 MG Tab, Take 0.5 mg by mouth 3 times a day as needed for Anxiety., Disp: , Rfl:     Cyanocobalamin (VITAMIN B-12 PO), Take 1 Tablet by mouth every day., Disp: , Rfl:     amLODIPine (NORVASC) 10 MG Tab, Take 1 Tablet by mouth every day., Disp: 30 Tablet, Rfl: 1    carvedilol (COREG) 12.5 MG Tab, Take 1 Tablet by mouth 2 times a day with meals., Disp: 60 Tablet, Rfl: 1    finasteride (PROSCAR) 5 MG Tab, Take 1 Tablet by mouth every day., Disp: 30 Tablet, Rfl: 1    losartan (COZAAR) 100 MG Tab, Take 1 Tablet by mouth every day., Disp: 30 Tablet, Rfl: 1  ALLERGIES:   Allergies   Allergen Reactions    Sulfa Drugs Hives and Rash     SURGHX: History reviewed. No pertinent surgical history.  SOCHX:  reports that he has quit smoking. His smoking use included cigarettes. He has never used smokeless tobacco. He reports that he does  "not currently use alcohol. He reports that he does not use drugs.  FH: Reviewed with patient, not pertinent to this visit.           Patient presents with:  Elbow Pain: R x 1 week but started to have more pain yesterday.  Patient denies injury to this elbow, has not hit it on anything or injured it previously.  Pain with ROM, palpation and any pressure on elbow (resting on the couch, etc).  PT has hx of gout but typically in the foot.  PT has tried his colchicine, no improvement.  Patient denies any other complaint.            Review of Systems   Musculoskeletal:  Positive for joint pain (Right elbow).   Neurological:  Negative for tingling and focal weakness.   All other systems reviewed and are negative.             Objective     /60   Pulse 77   Temp 36.5 °C (97.7 °F) (Temporal)   Ht 1.803 m (5' 11\")   Wt 85.3 kg (188 lb)   BMI 26.22 kg/m²      Physical Exam  Vitals and nursing note reviewed.   Constitutional:       General: He is not in acute distress.     Appearance: Normal appearance. He is well-developed. He is not ill-appearing or toxic-appearing.   HENT:      Head: Normocephalic and atraumatic.      Right Ear: Tympanic membrane normal.      Left Ear: Tympanic membrane normal.      Nose: Nose normal.      Mouth/Throat:      Lips: Pink.      Mouth: Mucous membranes are moist.      Pharynx: Oropharynx is clear. Uvula midline.   Eyes:      Extraocular Movements: Extraocular movements intact.      Conjunctiva/sclera: Conjunctivae normal.      Pupils: Pupils are equal, round, and reactive to light.   Cardiovascular:      Rate and Rhythm: Normal rate and regular rhythm.      Pulses: Normal pulses.      Heart sounds: Normal heart sounds.   Pulmonary:      Effort: Pulmonary effort is normal.      Breath sounds: Normal breath sounds.   Abdominal:      General: Bowel sounds are normal.      Palpations: Abdomen is soft.   Musculoskeletal:         General: Normal range of motion.      Right elbow: Tenderness " present in lateral epicondyle and olecranon process. No radial head tenderness.      Cervical back: Normal range of motion and neck supple.   Skin:     General: Skin is warm and dry.      Capillary Refill: Capillary refill takes less than 2 seconds.   Neurological:      General: No focal deficit present.      Mental Status: He is alert and oriented to person, place, and time.      Cranial Nerves: No cranial nerve deficit.      Motor: Motor function is intact.      Coordination: Coordination is intact.      Gait: Gait normal.   Psychiatric:         Mood and Affect: Mood normal.                             Assessment & Plan        Assessment & Plan  Tendonitis of elbow, right    Orders:    methylPREDNISolone (MEDROL DOSEPAK) 4 MG Tablet Therapy Pack; Follow schedule on package instructions.    indomethacin (INDOCIN) 50 MG Cap; Take 1 Capsule by mouth 3 times a day.    Pain and swelling of elbow, right    Orders:    methylPREDNISolone (MEDROL DOSEPAK) 4 MG Tablet Therapy Pack; Follow schedule on package instructions.    indomethacin (INDOCIN) 50 MG Cap; Take 1 Capsule by mouth 3 times a day.                Patient HPI physical exam consistent with tendinitis of the right elbow.  Particularly consistent with gout as the joint is not red or warm to the touch but it is slightly swollen.  It is particularly painful with supination and pronation and palpation of the lateral epicondyle.      I will treat with Medrol Dosepak for 6 days, then patient was encouraged to take indomethacin after that.  In the meantime patient can take over-the-counter Tylenol for pain.    We discussed x-ray, patient decided he did not want an x-ray since he had no trauma or injury to this elbow.    Ace wrap applied to elbow for comfort and compression.     Differential diagnosis, supportive care, and indications for immediate follow-up discussed with patient.  Instructed to return to clinic or nearest emergency department for any change in  condition, further concerns, or worsening of symptoms.    I personally reviewed prior external notes and test results pertinent to today's visit.  I have independently reviewed and interpreted all diagnostics ordered during this urgent care visit.    PT should follow up with PCP in 1-2 days for re-evaluation if symptoms have not improved.      Discussed red flags and reasons to return to UC or ED.      Pt and/or family verbalized understanding of diagnosis and follow up instructions and was offered informational handout on diagnosis.  PT discharged.     Please note that this dictation was created using voice recognition software. I have made every reasonable attempt to correct obvious errors, but I expect that there may be errors of grammar and possibly content that I did not discover before finalizing the note.

## 2024-12-15 ASSESSMENT — ENCOUNTER SYMPTOMS
FOCAL WEAKNESS: 0
TINGLING: 0

## 2025-01-01 ENCOUNTER — HOSPITAL ENCOUNTER (EMERGENCY)
Facility: MEDICAL CENTER | Age: 65
End: 2025-05-07
Attending: EMERGENCY MEDICINE | Admitting: EMERGENCY MEDICINE
Payer: MEDICARE

## 2025-01-01 ENCOUNTER — APPOINTMENT (OUTPATIENT)
Dept: RADIOLOGY | Facility: MEDICAL CENTER | Age: 65
End: 2025-01-01
Attending: EMERGENCY MEDICINE
Payer: MEDICARE

## 2025-01-01 VITALS
RESPIRATION RATE: 22 BRPM | BODY MASS INDEX: 26.33 KG/M2 | WEIGHT: 188.05 LBS | DIASTOLIC BLOOD PRESSURE: 91 MMHG | SYSTOLIC BLOOD PRESSURE: 196 MMHG | HEIGHT: 71 IN | OXYGEN SATURATION: 100 %

## 2025-01-01 DIAGNOSIS — S09.90XA CLOSED HEAD INJURY, INITIAL ENCOUNTER: ICD-10-CM

## 2025-01-01 DIAGNOSIS — R41.82 ALTERED MENTAL STATUS, UNSPECIFIED ALTERED MENTAL STATUS TYPE: ICD-10-CM

## 2025-01-01 DIAGNOSIS — R56.9 SEIZURE (HCC): ICD-10-CM

## 2025-01-01 DIAGNOSIS — I46.9 CARDIAC ARREST (HCC): ICD-10-CM

## 2025-01-01 LAB
ALBUMIN SERPL BCP-MCNC: 4.1 G/DL (ref 3.2–4.9)
ALBUMIN/GLOB SERPL: 1.2 G/DL
ALP SERPL-CCNC: 86 U/L (ref 30–99)
ALT SERPL-CCNC: 48 U/L (ref 2–50)
ANION GAP SERPL CALC-SCNC: 28 MMOL/L (ref 7–16)
APAP SERPL-MCNC: <5 UG/ML (ref 10–30)
AST SERPL-CCNC: 32 U/L (ref 12–45)
BASOPHILS # BLD AUTO: 0.2 % (ref 0–1.8)
BASOPHILS # BLD: 0.08 K/UL (ref 0–0.12)
BILIRUB SERPL-MCNC: <0.2 MG/DL (ref 0.1–1.5)
BUN SERPL-MCNC: 29 MG/DL (ref 8–22)
CALCIUM ALBUM COR SERPL-MCNC: 9.7 MG/DL (ref 8.5–10.5)
CALCIUM SERPL-MCNC: 9.8 MG/DL (ref 8.5–10.5)
CHLORIDE SERPL-SCNC: 96 MMOL/L (ref 96–112)
CO2 SERPL-SCNC: 10 MMOL/L (ref 20–33)
CREAT SERPL-MCNC: 1.73 MG/DL (ref 0.5–1.4)
EKG IMPRESSION: NORMAL
EKG IMPRESSION: NORMAL
EOSINOPHIL # BLD AUTO: 0.06 K/UL (ref 0–0.51)
EOSINOPHIL NFR BLD: 0.2 % (ref 0–6.9)
ERYTHROCYTE [DISTWIDTH] IN BLOOD BY AUTOMATED COUNT: 44.5 FL (ref 35.9–50)
ETHANOL BLD-MCNC: <10.1 MG/DL
GFR SERPLBLD CREATININE-BSD FMLA CKD-EPI: 43 ML/MIN/1.73 M 2
GLOBULIN SER CALC-MCNC: 3.3 G/DL (ref 1.9–3.5)
GLUCOSE BLD STRIP.AUTO-MCNC: 154 MG/DL (ref 65–99)
GLUCOSE SERPL-MCNC: 152 MG/DL (ref 65–99)
HCT VFR BLD AUTO: 46.8 % (ref 42–52)
HGB BLD-MCNC: 15.3 G/DL (ref 14–18)
IMM GRANULOCYTES # BLD AUTO: 0.6 K/UL (ref 0–0.11)
IMM GRANULOCYTES NFR BLD AUTO: 1.8 % (ref 0–0.9)
LACTATE SERPL-SCNC: 12.1 MMOL/L (ref 0.5–2)
LYMPHOCYTES # BLD AUTO: 3.36 K/UL (ref 1–4.8)
LYMPHOCYTES NFR BLD: 10.4 % (ref 22–41)
MCH RBC QN AUTO: 30.4 PG (ref 27–33)
MCHC RBC AUTO-ENTMCNC: 32.7 G/DL (ref 32.3–36.5)
MCV RBC AUTO: 93 FL (ref 81.4–97.8)
MONOCYTES # BLD AUTO: 1.91 K/UL (ref 0–0.85)
MONOCYTES NFR BLD AUTO: 5.9 % (ref 0–13.4)
NEUTROPHILS # BLD AUTO: 26.45 K/UL (ref 1.82–7.42)
NEUTROPHILS NFR BLD: 81.5 % (ref 44–72)
NRBC # BLD AUTO: 0 K/UL
NRBC BLD-RTO: 0 /100 WBC (ref 0–0.2)
PLATELET # BLD AUTO: 338 K/UL (ref 164–446)
PMV BLD AUTO: 8.8 FL (ref 9–12.9)
POTASSIUM SERPL-SCNC: 5.9 MMOL/L (ref 3.6–5.5)
PROT SERPL-MCNC: 7.4 G/DL (ref 6–8.2)
RBC # BLD AUTO: 5.03 M/UL (ref 4.7–6.1)
SALICYLATES SERPL-MCNC: >70 MG/DL (ref 15–25)
SODIUM SERPL-SCNC: 134 MMOL/L (ref 135–145)
TROPONIN T SERPL-MCNC: 55 NG/L (ref 6–19)
WBC # BLD AUTO: 32.5 K/UL (ref 4.8–10.8)

## 2025-01-01 PROCEDURE — 71275 CT ANGIOGRAPHY CHEST: CPT

## 2025-01-01 PROCEDURE — 80053 COMPREHEN METABOLIC PANEL: CPT

## 2025-01-01 PROCEDURE — 99285 EMERGENCY DEPT VISIT HI MDM: CPT

## 2025-01-01 PROCEDURE — 36415 COLL VENOUS BLD VENIPUNCTURE: CPT

## 2025-01-01 PROCEDURE — 99291 CRITICAL CARE FIRST HOUR: CPT

## 2025-01-01 PROCEDURE — 82077 ASSAY SPEC XCP UR&BREATH IA: CPT

## 2025-01-01 PROCEDURE — 302214 INTUBATION BOX: Performed by: EMERGENCY MEDICINE

## 2025-01-01 PROCEDURE — 71045 X-RAY EXAM CHEST 1 VIEW: CPT

## 2025-01-01 PROCEDURE — 85025 COMPLETE CBC W/AUTO DIFF WBC: CPT

## 2025-01-01 PROCEDURE — 92950 HEART/LUNG RESUSCITATION CPR: CPT

## 2025-01-01 PROCEDURE — 93005 ELECTROCARDIOGRAM TRACING: CPT | Mod: TC | Performed by: EMERGENCY MEDICINE

## 2025-01-01 PROCEDURE — 31500 INSERT EMERGENCY AIRWAY: CPT

## 2025-01-01 PROCEDURE — 94002 VENT MGMT INPAT INIT DAY: CPT

## 2025-01-01 PROCEDURE — 80143 DRUG ASSAY ACETAMINOPHEN: CPT

## 2025-01-01 PROCEDURE — 70450 CT HEAD/BRAIN W/O DYE: CPT

## 2025-01-01 PROCEDURE — 83605 ASSAY OF LACTIC ACID: CPT

## 2025-01-01 PROCEDURE — 80179 DRUG ASSAY SALICYLATE: CPT

## 2025-01-01 PROCEDURE — 700111 HCHG RX REV CODE 636 W/ 250 OVERRIDE (IP): Performed by: EMERGENCY MEDICINE

## 2025-01-01 PROCEDURE — 84484 ASSAY OF TROPONIN QUANT: CPT

## 2025-01-01 PROCEDURE — 700101 HCHG RX REV CODE 250: Performed by: EMERGENCY MEDICINE

## 2025-01-01 PROCEDURE — 700117 HCHG RX CONTRAST REV CODE 255: Performed by: EMERGENCY MEDICINE

## 2025-01-01 PROCEDURE — 72125 CT NECK SPINE W/O DYE: CPT

## 2025-01-01 PROCEDURE — 82962 GLUCOSE BLOOD TEST: CPT

## 2025-01-01 RX ORDER — EPINEPHRINE 0.1 MG/ML
SYRINGE (ML) INJECTION
Status: COMPLETED | OUTPATIENT
Start: 2025-01-01 | End: 2025-01-01

## 2025-01-01 RX ORDER — INDOMETHACIN 25 MG/1
50 CAPSULE ORAL ONCE
Status: DISCONTINUED | OUTPATIENT
Start: 2025-01-01 | End: 2025-01-01 | Stop reason: HOSPADM

## 2025-01-01 RX ORDER — CALCIUM CHLORIDE 100 MG/ML
INJECTION INTRAVENOUS; INTRAVENTRICULAR
Status: COMPLETED | OUTPATIENT
Start: 2025-01-01 | End: 2025-01-01

## 2025-01-01 RX ORDER — MIDAZOLAM HYDROCHLORIDE 1 MG/ML
5 INJECTION INTRAMUSCULAR; INTRAVENOUS
Status: DISCONTINUED | OUTPATIENT
Start: 2025-01-01 | End: 2025-01-01 | Stop reason: HOSPADM

## 2025-01-01 RX ORDER — LEVETIRACETAM 500 MG/5ML
2500 INJECTION, SOLUTION, CONCENTRATE INTRAVENOUS ONCE
Status: DISCONTINUED | OUTPATIENT
Start: 2025-01-01 | End: 2025-01-01 | Stop reason: HOSPADM

## 2025-01-01 RX ORDER — MIDAZOLAM HYDROCHLORIDE 1 MG/ML
5 INJECTION INTRAMUSCULAR; INTRAVENOUS ONCE
Status: DISCONTINUED | OUTPATIENT
Start: 2025-01-01 | End: 2025-01-01 | Stop reason: HOSPADM

## 2025-01-01 RX ORDER — EPINEPHRINE HCL IN 0.9 % NACL 4MG/250ML
PLASTIC BAG, INJECTION (ML) INTRAVENOUS
Status: DISCONTINUED
Start: 2025-01-01 | End: 2025-01-01 | Stop reason: HOSPADM

## 2025-01-01 RX ORDER — INDOMETHACIN 25 MG/1
CAPSULE ORAL
Status: COMPLETED | OUTPATIENT
Start: 2025-01-01 | End: 2025-01-01

## 2025-01-01 RX ORDER — SODIUM CHLORIDE, SODIUM LACTATE, POTASSIUM CHLORIDE, AND CALCIUM CHLORIDE .6; .31; .03; .02 G/100ML; G/100ML; G/100ML; G/100ML
30 INJECTION, SOLUTION INTRAVENOUS ONCE
Status: DISCONTINUED | OUTPATIENT
Start: 2025-01-01 | End: 2025-01-01 | Stop reason: HOSPADM

## 2025-01-01 RX ORDER — EPINEPHRINE HCL IN 0.9 % NACL 4MG/250ML
0-.5 PLASTIC BAG, INJECTION (ML) INTRAVENOUS CONTINUOUS
Status: DISCONTINUED | OUTPATIENT
Start: 2025-01-01 | End: 2025-01-01 | Stop reason: HOSPADM

## 2025-01-01 RX ORDER — ROCURONIUM BROMIDE 10 MG/ML
INJECTION, SOLUTION INTRAVENOUS
Status: COMPLETED | OUTPATIENT
Start: 2025-01-01 | End: 2025-01-01

## 2025-01-01 RX ADMIN — CALCIUM CHLORIDE 1 G: 100 INJECTION, SOLUTION INTRAVENOUS; INTRAVENTRICULAR at 11:33

## 2025-01-01 RX ADMIN — SODIUM BICARBONATE 50 MEQ: 84 INJECTION INTRAVENOUS at 11:34

## 2025-01-01 RX ADMIN — CALCIUM CHLORIDE 1 G: 100 INJECTION, SOLUTION INTRAVENOUS; INTRAVENTRICULAR at 11:06

## 2025-01-01 RX ADMIN — EPINEPHRINE 1 MG: 0.1 INJECTION, SOLUTION INTRAVENOUS at 11:05

## 2025-01-01 RX ADMIN — EPINEPHRINE 1 MG: 0.1 INJECTION, SOLUTION INTRAVENOUS at 11:31

## 2025-01-01 RX ADMIN — EPINEPHRINE 1 MG: 0.1 INJECTION, SOLUTION INTRAVENOUS at 11:12

## 2025-01-01 RX ADMIN — SODIUM BICARBONATE 50 MEQ: 84 INJECTION INTRAVENOUS at 11:11

## 2025-01-01 RX ADMIN — SODIUM BICARBONATE 50 MEQ: 84 INJECTION INTRAVENOUS at 11:04

## 2025-01-01 RX ADMIN — IOHEXOL 68 ML: 350 INJECTION, SOLUTION INTRAVENOUS at 11:15

## 2025-01-01 RX ADMIN — EPINEPHRINE 1 MG: 0.1 INJECTION, SOLUTION INTRAVENOUS at 11:02

## 2025-01-01 RX ADMIN — SODIUM BICARBONATE 50 MEQ: 84 INJECTION INTRAVENOUS at 10:58

## 2025-01-01 RX ADMIN — ROCURONIUM BROMIDE 90 MG: 10 INJECTION INTRAVENOUS at 10:58

## 2025-01-01 RX ADMIN — PROPOFOL 90 MG: 10 INJECTION, EMULSION INTRAVENOUS at 10:57

## 2025-01-01 RX ADMIN — EPINEPHRINE 1 MG: 0.1 INJECTION, SOLUTION INTRAVENOUS at 11:10

## 2025-01-01 RX ADMIN — EPINEPHRINE 1 MG: 0.1 INJECTION, SOLUTION INTRAVENOUS at 11:36

## 2025-01-01 ASSESSMENT — FIBROSIS 4 INDEX: FIB4 SCORE: 0.23

## 2025-05-07 PROBLEM — I46.9 CARDIAC ARREST (HCC): Status: ACTIVE | Noted: 2025-01-01

## 2025-05-07 NOTE — RESPIRATORY CARE
Intubation    Reason for intubation respiratory distress  Difficult Intubation/Number of attempts 2  Evidence of aspiration no  Airway ETT Oral 8.0-Secured At (cm): 24 (05/07/25 1111)  Vent Mode: (S)CMV   Rate (breaths/min): 26   Vt Target (mL): 450   PEEP/CPAP: 12     Positive breath sounds and color change on Ezcap.

## 2025-05-07 NOTE — ED PROVIDER NOTES
ED Provider Note    CHIEF COMPLAINT  Chief Complaint   Patient presents with    Seizure     Patient had witnessed seizure on arrival to ED with EMS 15-20 seconds.     T-5000 Head Injury     Patient has laceration to head.          EXTERNAL RECORDS REVIEWED  Prior admission 10/6/2024, patient admitted for altered mental status in the setting of acute Tylenol overdose    HPI/ROS  LIMITATION TO HISTORY   Select: Altered mental status / Confusion  OUTSIDE HISTORIAN(S):  EMS provides the entirety of the history below as patient is altered and unable to provide history himself    Terrell Hensley is a 64 y.o. male who presents with altered mental status.  Patient had sustained a ground-level fall, where he fell off some steps onto the ground in front of his house.  Apparently [employer] called EMS to the patient who was in distress on his front lawn.  He was very agitated at that time.  Per EMS he was able to be redirected verbally and was oriented but very agitated and unable to provide any history otherwise.  Per EMS patient has a longstanding history of mental health issues, and is on benzodiazepines for longstanding history of anxiety.  Apparently his sister has been cutting back on his benzodiazepine dose, unclear to what extent.  Furthermore patient with a history of suicide attempts in the past.  There is no empty pill bottles or evidence of suicide attempt per EMS.  Immediately prior to arrival patient had a witnessed tonic-clonic seizure that lasted for around 10 to 20 seconds and resolved.  Patient was postictal following and remains postictal and unable to provide any further history.    PAST MEDICAL HISTORY   has a past medical history of HTN (hypertension) and Suicide attempt (HCC).    SURGICAL HISTORY  patient denies any surgical history    FAMILY HISTORY  No family history on file.    SOCIAL HISTORY  Social History     Tobacco Use    Smoking status: Former     Types: Cigarettes    Smokeless tobacco: Never    Vaping Use    Vaping status: Never Used   Substance and Sexual Activity    Alcohol use: Not Currently     Comment: occ    Drug use: Never    Sexual activity: Not on file       CURRENT MEDICATIONS  Home Medications    **Home medications have not yet been reviewed for this encounter**       Audit from Redirected Encounters    **Home medications have not yet been reviewed for this encounter**         ALLERGIES  Allergies   Allergen Reactions    Sulfa Drugs Hives and Rash       PHYSICAL EXAM  VITAL SIGNS: There were no vitals taken for this visit.   Physical Exam  Constitutional:       Appearance: He is normal weight. He is ill-appearing and diaphoretic.   HENT:      Head:      Comments: Right-sided cephalohematoma just anterior to the temple with no evidence of bony abnormality     Nose: Nose normal.   Cardiovascular:      Rate and Rhythm: Regular rhythm. Tachycardia present.   Pulmonary:      Comments: Tachypneic  Musculoskeletal:      Comments: Casted left foot, distal pulses present, no overlying skin changes   Skin:     Capillary Refill: Capillary refill takes less than 2 seconds.   Neurological:      Comments: Tachypneic, spontaneously moving all extremities though infrequently.  Patient GCS of 8           EKG/LABS  Results for orders placed or performed during the hospital encounter of 05/07/25   CBC WITH DIFFERENTIAL    Collection Time: 05/07/25 10:22 AM   Result Value Ref Range    WBC 32.5 (H) 4.8 - 10.8 K/uL    RBC 5.03 4.70 - 6.10 M/uL    Hemoglobin 15.3 14.0 - 18.0 g/dL    Hematocrit 46.8 42.0 - 52.0 %    MCV 93.0 81.4 - 97.8 fL    MCH 30.4 27.0 - 33.0 pg    MCHC 32.7 32.3 - 36.5 g/dL    RDW 44.5 35.9 - 50.0 fL    Platelet Count 338 164 - 446 K/uL    MPV 8.8 (L) 9.0 - 12.9 fL    Neutrophils-Polys 81.50 (H) 44.00 - 72.00 %    Lymphocytes 10.40 (L) 22.00 - 41.00 %    Monocytes 5.90 0.00 - 13.40 %    Eosinophils 0.20 0.00 - 6.90 %    Basophils 0.20 0.00 - 1.80 %    Immature Granulocytes 1.80 (H) 0.00 - 0.90 %     Nucleated RBC 0.00 0.00 - 0.20 /100 WBC    Neutrophils (Absolute) 26.45 (H) 1.82 - 7.42 K/uL    Lymphs (Absolute) 3.36 1.00 - 4.80 K/uL    Monos (Absolute) 1.91 (H) 0.00 - 0.85 K/uL    Eos (Absolute) 0.06 0.00 - 0.51 K/uL    Baso (Absolute) 0.08 0.00 - 0.12 K/uL    Immature Granulocytes (abs) 0.60 (H) 0.00 - 0.11 K/uL    NRBC (Absolute) 0.00 K/uL   CMP    Collection Time: 05/07/25 10:22 AM   Result Value Ref Range    Sodium 134 (L) 135 - 145 mmol/L    Potassium 5.9 (H) 3.6 - 5.5 mmol/L    Chloride 96 96 - 112 mmol/L    Co2 10 (L) 20 - 33 mmol/L    Anion Gap 28.0 (H) 7.0 - 16.0    Glucose 152 (H) 65 - 99 mg/dL    Bun 29 (H) 8 - 22 mg/dL    Creatinine 1.73 (H) 0.50 - 1.40 mg/dL    Calcium 9.8 8.5 - 10.5 mg/dL    Correct Calcium 9.7 8.5 - 10.5 mg/dL    AST(SGOT) 32 12 - 45 U/L    ALT(SGPT) 48 2 - 50 U/L    Alkaline Phosphatase 86 30 - 99 U/L    Total Bilirubin <0.2 0.1 - 1.5 mg/dL    Albumin 4.1 3.2 - 4.9 g/dL    Total Protein 7.4 6.0 - 8.2 g/dL    Globulin 3.3 1.9 - 3.5 g/dL    A-G Ratio 1.2 g/dL   TROPONIN    Collection Time: 05/07/25 10:22 AM   Result Value Ref Range    Troponin T 55 (H) 6 - 19 ng/L   DIAGNOSTIC ALCOHOL    Collection Time: 05/07/25 10:22 AM   Result Value Ref Range    Diagnostic Alcohol <10.1 <10.1 mg/dL   LACTIC ACID    Collection Time: 05/07/25 10:22 AM   Result Value Ref Range    Lactic Acid 12.1 (HH) 0.5 - 2.0 mmol/L   ACETAMINOPHEN    Collection Time: 05/07/25 10:22 AM   Result Value Ref Range    Acetaminophen -Tylenol <5.0 (L) 10.0 - 30.0 ug/mL   Salicylate    Collection Time: 05/07/25 10:22 AM   Result Value Ref Range    Salicylates, Quant. >70.0 (HH) 15.0 - 25.0 mg/dL   ESTIMATED GFR    Collection Time: 05/07/25 10:22 AM   Result Value Ref Range    GFR (CKD-EPI) 43 (A) >60 mL/min/1.73 m 2   EKG    Collection Time: 05/07/25 10:24 AM   Result Value Ref Range    Report       [facility] Emergency Dept.    Test Date:  2025-05-07  Pt Name:    FAIZAN GARDNER              Department: ER  MRN:        [id number]                      Room:        11  Gender:     Male                         Technician:  :        1960                   Requested By:FOLR KELLER  Order #:    [id number]                    Reading MD:    Measurements  Intervals                                Axis  Rate:       104                          P:          81  MN:         151                          QRS:        91  QRSD:       114                          T:          43  QT:         325  QTc:        428    Interpretive Statements  Sinus tachycardia  Right atrial enlargement  Borderline intraventricular conduction delay  Compared to ECG 10/10/2024 08:23:04  Atrial abnormality now present  Sinus rhythm no longer present     POCT glucose device results    Collection Time: 25 10:25 AM   Result Value Ref Range    POC Glucose, Blood 154 (H) 65 - 99 mg/dL   EKG (NOW)    Collection Time: 25 11:18 AM   Result Value Ref Range    Report       [facility] Emergency Dept.    Test Date:  2025  Pt Name:    FAIZAN GARDNER             Department: ER  MRN:        [id number]                      Room:        11  Gender:     Male                         Technician:  :        1960                   Requested By:FLOR KELLER  Order #:    [id number]                    Reading MD:    Measurements  Intervals                                Axis  Rate:       90                           P:          77  MN:         182                          QRS:        87  QRSD:       149                          T:          18  QT:         388  QTc:        475    Interpretive Statements  Sinus rhythm  Probable left atrial enlargement  Right bundle branch block  Extensive anterior infarct, acute  Compared to ECG 2025 10:24:06  Right bundle-branch block now present  Myocardial infarct finding now present  Sinus tachycardia no longer present         I have independently interpreted this  EKG    RADIOLOGY/PROCEDURES   I have independently interpreted the diagnostic imaging associated with this visit and am waiting the final reading from the radiologist.   My preliminary interpretation is as follows: No obvious evidence of ICH or massive PE    Radiologist interpretation:  DX-CHEST-PORTABLE (1 VIEW)   Final Result      No acute cardiopulmonary abnormality.      CT-CTA CHEST PULMONARY ARTERY W/ RECONS   Final Result      1.  Suboptimal exam related to respiratory motion.   2.  No evidence of pulmonary embolism to the level of the segmental pulmonary arteries.   3.  Linear densities of the bilateral upper lobes, likely scarring.   4.  9 mm groundglass nodule of the right lower lobe. This may be infectious or inflammatory. Follow-up recommendations below.   5.  Sequela of prior granulomatous disease.               Ground Glass: CT at 6-12 months to confirm persistence, then CT every 2 years until 5 years      Part Solid: CT at 3-6 months to confirm persistence. If unchanged and solid component remains less than 6 mm, annual CT should be performed for 5 years.      Comments: In practice, part-solid nodules cannot be defined as such until equal to or greater than 6 mm, and nodules less than 6 mm do not usually require follow-up. Persistent part-solid nodules with solid components equal to or greater than 6 mm should    be considered highly suspicious.      Note: These recommendations do not apply to lung cancer screening, patients with immunosuppression, or patients with known primary cancer.      Fleischner Society 2017 Guidelines for Management of Incidentally Detected Pulmonary Nodules in Adults      CT-CSPINE WITHOUT PLUS RECONS   Final Result      CT of the cervical spine without contrast within normal limits.      CT-HEAD W/O   Final Result      No acute intracranial process.                    CRITICAL CARE  The very real possibilty of a deterioration of this patient's condition required the highest  level of my preparedness for sudden, emergent intervention.  I provided critical care services, which included medication orders, frequent reevaluations of the patient's condition and response to treatment, ordering and reviewing test results, and discussing the case with various consultants.  The critical care time associated with the care of the patient was 40 minutes. Review chart for interventions. This time is exclusive of any other billable procedures.     Please see nursing notes for CPR and resuscitation efforts    Intubation Procedure Note    Indication: airway protection    Consent: Unable to be obtained due to the emergent nature of this procedure.    Medications Used: rocuronium intravenously and propofol 80 mg intravenously    Procedure: The patient was placed in the appropriate position.  Cricoid pressure was utilized. blade was used. Intubation was performed by video laryngoscopy, using a a CMAC.  Original attempt by EMT student unsuccessful, patient became acutely severely hypoxic, Had cardiac arrest, chest compressions were initiated, I immediately intubated patient shortly thereafter and I then placed ET tube an 8.0 cuffed endotracheal tube.  The cuff was then inflated and the tube was secured appropriately at a distance of 22 cm to the dental ridge.  Initial confirmation of placement included bilateral breath sounds, an end tidal CO2 detector, absence of sounds over the stomach, tube fogging, adequate chest rise, adequate pulse oximetry reading, and improved skin color.  A chest x-ray to verify correct placement of the tube showed appropriate tube position.    The patient tolerated the procedure well.     Complications:   multiple attempts      COURSE & MEDICAL DECISION MAKING    ASSESSMENT, COURSE AND PLAN  Care Narrative: Patient here, significantly altered, recent seizure, evidence of head trauma.  Patient with casted left foot.  Unclear etiology, possible ICH.  Given high clinical suspicion  TBI was called and patient sent directly to CT.  In the interim basic labs were checked and given patient's questionable history of suicidal in the past salicylate and acetaminophen levels were checked.  As CT did not show any immediate evidence of bleed or cervical spine trauma, I ordered a CTA given high, suspicion of possible pulm embolus as the cause of patient symptoms.  Upon returning to room patient with worsening airway protection, now with no gag, and borderline hypoxic.  Given patient's inability to protect his airway we decided to intubate patient.  Please see intubation note above, the initial attempt was performed by EMT student and unfortunately was not successful, patient had almost immediate severe hypoxia, and shoulders after arrested.  Chest compressions were started, patient was given epinephrine, bicarb and calcium chloride.  I intubated patient without issue following this but patient remained in cardiac arrest.  Despite multiple attempts patient remained in asystolic cardiac arrest.  At some point patient developed V. tach without a pulse, he was shocked and following this regained pulses.  Patient was started on the epi drip.  Chest x-ray was performed which confirmed tube placement, unfortunately however shortly thereafter patient developed cardiac arrest again, this was an asystolic arrest, received more bicarb, more calcium, but unfortunately remained in asystole and time of death was called at 1148.  After patient , salicylate levels returned significantly elevated likely consistent with overdose.  I updated family.                       FINAL DIAGNOSIS  1. Closed head injury, initial encounter        2. Seizure (HCC)        3. Cardiac arrest (HCC)        4. Altered mental status, unspecified altered mental status type

## 2025-05-07 NOTE — ED TRIAGE NOTES
Chief Complaint   Patient presents with    Seizure     Patient had witnessed seizure on arrival to ED with EMS 15-20 seconds.     T-5000 Head Injury     Patient has laceration to head.

## 2025-05-07 NOTE — DISCHARGE PLANNING
Pt brother arrived to the ED, SW brought him to the family room. Pt brother Frantz was notified by ERP that Pt had passed away. SW  provided support and brother is calling family at this time. SW will continue to assist and provide support to Pt brother.

## 2025-05-07 NOTE — ED NOTES
Patient back from CT. ERP at bedside. Respiratory called.       Time out called 1055 for intubation. Medications given. Patient became PEA on monitor. Patient intubated. MICHEL achieved. Patient became bradycardic on monitors. ERP at bedside. CPR started. ERp at bedside with ultrasound no cardiac activity.   See Code narrator.

## 2025-05-07 NOTE — DISCHARGE PLANNING
KELLY contacted Pt brother and POA to notify Pt  had lost pulses and they were beginning CPR. Pt brother was aware he was brought to the hospital and he will now come to the hospital. KELLY will keep him updated by phone.